# Patient Record
Sex: FEMALE | Race: WHITE | NOT HISPANIC OR LATINO
[De-identification: names, ages, dates, MRNs, and addresses within clinical notes are randomized per-mention and may not be internally consistent; named-entity substitution may affect disease eponyms.]

---

## 2022-12-29 ENCOUNTER — TRANSCRIPTION ENCOUNTER (OUTPATIENT)
Age: 72
End: 2022-12-29

## 2022-12-30 ENCOUNTER — RESULT REVIEW (OUTPATIENT)
Age: 72
End: 2022-12-30

## 2022-12-30 ENCOUNTER — INPATIENT (INPATIENT)
Facility: HOSPITAL | Age: 72
LOS: 12 days | Discharge: NURSING, REHAB OR LONG-TERM CARE | DRG: 23 | End: 2023-01-12
Attending: NEUROLOGICAL SURGERY | Admitting: NEUROLOGICAL SURGERY
Payer: MEDICARE

## 2022-12-30 ENCOUNTER — TRANSCRIPTION ENCOUNTER (OUTPATIENT)
Age: 72
End: 2022-12-30

## 2022-12-30 VITALS
OXYGEN SATURATION: 100 % | DIASTOLIC BLOOD PRESSURE: 73 MMHG | HEART RATE: 58 BPM | SYSTOLIC BLOOD PRESSURE: 168 MMHG | RESPIRATION RATE: 12 BRPM

## 2022-12-30 LAB
ANION GAP SERPL CALC-SCNC: 10 MMOL/L — SIGNIFICANT CHANGE UP (ref 5–17)
ANION GAP SERPL CALC-SCNC: 8 MMOL/L — SIGNIFICANT CHANGE UP (ref 5–17)
APTT BLD: 24.2 SEC — LOW (ref 27.5–35.5)
BASE EXCESS BLDA CALC-SCNC: -0.8 MMOL/L — SIGNIFICANT CHANGE UP (ref -2–3)
BLD GP AB SCN SERPL QL: NEGATIVE — SIGNIFICANT CHANGE UP
BLD GP AB SCN SERPL QL: NEGATIVE — SIGNIFICANT CHANGE UP
BUN SERPL-MCNC: 13 MG/DL — SIGNIFICANT CHANGE UP (ref 7–23)
BUN SERPL-MCNC: 13 MG/DL — SIGNIFICANT CHANGE UP (ref 7–23)
CALCIUM SERPL-MCNC: 7.4 MG/DL — LOW (ref 8.4–10.5)
CALCIUM SERPL-MCNC: 8.4 MG/DL — SIGNIFICANT CHANGE UP (ref 8.4–10.5)
CHLORIDE SERPL-SCNC: 102 MMOL/L — SIGNIFICANT CHANGE UP (ref 96–108)
CHLORIDE SERPL-SCNC: 104 MMOL/L — SIGNIFICANT CHANGE UP (ref 96–108)
CO2 BLDA-SCNC: 25 MMOL/L — HIGH (ref 19–24)
CO2 SERPL-SCNC: 24 MMOL/L — SIGNIFICANT CHANGE UP (ref 22–31)
CO2 SERPL-SCNC: 28 MMOL/L — SIGNIFICANT CHANGE UP (ref 22–31)
CREAT SERPL-MCNC: 0.62 MG/DL — SIGNIFICANT CHANGE UP (ref 0.5–1.3)
CREAT SERPL-MCNC: 0.65 MG/DL — SIGNIFICANT CHANGE UP (ref 0.5–1.3)
EGFR: 94 ML/MIN/1.73M2 — SIGNIFICANT CHANGE UP
EGFR: 95 ML/MIN/1.73M2 — SIGNIFICANT CHANGE UP
GAS PNL BLDA: SIGNIFICANT CHANGE UP
GLUCOSE SERPL-MCNC: 127 MG/DL — HIGH (ref 70–99)
GLUCOSE SERPL-MCNC: 155 MG/DL — HIGH (ref 70–99)
HCO3 BLDA-SCNC: 24 MMOL/L — SIGNIFICANT CHANGE UP (ref 21–28)
HCT VFR BLD CALC: 24.1 % — LOW (ref 34.5–45)
HCT VFR BLD CALC: 24.2 % — LOW (ref 34.5–45)
HCT VFR BLD CALC: 27.6 % — LOW (ref 34.5–45)
HGB BLD-MCNC: 8 G/DL — LOW (ref 11.5–15.5)
HGB BLD-MCNC: 8 G/DL — LOW (ref 11.5–15.5)
HGB BLD-MCNC: 9 G/DL — LOW (ref 11.5–15.5)
INR BLD: 1.25 — HIGH (ref 0.88–1.16)
MAGNESIUM SERPL-MCNC: 1.8 MG/DL — SIGNIFICANT CHANGE UP (ref 1.6–2.6)
MAGNESIUM SERPL-MCNC: 2 MG/DL — SIGNIFICANT CHANGE UP (ref 1.6–2.6)
MCHC RBC-ENTMCNC: 28.9 PG — SIGNIFICANT CHANGE UP (ref 27–34)
MCHC RBC-ENTMCNC: 29.3 PG — SIGNIFICANT CHANGE UP (ref 27–34)
MCHC RBC-ENTMCNC: 29.4 PG — SIGNIFICANT CHANGE UP (ref 27–34)
MCHC RBC-ENTMCNC: 32.6 GM/DL — SIGNIFICANT CHANGE UP (ref 32–36)
MCHC RBC-ENTMCNC: 33.1 GM/DL — SIGNIFICANT CHANGE UP (ref 32–36)
MCHC RBC-ENTMCNC: 33.2 GM/DL — SIGNIFICANT CHANGE UP (ref 32–36)
MCV RBC AUTO: 88.3 FL — SIGNIFICANT CHANGE UP (ref 80–100)
MCV RBC AUTO: 88.7 FL — SIGNIFICANT CHANGE UP (ref 80–100)
MCV RBC AUTO: 89 FL — SIGNIFICANT CHANGE UP (ref 80–100)
NRBC # BLD: 0 /100 WBCS — SIGNIFICANT CHANGE UP (ref 0–0)
PCO2 BLDA: 37 MMHG — SIGNIFICANT CHANGE UP (ref 32–45)
PH BLDA: 7.41 — SIGNIFICANT CHANGE UP (ref 7.35–7.45)
PHOSPHATE SERPL-MCNC: 3.5 MG/DL — SIGNIFICANT CHANGE UP (ref 2.5–4.5)
PHOSPHATE SERPL-MCNC: 3.8 MG/DL — SIGNIFICANT CHANGE UP (ref 2.5–4.5)
PLATELET # BLD AUTO: 198 K/UL — SIGNIFICANT CHANGE UP (ref 150–400)
PLATELET # BLD AUTO: 218 K/UL — SIGNIFICANT CHANGE UP (ref 150–400)
PLATELET # BLD AUTO: 236 K/UL — SIGNIFICANT CHANGE UP (ref 150–400)
PO2 BLDA: 183 MMHG — HIGH (ref 83–108)
POTASSIUM SERPL-MCNC: 3.9 MMOL/L — SIGNIFICANT CHANGE UP (ref 3.5–5.3)
POTASSIUM SERPL-MCNC: 3.9 MMOL/L — SIGNIFICANT CHANGE UP (ref 3.5–5.3)
POTASSIUM SERPL-SCNC: 3.9 MMOL/L — SIGNIFICANT CHANGE UP (ref 3.5–5.3)
POTASSIUM SERPL-SCNC: 3.9 MMOL/L — SIGNIFICANT CHANGE UP (ref 3.5–5.3)
PROTHROM AB SERPL-ACNC: 14.9 SEC — HIGH (ref 10.5–13.4)
RBC # BLD: 2.72 M/UL — LOW (ref 3.8–5.2)
RBC # BLD: 2.73 M/UL — LOW (ref 3.8–5.2)
RBC # BLD: 3.11 M/UL — LOW (ref 3.8–5.2)
RBC # FLD: 16.1 % — HIGH (ref 10.3–14.5)
RBC # FLD: 16.2 % — HIGH (ref 10.3–14.5)
RBC # FLD: 16.2 % — HIGH (ref 10.3–14.5)
RH IG SCN BLD-IMP: POSITIVE — SIGNIFICANT CHANGE UP
RH IG SCN BLD-IMP: POSITIVE — SIGNIFICANT CHANGE UP
SAO2 % BLDA: 98.4 % — HIGH (ref 94–98)
SARS-COV-2 RNA SPEC QL NAA+PROBE: SIGNIFICANT CHANGE UP
SODIUM SERPL-SCNC: 138 MMOL/L — SIGNIFICANT CHANGE UP (ref 135–145)
SODIUM SERPL-SCNC: 138 MMOL/L — SIGNIFICANT CHANGE UP (ref 135–145)
TROPONIN T SERPL-MCNC: 0.01 NG/ML — SIGNIFICANT CHANGE UP (ref 0–0.01)
WBC # BLD: 13.3 K/UL — HIGH (ref 3.8–10.5)
WBC # BLD: 13.63 K/UL — HIGH (ref 3.8–10.5)
WBC # BLD: 13.76 K/UL — HIGH (ref 3.8–10.5)
WBC # FLD AUTO: 13.3 K/UL — HIGH (ref 3.8–10.5)
WBC # FLD AUTO: 13.63 K/UL — HIGH (ref 3.8–10.5)
WBC # FLD AUTO: 13.76 K/UL — HIGH (ref 3.8–10.5)

## 2022-12-30 PROCEDURE — 61514 CRNEC TREPH EXC BRN ABS STTL: CPT

## 2022-12-30 PROCEDURE — 99291 CRITICAL CARE FIRST HOUR: CPT | Mod: 24

## 2022-12-30 PROCEDURE — 88309 TISSUE EXAM BY PATHOLOGIST: CPT | Mod: 26

## 2022-12-30 PROCEDURE — 88311 DECALCIFY TISSUE: CPT | Mod: 26

## 2022-12-30 DEVICE — SURGICEL 4 X 8": Type: IMPLANTABLE DEVICE | Status: FUNCTIONAL

## 2022-12-30 DEVICE — SURGIFLO HEMOSTATIC MATRIX KIT: Type: IMPLANTABLE DEVICE | Status: FUNCTIONAL

## 2022-12-30 DEVICE — COLLAGEN DURAMATRIX ONLAY 4X5IN: Type: IMPLANTABLE DEVICE | Status: FUNCTIONAL

## 2022-12-30 DEVICE — SURGIFOAM PAD 8CM X 12.5CM X 10MM (100): Type: IMPLANTABLE DEVICE | Status: FUNCTIONAL

## 2022-12-30 RX ORDER — ACETAMINOPHEN 500 MG
650 TABLET ORAL EVERY 6 HOURS
Refills: 0 | Status: DISCONTINUED | OUTPATIENT
Start: 2022-12-30 | End: 2022-12-31

## 2022-12-30 RX ORDER — DEXMEDETOMIDINE HYDROCHLORIDE IN 0.9% SODIUM CHLORIDE 4 UG/ML
0.2 INJECTION INTRAVENOUS
Qty: 400 | Refills: 0 | Status: DISCONTINUED | OUTPATIENT
Start: 2022-12-30 | End: 2022-12-31

## 2022-12-30 RX ORDER — VANCOMYCIN HCL 1 G
2000 VIAL (EA) INTRAVENOUS ONCE
Refills: 0 | Status: DISCONTINUED | OUTPATIENT
Start: 2022-12-31 | End: 2022-12-31

## 2022-12-30 RX ORDER — AMPICILLIN TRIHYDRATE 250 MG
CAPSULE ORAL
Refills: 0 | Status: DISCONTINUED | OUTPATIENT
Start: 2022-12-31 | End: 2022-12-31

## 2022-12-30 RX ORDER — LEVETIRACETAM 250 MG/1
1000 TABLET, FILM COATED ORAL EVERY 12 HOURS
Refills: 0 | Status: DISCONTINUED | OUTPATIENT
Start: 2022-12-30 | End: 2023-01-01

## 2022-12-30 RX ORDER — CEFEPIME 1 G/1
INJECTION, POWDER, FOR SOLUTION INTRAMUSCULAR; INTRAVENOUS
Refills: 0 | Status: DISCONTINUED | OUTPATIENT
Start: 2022-12-30 | End: 2022-12-30

## 2022-12-30 RX ORDER — FENTANYL CITRATE 50 UG/ML
50 INJECTION INTRAVENOUS
Refills: 0 | Status: DISCONTINUED | OUTPATIENT
Start: 2022-12-30 | End: 2022-12-31

## 2022-12-30 RX ORDER — AMPICILLIN TRIHYDRATE 250 MG
2 CAPSULE ORAL EVERY 4 HOURS
Refills: 0 | Status: DISCONTINUED | OUTPATIENT
Start: 2022-12-31 | End: 2022-12-31

## 2022-12-30 RX ORDER — PANTOPRAZOLE SODIUM 20 MG/1
40 TABLET, DELAYED RELEASE ORAL DAILY
Refills: 0 | Status: DISCONTINUED | OUTPATIENT
Start: 2022-12-30 | End: 2022-12-31

## 2022-12-30 RX ORDER — SENNA PLUS 8.6 MG/1
2 TABLET ORAL AT BEDTIME
Refills: 0 | Status: DISCONTINUED | OUTPATIENT
Start: 2022-12-30 | End: 2023-01-01

## 2022-12-30 RX ORDER — CHLORHEXIDINE GLUCONATE 213 G/1000ML
15 SOLUTION TOPICAL EVERY 12 HOURS
Refills: 0 | Status: DISCONTINUED | OUTPATIENT
Start: 2022-12-30 | End: 2022-12-31

## 2022-12-30 RX ORDER — AMPICILLIN TRIHYDRATE 250 MG
2 CAPSULE ORAL ONCE
Refills: 0 | Status: COMPLETED | OUTPATIENT
Start: 2022-12-30 | End: 2022-12-31

## 2022-12-30 RX ORDER — ONDANSETRON 8 MG/1
4 TABLET, FILM COATED ORAL EVERY 6 HOURS
Refills: 0 | Status: DISCONTINUED | OUTPATIENT
Start: 2022-12-30 | End: 2023-01-12

## 2022-12-30 RX ORDER — SODIUM CHLORIDE 9 MG/ML
1000 INJECTION INTRAMUSCULAR; INTRAVENOUS; SUBCUTANEOUS
Refills: 0 | Status: DISCONTINUED | OUTPATIENT
Start: 2022-12-30 | End: 2022-12-31

## 2022-12-30 NOTE — PROGRESS NOTE ADULT - SUBJECTIVE AND OBJECTIVE BOX
***********************************************  ADULT NSICU PROGRESS NOTE  JEMIMA DUFFY [id number] [facility] [id number]  ***********************************************    HPI: 73 yo RH F with no significant PMHx admitted to NSICU for management of subdural empyema/abscess in setting of pansinusitis.  Patient was transferred from [facility] in [place] where she originally presented with c/f AMS and dysarthria on 12/22.  Stroke alert was initially called, at which time CTH/CTP were unremarkable and the patient did not receive thrombolytic for unclear reason (though in retrospect we know this is CNS infection, no CVA).  Repeat stroke alert called 12/23, CTH showing SDH.  NSG recommending no surgical intervention.  Patient exhibited focal clinical seizure activity (twitching) for which she was empirically treated with LEV 1000/1000.  Ceribell showed no epileptiform activity.  UA(+) for which vanc/zosyn were started, but these abx were ultimately changed to vanc/CTX when a stability scan showed a L. frontoparietal low attenuation focus and an MRI showed a L. SD collection w/ difficusion restriction concerning for SD empyema and associated abscess.  Patient accepted by Dr. D'Amico to [facility] NSICU.    ROS: negative except per mentioned above in 24h interval events.      VITALS:    ICU Vital Signs Last 24 Hrs  T(C): 37.7 (30 Dec 2022 19:44), Max: 37.7 (30 Dec 2022 19:30)  T(F): 99.9 (30 Dec 2022 19:30), Max: 99.9 (30 Dec 2022 19:30)  HR: 60 (30 Dec 2022 19:44) (58 - 60)  BP: 168/73 (30 Dec 2022 19:44) (168/73 - 168/73)  BP(mean): 105 (30 Dec 2022 19:44) (105 - 105)  ABP: --  ABP(mean): --  RR: 12 (30 Dec 2022 19:44) (12 - 12)  SpO2: 99% (30 Dec 2022 19:44) (99% - 100%)    O2 Parameters below as of 30 Dec 2022 19:44      O2 Concentration (%): 50            I&O's Summary      EXAM:     Amorita Coma Scale: 1/1T/5 = 7    General: normocephalic, atraumatic, laying in bed, on vent & no sedation  Neuro     MS: Intubated and on mechanical ventilation, obtunded & not on sedation, eyes closed, but localizing to noxious stimuli    CN: PERRL, gaze conjugate, EOMI, face appears symmetric but activation not assessed    Mot: bulk normal, tone normal, antigravity bilateral UE (localizes), plain of bed bilateral LE (withdrawals)    Sens: intact to crude touch in bilateral upper and lower extremities  Chest: nonlabored respirations, coarse/mechanical breath sounds, heart regular rate/rhythm, present S1/S2, no murmurs or rubs  Abdomen: obese, soft and nontender without peritoneal signs, normoactive bowel sounds  Extremities: no clubbing, well-perfused, no edema    LABORATORY DATA:                            9.0    13.30 )-----------( 236      ( 30 Dec 2022 19:15 )             27.6     12-30    138  |  102  |  13  ----------------------------<  127<H>  3.9   |  28  |  0.62    Ca    8.4      30 Dec 2022 19:15  Phos  3.5     12-30  Mg     2.0     12-30      MEDICATIONS  (STANDING):  chlorhexidine 0.12% Liquid 15 milliLiter(s) Oral Mucosa every 12 hours    MEDICATIONS  (PRN):      ***********************************************  ASSESSMENT AND PLAN  ***********************************************    This is a case of a SD empyema/cerebral abscess in a 73 yo RH F with no significant PMHx (patient does not see physicians regularly).  Source of CNS infection likely pansinusitis.      NEURO  #Subdural empyema, cerebral abscess  #Clinical seizure activity at OSH  - Admit NSICU, Q1h neuro checks / Q1h vital signs  - OR for craniectomy/washout, Dr. D'Amico  - VEEG tomorrow if indicated and able from wound perspective  - LEV 1000/1000  - Upload OSH images  - ENT consult in AM  - Pain control, sedation w/ precedex    PULM  #Compromised airway due to neurological injury  #Mechanically ventilated  - SpO2 goal > 92%, supplemental O2 and pulm toileting as needed  - Baseline CXR/ABG    CARDIO  - BP goal: NORMOTENSION  - TTE, trend cardiac trop    GI  - Diet: NPO  - Stress ulcer prophylaxis: not indicated  - Bowel regimen, rectal tube in place from OSH    /RENAL  - Monitor UOP/volume status, BUN/SCr  - MIVF w/ D5 + 1/2NS    HEME  - Maintain Hb > 7.0, PLT > 100,000  - SCDs, holding chemoprophylaxis    ID  #CNS infection as above  - ID consultation, CT CAP w/ contrast, daily BCx until S/S identified, follow up surgical cultures, pan culture  - Vanco/CPM/ampicillin, but will defer abx selection to ID  - Monitor for infectious s/s, fever curve, leukocytosis    ENDO  - RASSI as needed    12-30-22 @ 22:11

## 2022-12-30 NOTE — H&P ADULT - HISTORY OF PRESENT ILLNESS
72F, txfered from [facility]. Presented to [facility] 7 days ago for AMS. CTH done 6 days ago showed spontaneous Left SD collection. MRI done today showed Left SD collection with diffusion restriction c/f empyema and infarct. Also showed Left sinus collection. Was also getting ceftriaxone for presumed UT at OSH.

## 2022-12-30 NOTE — H&P ADULT - NSHPLABSRESULTS_GEN_ALL_CORE
CTH done 6 days ago showed spontaneous Left SD collection. MRI done today showed Left SD collection with diffusion restriction c/f empyema and infarct. Also showed Left sinus collection. Was also getting ceftriaxone for presumed UT at OSH.

## 2022-12-31 LAB
ANION GAP SERPL CALC-SCNC: 8 MMOL/L — SIGNIFICANT CHANGE UP (ref 5–17)
APPEARANCE UR: CLEAR — SIGNIFICANT CHANGE UP
BACTERIA # UR AUTO: PRESENT /HPF
BILIRUB UR-MCNC: NEGATIVE — SIGNIFICANT CHANGE UP
BUN SERPL-MCNC: 13 MG/DL — SIGNIFICANT CHANGE UP (ref 7–23)
CALCIUM SERPL-MCNC: 7.9 MG/DL — LOW (ref 8.4–10.5)
CHLORIDE SERPL-SCNC: 99 MMOL/L — SIGNIFICANT CHANGE UP (ref 96–108)
CO2 SERPL-SCNC: 26 MMOL/L — SIGNIFICANT CHANGE UP (ref 22–31)
COLOR SPEC: YELLOW — SIGNIFICANT CHANGE UP
CREAT SERPL-MCNC: 0.63 MG/DL — SIGNIFICANT CHANGE UP (ref 0.5–1.3)
CRP SERPL-MCNC: 79.8 MG/L — HIGH (ref 0–4)
DIFF PNL FLD: ABNORMAL
EGFR: 95 ML/MIN/1.73M2 — SIGNIFICANT CHANGE UP
EPI CELLS # UR: SIGNIFICANT CHANGE UP /HPF (ref 0–5)
ERYTHROCYTE [SEDIMENTATION RATE] IN BLOOD: 122 MM/HR — HIGH
GLUCOSE BLDC GLUCOMTR-MCNC: 128 MG/DL — HIGH (ref 70–99)
GLUCOSE BLDC GLUCOMTR-MCNC: 137 MG/DL — HIGH (ref 70–99)
GLUCOSE BLDC GLUCOMTR-MCNC: 147 MG/DL — HIGH (ref 70–99)
GLUCOSE SERPL-MCNC: 147 MG/DL — HIGH (ref 70–99)
GLUCOSE UR QL: NEGATIVE — SIGNIFICANT CHANGE UP
GRAM STN FLD: SIGNIFICANT CHANGE UP
HCT VFR BLD CALC: 24.5 % — LOW (ref 34.5–45)
HCV AB S/CO SERPL IA: 0.04 S/CO — SIGNIFICANT CHANGE UP
HCV AB SERPL-IMP: SIGNIFICANT CHANGE UP
HGB BLD-MCNC: 7.9 G/DL — LOW (ref 11.5–15.5)
KETONES UR-MCNC: NEGATIVE — SIGNIFICANT CHANGE UP
LEUKOCYTE ESTERASE UR-ACNC: NEGATIVE — SIGNIFICANT CHANGE UP
MAGNESIUM SERPL-MCNC: 2.1 MG/DL — SIGNIFICANT CHANGE UP (ref 1.6–2.6)
MCHC RBC-ENTMCNC: 28.8 PG — SIGNIFICANT CHANGE UP (ref 27–34)
MCHC RBC-ENTMCNC: 32.2 GM/DL — SIGNIFICANT CHANGE UP (ref 32–36)
MCV RBC AUTO: 89.4 FL — SIGNIFICANT CHANGE UP (ref 80–100)
NITRITE UR-MCNC: NEGATIVE — SIGNIFICANT CHANGE UP
NRBC # BLD: 0 /100 WBCS — SIGNIFICANT CHANGE UP (ref 0–0)
PH UR: 5.5 — SIGNIFICANT CHANGE UP (ref 5–8)
PHOSPHATE SERPL-MCNC: 3.6 MG/DL — SIGNIFICANT CHANGE UP (ref 2.5–4.5)
PLATELET # BLD AUTO: 247 K/UL — SIGNIFICANT CHANGE UP (ref 150–400)
POTASSIUM SERPL-MCNC: 4 MMOL/L — SIGNIFICANT CHANGE UP (ref 3.5–5.3)
POTASSIUM SERPL-SCNC: 4 MMOL/L — SIGNIFICANT CHANGE UP (ref 3.5–5.3)
PROT UR-MCNC: NEGATIVE MG/DL — SIGNIFICANT CHANGE UP
RBC # BLD: 2.74 M/UL — LOW (ref 3.8–5.2)
RBC # FLD: 16.5 % — HIGH (ref 10.3–14.5)
RBC CASTS # UR COMP ASSIST: < 5 /HPF — SIGNIFICANT CHANGE UP
SODIUM SERPL-SCNC: 133 MMOL/L — LOW (ref 135–145)
SP GR SPEC: 1.01 — SIGNIFICANT CHANGE UP (ref 1–1.03)
SPECIMEN SOURCE: SIGNIFICANT CHANGE UP
UROBILINOGEN FLD QL: 0.2 E.U./DL — SIGNIFICANT CHANGE UP
VANCOMYCIN TROUGH SERPL-MCNC: 13.3 UG/ML — SIGNIFICANT CHANGE UP (ref 10–20)
WBC # BLD: 15.67 K/UL — HIGH (ref 3.8–10.5)
WBC # FLD AUTO: 15.67 K/UL — HIGH (ref 3.8–10.5)
WBC UR QL: < 5 /HPF — SIGNIFICANT CHANGE UP

## 2022-12-31 PROCEDURE — 70450 CT HEAD/BRAIN W/O DYE: CPT | Mod: 26

## 2022-12-31 PROCEDURE — 71045 X-RAY EXAM CHEST 1 VIEW: CPT | Mod: 26,77

## 2022-12-31 PROCEDURE — 36600 WITHDRAWAL OF ARTERIAL BLOOD: CPT | Mod: 59

## 2022-12-31 PROCEDURE — 71045 X-RAY EXAM CHEST 1 VIEW: CPT | Mod: 26

## 2022-12-31 PROCEDURE — 43752 NASAL/OROGASTRIC W/TUBE PLMT: CPT

## 2022-12-31 PROCEDURE — 36000 PLACE NEEDLE IN VEIN: CPT | Mod: 59

## 2022-12-31 PROCEDURE — 74177 CT ABD & PELVIS W/CONTRAST: CPT | Mod: 26

## 2022-12-31 PROCEDURE — 71260 CT THORAX DX C+: CPT | Mod: 26

## 2022-12-31 PROCEDURE — 99223 1ST HOSP IP/OBS HIGH 75: CPT

## 2022-12-31 PROCEDURE — 99291 CRITICAL CARE FIRST HOUR: CPT

## 2022-12-31 RX ORDER — ENOXAPARIN SODIUM 100 MG/ML
40 INJECTION SUBCUTANEOUS
Refills: 0 | Status: DISCONTINUED | OUTPATIENT
Start: 2022-12-31 | End: 2023-01-01

## 2022-12-31 RX ORDER — VANCOMYCIN HCL 1 G
2000 VIAL (EA) INTRAVENOUS EVERY 12 HOURS
Refills: 0 | Status: DISCONTINUED | OUTPATIENT
Start: 2022-12-31 | End: 2023-01-02

## 2022-12-31 RX ORDER — SODIUM CHLORIDE 0.65 %
1 AEROSOL, SPRAY (ML) NASAL
Refills: 0 | Status: DISCONTINUED | OUTPATIENT
Start: 2022-12-31 | End: 2023-01-12

## 2022-12-31 RX ORDER — FLUTICASONE PROPIONATE 50 MCG
1 SPRAY, SUSPENSION NASAL EVERY 12 HOURS
Refills: 0 | Status: DISCONTINUED | OUTPATIENT
Start: 2022-12-31 | End: 2023-01-12

## 2022-12-31 RX ORDER — INSULIN LISPRO 100/ML
VIAL (ML) SUBCUTANEOUS
Refills: 0 | Status: DISCONTINUED | OUTPATIENT
Start: 2022-12-31 | End: 2023-01-11

## 2022-12-31 RX ORDER — METRONIDAZOLE 500 MG
500 TABLET ORAL EVERY 8 HOURS
Refills: 0 | Status: DISCONTINUED | OUTPATIENT
Start: 2022-12-31 | End: 2022-12-31

## 2022-12-31 RX ORDER — DEXTROSE 50 % IN WATER 50 %
12.5 SYRINGE (ML) INTRAVENOUS ONCE
Refills: 0 | Status: DISCONTINUED | OUTPATIENT
Start: 2022-12-31 | End: 2022-12-31

## 2022-12-31 RX ORDER — CEFTRIAXONE 500 MG/1
INJECTION, POWDER, FOR SOLUTION INTRAMUSCULAR; INTRAVENOUS
Refills: 0 | Status: DISCONTINUED | OUTPATIENT
Start: 2022-12-31 | End: 2022-12-31

## 2022-12-31 RX ORDER — DEXTROSE 50 % IN WATER 50 %
25 SYRINGE (ML) INTRAVENOUS ONCE
Refills: 0 | Status: DISCONTINUED | OUTPATIENT
Start: 2022-12-31 | End: 2022-12-31

## 2022-12-31 RX ORDER — SODIUM CHLORIDE 9 MG/ML
1000 INJECTION INTRAMUSCULAR; INTRAVENOUS; SUBCUTANEOUS
Refills: 0 | Status: DISCONTINUED | OUTPATIENT
Start: 2022-12-31 | End: 2023-01-01

## 2022-12-31 RX ORDER — CHLORHEXIDINE GLUCONATE 213 G/1000ML
15 SOLUTION TOPICAL EVERY 12 HOURS
Refills: 0 | Status: DISCONTINUED | OUTPATIENT
Start: 2022-12-31 | End: 2022-12-31

## 2022-12-31 RX ORDER — CEFEPIME 1 G/1
2000 INJECTION, POWDER, FOR SOLUTION INTRAMUSCULAR; INTRAVENOUS EVERY 12 HOURS
Refills: 0 | Status: DISCONTINUED | OUTPATIENT
Start: 2022-12-31 | End: 2022-12-31

## 2022-12-31 RX ORDER — METRONIDAZOLE 500 MG
750 TABLET ORAL EVERY 8 HOURS
Refills: 0 | Status: DISCONTINUED | OUTPATIENT
Start: 2022-12-31 | End: 2023-01-03

## 2022-12-31 RX ORDER — CEFEPIME 1 G/1
2000 INJECTION, POWDER, FOR SOLUTION INTRAMUSCULAR; INTRAVENOUS ONCE
Refills: 0 | Status: COMPLETED | OUTPATIENT
Start: 2022-12-31 | End: 2022-12-31

## 2022-12-31 RX ORDER — CEFTRIAXONE 500 MG/1
2000 INJECTION, POWDER, FOR SOLUTION INTRAMUSCULAR; INTRAVENOUS EVERY 12 HOURS
Refills: 0 | Status: COMPLETED | OUTPATIENT
Start: 2022-12-31 | End: 2023-01-07

## 2022-12-31 RX ORDER — VANCOMYCIN HCL 1 G
2000 VIAL (EA) INTRAVENOUS EVERY 12 HOURS
Refills: 0 | Status: DISCONTINUED | OUTPATIENT
Start: 2022-12-31 | End: 2022-12-31

## 2022-12-31 RX ADMIN — FENTANYL CITRATE 50 MICROGRAM(S): 50 INJECTION INTRAVENOUS at 00:30

## 2022-12-31 RX ADMIN — Medication 100 MILLIGRAM(S): at 21:24

## 2022-12-31 RX ADMIN — ENOXAPARIN SODIUM 40 MILLIGRAM(S): 100 INJECTION SUBCUTANEOUS at 21:24

## 2022-12-31 RX ADMIN — CHLORHEXIDINE GLUCONATE 15 MILLILITER(S): 213 SOLUTION TOPICAL at 06:49

## 2022-12-31 RX ADMIN — Medication 500 MILLIGRAM(S): at 09:13

## 2022-12-31 RX ADMIN — LEVETIRACETAM 400 MILLIGRAM(S): 250 TABLET, FILM COATED ORAL at 18:00

## 2022-12-31 RX ADMIN — Medication 216 GRAM(S): at 06:02

## 2022-12-31 RX ADMIN — PANTOPRAZOLE SODIUM 40 MILLIGRAM(S): 20 TABLET, DELAYED RELEASE ORAL at 11:16

## 2022-12-31 RX ADMIN — Medication 1 SPRAY(S): at 18:32

## 2022-12-31 RX ADMIN — Medication 100 MILLIGRAM(S): at 13:51

## 2022-12-31 RX ADMIN — FENTANYL CITRATE 50 MICROGRAM(S): 50 INJECTION INTRAVENOUS at 01:17

## 2022-12-31 RX ADMIN — Medication 250 MILLIGRAM(S): at 02:09

## 2022-12-31 RX ADMIN — Medication 250 MILLIGRAM(S): at 17:42

## 2022-12-31 RX ADMIN — DEXMEDETOMIDINE HYDROCHLORIDE IN 0.9% SODIUM CHLORIDE 6.32 MICROGRAM(S)/KG/HR: 4 INJECTION INTRAVENOUS at 00:27

## 2022-12-31 RX ADMIN — Medication 1 SPRAY(S): at 23:16

## 2022-12-31 RX ADMIN — LEVETIRACETAM 400 MILLIGRAM(S): 250 TABLET, FILM COATED ORAL at 06:47

## 2022-12-31 RX ADMIN — SENNA PLUS 2 TABLET(S): 8.6 TABLET ORAL at 21:24

## 2022-12-31 RX ADMIN — CEFEPIME 100 MILLIGRAM(S): 1 INJECTION, POWDER, FOR SOLUTION INTRAMUSCULAR; INTRAVENOUS at 01:02

## 2022-12-31 RX ADMIN — Medication 216 GRAM(S): at 10:42

## 2022-12-31 RX ADMIN — Medication 216 GRAM(S): at 00:14

## 2022-12-31 RX ADMIN — CEFTRIAXONE 100 MILLIGRAM(S): 500 INJECTION, POWDER, FOR SOLUTION INTRAMUSCULAR; INTRAVENOUS at 17:42

## 2022-12-31 NOTE — CONSULT NOTE ADULT - SUBJECTIVE AND OBJECTIVE BOX
HPI:  72F, txfered from [facility]. Presented to [facility] 7 days ago for AMS. CTH done 6 days ago showed spontaneous Left SD collection. MRI done today showed Left SD collection with diffusion restriction c/f empyema and infarct. Also showed Left sinus collection. Was also getting ceftriaxone for presumed UT at OSH. (30 Dec 2022 19:41)      PAST MEDICAL & SURGICAL HISTORY:        REVIEW OF SYSTEMS:    UTO      ANTIBIOTICS:  MEDICATIONS  (STANDING):  cefTRIAXone   IVPB 2000 milliGRAM(s) IV Intermittent every 12 hours  dextrose 50% Injectable 25 Gram(s) IV Push once  dextrose 50% Injectable 12.5 Gram(s) IV Push once  enoxaparin Injectable 40 milliGRAM(s) SubCutaneous <User Schedule>  fluticasone propionate 50 MICROgram(s)/spray Nasal Spray 1 Spray(s) Both Nostrils every 12 hours  insulin lispro (ADMELOG) corrective regimen sliding scale   SubCutaneous Before meals and at bedtime  levETIRAcetam  IVPB 1000 milliGRAM(s) IV Intermittent every 12 hours  metroNIDAZOLE  IVPB 750 milliGRAM(s) IV Intermittent every 8 hours  senna 2 Tablet(s) Oral at bedtime  sodium chloride 0.65% Nasal 1 Spray(s) Both Nostrils four times a day  sodium chloride 0.9%. 1000 milliLiter(s) (30 mL/Hr) IV Continuous <Continuous>  vancomycin  IVPB 2000 milliGRAM(s) IV Intermittent every 12 hours    MEDICATIONS  (PRN):  ondansetron Injectable 4 milliGRAM(s) IV Push every 6 hours PRN Nausea and/or Vomiting      Allergies    Allergy Status Unknown    Intolerances        SOCIAL HISTORY:    FAMILY HISTORY:      Vital Signs Last 24 Hrs  T(C): 37.3 (31 Dec 2022 14:22), Max: 37.7 (30 Dec 2022 19:30)  T(F): 99.1 (31 Dec 2022 14:22), Max: 99.9 (30 Dec 2022 19:30)  HR: 75 (31 Dec 2022 15:00) (58 - 91)  BP: 136/57 (31 Dec 2022 15:00) (95/52 - 168/73)  BP(mean): 82 (31 Dec 2022 15:00) (71 - 105)  RR: 18 (31 Dec 2022 15:00) (12 - 20)  SpO2: 97% (31 Dec 2022 15:00) (97% - 100%)    Parameters below as of 31 Dec 2022 15:00  Patient On (Oxygen Delivery Method): room air        PHYSICAL EXAM:  Constitutional: NAD  Eyes: VISHAL, EOMI  Ear/Nose/Throat: no oral lesion, no sinus tenderness on percussion	  Neck: no JVD, no lymphadenopathy, supple  Respiratory: CTA larry  Cardiovascular: S1S2 RRR, no murmurs  Gastrointestinal: soft, (+) BS, no HSM  Extremities:no e/e/c  Vascular: DP Pulse: right normal; left normal            LABS:                        7.9    15.67 )-----------( 247      ( 31 Dec 2022 05:59 )             24.5     12-    133<L>  |  99  |  13  ----------------------------<  147<H>  4.0   |  26  |  0.63    Ca    7.9<L>      31 Dec 2022 05:59  Phos  3.6     12  Mg     2.1     12-      PT/INR - ( 30 Dec 2022 23:02 )   PT: 14.9 sec;   INR: 1.25          PTT - ( 30 Dec 2022 23:02 )  PTT:24.2 sec  Urinalysis Basic - ( 31 Dec 2022 07:25 )    Color: Yellow / Appearance: Clear / S.010 / pH: x  Gluc: x / Ketone: NEGATIVE  / Bili: Negative / Urobili: 0.2 E.U./dL   Blood: x / Protein: NEGATIVE mg/dL / Nitrite: NEGATIVE   Leuk Esterase: NEGATIVE / RBC: < 5 /HPF / WBC < 5 /HPF   Sq Epi: x / Non Sq Epi: 0-5 /HPF / Bacteria: Present /HPF        MICROBIOLOGY: Culture - Surgical Swab (22 @ 21:00)    Gram Stain:   Numerous White blood cells  No organisms seen    Specimen Source: .Surgical Swab 2- subdural infection or spec    Culture Results:   No growth to date        RADIOLOGY & ADDITIONAL STUDIES: < from: CT Head No Cont (22 @ 05:58) >  IMPRESSION:  1. Recent postsurgical changes as described above, with sulcal effacement   and patchy cerebral edema in the high left parietal and frontal lobes.   Comparison with any available prior studies would be helpful.  2. Pansinus mucosal disease with air-fluid levels, nonspecificfindings   in the setting of intubation.    < end of copied text >

## 2022-12-31 NOTE — CONSULT NOTE ADULT - ASSESSMENT
72F no PMH transferred from [place] with left subdural emypema s/p left hemicraniectomy and evacuation of empyema 12/30. ENT consulted for pansinusitis on imaging, MRI upload pending. On vanc, ampicillin, cefempime, flagyl. Afebrile, hemodynamically stable.    - No acute ENT surgical intervention at this time  - f/u MRI imaging upload  - continue IV abx per ID  - care per Neurosurgery/NSICU  - Notify ENT if any acute worsening  - d/w attending 72F no PMH transferred from [place] with left subdural emypema s/p left hemicraniectomy and evacuation of empyema 12/30. ENT consulted for pansinusitis on imaging, MRI upload pending. On vanc, ampicillin, cefempime, flagyl. OR cx 12/30 with GPC Afebrile, hemodynamically stable.    - No acute ENT surgical intervention at this time  - f/u MRI imaging upload  - continue IV abx per ID  - f/u Cx  - care per Neurosurgery/NSICU  - Notify ENT if any acute worsening  - d/w attending 72F no PMH transferred from [place] with left subdural emypema s/p left hemicraniectomy and evacuation of empyema 12/30. ENT consulted for pansinusitis on imaging, MRI upload pending. On vanc, ampicillin, cefempime, flagyl. OR cx 12/30 with GPC Afebrile, hemodynamically stable.    - No acute ENT surgical intervention at this time  - CT sinus stealth protocol  - f/u MRI imaging upload  - continue IV abx per ID  - f/u Cx  - care per Neurosurgery/NSICU  - Notify ENT if any acute worsening  - d/w attending 72F no PMH transferred from [place] with left subdural emypema s/p left hemicraniectomy and evacuation of empyema 12/30. ENT consulted for pansinusitis on imaging, MRI upload pending. On vanc, ampicillin, cefempime, flagyl. OR cx 12/30 with GPC Afebrile, hemodynamically stable.    - No acute ENT surgical intervention at this time  - CT sinus stealth protocol if stable enough  - f/u MRI imaging upload  - continue IV abx per ID  - f/u Cx  - care per Neurosurgery/NSICU  - Notify ENT if any acute worsening  - d/w attending 72F no PMH transferred from [place] with left subdural emypema s/p left hemicraniectomy and evacuation of empyema 12/30. ENT consulted for pansinusitis on imaging, MRI upload pending. On vanc, ampicillin, cefempime, flagyl. OR cx 12/30 with GPC Afebrile, hemodynamically stable.    - No acute ENT surgical intervention at this time  - CT sinus stealth protocol when stable  - Nasal saline QID  - Start flonase  - f/u MRI imaging upload  - continue IV abx per ID  - f/u Cx  - care per Neurosurgery/NSICU  - Notify ENT if any acute worsening  - d/w attending

## 2022-12-31 NOTE — PROGRESS NOTE ADULT - SUBJECTIVE AND OBJECTIVE BOX
HPI:  72F, txfered from [facility]. Presented to [facility] 7 days ago for AMS. CTH done 6 days ago showed spontaneous Left SD collection. MRI done today showed Left SD collection with diffusion restriction c/f empyema and infarct. Also showed Left sinus collection. Was also getting ceftriaxone for presumed UT at OSH. (30 Dec 2022 19:41)      Hospital Course:   12/31: Admitted to NSICU, s/p Left hemicraniectomy and washout of L subdural empyema. Pancultured. Vanc/cefepime/ampicillin/flagyl for empiric coverage.    Vital Signs Last 24 Hrs  T(C): 37.2 (31 Dec 2022 01:46), Max: 37.7 (30 Dec 2022 19:30)  T(F): 99 (31 Dec 2022 01:46), Max: 99.9 (30 Dec 2022 19:30)  HR: 67 (31 Dec 2022 02:00) (58 - 91)  BP: 105/51 (31 Dec 2022 02:00) (102/55 - 168/73)  BP(mean): 72 (31 Dec 2022 02:00) (72 - 105)  RR: 12 (31 Dec 2022 02:00) (12 - 14)  SpO2: 100% (31 Dec 2022 02:00) (99% - 100%)    Parameters below as of 31 Dec 2022 02:00  Patient On (Oxygen Delivery Method): ventilator    O2 Concentration (%): 40    I&O's Detail    30 Dec 2022 07:01  -  31 Dec 2022 02:51  --------------------------------------------------------  IN:    Dexmedetomidine: 50.2 mL    IV PiggyBack: 200 mL    sodium chloride 0.9%: 300 mL  Total IN: 550.2 mL    OUT:    Indwelling Catheter - Urethral (mL): 525 mL  Total OUT: 525 mL    Total NET: 25.2 mL        I&O's Summary    30 Dec 2022 07:01  -  31 Dec 2022 02:51  --------------------------------------------------------  IN: 550.2 mL / OUT: 525 mL / NET: 25.2 mL        PHYSICAL EXAM:  General: patient seen laying supine in bed intubated on FVS  Neuro: OE to noxious, LUE anti-gravity FC (squeezes hand), b/l LE FC (wiggles toes), RUE extensor posturing to noxious, +cough/gag/corneals   HEENT: L pupil 3 mm briskly reactive, R surgical pupil, +salem sump  Pulmonary: chest rise symmetric  Abdomen: soft, nontender, nondistended  Ext: perfusing well  Skin: warm, dry  Wound: L hemicraniectomy site c/d/i +dressing      TUBES/LINES:  [] CVC  [x] A-line  [] Lumbar Drain  [x] Ventriculostomy  [x] CHENCHO- x2  [] Cannon  [] NGT   [] Other    DIET:  [x] NPO  [] Mechanical  [] Tube feeds    LABS:                        8.0    13.76 )-----------( 218      ( 30 Dec 2022 23:02 )             24.2     12-30    138  |  104  |  13  ----------------------------<  155<H>  3.9   |  24  |  0.65    Ca    7.4<L>      30 Dec 2022 23:02  Phos  3.8     12-30  Mg     1.8     12-30      PT/INR - ( 30 Dec 2022 23:02 )   PT: 14.9 sec;   INR: 1.25          PTT - ( 30 Dec 2022 23:02 )  PTT:24.2 sec    CARDIAC MARKERS ( 30 Dec 2022 19:15 )  x     / 0.01 ng/mL / x     / x     / x          CAPILLARY BLOOD GLUCOSE          Drug Levels: [] N/A    CSF Analysis: [] N/A      Allergies    Allergy Status Unknown    Intolerances        Home Medications:      MEDICATIONS:  Antibiotics:  ampicillin  IVPB      ampicillin  IVPB 2 Gram(s) IV Intermittent every 4 hours  cefepime   IVPB 2000 milliGRAM(s) IV Intermittent every 12 hours  metroNIDAZOLE    Tablet 500 milliGRAM(s) Oral every 8 hours  vancomycin  IVPB 2000 milliGRAM(s) IV Intermittent every 12 hours    Neuro:  acetaminophen     Tablet .. 650 milliGRAM(s) Oral every 6 hours PRN  dexMEDEtomidine Infusion 0.2 MICROgram(s)/kG/Hr IV Continuous <Continuous>  fentaNYL    Injectable 50 MICROGram(s) IV Push every 2 hours PRN  levETIRAcetam  IVPB 1000 milliGRAM(s) IV Intermittent every 12 hours  ondansetron Injectable 4 milliGRAM(s) IV Push every 6 hours PRN    Anticoagulation:    OTHER:  chlorhexidine 0.12% Liquid 15 milliLiter(s) Oral Mucosa every 12 hours  pantoprazole  Injectable 40 milliGRAM(s) IV Push daily  senna 2 Tablet(s) Oral at bedtime    IVF:  sodium chloride 0.9%. 1000 milliLiter(s) IV Continuous <Continuous>    CULTURES:    RADIOLOGY & ADDITIONAL TESTS:  < from: Xray Chest 1 View AP/PA (12.31.22 @ 00:35) >  IMPRESSION:  Endotracheal tube in good position. Enteric tube with tip terminating in   the region of the gastric cardia, consider adjustment.        ******PRELIMINARY REPORT******      < end of copied text >      ASSESSMENT:  73yo F w/ no known PMHx transferred from [place] c/o slurred speech and right sided weakness. CTH initially negative, repeat on 12/23 showed L sided SDH, pts mental status worsened and CTH on 12/28 significant for L frontoparietal collection, MRI completed showed concern for possible empyema. S/p L hemicraniectomy and washout of L subdural empyema.     INTRACRANIALHEMORRIAGE    Handoff    Subdural empyema    Subdural empyema    Craniectomy or craniotomy, emergent    [id number]        PLAN:  NEURO   - neuro/vitals Q1/Q1  - Post op labs/CTH  - Post op VEEG if unable to arouse and able to from a wound perspective  - Cont Keppra 1000 BID  - Sedation w/ precedex, prn fentanyl pushes  - ENT consult  - 2 CHENCHO drains, monitor output     PULM   - AC/VC, baseline ABG/CXR, goal > 92%    CARDIO   - TTE  - < 140    GI   - NPO       - NS @75 while NPO    HEME   - SCDs, holding chemoppx    ID   - ID consult, CT CAP w/ IV con, daily BCx for now  - Pancultured 12/31, f/u cultures  - follow up OR cultures  - Start vanc/CPM/ampicillin/flagyl for empiric tx     ENDO  - repeat TFTs, RASS    D/w Dr. D'Amico       Assessment:  Present when checked    []  GCS  E   V  M     Heart Failure: []Acute, [] acute on chronic , []chronic  Heart Failure:  [] Diastolic (HFpEF), [] Systolic (HFrEF), []Combined (HFpEF and HFrEF), [] RHF, [] Pulm HTN, [] Other    [] DIONTE, [] ATN, [] AIN, [] other  [] CKD1, [] CKD2, [] CKD 3, [] CKD 4, [] CKD 5, []ESRD    Encephalopathy: [] Metabolic, [] Hepatic, [] toxic, [] Neurological, [] Other    Abnormal Nurtitional Status: [] malnurtition (see nutrition note), [ ]underweight: BMI < 19, [] morbid obesity: BMI >40, [] Cachexia    [] Sepsis  [] hypovolemic shock,[] cardiogenic shock, [] hemorrhagic shock, [] neuogenic shock  [] Acute Respiratory Failure  []Cerebral edema, [] Brain compression/ herniation,   [] Functional quadriplegia  [] Acute blood loss anemia

## 2022-12-31 NOTE — PROGRESS NOTE ADULT - ASSESSMENT
72y/F with  subdural empyema s/p L hemicraniectomy, evacuation, brain compression, cerebral edema  anemia  hyponatremia    PLAN:   NEURO: neurochecks q1h, PRN pain meds with acetaminophen, sedation with dexmedetomidine PRN fentanyl  VEEG, continue levetiracetam 1G BID  ENT consult  2 CHENCHO drains - keep for now, monitor output  REHAB:  physical therapy evaluation and management    EARLY MOB:  HOB up, bedrest    PULM:  wean from vent   CARDIO:  SBP goal 100-160mm Hg  ENDO:  Blood sugar goals 140-180 mg/dL, continue insulin sliding scale  GI:  PPI for GI prophylaxis while intubated  DIET: NPO  RENAL:   IVF, hyponatremia work-up, start 3%. recheck BMP this afternoon  HEM/ONC: check post-op Hb, anemia work-up  VTE Prophylaxis: SCDs, no DVT chemoprophylaxis for now as patient is high risk for bleed (fresh post-op); baseline LE Doppler for DVT suspected on admission (transfer)  ID: afebrile, leukocytosis, continue quad ABx, ID consult, f/u microbiologic work-up  Social: will update family    Active issues:  What's keeping patient in the ICU? intubated, fresh post-op  What is this patient's dispo plan?    ATTENDING ATTESTATION:  I was physically present for the key portions of the evaluation and management (E/M) service provided.  I agree with the above history, physical and plan, which I have reviewed and edited where appropriate.    Patient at high risk for neurological deterioration or death due to:  ICU delirium, aspiration PNA, DVT / PE.  Critical care time:  I have personally provided 45 minutes of critical care time, excluding time spent on separate procedures.      Plan discussed with RN, house staff. 72y/F with  subdural empyema s/p L hemicraniectomy, evacuation, brain compression, cerebral edema  anemia  hyponatremia    PLAN:   NEURO: neurochecks q1h, PRN pain meds with acetaminophen, sedation with dexmedetomidine RASS to 0 to -1, PRN fentanyl  defer VEEG for now, continue levetiracetam 1G BID  ENT consult, ?drainage  2 CHENCHO drains - keep for now, monitor output  REHAB:  physical therapy evaluation and management    EARLY MOB:  HOB up, bedrest    PULM:  wean from vent, CPAP 8/5   CARDIO:  SBP goal 100-160mm Hg  ENDO:  Blood sugar goals 140-180 mg/dL, continue insulin sliding scale  GI:  PPI for GI prophylaxis while intubated  DIET: NPO for extubation  RENAL:   IVF, hyponatremia work-up, start 3%. recheck BMP this afternoon  HEM/ONC: check post-op Hb, anemia work-up  VTE Prophylaxis: SCDs, no DVT chemoprophylaxis for now as patient is high risk for bleed (fresh post-op); baseline LE Doppler for DVT suspected on admission (transfer)  ID: afebrile, leukocytosis, continue quad ABx, ID consult, f/u microbiologic work-up; vanc trough prior to 4th dose, ESR CRP  Social: will update family (daughter)    Active issues:  What's keeping patient in the ICU? intubated, fresh post-op  What is this patient's dispo plan?    ATTENDING ATTESTATION:  I was physically present for the key portions of the evaluation and management (E/M) service provided.  I agree with the above history, physical and plan, which I have reviewed and edited where appropriate.    Patient at high risk for neurological deterioration or death due to:  ICU delirium, aspiration PNA, DVT / PE.  Critical care time:  I have personally provided 45 minutes of critical care time, excluding time spent on separate procedures.      Plan discussed with RN, house staff.

## 2022-12-31 NOTE — PROGRESS NOTE ADULT - SUBJECTIVE AND OBJECTIVE BOX
=================================  NEUROCRITICAL CARE ATTENDING NOTE  =================================    JEMIMA DUFFY   MRN-[id number]  Summary:  72y/F from [facility]. Presented 7 days ago with AMS. CTH done 6 days ago showed spontaneous Left SD collection. MRI done today showed Left SD collection with diffusion restriction c/f empyema and infarct. Also showed Left sinus collection. Was also getting ceftriaxone for presumed UT at OSH. (30 Dec 2022 19:41)    COURSE IN THE HOSPITAL:   Admitted to [facility], s/p OR       Past Medical History:   Allergies:  Allergy Status Unknown  Home meds:     PHYSICAL EXAMINATION  T(C): 37.7 ( @ 05:47), Max: 37.7 ( @ 19:30) HR: 70 ( @ 07:00) (58 - 91) BP: 95/52 ( @ 04:00) (95/52 - 168/73) RR: 14 ( @ 07:00) (12 - 17) SpO2: 100% ( @ 07:00) (99% - 100%)   NEUROLOGIC EXAMINATION:  Patient is    GENERAL:    EENT:  anicteric  CARDIOVASCULAR: (+) S1 S2, normal rate and regular rhythm  PULMONARY: clear to auscultation bilaterally  ABDOMEN: soft, nontender with normoactive bowel sounds  EXTREMITIES: no edema  SKIN: no rash    LABS:                        7.9    15.67 )-----------( 247      ( 31 Dec 2022 05:59 )             24.5     133<L>  |  99  |  13  ----------------------------<  147<H>  4.0   |  26  |  0.63    Ca    7.9<L>      31 Dec 2022 05:59  Phos  3.6       Mg     2.1      @ 07:01  -   @ 07:00  IN: 1131.4 mL / OUT: 1160 mL / NET: -28.6 mL    Bacteriology:  CSF studies:  EEG:  Neuroimagin/31 CT head: post-surgical changes, patchy cerebral edema high L parietal and frontal lobes, pansinus mucosal disease (near complete opacification of bilateral ethmoid and sphenoid sinuses), mod mucosal thickening R maxillary sinuses  Other imaging:    IV FLUIDS:  DRIPS:  DIET:  Lines:  Drains:      Bulb (mL): 60 mL  Wounds:    CODE STATUS:  Full Code                       GOALS OF CARE:  aggressive                      DISPOSITION:  ICU =================================  NEUROCRITICAL CARE ATTENDING NOTE  =================================    JEMIMA DUFFY   MRN-[id number]  Summary:  72y/F from [facility]. Presented 7 days ago with AMS. CTH done 6 days ago showed spontaneous Left SD collection. MRI done today showed Left SD collection with diffusion restriction c/f empyema and infarct. Also showed Left sinus collection. Was also getting ceftriaxone for presumed UT at OSH. (30 Dec 2022 19:41)    COURSE IN THE HOSPITAL:   Admitted to [facility], s/p OR   remained intubated overnight    Past Medical History:   Allergies:  Allergy Status Unknown  Home meds:     PHYSICAL EXAMINATION  T(C): 37.7 ( @ 05:47), Max: 37.7 ( @ 19:30) HR: 70 ( @ 07:00) (58 - 91) BP: 95/52 ( @ 04:00) (95/52 - 168/73) RR: 14 ( @ 07:00) (12 - 17) SpO2: 100% ( @ 07:00) (99% - 100%)   NEUROLOGIC EXAMINATION:  Patient is  awake, eyes open to voice, following commands, L 4mm brisk R 4mm sluggish, (+) cough/gag, L UE/LE 5/5, R UE withdraws to pain, R LE 4/5  GENERAL:  AC 50% 12 +5  EENT:  anicteric  CARDIOVASCULAR: (+) S1 S2, normal rate and regular rhythm  PULMONARY: clear to auscultation bilaterally  ABDOMEN: soft, nontender with normoactive bowel sounds  EXTREMITIES: no edema  SKIN: no rash    LABS:             (13.7)   7.9  (9.0)  15.67 )-----------( 247      ( 31 Dec 2022 05:59 )             24.5   (138)  133<L>  |  99  |  13  ----------------------------<  147<H>  4.0   |  26  |  0.63    Ca    7.9<L>      31 Dec 2022 05:59  Phos  3.6       Mg     2.1         CRP 79.8     @ 07:01  -   @ 07:00  IN: 1131.4 mL / OUT: 1160 mL / NET: -28.6 mL    Bacteriology:  UA NEG   surgical swab few GPCIPairs x1   surgical swab NEG x2    CSF studies:  EEG:  Neuroimagin/31 CT head: post-surgical changes, patchy cerebral edema high L parietal and frontal lobes, pansinus mucosal disease (near complete opacification of bilateral ethmoid and sphenoid sinuses), mod mucosal thickening R maxillary sinuses  Other imaging:    MEDICATIONS:     ·	ampicillin  IVPB 2 IV Intermittent every 4 hours  ·	cefepime   IVPB 2000 IV Intermittent every 12 hours  ·	metroNIDAZOLE    Tablet 500 Oral every 8 hours  ·	vancomycin  IVPB 2000 IV Intermittent every 12 hours  ·	levETIRAcetam  IVPB 1000 IV Intermittent every 12 hours  ·	pantoprazole  Injectable 40 IV Push daily  ·	senna 2 Oral at bedtime  ·	acetaminophen     Tablet .. 650 Oral every 6 hours PRN  ·	fentaNYL    Injectable 50 IV Push every 2 hours PRN  ·	ondansetron Injectable 4 IV Push every 6 hours PRN    IV FLUIDS: NS@75cc/hr  DRIPS: off precedex  DIET: NPO  Lines: R radial roz, midline  Drains:  Cannon     Bulb (mL): 60 mL  Bulb:  Wounds:    CODE STATUS:  Full Code                       GOALS OF CARE:  aggressive                      DISPOSITION:  ICU

## 2022-12-31 NOTE — CONSULT NOTE ADULT - ASSESSMENT
71 yo female p/w confusion, word-finding difficulty to OSH, found to have pansinusitis and L subdural empyema s/p L hemicraniotomy 12/30; per patient's daughter at bedside, unknown if patient had sinus infection preceding presentation and denies patient having recent dental work.   - f/u OR cx--GS with GPC in pairs  - continue vancomycin 2g IV q12h; check trough before 4th dose  - d/c cefepime; start ceftriaxone 2g IV q12h  - continue metronidazole--increase to 750mg IV q8h  - d/c ampicillin

## 2022-12-31 NOTE — CONSULT NOTE ADULT - SUBJECTIVE AND OBJECTIVE BOX
ENT Consult Note    HPI: 73 yo RH F with no significant PMHx admitted to NSICU for management of subdural empyema/abscess in setting of pansinusitis.  Patient was transferred from [facility] in [place] where she originally presented with c/f AMS and dysarthria on 12/22.  Stroke alert was initially called, at which time CTH/CTP were unremarkable and the patient did not receive thrombolytic for unclear reason (though in retrospect we know this is CNS infection, no CVA).  Repeat stroke alert called 12/23, CTH showing SDH.  NSG recommending no surgical intervention.  Patient exhibited focal clinical seizure activity (twitching) for which she was empirically treated with LEV 1000/1000.  Ceribell showed no epileptiform activity.  UA(+) for which vanc/zosyn were started, but these abx were ultimately changed to vanc/CTX when a stability scan showed a L. frontoparietal low attenuation focus and an MRI showed a L. SD collection w/ difficusion restriction concerning for SD empyema and associated abscess.  Patient accepted by Dr. D'Amico to [facility] NSICU    ENT Consult:  ENT consulted for sinusitis on imaging in setting of left subdural empyema s/p left hemicraniectomy and evacuation of empyema. Pt intubated at time of exam. CTH showing limited view of sinuses, pansinusitis involving sphenoids, ethmoid, frontal, and left maxillary>right maxillary. Air in frontal sinuses. Currently on vanc, cefepime, ampicillin, flagyl. ID following.    PAST MEDICAL & SURGICAL HISTORY:    MEDICATIONS  (STANDING):  ampicillin  IVPB      ampicillin  IVPB 2 Gram(s) IV Intermittent every 4 hours  cefepime   IVPB 2000 milliGRAM(s) IV Intermittent every 12 hours  dextrose 50% Injectable 25 Gram(s) IV Push once  dextrose 50% Injectable 12.5 Gram(s) IV Push once  enoxaparin Injectable 40 milliGRAM(s) SubCutaneous <User Schedule>  insulin lispro (ADMELOG) corrective regimen sliding scale   SubCutaneous Before meals and at bedtime  levETIRAcetam  IVPB 1000 milliGRAM(s) IV Intermittent every 12 hours  metroNIDAZOLE  IVPB 500 milliGRAM(s) IV Intermittent every 8 hours  pantoprazole  Injectable 40 milliGRAM(s) IV Push daily  senna 2 Tablet(s) Oral at bedtime  sodium chloride 0.9%. 1000 milliLiter(s) (75 mL/Hr) IV Continuous <Continuous>  vancomycin  IVPB 2000 milliGRAM(s) IV Intermittent every 12 hours    MEDICATIONS  (PRN):  ondansetron Injectable 4 milliGRAM(s) IV Push every 6 hours PRN Nausea and/or Vomiting    ICU Vital Signs Last 24 Hrs  T(C): 36.6 (31 Dec 2022 09:28), Max: 37.7 (30 Dec 2022 19:30)  T(F): 97.8 (31 Dec 2022 09:28), Max: 99.9 (30 Dec 2022 19:30)  HR: 79 (31 Dec 2022 13:00) (58 - 91)  BP: 123/56 (31 Dec 2022 13:00) (95/52 - 168/73)  BP(mean): 81 (31 Dec 2022 13:00) (71 - 105)  ABP: 149/50 (31 Dec 2022 13:00) (103/45 - 187/59)  ABP(mean): 78 (31 Dec 2022 13:00) (63 - 133)  RR: 20 (31 Dec 2022 13:00) (12 - 20)  SpO2: 100% (31 Dec 2022 13:00) (99% - 100%)    O2 Parameters below as of 31 Dec 2022 13:00  Patient On (Oxygen Delivery Method): mask, simple face    O2 Concentration (%): 40     ENT Consult Note    HPI: 73 yo RH F with no significant PMHx admitted to NSICU for management of subdural empyema/abscess in setting of pansinusitis.  Patient was transferred from [facility] in [place] where she originally presented with c/f AMS and dysarthria on 12/22.  Stroke alert was initially called, at which time CTH/CTP were unremarkable and the patient did not receive thrombolytic for unclear reason (though in retrospect we know this is CNS infection, no CVA).  Repeat stroke alert called 12/23, CTH showing SDH.  NSG recommending no surgical intervention.  Patient exhibited focal clinical seizure activity (twitching) for which she was empirically treated with LEV 1000/1000.  Ceribell showed no epileptiform activity.  UA(+) for which vanc/zosyn were started, but these abx were ultimately changed to vanc/CTX when a stability scan showed a L. frontoparietal low attenuation focus and an MRI showed a L. SD collection w/ difficusion restriction concerning for SD empyema and associated abscess.  Patient accepted by Dr. D'Amico to [facility] NSICU    ENT Consult:  ENT consulted for sinusitis on imaging in setting of left subdural empyema s/p left hemicraniectomy and evacuation of empyema. Pt intubated at time of exam. CTH showing limited view of sinuses, pansinusitis involving sphenoids, ethmoid, frontal, and left maxillary>right maxillary. Air in frontal sinuses. Currently on vanc, cefepime, ampicillin, flagyl. ID following.    PAST MEDICAL & SURGICAL HISTORY:    MEDICATIONS  (STANDING):  ampicillin  IVPB      ampicillin  IVPB 2 Gram(s) IV Intermittent every 4 hours  cefepime   IVPB 2000 milliGRAM(s) IV Intermittent every 12 hours  dextrose 50% Injectable 25 Gram(s) IV Push once  dextrose 50% Injectable 12.5 Gram(s) IV Push once  enoxaparin Injectable 40 milliGRAM(s) SubCutaneous <User Schedule>  insulin lispro (ADMELOG) corrective regimen sliding scale   SubCutaneous Before meals and at bedtime  levETIRAcetam  IVPB 1000 milliGRAM(s) IV Intermittent every 12 hours  metroNIDAZOLE  IVPB 500 milliGRAM(s) IV Intermittent every 8 hours  pantoprazole  Injectable 40 milliGRAM(s) IV Push daily  senna 2 Tablet(s) Oral at bedtime  sodium chloride 0.9%. 1000 milliLiter(s) (75 mL/Hr) IV Continuous <Continuous>  vancomycin  IVPB 2000 milliGRAM(s) IV Intermittent every 12 hours    MEDICATIONS  (PRN):  ondansetron Injectable 4 milliGRAM(s) IV Push every 6 hours PRN Nausea and/or Vomiting    ICU Vital Signs Last 24 Hrs  T(C): 36.6 (31 Dec 2022 09:28), Max: 37.7 (30 Dec 2022 19:30)  T(F): 97.8 (31 Dec 2022 09:28), Max: 99.9 (30 Dec 2022 19:30)  HR: 79 (31 Dec 2022 13:00) (58 - 91)  BP: 123/56 (31 Dec 2022 13:00) (95/52 - 168/73)  BP(mean): 81 (31 Dec 2022 13:00) (71 - 105)  ABP: 149/50 (31 Dec 2022 13:00) (103/45 - 187/59)  ABP(mean): 78 (31 Dec 2022 13:00) (63 - 133)  RR: 20 (31 Dec 2022 13:00) (12 - 20)  SpO2: 100% (31 Dec 2022 13:00) (99% - 100%)    O2 Parameters below as of 31 Dec 2022 13:00  Patient On (Oxygen Delivery Method): mask, simple face    O2 Concentration (%): 40    PHYSICAL EXAM:    CONSTITUTIONAL: Intubated and sedated.    HEAD: s/p left hemicraniectomy.  EARS: The right/left pinna was normal.   NOSE: Normal external nose. NGT in place, no judith drainage  ORAL CAVITY/OROPHARYNX: orally intubated  NECK: No cervical lymphadenopathy  RESPIRATORY: ETT, on vent.  CARDIAC: Warm extremities, no cyanosis.                          7.9    15.67 )-----------( 247      ( 31 Dec 2022 05:59 )             24.5     12-31    133<L>  |  99  |  13  ----------------------------<  147<H>  4.0   |  26  |  0.63    Ca    7.9<L>      31 Dec 2022 05:59  Phos  3.6     12-31  Mg     2.1     12-31       ENT Consult Note    HPI: 71 yo RH F with no significant PMHx admitted to NSICU for management of subdural empyema/abscess in setting of pansinusitis.  Patient was transferred from [facility] in [place] where she originally presented with c/f AMS and dysarthria on 12/22.  Stroke alert was initially called, at which time CTH/CTP were unremarkable and the patient did not receive thrombolytic for unclear reason (though in retrospect we know this is CNS infection, no CVA).  Repeat stroke alert called 12/23, CTH showing SDH.  NSG recommending no surgical intervention.  Patient exhibited focal clinical seizure activity (twitching) for which she was empirically treated with LEV 1000/1000.  Ceribell showed no epileptiform activity.  UA(+) for which vanc/zosyn were started, but these abx were ultimately changed to vanc/CTX when a stability scan showed a L. frontoparietal low attenuation focus and an MRI showed a L. SD collection w/ difficusion restriction concerning for SD empyema and associated abscess.  Patient accepted by Dr. D'Amico to [facility] NSICU    ENT Consult:  ENT consulted for sinusitis on imaging in setting of left subdural empyema s/p left hemicraniectomy and evacuation of empyema. Pt intubated at time of exam. CTH showing limited view of sinuses, pansinusitis involving sphenoids, ethmoid, frontal, and left maxillary>right maxillary. Air in frontal sinuses. Currently on vanc, cefepime, ampicillin, flagyl. ID following.    PAST MEDICAL & SURGICAL HISTORY:    MEDICATIONS  (STANDING):  ampicillin  IVPB      ampicillin  IVPB 2 Gram(s) IV Intermittent every 4 hours  cefepime   IVPB 2000 milliGRAM(s) IV Intermittent every 12 hours  dextrose 50% Injectable 25 Gram(s) IV Push once  dextrose 50% Injectable 12.5 Gram(s) IV Push once  enoxaparin Injectable 40 milliGRAM(s) SubCutaneous <User Schedule>  insulin lispro (ADMELOG) corrective regimen sliding scale   SubCutaneous Before meals and at bedtime  levETIRAcetam  IVPB 1000 milliGRAM(s) IV Intermittent every 12 hours  metroNIDAZOLE  IVPB 500 milliGRAM(s) IV Intermittent every 8 hours  pantoprazole  Injectable 40 milliGRAM(s) IV Push daily  senna 2 Tablet(s) Oral at bedtime  sodium chloride 0.9%. 1000 milliLiter(s) (75 mL/Hr) IV Continuous <Continuous>  vancomycin  IVPB 2000 milliGRAM(s) IV Intermittent every 12 hours    MEDICATIONS  (PRN):  ondansetron Injectable 4 milliGRAM(s) IV Push every 6 hours PRN Nausea and/or Vomiting    ICU Vital Signs Last 24 Hrs  T(C): 36.6 (31 Dec 2022 09:28), Max: 37.7 (30 Dec 2022 19:30)  T(F): 97.8 (31 Dec 2022 09:28), Max: 99.9 (30 Dec 2022 19:30)  HR: 79 (31 Dec 2022 13:00) (58 - 91)  BP: 123/56 (31 Dec 2022 13:00) (95/52 - 168/73)  BP(mean): 81 (31 Dec 2022 13:00) (71 - 105)  ABP: 149/50 (31 Dec 2022 13:00) (103/45 - 187/59)  ABP(mean): 78 (31 Dec 2022 13:00) (63 - 133)  RR: 20 (31 Dec 2022 13:00) (12 - 20)  SpO2: 100% (31 Dec 2022 13:00) (99% - 100%)    O2 Parameters below as of 31 Dec 2022 13:00  Patient On (Oxygen Delivery Method): mask, simple face    O2 Concentration (%): 40    PHYSICAL EXAM:    CONSTITUTIONAL: Intubated and sedated.    HEAD: s/p left hemicraniectomy.  EARS: The right/left pinna was normal.   NOSE: Normal external nose. NGT in place, no judith drainage  ORAL CAVITY/OROPHARYNX: orally intubated  NECK: No cervical lymphadenopathy  RESPIRATORY: ETT, on vent.  CARDIAC: Warm extremities, no cyanosis.                          7.9    15.67 )-----------( 247      ( 31 Dec 2022 05:59 )             24.5     12-31    133<L>  |  99  |  13  ----------------------------<  147<H>  4.0   |  26  |  0.63    Ca    7.9<L>      31 Dec 2022 05:59  Phos  3.6     12-31  Mg     2.1     12-31    CTH  1. Recent postsurgical changes as described above, with sulcal effacement and patchy cerebral edema in the high left parietal and frontal lobes. Comparison with any available prior studies would be helpful.  2. Pansinus mucosal disease with air-fluid levels, nonspecific findings in the setting of intubation.

## 2022-12-31 NOTE — CHART NOTE - NSCHARTNOTEFT_GEN_A_CORE
12/31: Admitted to NSICU, s/p Left hemicraniectomy and washout of L subdural empyema. Pancultured. Vanc/ceftriaxone/flagyl for empiric coverage. OR cultures demonstrated gram negative cocci in pairs. ENT and ID consulted, recommend cont vanc 2g, flagyl 750mg q8hrs, ceftriaxone 2g q12hrs. Extubated to room air, RUE/RLE strength improving. Pend CT sinus stealth protocol pend per ENT.

## 2023-01-01 LAB
A1C WITH ESTIMATED AVERAGE GLUCOSE RESULT: 6.1 % — HIGH (ref 4–5.6)
ANION GAP SERPL CALC-SCNC: 8 MMOL/L — SIGNIFICANT CHANGE UP (ref 5–17)
APTT BLD: 28.1 SEC — SIGNIFICANT CHANGE UP (ref 27.5–35.5)
BASOPHILS # BLD AUTO: 0.04 K/UL — SIGNIFICANT CHANGE UP (ref 0–0.2)
BASOPHILS NFR BLD AUTO: 0.3 % — SIGNIFICANT CHANGE UP (ref 0–2)
BUN SERPL-MCNC: 12 MG/DL — SIGNIFICANT CHANGE UP (ref 7–23)
CALCIUM SERPL-MCNC: 7.4 MG/DL — LOW (ref 8.4–10.5)
CHLORIDE SERPL-SCNC: 104 MMOL/L — SIGNIFICANT CHANGE UP (ref 96–108)
CO2 SERPL-SCNC: 27 MMOL/L — SIGNIFICANT CHANGE UP (ref 22–31)
CREAT SERPL-MCNC: 0.6 MG/DL — SIGNIFICANT CHANGE UP (ref 0.5–1.3)
CRP SERPL-MCNC: 133.2 MG/L — HIGH (ref 0–4)
EGFR: 96 ML/MIN/1.73M2 — SIGNIFICANT CHANGE UP
EOSINOPHIL # BLD AUTO: 0.19 K/UL — SIGNIFICANT CHANGE UP (ref 0–0.5)
EOSINOPHIL NFR BLD AUTO: 1.3 % — SIGNIFICANT CHANGE UP (ref 0–6)
ERYTHROCYTE [SEDIMENTATION RATE] IN BLOOD: 112 MM/HR — HIGH
ESTIMATED AVERAGE GLUCOSE: 128 MG/DL — HIGH (ref 68–114)
FERRITIN SERPL-MCNC: 593 NG/ML — HIGH (ref 15–150)
FOLATE SERPL-MCNC: 3.3 NG/ML — LOW
GLUCOSE BLDC GLUCOMTR-MCNC: 126 MG/DL — HIGH (ref 70–99)
GLUCOSE BLDC GLUCOMTR-MCNC: 149 MG/DL — HIGH (ref 70–99)
GLUCOSE BLDC GLUCOMTR-MCNC: 162 MG/DL — HIGH (ref 70–99)
GLUCOSE BLDC GLUCOMTR-MCNC: 165 MG/DL — HIGH (ref 70–99)
GLUCOSE SERPL-MCNC: 176 MG/DL — HIGH (ref 70–99)
HCT VFR BLD CALC: 20.9 % — CRITICAL LOW (ref 34.5–45)
HCT VFR BLD CALC: 22.6 % — LOW (ref 34.5–45)
HGB BLD-MCNC: 6.8 G/DL — CRITICAL LOW (ref 11.5–15.5)
HGB BLD-MCNC: 7.4 G/DL — LOW (ref 11.5–15.5)
IMM GRANULOCYTES NFR BLD AUTO: 1 % — HIGH (ref 0–0.9)
INR BLD: 1.27 — HIGH (ref 0.88–1.16)
IRON SATN MFR SERPL: 26 % — SIGNIFICANT CHANGE UP (ref 14–50)
IRON SATN MFR SERPL: 32 UG/DL — SIGNIFICANT CHANGE UP (ref 30–160)
LYMPHOCYTES # BLD AUTO: 1.79 K/UL — SIGNIFICANT CHANGE UP (ref 1–3.3)
LYMPHOCYTES # BLD AUTO: 12.6 % — LOW (ref 13–44)
MAGNESIUM SERPL-MCNC: 2 MG/DL — SIGNIFICANT CHANGE UP (ref 1.6–2.6)
MCHC RBC-ENTMCNC: 28.8 PG — SIGNIFICANT CHANGE UP (ref 27–34)
MCHC RBC-ENTMCNC: 29 PG — SIGNIFICANT CHANGE UP (ref 27–34)
MCHC RBC-ENTMCNC: 32.5 GM/DL — SIGNIFICANT CHANGE UP (ref 32–36)
MCHC RBC-ENTMCNC: 32.7 GM/DL — SIGNIFICANT CHANGE UP (ref 32–36)
MCV RBC AUTO: 88.6 FL — SIGNIFICANT CHANGE UP (ref 80–100)
MCV RBC AUTO: 88.6 FL — SIGNIFICANT CHANGE UP (ref 80–100)
MONOCYTES # BLD AUTO: 1 K/UL — HIGH (ref 0–0.9)
MONOCYTES NFR BLD AUTO: 7 % — SIGNIFICANT CHANGE UP (ref 2–14)
NEUTROPHILS # BLD AUTO: 11.08 K/UL — HIGH (ref 1.8–7.4)
NEUTROPHILS NFR BLD AUTO: 77.8 % — HIGH (ref 43–77)
NRBC # BLD: 0 /100 WBCS — SIGNIFICANT CHANGE UP (ref 0–0)
NRBC # BLD: 0 /100 WBCS — SIGNIFICANT CHANGE UP (ref 0–0)
PHOSPHATE SERPL-MCNC: 2.5 MG/DL — SIGNIFICANT CHANGE UP (ref 2.5–4.5)
PLATELET # BLD AUTO: 240 K/UL — SIGNIFICANT CHANGE UP (ref 150–400)
PLATELET # BLD AUTO: 243 K/UL — SIGNIFICANT CHANGE UP (ref 150–400)
POTASSIUM SERPL-MCNC: 3.5 MMOL/L — SIGNIFICANT CHANGE UP (ref 3.5–5.3)
POTASSIUM SERPL-SCNC: 3.5 MMOL/L — SIGNIFICANT CHANGE UP (ref 3.5–5.3)
PROTHROM AB SERPL-ACNC: 15.1 SEC — HIGH (ref 10.5–13.4)
RBC # BLD: 2.36 M/UL — LOW (ref 3.8–5.2)
RBC # BLD: 2.55 M/UL — LOW (ref 3.8–5.2)
RBC # FLD: 16.1 % — HIGH (ref 10.3–14.5)
RBC # FLD: 16.4 % — HIGH (ref 10.3–14.5)
SODIUM SERPL-SCNC: 139 MMOL/L — SIGNIFICANT CHANGE UP (ref 135–145)
TIBC SERPL-MCNC: 125 UG/DL — LOW (ref 220–430)
TRANSFERRIN SERPL-MCNC: 108 MG/DL — LOW (ref 200–360)
UIBC SERPL-MCNC: 93 UG/DL — LOW (ref 110–370)
VANCOMYCIN TROUGH SERPL-MCNC: 19.7 UG/ML — SIGNIFICANT CHANGE UP (ref 10–20)
VIT B12 SERPL-MCNC: 591 PG/ML — SIGNIFICANT CHANGE UP (ref 232–1245)
WBC # BLD: 13.43 K/UL — HIGH (ref 3.8–10.5)
WBC # BLD: 14.24 K/UL — HIGH (ref 3.8–10.5)
WBC # FLD AUTO: 13.43 K/UL — HIGH (ref 3.8–10.5)
WBC # FLD AUTO: 14.24 K/UL — HIGH (ref 3.8–10.5)

## 2023-01-01 PROCEDURE — 70486 CT MAXILLOFACIAL W/O DYE: CPT | Mod: 26

## 2023-01-01 PROCEDURE — 93970 EXTREMITY STUDY: CPT | Mod: 26

## 2023-01-01 PROCEDURE — 99292 CRITICAL CARE ADDL 30 MIN: CPT

## 2023-01-01 PROCEDURE — 99232 SBSQ HOSP IP/OBS MODERATE 35: CPT

## 2023-01-01 PROCEDURE — 99291 CRITICAL CARE FIRST HOUR: CPT

## 2023-01-01 RX ORDER — SODIUM,POTASSIUM PHOSPHATES 278-250MG
1 POWDER IN PACKET (EA) ORAL ONCE
Refills: 0 | Status: COMPLETED | OUTPATIENT
Start: 2023-01-01 | End: 2023-01-01

## 2023-01-01 RX ORDER — HYDROMORPHONE HYDROCHLORIDE 2 MG/ML
0.25 INJECTION INTRAMUSCULAR; INTRAVENOUS; SUBCUTANEOUS ONCE
Refills: 0 | Status: DISCONTINUED | OUTPATIENT
Start: 2023-01-01 | End: 2023-01-01

## 2023-01-01 RX ORDER — MAGNESIUM SULFATE 500 MG/ML
2 VIAL (ML) INJECTION ONCE
Refills: 0 | Status: COMPLETED | OUTPATIENT
Start: 2023-01-01 | End: 2023-01-01

## 2023-01-01 RX ORDER — ENOXAPARIN SODIUM 100 MG/ML
40 INJECTION SUBCUTANEOUS EVERY 12 HOURS
Refills: 0 | Status: DISCONTINUED | OUTPATIENT
Start: 2023-01-01 | End: 2023-01-04

## 2023-01-01 RX ORDER — POTASSIUM CHLORIDE 20 MEQ
40 PACKET (EA) ORAL ONCE
Refills: 0 | Status: COMPLETED | OUTPATIENT
Start: 2023-01-01 | End: 2023-01-01

## 2023-01-01 RX ORDER — HYDRALAZINE HCL 50 MG
10 TABLET ORAL
Refills: 0 | Status: DISCONTINUED | OUTPATIENT
Start: 2023-01-01 | End: 2023-01-02

## 2023-01-01 RX ORDER — LEVETIRACETAM 250 MG/1
1000 TABLET, FILM COATED ORAL
Refills: 0 | Status: DISCONTINUED | OUTPATIENT
Start: 2023-01-01 | End: 2023-01-08

## 2023-01-01 RX ORDER — SENNA PLUS 8.6 MG/1
2 TABLET ORAL AT BEDTIME
Refills: 0 | Status: DISCONTINUED | OUTPATIENT
Start: 2023-01-01 | End: 2023-01-10

## 2023-01-01 RX ADMIN — ENOXAPARIN SODIUM 40 MILLIGRAM(S): 100 INJECTION SUBCUTANEOUS at 18:05

## 2023-01-01 RX ADMIN — Medication 100 MILLIGRAM(S): at 13:29

## 2023-01-01 RX ADMIN — HYDROMORPHONE HYDROCHLORIDE 0.25 MILLIGRAM(S): 2 INJECTION INTRAMUSCULAR; INTRAVENOUS; SUBCUTANEOUS at 13:31

## 2023-01-01 RX ADMIN — Medication 1 SPRAY(S): at 08:22

## 2023-01-01 RX ADMIN — Medication 1 SPRAY(S): at 07:30

## 2023-01-01 RX ADMIN — Medication 250 MILLIGRAM(S): at 19:00

## 2023-01-01 RX ADMIN — Medication 1 PACKET(S): at 07:27

## 2023-01-01 RX ADMIN — Medication 1 SPRAY(S): at 18:21

## 2023-01-01 RX ADMIN — Medication 10 MILLIGRAM(S): at 09:55

## 2023-01-01 RX ADMIN — Medication 1 SPRAY(S): at 13:00

## 2023-01-01 RX ADMIN — Medication 2: at 18:00

## 2023-01-01 RX ADMIN — HYDROMORPHONE HYDROCHLORIDE 0.25 MILLIGRAM(S): 2 INJECTION INTRAMUSCULAR; INTRAVENOUS; SUBCUTANEOUS at 13:01

## 2023-01-01 RX ADMIN — Medication 100 MILLIGRAM(S): at 06:52

## 2023-01-01 RX ADMIN — LEVETIRACETAM 1000 MILLIGRAM(S): 250 TABLET, FILM COATED ORAL at 18:02

## 2023-01-01 RX ADMIN — Medication 25 GRAM(S): at 12:00

## 2023-01-01 RX ADMIN — Medication 40 MILLIEQUIVALENT(S): at 07:26

## 2023-01-01 RX ADMIN — CEFTRIAXONE 100 MILLIGRAM(S): 500 INJECTION, POWDER, FOR SOLUTION INTRAMUSCULAR; INTRAVENOUS at 18:05

## 2023-01-01 RX ADMIN — Medication 1 SPRAY(S): at 23:56

## 2023-01-01 RX ADMIN — Medication 250 MILLIGRAM(S): at 09:00

## 2023-01-01 RX ADMIN — CEFTRIAXONE 100 MILLIGRAM(S): 500 INJECTION, POWDER, FOR SOLUTION INTRAMUSCULAR; INTRAVENOUS at 09:00

## 2023-01-01 RX ADMIN — Medication 2: at 06:55

## 2023-01-01 RX ADMIN — SENNA PLUS 2 TABLET(S): 8.6 TABLET ORAL at 21:49

## 2023-01-01 RX ADMIN — Medication 100 MILLIGRAM(S): at 21:49

## 2023-01-01 RX ADMIN — LEVETIRACETAM 400 MILLIGRAM(S): 250 TABLET, FILM COATED ORAL at 06:31

## 2023-01-01 NOTE — DIETITIAN INITIAL EVALUATION ADULT - OTHER CALCULATIONS
Based on Standards of Care pt within % IBW thus actual body weight used for all calculations. Needs adjusted for advanced age and critical care, non-vented needs (w/BMI >40 taken into account). Fluid recs per team.

## 2023-01-01 NOTE — OCCUPATIONAL THERAPY INITIAL EVALUATION ADULT - RANGE OF MOTION EXAMINATION, UPPER EXTREMITY
**RUE AROM shoulder flexion/extension ~75%, elbow flexion/extension ~75%, unable to assess wrist AROM 2/2 IV placement, no AROM noted in R thumb and digits/Left UE Active ROM was WNL (within normal limits)

## 2023-01-01 NOTE — SWALLOW BEDSIDE ASSESSMENT ADULT - PHARYNGEAL PHASE
Multiple swallows with thin liquids (3-4) despite small bolus size. Weak cough x1 noted after thin liquid trial. No overt signs of airway protection deficits noted with ice chip trials. Cannot rule out aspiration at bedside./Cough post oral intake/Multiple swallows Multiple swallows with thin liquids (3-4) despite small bolus size. Weak cough x1 noted after thin liquid trial. No overt signs of airway protection deficits noted with ice chip trials. Cannot rule out aspiration at bedside. Unable to assess vocal quality./Cough post oral intake/Multiple swallows

## 2023-01-01 NOTE — OCCUPATIONAL THERAPY INITIAL EVALUATION ADULT - GROSSLY INTACT, SENSORY
unable to accurately assess as pt unable to verbalize needs, and not following directions to nod yes/no or use hand signals

## 2023-01-01 NOTE — OCCUPATIONAL THERAPY INITIAL EVALUATION ADULT - NS ASR FOLLOW COMMAND OT EVAL
Able to follow ~75% single step commands and ~25% 2-step commands. Pt non-verbal throughout. Responds better to visual cues compared to verbal cues.

## 2023-01-01 NOTE — OCCUPATIONAL THERAPY INITIAL EVALUATION ADULT - VISUAL ASSESSMENT: TRACKING
able to track with (R) eye with increased verbal cues as pt with difficulty maintaining attention, L eye swollen shut

## 2023-01-01 NOTE — OCCUPATIONAL THERAPY INITIAL EVALUATION ADULT - MODIFIED CLINICAL TEST OF SENSORY INTEGRATION IN BALANCE TEST
Pt tolerated sitting upright at EOB for ~10 minutes with min-max A to maintain midline position as pt with posterior and R side lean

## 2023-01-01 NOTE — DIETITIAN INITIAL EVALUATION ADULT - COLLABORATION WITH OTHER PROVIDERS
RD has collaborated with team in regards to diet/EN/PN recs, ONS/nourishment recs, pt's overall nutritional status. Paged team.

## 2023-01-01 NOTE — SWALLOW BEDSIDE ASSESSMENT ADULT - ADDITIONAL RECOMMENDATIONS
Allow 2-3 ice chips/hour for swallow practice and hydration. This svc will f/u within 48 hours for reassessment.

## 2023-01-01 NOTE — CHART NOTE - NSCHARTNOTEFT_GEN_A_CORE
POD2 DEEPALI o/n, neuro stable, tolerating RA post extubation, pending CT sinus w/ stealth protocol per ENT. Blood consent obtained and pt transfused 1u prbc for HGB 7.9-> 6.8 -> 7.4. CHENCHO drain removed.  LE dopplers + B/L peroneal IM calf DVT, SQL increased to BID prophylactic dosing. Failed swallow eval, remains on tube feeds at goal.

## 2023-01-01 NOTE — PROCEDURE NOTE - GENERAL PROCEDURE DETAILS
Skin and drain site was prepped with chlorhexidine. Drain was taken off suction and anchoring suture was removed. Drain was pulled without resistance and tip was visualized and intact. 1 staple was placed at drain exit site.

## 2023-01-01 NOTE — PROGRESS NOTE ADULT - ASSESSMENT
72y/F with  subdural empyema s/p L hemicraniectomy, evacuation, brain compression, cerebral edema  anemia  hyponatremia  prediabetes  bilateral DVT (present on admission)    PLAN:   NEURO: neurochecks q2h, VSq1h, PRN pain meds with acetaminophen  seizure treatment: continue levetiracetam 1G PO BID  ENT consult, CT sinuses, fluticasone nasal spray  1 CHENCHO drain, monitor output  REHAB:  physical therapy evaluation and management    EARLY MOB:  HOB up, bedrest    PULM:  room air  CARDIO:  SBP goal 100-160mm Hg, PRN antihypertensives  ENDO:  Blood sugar goals 140-180 mg/dL, continue insulin sliding scale  GI:  bowel regimen   DIET: Jevity at goal   RENAL:   IVF, Na goal 135-145   HEM/ONC: Hb low - given 1 unit, get post-tranfusion CBC  VTE Prophylaxis: SCDs, SQL BID, bilateral DVT  ID: afebrile, leukocytosis improving , continue triple ABx, - Vanc ceftriaxone MNZ; ID consulted,  f/u microbiologic work-up; f/u vanc trough. ESR CRP - every 5 days  Social: daughter updated yesterday     Active issues:  What's keeping patient in the ICU? close neurochecks, aspiration risk  What is this patient's dispo plan? early stepdown tomorrow    ATTENDING ATTESTATION:  I was physically present for the key portions of the evaluation and management (E/M) service provided.  I agree with the above history, physical and plan, which I have reviewed and edited where appropriate.    Patient at high risk for neurological deterioration or death due to:  ICU delirium, aspiration PNA, DVT / PE.  Critical care time:  I have personally provided 45 minutes of critical care time, excluding time spent on separate procedures.      Plan discussed with RN, house staff. 72y/F with  subdural empyema s/p L hemicraniectomy, evacuation, brain compression, cerebral edema  anemia  hyponatremia  prediabetes  bilateral DVT (present on admission)    PLAN:   NEURO: neurochecks q2h, VSq1h, PRN pain meds with acetaminophen  seizure treatment: continue levetiracetam 1G PO BID  ENT consult, CT sinuses, fluticasone nasal spray  1 CHENCHO drain, monitor output  REHAB:  physical therapy evaluation and management    EARLY MOB:  HOB up, bedrest    PULM:  room air  CARDIO:  SBP goal 100-160mm Hg, PRN antihypertensives  ENDO:  Blood sugar goals 140-180 mg/dL, continue insulin sliding scale  GI:  bowel regimen   DIET: Jevity at goal   RENAL:   IVF, Na goal 135-145   HEM/ONC: Hb low - given 1 unit, get post-tranfusion CBC  VTE Prophylaxis: SCDs, SQL BID, bilateral DVT  ID: afebrile, leukocytosis improving , continue triple ABx, - Vanc ceftriaxone MNZ; ID consulted,  f/u microbiologic work-up; f/u vanc trough. ESR CRP - every 5 days  Social: daughter updated yesterday     Active issues:  What's keeping patient in the ICU? close neurochecks, aspiration risk  What is this patient's dispo plan? early stepdown tomorrow    ATTENDING ATTESTATION:  I was physically present for the key portions of the evaluation and management (E/M) service provided.  I agree with the above history, physical and plan, which I have reviewed and edited where appropriate.    Patient at high risk for neurological deterioration or death due to:  ICU delirium, aspiration PNA, DVT / PE.  Critical care time:  I have personally provided 30 minutes of critical care time, excluding time spent on separate procedures.      Plan discussed with RN, house staff.

## 2023-01-01 NOTE — PROGRESS NOTE ADULT - SUBJECTIVE AND OBJECTIVE BOX
HPI:  72F, txfered from [facility]. Presented to [facility] 7 days ago for AMS. CTH done 6 days ago showed spontaneous Left SD collection. MRI done today showed Left SD collection with diffusion restriction c/f empyema and infarct. Also showed Left sinus collection. Was also getting ceftriaxone for presumed UT at OSH. (30 Dec 2022 19:41)    Hospital Course:   : Admitted to NSICU, s/p Left hemicraniectomy and washout of L subdural empyema. Pancultured. Vanc/ceftriaxone/flagyl for empiric coverage. OR cultures demonstrated gram negative cocci in pairs. ENT and ID consulted, recommend cont vanc 2g, flagyl 750mg q8hrs, ceftriaxone 2g q12hrs. Extubated to room air, RUE/RLE strength improving. Pend CT sinus stealth protocol pend per ENT.   : DEEPALI o/n, neuro stable, tolerating RA post extubation, pending CT sinus w/ stealth protocol per ENT.         Vital Signs Last 24 Hrs  T(C): 37.3 (2023 01:09), Max: 37.8 (31 Dec 2022 18:02)  T(F): 99.2 (2023 01:09), Max: 100.1 (31 Dec 2022 18:02)  HR: 73 (2023 01:00) (60 - 82)  BP: 138/59 (2023 01:00) (95/52 - 160/69)  BP(mean): 85 (2023 01:00) (71 - 99)  RR: 15 (2023 01:00) (12 - 20)  SpO2: 98% (2023 01:00) (96% - 100%)    Parameters below as of 2023 01:00  Patient On (Oxygen Delivery Method): room air        I&O's Detail    30 Dec 2022 07:01  -  31 Dec 2022 07:00  --------------------------------------------------------  IN:    Dexmedetomidine: 56.4 mL    IV PiggyBack: 550 mL    sodium chloride 0.9%: 525 mL  Total IN: 1131.4 mL    OUT:    Bulb (mL): 60 mL    Indwelling Catheter - Urethral (mL): 1100 mL    Rectal Tube (mL): 0 mL  Total OUT: 1160 mL    Total NET: -28.6 mL      31 Dec 2022 07:01  -  2023 02:16  --------------------------------------------------------  IN:    Enteral Tube Flush: 60 mL    IV PiggyBack: 850 mL    Jevity 1.2: 200 mL    sodium chloride 0.9%: 330 mL    sodium chloride 0.9%: 600 mL  Total IN: 2040 mL    OUT:    Bulb (mL): 105 mL    Bulb (mL): 0 mL    Dexmedetomidine: 0 mL    Indwelling Catheter - Urethral (mL): 350 mL    Rectal Tube (mL): 0 mL    Voided (mL): 550 mL  Total OUT: 1005 mL    Total NET: 1035 mL        I&O's Summary    30 Dec 2022 07:01  -  31 Dec 2022 07:00  --------------------------------------------------------  IN: 1131.4 mL / OUT: 1160 mL / NET: -28.6 mL    31 Dec 2022 07:01  -  2023 02:16  --------------------------------------------------------  IN: 2040 mL / OUT: 1005 mL / NET: 1035 mL        PHYSICAL EXAM:  General: patient seen laying supine in bed in NAD  Neuro: OE to voice, smiles to command, R facial, LUE FC (squeezes hand) 4/5, LLE 4/5, RUE 3/5 (attempts to squeeze hand), RLE 3/5   HEENT: L pupil 4 mm reactive, R surgical pupil, EOMI  Pulmonary: chest rise symmetric  Abdomen: soft, nontender, nondistended  Ext: perfusing well  Skin: warm, dry  Wounds: L crani incision site c/d/i with dressing in place       TUBES/LINES:  [] CVC  [] A-line  [] Lumbar Drain  [] Ventriculostomy  [x] CHENCHO - 2x  [] Cannon  [x] NGT   [] Other    DIET:  [] NPO  [] Mechanical  [x] Tube feeds - NGT     LABS:                        7.9    15.67 )-----------( 247      ( 31 Dec 2022 05:59 )             24.5     12-    133<L>  |  99  |  13  ----------------------------<  147<H>  4.0   |  26  |  0.63    Ca    7.9<L>      31 Dec 2022 05:59  Phos  3.6       Mg     2.1     -      PT/INR - ( 30 Dec 2022 23:02 )   PT: 14.9 sec;   INR: 1.25          PTT - ( 30 Dec 2022 23:02 )  PTT:24.2 sec  Urinalysis Basic - ( 31 Dec 2022 07:25 )    Color: Yellow / Appearance: Clear / S.010 / pH: x  Gluc: x / Ketone: NEGATIVE  / Bili: Negative / Urobili: 0.2 E.U./dL   Blood: x / Protein: NEGATIVE mg/dL / Nitrite: NEGATIVE   Leuk Esterase: NEGATIVE / RBC: < 5 /HPF / WBC < 5 /HPF   Sq Epi: x / Non Sq Epi: 0-5 /HPF / Bacteria: Present /HPF      CARDIAC MARKERS ( 30 Dec 2022 19:15 )  x     / 0.01 ng/mL / x     / x     / x          CAPILLARY BLOOD GLUCOSE      POCT Blood Glucose.: 147 mg/dL (31 Dec 2022 21:49)  POCT Blood Glucose.: 137 mg/dL (31 Dec 2022 15:17)  POCT Blood Glucose.: 128 mg/dL (31 Dec 2022 11:24)      Drug Levels: [] N/A  Vancomycin Level, Trough: 13.3 ug/mL ( @ 16:19)    CSF Analysis: [] N/A      Allergies    Allergy Status Unknown    Intolerances        Home Medications:      MEDICATIONS:  Antibiotics:  cefTRIAXone   IVPB 2000 milliGRAM(s) IV Intermittent every 12 hours  metroNIDAZOLE  IVPB 750 milliGRAM(s) IV Intermittent every 8 hours  vancomycin  IVPB 2000 milliGRAM(s) IV Intermittent every 12 hours    Neuro:  levETIRAcetam  IVPB 1000 milliGRAM(s) IV Intermittent every 12 hours  ondansetron Injectable 4 milliGRAM(s) IV Push every 6 hours PRN    Anticoagulation:  enoxaparin Injectable 40 milliGRAM(s) SubCutaneous <User Schedule>    OTHER:  fluticasone propionate 50 MICROgram(s)/spray Nasal Spray 1 Spray(s) Both Nostrils every 12 hours  insulin lispro (ADMELOG) corrective regimen sliding scale   SubCutaneous Before meals and at bedtime  senna 2 Tablet(s) Oral at bedtime  sodium chloride 0.65% Nasal 1 Spray(s) Both Nostrils four times a day    IVF:  sodium chloride 0.9%. 1000 milliLiter(s) IV Continuous <Continuous>    CULTURES:  Culture Results:   No growth at 12 hours ( @ 02:42)  Culture Results:   No growth at 12 hours ( @ 02:42)    RADIOLOGY & ADDITIONAL TESTS:  < from: CT Head No Cont (22 @ 05:58) >  IMPRESSION:  1. Recent postsurgical changes as described above, with sulcal effacement   and patchy cerebral edema in the high left parietal and frontal lobes.   Comparison with any available prior studies would be helpful.  2. Pansinus mucosal disease with air-fluid levels, nonspecificfindings   in the setting of intubation.    --- End of Report ---    < end of copied text >  < from: Xray Chest 1 View- PORTABLE-Urgent (Xray Chest 1 View- PORTABLE-Urgent .) (22 @ 14:29) >  IMPRESSION: The upper lung zones are out of field of view. No focal   consolidation in the visualized lungs. There are no pleural effusions.   Interval placement of nasogastric tube with tip overlying the stomach.        ******PRELIMINARY REPORT******      < end of copied text >      ASSESSMENT:  71yo F w/ no known PMHx transferred from [place] c/o slurred speech and right sided weakness. CTH initially negative, repeat on  showed L sided SDH, pts mental status worsened and CTH on  significant for L frontoparietal collection, MRI completed showed concern for possible empyema. S/p L hemicraniectomy and washout of L subdural empyema 22.     INTRACRANIALHEMORRIAGE    Handoff    Subdural empyema    Subdural empyema    Craniectomy or craniotomy, emergent    [id number]        PLAN:  NEURO   - neuro/vitals Q1/Q1  - Post op CTH complete   - CT sinuses pending per ENT rec   - Cont Keppra 1000 BID  - Upload OSH images  - 2 CHENCHO drains, monitor output     PULM   - Extubated   - Satting well on room air     CARDIO   - TTE  -  < 160    GI   - NPO  - bowel regimen  - protonix   - rectal tube d/c'ed     RENAL  - NS @30  - voiding  - i's and o's     ENDO  - ISS  - monitor finger sticks   - repeat TFTs, RASS    HEME   - SCDs, SQL (q24 dosing)   - downtrending hgb  - iron deficiency anemia work up pending   - FOB pending     ID   - ID consult,   - Pancultured , f/u cultures  - follow up OR cultures  - vanc/ceftriaxone/metronidazole for presumed CNS infection (Pus in OR)     Dispo: Pending, pending PT/OT when appropriate     Discussed with Dr. D'Amico       Assessment:  Present when checked    []  GCS  E   V  M     Heart Failure: []Acute, [] acute on chronic , []chronic  Heart Failure:  [] Diastolic (HFpEF), [] Systolic (HFrEF), []Combined (HFpEF and HFrEF), [] RHF, [] Pulm HTN, [] Other    [] DIONTE, [] ATN, [] AIN, [] other  [] CKD1, [] CKD2, [] CKD 3, [] CKD 4, [] CKD 5, []ESRD    Encephalopathy: [] Metabolic, [] Hepatic, [] toxic, [] Neurological, [] Other    Abnormal Nurtitional Status: [] malnurtition (see nutrition note), [ ]underweight: BMI < 19, [] morbid obesity: BMI >40, [] Cachexia    [] Sepsis  [] hypovolemic shock,[] cardiogenic shock, [] hemorrhagic shock, [] neuogenic shock  [] Acute Respiratory Failure  []Cerebral edema, [] Brain compression/ herniation,   [] Functional quadriplegia  [] Acute blood loss anemia

## 2023-01-01 NOTE — OCCUPATIONAL THERAPY INITIAL EVALUATION ADULT - PLANNED THERAPY INTERVENTIONS, OT EVAL
Spoke with patient, aware of results. Patient states her cough is much better.  Will call with update if sx persist. ADL retraining/IADL retraining/balance training/bed mobility training/cognitive, visual perceptual/fine motor coordination training/motor coordination training/neuromuscular re-education/parent/caregiver training.../ROM/strengthening/transfer training

## 2023-01-01 NOTE — SWALLOW BEDSIDE ASSESSMENT ADULT - SWALLOW EVAL: ORAL MUSCULATURE
Limited visualization of oral cavity 2/2 lingual swelling./unable to assess due to poor participation/comprehension

## 2023-01-01 NOTE — SWALLOW BEDSIDE ASSESSMENT ADULT - ORAL PREPARATORY PHASE
Reduced bolus control and containment with first ice chip and thin liquid trials resulting in anterior loss. Oral prep improved with subsequent trials of ice chips.

## 2023-01-01 NOTE — PROGRESS NOTE ADULT - SUBJECTIVE AND OBJECTIVE BOX
=================================  NEUROCRITICAL CARE ATTENDING NOTE  =================================    JEMIMA DUFFY   MRN-[id number]  Summary:  72y/F from [facility]. Presented 7 days ago with AMS. CTH done 6 days ago showed spontaneous Left SD collection. MRI done today showed Left SD collection with diffusion restriction c/f empyema and infarct. Also showed Left sinus collection. Was also getting ceftriaxone for presumed UT at OSH. (30 Dec 2022 19:41)    COURSE IN THE HOSPITAL:   Admitted to [facility], s/p OR   remained intubated overnight    :      Past Medical History:   Allergies:  Allergy Status Unknown  Home meds:     PHYSICAL EXAMINATION  T(C): 37.4 ( @ 05:22), Max: 37.8 ( @ 18:02) HR: 55 ( @ 05:00) (55 - 82) BP: 130/58 ( @ 05:00) (116/56 - 160/69) RR: 18 ( @ 05:00) (15 - 20) SpO2: 97% ( @ 05:00) (96% - 100%)  NEUROLOGIC EXAMINATION:  Patient is  awake, eyes open to voice, following commands, L 4mm brisk R 4mm sluggish, (+) cough/gag, L UE/LE 5/5, R UE withdraws to pain, R LE 4/5  GENERAL:  AC 50% 12 +5  EENT:  anicteric  CARDIOVASCULAR: (+) S1 S2, bradycardic rate and regular rhythm  PULMONARY: clear to auscultation bilaterally  ABDOMEN: soft, nontender with normoactive bowel sounds  EXTREMITIES: no edema  SKIN: no rash    LABS:   CAPILLARY BLOOD GLUCOSE 162 147 137 128              (15.67)  6.8 (7.9)   14.24 )-----------( 240      ( 2023 04:53 )             20.9   (133)  139  |  104  |  12  ----------------------------<  176<H>  3.5   |  27  |  0.60    Ca    7.4<L>      2023 04:53  Phos  2.5       Mg     2.0      @ 07:01  -   @ 07:00  IN: 2310 mL / OUT: 1205 mL / NET: 1105 mL    CRP 79.8     Bacteriology:  UA NEG   surgical swab few GPCIPairs x1   surgical swab NEG x2    CSF studies:  EEG:  Neuroimagin/31 CT head: post-surgical changes, patchy cerebral edema high L parietal and frontal lobes, pansinus mucosal disease (near complete opacification of bilateral ethmoid and sphenoid sinuses), mod mucosal thickening R maxillary sinuses  Other imaging:    MEDICATIONS:     ·	enoxaparin Injectable 40 SubCutaneous <User Schedule>  ·	cefTRIAXone   IVPB 2000 IV Intermittent every 12 hours  ·	metroNIDAZOLE  IVPB 750 IV Intermittent every 8 hours  ·	vancomycin  IVPB 2000 IV Intermittent every 12 hours  ·	levETIRAcetam  IVPB 1000 IV Intermittent every 12 hours  ·	senna 2 Oral at bedtime  ·	insulin lispro (ADMELOG) corrective regimen sliding scale  SubCutaneous Before meals and at bedtime  ·	fluticasone propionate 50 MICROgram(s)/spray Nasal Spray 1 Both Nostrils every 12 hours  ·	sodium chloride 0.65% Nasal 1 Both Nostrils four times a day  ·	ondansetron Injectable 4 IV Push every 6 hours PRN    IV FLUIDS: NS@75cc/hr  DRIPS: off precedex  DIET: NPO  Lines: R radial roz, midline  Drains:  Cannon     Bulb (mL): 60 mL  Bulb:  Wounds:    CODE STATUS:  Full Code                       GOALS OF CARE:  aggressive                      DISPOSITION:  ICU =================================  NEUROCRITICAL CARE ATTENDING NOTE  =================================    JEMIMA DUFFY   [id number]  Summary:  72y/F from [facility]. Presented 7 days ago with AMS. CTH done 6 days ago showed spontaneous Left SD collection. MRI done today showed Left SD collection with diffusion restriction c/f empyema and infarct. Also showed Left sinus collection. Was also getting ceftriaxone for presumed UT at OSH. (30 Dec 2022 19:41)    COURSE IN THE HOSPITAL:   Admitted to [facility], s/p OR   remained intubated overnight; extubated     :   1 unit pRBC overnight    Past Medical History:   Allergies:  Allergy Status Unknown  Home meds:     PHYSICAL EXAMINATION  T(C): 37.4 ( @ 05:22), Max: 37.8 ( @ 18:02) HR: 55 ( @ 05:00) (55 - 82) BP: 130/58 ( @ 05:00) (116/56 - 160/69) RR: 18 ( @ 05:00) (15 - 20) SpO2: 97% ( @ 05:00) (96% - 100%)  NEUROLOGIC EXAMINATION:  Patient is  awake, eyes open to voice, following commands, L 4mm brisk R 4mm sluggish, L UE/LE 5/5, R UE 3/5, R LE 4/5  GENERAL:  not intubated, not in respiratory distress  EENT:  anicteric  CARDIOVASCULAR: (+) S1 S2, bradycardic rate and regular rhythm  PULMONARY: clear to auscultation bilaterally  ABDOMEN: soft, nontender with normoactive bowel sounds  EXTREMITIES: no edema  SKIN: no rash    LABS:   CAPILLARY BLOOD GLUCOSE 162 147 137 128  - 2 units coverage    (15.67)  6.8 (7.9)   14.24 )-----------( 240      ( 2023 04:53 )             20.9   (133)  139  |  104  |  12  ----------------------------<  176<H>  3.5   |  27  |  0.60    Ca    7.4<L>      2023 04:53  Phos  2.5       Mg     2.0      @ 07:01  -   @ 07:00  IN: 2310 mL / OUT: 1205 mL / NET: 1105 mL    CRP 79.8     Bacteriology:  UA NEG   Blood CS NG1D x2   surgical swab few GPCIPairs x1, nGTD   surgical swab NGTD    CSF studies:  EEG:  Neuroimagin/31 CT head: post-surgical changes, patchy cerebral edema high L parietal and frontal lobes, pansinus mucosal disease (near complete opacification of bilateral ethmoid and sphenoid sinuses), mod mucosal thickening R maxillary sinuses  Other imaging:    MEDICATIONS:     ·	enoxaparin Injectable 40 SubCutaneous <User Schedule>  ·	cefTRIAXone   IVPB 2000 IV Intermittent every 12 hours  ·	metroNIDAZOLE  IVPB 750 IV Intermittent every 8 hours  ·	vancomycin  IVPB 2000 IV Intermittent every 12 hours  ·	levETIRAcetam  IVPB 1000 IV Intermittent every 12 hours  ·	senna 2 Oral at bedtime  ·	insulin lispro (ADMELOG) corrective regimen sliding scale  SubCutaneous Before meals and at bedtime  ·	fluticasone propionate 50 MICROgram(s)/spray Nasal Spray 1 Both Nostrils every 12 hours  ·	sodium chloride 0.65% Nasal 1 Both Nostrils four times a day  ·	ondansetron Injectable 4 IV Push every 6 hours PRN    IV FLUIDS: IVL  DRIPS:   DIET: Jevity @50cc/hr  Lines: R radial roz, midline  Drains:  Cannon     Bulb (mL): 0 mL    Bulb (mL): 135 mL  Wounds:    CODE STATUS:  Full Code                       GOALS OF CARE:  aggressive                      DISPOSITION:  ICU

## 2023-01-01 NOTE — DIETITIAN INITIAL EVALUATION ADULT - PERTINENT MEDS FT
MEDICATIONS  (STANDING):  cefTRIAXone   IVPB 2000 milliGRAM(s) IV Intermittent every 12 hours  enoxaparin Injectable 40 milliGRAM(s) SubCutaneous every 12 hours  fluticasone propionate 50 MICROgram(s)/spray Nasal Spray 1 Spray(s) Both Nostrils every 12 hours  insulin lispro (ADMELOG) corrective regimen sliding scale   SubCutaneous Before meals and at bedtime  levETIRAcetam 1000 milliGRAM(s) Oral two times a day  metroNIDAZOLE  IVPB 750 milliGRAM(s) IV Intermittent every 8 hours  senna 2 Tablet(s) Oral at bedtime  sodium chloride 0.65% Nasal 1 Spray(s) Both Nostrils four times a day  vancomycin  IVPB 2000 milliGRAM(s) IV Intermittent every 12 hours    MEDICATIONS  (PRN):  hydrALAZINE Injectable 10 milliGRAM(s) IV Push every 2 hours PRN SBP >160  ondansetron Injectable 4 milliGRAM(s) IV Push every 6 hours PRN Nausea and/or Vomiting

## 2023-01-01 NOTE — PHYSICAL THERAPY INITIAL EVALUATION ADULT - PERTINENT HX OF CURRENT PROBLEM, REHAB EVAL
72F, txfered from [facility]. Presented to [facility] 7 days ago for AMS. CTH done 6 days ago showed spontaneous Left SD collection. MRI done today showed Left SD collection with diffusion restriction c/f empyema and infarct. Also showed Left sinus collection. Was also getting ceftriaxone for presumed UT at OSH.

## 2023-01-01 NOTE — OCCUPATIONAL THERAPY INITIAL EVALUATION ADULT - THERAPY FREQUENCY, OT EVAL
Scribe Attestation (For Scribes USE Only)... 3-5x/week Scribe Attestation (For Scribes USE Only).../Attending Attestation (For Attendings USE Only)...

## 2023-01-01 NOTE — DIETITIAN INITIAL EVALUATION ADULT - OTHER INFO
71yo F w/ no known PMHx. on 12/22 presented to [facility] c/o slurred speech and R sided weakness, stroke code called, imaging initially negative for stroke or hemorrhage, pt's neuro exam worsened and subsequent CTH the following day showed L sided SDH, admitted to ICU for further monitoring. Pt neuro declined on 12/25 with AMS requiring intubation, noted to have episodes of twitching concerning for seizures, started on keppra and EEG placed. No epileptiform activity noted. CTH on 12/28 significant for L frontoparietal collection, MRI completed showed concern for possible empyema. Daughter reported pt likely had a sinus infection recently, unsure of abx. At [place] pt started on ceftriaxone for UTI that was switched to vancomycin this am. Pt transferred to [facility] for further management and possible L hemicraniectomy and washout of L subdural empyema.     Pt seen on 8EA at bedside. Pt extubated on 12/31/22. Pt in bed resting, satting at 96% SpO2. MAP 79, trend >65. GI: s/s fecal incontinence noted. NS/NT abdomen; audible and normoactive bowel sounds per chart. No noted N/V/D by pt's RN/medical team. Most recent BM on 1/1/23. Alexandr: 14. Skin integrity: L head sx incision noted, lower lip wound noted. Generalized edema 1+ (trace) noted. Nonverbal indicators of pain absent. Allergies unknown per chart. Pertinent labs: elevated POCT BG (126), elevated A1c (6.1). Pt is currently NPO status with a nasogastric tube feeding: Jevity 1.2 started at 20mL/h to goal rate of 50mL/h continuously. Pertinent nutrition-related medications/supplements: pt is receiving insulin, senna, zofran, IV Abx and IV fluids for hydration/electrolyte repletion. No wt hx noted in chart; pt's .3kg (278.5#). Pt's IBW is 120#, pt is 232% of IBW. RD observed pt with no overt signs of muscle or fat wasting. Based on ASPEN guidelines, pt does not meet criteria for malnutrition at this time. Education deferred at this time r/t pt's clinical and cognitive status. RD will remain available per protocol. Additional nutrition recommendations listed below to follow.

## 2023-01-01 NOTE — OCCUPATIONAL THERAPY INITIAL EVALUATION ADULT - DIAGNOSIS, OT EVAL
Pt is s/p L hemicraniectomy and washout of subdural empyema (12/30) presents with impaired balance, R sided weakness (RUE weaker than RLE), impaired cognition in regards to command following and attention, and decreased activity tolerance impacting ability to perform ADLs and transfers at prior level of function.

## 2023-01-01 NOTE — OCCUPATIONAL THERAPY INITIAL EVALUATION ADULT - ORIENTATION, REHAB EVAL
unable to accurately assess as pt non-verbal and inconsistently following commands, pt responding to name

## 2023-01-01 NOTE — OCCUPATIONAL THERAPY INITIAL EVALUATION ADULT - GENERAL OBSERVATIONS, REHAB EVAL
Pt received semi-supine in bed, +tele, +crani CHENCHO x2, +crani staples C/D/I, +SCDs, +a-line, +purewick, in NAD and agreeable to OT. MRS 5. Pt received semi-supine in bed, +tele, +crani CHENCHO x2, +crani staples C/D/I, +SCDs, +a-line, +purewick, in NAD and agreeable to OT.

## 2023-01-01 NOTE — DIETITIAN INITIAL EVALUATION ADULT - NSFNSGIIOFT_GEN_A_CORE
12-31-22 @ 07:01 - 01-01-23 @ 07:00  --------------------------------------------------------  OUT:    Rectal Tube (mL): 0 mL  Total OUT: 0 mL    Total NET: 390 mL      01-01-23 @ 07:01 - 01-01-23 @ 20:25  --------------------------------------------------------  OUT:  Total OUT: 0 mL    Total NET: 650 mL

## 2023-01-01 NOTE — DIETITIAN INITIAL EVALUATION ADULT - ADD RECOMMEND
1. If medically feasible, recommend EN via NGT: Jevity 1.2 @ 65mL/hr x 24 hours. This provides: 1560mL TV,  1872kcal, 86.6g protein, 1259mL free water. Meetin non-protein kcal/kg, 1.56g/kg protein based on IBW 54.4kg. Defer fluids to team. Maintain aspiration precautions. Monitor s/s of intolerance; adjust EN as needed. Start at 20ml/hr, increase 10ml 6-8hrs or as tolerated until goal rate reached    2. Encourage pt to meet nutritional needs as able   3. Trend weights (weekly), monitor skin integrity, monitor labs (electrolytes, CMP), monitor GI fxn   4. Encourage adherence to diet education (reinforce as able)   5. Continue insulin regimen to promote euglycemia.    6. Pain and bowel regimen per team   7. Will continue to assess/honor preferences as able   8. Align nutrition interventions with GOC at all times

## 2023-01-01 NOTE — PROGRESS NOTE ADULT - ASSESSMENT
72y/F with  subdural empyema s/p L hemicraniectomy, evacuation, brain compression, cerebral edema  anemia  hyponatremia    PLAN:   NEURO: neurochecks q1h, PRN pain meds with acetaminophen, sedation with dexmedetomidine RASS to 0 to -1, PRN fentanyl  defer VEEG for now, continue levetiracetam 1G BID  ENT consult, ?drainage  2 CHENCHO drains - keep for now, monitor output  REHAB:  physical therapy evaluation and management    EARLY MOB:  HOB up, bedrest    PULM:  wean from vent, CPAP 8/5   CARDIO:  SBP goal 100-160mm Hg  ENDO:  Blood sugar goals 140-180 mg/dL, continue insulin sliding scale  GI:  PPI for GI prophylaxis while intubated  DIET: NPO for extubation  RENAL:   IVF, hyponatremia work-up, start 3%. recheck BMP this afternoon  HEM/ONC: check post-op Hb, anemia work-up  VTE Prophylaxis: SCDs, no DVT chemoprophylaxis for now as patient is high risk for bleed (fresh post-op); baseline LE Doppler for DVT suspected on admission (transfer)  ID: afebrile, leukocytosis, continue quad ABx, ID consult, f/u microbiologic work-up; vanc trough prior to 4th dose, ESR CRP  Social: will update family (daughter)    Active issues:  What's keeping patient in the ICU? intubated, fresh post-op  What is this patient's dispo plan?    ATTENDING ATTESTATION:  I was physically present for the key portions of the evaluation and management (E/M) service provided.  I agree with the above history, physical and plan, which I have reviewed and edited where appropriate.    Patient at high risk for neurological deterioration or death due to:  ICU delirium, aspiration PNA, DVT / PE.  Critical care time:  I have personally provided 45 minutes of critical care time, excluding time spent on separate procedures.      Plan discussed with RN, house staff. 72y/F with  subdural empyema s/p L hemicraniectomy, evacuation, brain compression, cerebral edema  anemia  hyponatremia  prediabetes    PLAN:   NEURO: neurochecks q2h, VSq1h, PRN pain meds with acetaminophen  seizure treatment: continue levetiracetam 1G PO BID  ENT consult, CT sinuses, fluticasone nasal spray  2 CHENCHO drains - keep 1 for now, monitor output, d/c CHENCHO 1  REHAB:  physical therapy evaluation and management    EARLY MOB:  HOB up, bedrest    PULM:  room air  CARDIO:  SBP goal 100-160mm Hg, PRN antihypertensives  ENDO:  Blood sugar goals 140-180 mg/dL, continue insulin sliding scale  GI:  bowel regimen   DIET: Jevity at goal   RENAL:   IVF, Na goal 135-145   HEM/ONC: Hb low - given 1 unit, get post-tranfusion CBC  VTE Prophylaxis: SCDs, SQL OD, may increase to BID if CBC stable tomorrow; baseline LE Doppler for DVT suspected on admission (transfer)  ID: afebrile, leukocytosis improving , continue triple ABx, - Vanc ceftriaxone MNZ; ID consulted,  f/u microbiologic work-up; vanc trough prior to 4th dose, ESR CRP - every 5 days  Social: daughter updated yesterday     Active issues:  What's keeping patient in the ICU? close neurochecks, aspiration risk  What is this patient's dispo plan? early stepdown tomorrow    ATTENDING ATTESTATION:  I was physically present for the key portions of the evaluation and management (E/M) service provided.  I agree with the above history, physical and plan, which I have reviewed and edited where appropriate.    Patient at high risk for neurological deterioration or death due to:  ICU delirium, aspiration PNA, DVT / PE.  Critical care time:  I have personally provided 45 minutes of critical care time, excluding time spent on separate procedures.      Plan discussed with RN, house staff.

## 2023-01-01 NOTE — DIETITIAN INITIAL EVALUATION ADULT - PERTINENT LABORATORY DATA
01-01    139  |  104  |  12  ----------------------------<  176<H>  3.5   |  27  |  0.60    Ca    7.4<L>      01 Jan 2023 04:53  Phos  2.5     01-01  Mg     2.0     01-01    POCT Blood Glucose.: 165 mg/dL (01-01-23 @ 17:05)  A1C with Estimated Average Glucose Result: 6.1 % (01-01-23 @ 04:53)

## 2023-01-01 NOTE — SWALLOW BEDSIDE ASSESSMENT ADULT - ASR SWALLOW LABIAL MOBILITY
Unable to complete lingual or labial ROM tasks 2/2 swelling. Of note, oral motor assessment limited by mental status.

## 2023-01-01 NOTE — OCCUPATIONAL THERAPY INITIAL EVALUATION ADULT - MODALITIES TREATMENT COMMENTS
Cranial nerves grossly assessed as pt at times inconsistently following commands benefits from visual cues > verbal cues. +R facial droop, +R tongue deviation, able to open/close (R) eye, L eye swollen shut. Difficulty maintaining attention to visually track during H-test but pt making eye contact throughout session. Pt able to point to nose, (L) ear, and (R) knee using LUE with verbal command.

## 2023-01-01 NOTE — SWALLOW BEDSIDE ASSESSMENT ADULT - SLP PERTINENT HISTORY OF CURRENT PROBLEM
73 yo RH F with no significant PMHx admitted to NSICU for management of subdural empyema/abscess in setting of pansinusitis.  Originally presented to [facility] with c/f AMS and dysarthria on 12/22.  Stroke alert was initially called, at which time CTH/CTP were unremarkable. Repeat stroke alert called 12/23, CTH showing SDH. Patient exhibited focal clinical seizure activity (twitching) for which she was empirically treated with LEV 1000/1000. UA(+) for which vanc/zosyn were started, but these abx were ultimately changed to vanc/CTX when a stability scan showed a L. frontoparietal low attenuation focus and an MRI showed a L. SD collection w/ difficusion restriction concerning for SD empyema and associated abscess. Admitted to NSICU, s/p Left hemicraniectomy and washout of L subdural empyema. SD empyema/cerebral abscess in a 73 yo RH F with no significant PMHx (patient does not see physicians regularly).  Source of CNS infection likely pansinusitis. s/p Left hemicraniectomy and washout of L subdural empyema.

## 2023-01-01 NOTE — SWALLOW BEDSIDE ASSESSMENT ADULT - NS SPL SWALLOW CLINIC TRIAL FT
O2 and HR stable across PO trials. PO trials deferred at this time given concern for aspiration.   O2 and HR stable across PO trials.

## 2023-01-01 NOTE — PHYSICAL THERAPY INITIAL EVALUATION ADULT - MANUAL MUSCLE TESTING RESULTS, REHAB EVAL
Left UE/LE 5/5. Right UE: shoulder flex, elbow flex, elbow extend 3-/5. Right  0/5 Right wrist flex/ext 0/5 R LE: knee ext 4/5  hip flex 3-/5

## 2023-01-01 NOTE — PHYSICAL THERAPY INITIAL EVALUATION ADULT - BED MOBILITY LIMITATIONS, REHAB EVAL
decreased ability to use arms for pushing/pulling/decreased ability to use legs for bridging/pushing
none

## 2023-01-01 NOTE — PROGRESS NOTE ADULT - SUBJECTIVE AND OBJECTIVE BOX
=================================  NEUROCRITICAL CARE ATTENDING NOTE  =================================    JEMIMA DUFFY   MRN-[id number]  Summary:  72y/F from [facility]. Presented 7 days ago with AMS. CTH done 6 days ago showed spontaneous Left SD collection. MRI done today showed Left SD collection with diffusion restriction c/f empyema and infarct. Also showed Left sinus collection. Was also getting ceftriaxone for presumed UT at OSH. (30 Dec 2022 19:41)    COURSE IN THE HOSPITAL:   Admitted to [facility], s/p OR   remained intubated overnight; extubated     :   1 unit pRBC overnight, PT, dangle legs    Past Medical History:   Allergies:  Allergy Status Unknown  Home meds:     PHYSICAL EXAMINATION  T(C): 37.1 ( @ 21:36), Max: 37.4 ( @ 05:22) HR: 60 ( @ 22:00) (55 - 98) BP: 136/61 ( @ 22:00) (117/51 - 177/77) RR: 18 ( @ 22:00) (15 - 18) SpO2: 99% ( @ 22:00) (94% - 100%)   NEUROLOGIC EXAMINATION:  Patient is  awake, eyes open to voice, following commands, L 4mm brisk R 4mm sluggish, L UE/LE 5/5, R UE 3/5, R LE 4/5  GENERAL:  not intubated, not in respiratory distress  EENT:  anicteric  CARDIOVASCULAR: (+) S1 S2, bradycardic rate and regular rhythm  PULMONARY: clear to auscultation bilaterally  ABDOMEN: soft, nontender with normoactive bowel sounds  EXTREMITIES: no edema  SKIN: no rash    LABS:   CAPILLARY BLOOD GLUCOSE 149 165 126 162              (14.24)  7.4  (6.8)  13.43 )-----------( 243      ( 2023 10:52 )             22.6     139  |  104  |  12  ----------------------------<  176<H>  3.5   |  27  |  0.60    Ca    7.4<L>      2023 04:53  Phos  2.5       Mg     2.0      @ 07:01  -   @ 07:00  IN: 2620 mL / OUT: 1235 mL / NET: 1385 mL    CRP 79.8     Bacteriology:  UA NEG   Blood CS NG1D x2   surgical swab few GPCIPairs x1, nGTD   surgical swab NGTD    CSF studies:  EEG:  Neuroimagin/31 CT head: post-surgical changes, patchy cerebral edema high L parietal and frontal lobes, pansinus mucosal disease (near complete opacification of bilateral ethmoid and sphenoid sinuses), mod mucosal thickening R maxillary sinuses  Other imaging:    MEDICATIONS:     ·	enoxaparin Injectable 40 SubCutaneous every 12 hours  ·	cefTRIAXone   IVPB 2000 IV Intermittent every 12 hours  ·	metroNIDAZOLE  IVPB 750 IV Intermittent every 8 hours  ·	vancomycin  IVPB 2000 IV Intermittent every 12 hours  ·	levETIRAcetam 1000 Oral two times a day  ·	senna 2 Oral at bedtime  ·	insulin lispro (ADMELOG) corrective regimen sliding scale  SubCutaneous Before meals and at bedtime  ·	fluticasone propionate 50 MICROgram(s)/spray Nasal Spray 1 Both Nostrils every 12 hours  ·	sodium chloride 0.65% Nasal 1 Both Nostrils four times a day  ·	hydrALAZINE Injectable 10 IV Push every 2 hours PRN  ·	ondansetron Injectable 4 IV Push every 6 hours PRN    IV FLUIDS: IVL  DRIPS:   DIET: Jevity @50cc/hr  Lines: R radial roz, midline  Drains:  Cannon     Bulb (mL): 0 mL    Bulb (mL): 135 mL  Wounds:    CODE STATUS:  Full Code                       GOALS OF CARE:  aggressive                      DISPOSITION:  ICU

## 2023-01-01 NOTE — OCCUPATIONAL THERAPY INITIAL EVALUATION ADULT - ADDITIONAL COMMENTS
Daughter (Elly) arrived at bedside during session and able to provide history. Pt lives alone in a walk-up apartment, 1 FOS to enter, with tub in bathroom. Prior to admit, pt independent with all ADLs/IADLs and mobility, did not require any DME or ADs. Pt is R hand dominant and wears prescription glasses as needed.

## 2023-01-01 NOTE — PHYSICAL THERAPY INITIAL EVALUATION ADULT - MODALITIES TREATMENT COMMENTS
CN's: pt with difficulty following commands, able to demo tongue movement with right deviation, opened and closed eyes, R facial droop. Vision: pt can track but not on demand, able to mirror how many fingers being held  up,

## 2023-01-01 NOTE — OCCUPATIONAL THERAPY INITIAL EVALUATION ADULT - PERTINENT HX OF CURRENT PROBLEM, REHAB EVAL
72y/F from [facility]. Presented 7 days ago with AMS. CTH done 6 days ago showed spontaneous Left SD collection. MRI done today showed Left SD collection with diffusion restriction c/f empyema and infarct. Also showed Left sinus collection. Was also getting ceftriaxone for presumed UT at OSH.

## 2023-01-02 LAB
ANION GAP SERPL CALC-SCNC: 8 MMOL/L — SIGNIFICANT CHANGE UP (ref 5–17)
BUN SERPL-MCNC: 12 MG/DL — SIGNIFICANT CHANGE UP (ref 7–23)
CALCIUM SERPL-MCNC: 7.2 MG/DL — LOW (ref 8.4–10.5)
CHLORIDE SERPL-SCNC: 106 MMOL/L — SIGNIFICANT CHANGE UP (ref 96–108)
CO2 SERPL-SCNC: 24 MMOL/L — SIGNIFICANT CHANGE UP (ref 22–31)
CREAT SERPL-MCNC: 0.55 MG/DL — SIGNIFICANT CHANGE UP (ref 0.5–1.3)
EGFR: 98 ML/MIN/1.73M2 — SIGNIFICANT CHANGE UP
GLUCOSE BLDC GLUCOMTR-MCNC: 149 MG/DL — HIGH (ref 70–99)
GLUCOSE BLDC GLUCOMTR-MCNC: 153 MG/DL — HIGH (ref 70–99)
GLUCOSE BLDC GLUCOMTR-MCNC: 171 MG/DL — HIGH (ref 70–99)
GLUCOSE BLDC GLUCOMTR-MCNC: 186 MG/DL — HIGH (ref 70–99)
GLUCOSE SERPL-MCNC: 185 MG/DL — HIGH (ref 70–99)
HCT VFR BLD CALC: 23.8 % — LOW (ref 34.5–45)
HGB BLD-MCNC: 7.9 G/DL — LOW (ref 11.5–15.5)
MAGNESIUM SERPL-MCNC: 2.4 MG/DL — SIGNIFICANT CHANGE UP (ref 1.6–2.6)
MCHC RBC-ENTMCNC: 29.3 PG — SIGNIFICANT CHANGE UP (ref 27–34)
MCHC RBC-ENTMCNC: 33.2 GM/DL — SIGNIFICANT CHANGE UP (ref 32–36)
MCV RBC AUTO: 88.1 FL — SIGNIFICANT CHANGE UP (ref 80–100)
NRBC # BLD: 0 /100 WBCS — SIGNIFICANT CHANGE UP (ref 0–0)
OB PNL STL: NEGATIVE — SIGNIFICANT CHANGE UP
PHOSPHATE SERPL-MCNC: 2.3 MG/DL — LOW (ref 2.5–4.5)
PLATELET # BLD AUTO: 289 K/UL — SIGNIFICANT CHANGE UP (ref 150–400)
POTASSIUM SERPL-MCNC: 3.5 MMOL/L — SIGNIFICANT CHANGE UP (ref 3.5–5.3)
POTASSIUM SERPL-SCNC: 3.5 MMOL/L — SIGNIFICANT CHANGE UP (ref 3.5–5.3)
RBC # BLD: 2.7 M/UL — LOW (ref 3.8–5.2)
RBC # FLD: 16.7 % — HIGH (ref 10.3–14.5)
SODIUM SERPL-SCNC: 138 MMOL/L — SIGNIFICANT CHANGE UP (ref 135–145)
VANCOMYCIN TROUGH SERPL-MCNC: 23 UG/ML — HIGH (ref 10–20)
WBC # BLD: 12.85 K/UL — HIGH (ref 3.8–10.5)
WBC # FLD AUTO: 12.85 K/UL — HIGH (ref 3.8–10.5)

## 2023-01-02 PROCEDURE — 99291 CRITICAL CARE FIRST HOUR: CPT | Mod: 24

## 2023-01-02 PROCEDURE — 99292 CRITICAL CARE ADDL 30 MIN: CPT | Mod: 24

## 2023-01-02 PROCEDURE — 70450 CT HEAD/BRAIN W/O DYE: CPT | Mod: 26

## 2023-01-02 PROCEDURE — 99233 SBSQ HOSP IP/OBS HIGH 50: CPT

## 2023-01-02 RX ORDER — LATANOPROST 0.05 MG/ML
1 SOLUTION/ DROPS OPHTHALMIC; TOPICAL AT BEDTIME
Refills: 0 | Status: DISCONTINUED | OUTPATIENT
Start: 2023-01-02 | End: 2023-01-02

## 2023-01-02 RX ORDER — LATANOPROST 0.05 MG/ML
2 SOLUTION/ DROPS OPHTHALMIC; TOPICAL AT BEDTIME
Refills: 0 | Status: DISCONTINUED | OUTPATIENT
Start: 2023-01-02 | End: 2023-01-02

## 2023-01-02 RX ORDER — POTASSIUM PHOSPHATE, MONOBASIC POTASSIUM PHOSPHATE, DIBASIC 236; 224 MG/ML; MG/ML
30 INJECTION, SOLUTION INTRAVENOUS ONCE
Refills: 0 | Status: COMPLETED | OUTPATIENT
Start: 2023-01-02 | End: 2023-01-02

## 2023-01-02 RX ORDER — AMLODIPINE BESYLATE 2.5 MG/1
5 TABLET ORAL DAILY
Refills: 0 | Status: DISCONTINUED | OUTPATIENT
Start: 2023-01-03 | End: 2023-01-03

## 2023-01-02 RX ORDER — VANCOMYCIN HCL 1 G
1750 VIAL (EA) INTRAVENOUS EVERY 12 HOURS
Refills: 0 | Status: DISCONTINUED | OUTPATIENT
Start: 2023-01-02 | End: 2023-01-03

## 2023-01-02 RX ORDER — LATANOPROST 0.05 MG/ML
1 SOLUTION/ DROPS OPHTHALMIC; TOPICAL AT BEDTIME
Refills: 0 | Status: DISCONTINUED | OUTPATIENT
Start: 2023-01-02 | End: 2023-01-12

## 2023-01-02 RX ORDER — POTASSIUM CHLORIDE 20 MEQ
20 PACKET (EA) ORAL ONCE
Refills: 0 | Status: COMPLETED | OUTPATIENT
Start: 2023-01-02 | End: 2023-01-02

## 2023-01-02 RX ORDER — CHLORHEXIDINE GLUCONATE 213 G/1000ML
1 SOLUTION TOPICAL
Refills: 0 | Status: DISCONTINUED | OUTPATIENT
Start: 2023-01-02 | End: 2023-01-12

## 2023-01-02 RX ORDER — HYDROMORPHONE HYDROCHLORIDE 2 MG/ML
0.25 INJECTION INTRAMUSCULAR; INTRAVENOUS; SUBCUTANEOUS ONCE
Refills: 0 | Status: DISCONTINUED | OUTPATIENT
Start: 2023-01-02 | End: 2023-01-02

## 2023-01-02 RX ORDER — ACETAMINOPHEN 500 MG
650 TABLET ORAL EVERY 6 HOURS
Refills: 0 | Status: DISCONTINUED | OUTPATIENT
Start: 2023-01-02 | End: 2023-01-08

## 2023-01-02 RX ADMIN — HYDROMORPHONE HYDROCHLORIDE 0.25 MILLIGRAM(S): 2 INJECTION INTRAMUSCULAR; INTRAVENOUS; SUBCUTANEOUS at 16:33

## 2023-01-02 RX ADMIN — CHLORHEXIDINE GLUCONATE 1 APPLICATION(S): 213 SOLUTION TOPICAL at 08:02

## 2023-01-02 RX ADMIN — Medication 250 MILLIGRAM(S): at 11:01

## 2023-01-02 RX ADMIN — LATANOPROST 1 DROP(S): 0.05 SOLUTION/ DROPS OPHTHALMIC; TOPICAL at 22:15

## 2023-01-02 RX ADMIN — Medication 250 MILLIGRAM(S): at 22:14

## 2023-01-02 RX ADMIN — CEFTRIAXONE 100 MILLIGRAM(S): 500 INJECTION, POWDER, FOR SOLUTION INTRAMUSCULAR; INTRAVENOUS at 18:17

## 2023-01-02 RX ADMIN — Medication 20 MILLIEQUIVALENT(S): at 06:40

## 2023-01-02 RX ADMIN — Medication 1 SPRAY(S): at 06:39

## 2023-01-02 RX ADMIN — Medication 1 SPRAY(S): at 18:17

## 2023-01-02 RX ADMIN — Medication 650 MILLIGRAM(S): at 11:32

## 2023-01-02 RX ADMIN — Medication 100 MILLIGRAM(S): at 06:31

## 2023-01-02 RX ADMIN — Medication 100 MILLIGRAM(S): at 22:14

## 2023-01-02 RX ADMIN — LEVETIRACETAM 1000 MILLIGRAM(S): 250 TABLET, FILM COATED ORAL at 18:16

## 2023-01-02 RX ADMIN — CEFTRIAXONE 100 MILLIGRAM(S): 500 INJECTION, POWDER, FOR SOLUTION INTRAMUSCULAR; INTRAVENOUS at 06:34

## 2023-01-02 RX ADMIN — Medication 1 SPRAY(S): at 11:32

## 2023-01-02 RX ADMIN — Medication 100 MILLIGRAM(S): at 14:40

## 2023-01-02 RX ADMIN — HYDROMORPHONE HYDROCHLORIDE 0.25 MILLIGRAM(S): 2 INJECTION INTRAMUSCULAR; INTRAVENOUS; SUBCUTANEOUS at 16:15

## 2023-01-02 RX ADMIN — POTASSIUM PHOSPHATE, MONOBASIC POTASSIUM PHOSPHATE, DIBASIC 83.33 MILLIMOLE(S): 236; 224 INJECTION, SOLUTION INTRAVENOUS at 11:00

## 2023-01-02 RX ADMIN — Medication 2: at 07:15

## 2023-01-02 RX ADMIN — ENOXAPARIN SODIUM 40 MILLIGRAM(S): 100 INJECTION SUBCUTANEOUS at 18:17

## 2023-01-02 RX ADMIN — Medication 1 SPRAY(S): at 23:36

## 2023-01-02 RX ADMIN — Medication 2: at 11:32

## 2023-01-02 RX ADMIN — Medication 650 MILLIGRAM(S): at 12:00

## 2023-01-02 RX ADMIN — LEVETIRACETAM 1000 MILLIGRAM(S): 250 TABLET, FILM COATED ORAL at 06:31

## 2023-01-02 RX ADMIN — ENOXAPARIN SODIUM 40 MILLIGRAM(S): 100 INJECTION SUBCUTANEOUS at 06:31

## 2023-01-02 NOTE — PROGRESS NOTE ADULT - SUBJECTIVE AND OBJECTIVE BOX
***********************************************  ADULT NSICU PROGRESS NOTE  JEMIMA DUFFY [id number] [facility] 08EA [id number]  ***********************************************    24H INTERVAL EVENTS:    ROS: negative except per mentioned above in 24h interval events.      HOSPITAL COURSE CARRIED FORWARD:    VITALS:    ICU Vital Signs Last 24 Hrs  T(C): 37.2 (02 Jan 2023 06:11), Max: 37.3 (01 Jan 2023 14:19)  T(F): 99 (02 Jan 2023 06:11), Max: 99.2 (01 Jan 2023 14:19)  HR: 77 (02 Jan 2023 07:00) (57 - 98)  BP: 143/64 (02 Jan 2023 07:00) (124/56 - 177/77)  BP(mean): 92 (02 Jan 2023 07:00) (80 - 110)  ABP: 157/52 (02 Jan 2023 07:00) (107/77 - 185/64)  ABP(mean): 86 (02 Jan 2023 07:00) (66 - 102)  RR: 16 (02 Jan 2023 07:00) (15 - 18)  SpO2: 100% (02 Jan 2023 07:00) (94% - 100%)    O2 Parameters below as of 02 Jan 2023 07:00  Patient On (Oxygen Delivery Method): room air                I&O's Summary    01 Jan 2023 07:01  -  02 Jan 2023 07:00  --------------------------------------------------------  IN: 3020 mL / OUT: 1450 mL / NET: 1570 mL    02 Jan 2023 07:01  -  02 Jan 2023 07:45  --------------------------------------------------------  IN: 65 mL / OUT: 0 mL / NET: 65 mL        EXAM:     Alvaro Coma Scale:     General: normocephalic, atraumatic, laying in bed, in no distress  Neuro     MS: A/Ox3, cooperative, normal attention, no neglect, comprehension intact, speech with preserved fluency, naming, and repetition    CN: PERRL, VF FTC, EOMI and no ptosis bilaterally, sensation intact to crude touch V1-V3, face symmetric, hearing grossly intact    Mot: bulk normal, tone normal, power 5/5 in bilateral upper and lower proximal extremities    Sens: intact to crude touch in bilateral upper and lower extremities    Reflexes: deferred    Coord: no dysmetria or ataxia on finger to nose/heel to shin, respectively, no focal bradykinetic movements    Gait: deferred  Chest: nonlabored respirations, no adventitious lung sounds bilaterally, heart regular rate/rhythm, present S1/S2, no murmurs or rubs  Abdomen: nondistended, soft and nontender without peritoneal signs, normoactive bowel sounds  Extremities: no clubbing, well-perfused, no edema    LABORATORY DATA:                            7.9    12.85 )-----------( 289      ( 02 Jan 2023 04:38 )             23.8     01-02    138  |  106  |  12  ----------------------------<  185<H>  3.5   |  24  |  0.55    Ca    7.2<L>      02 Jan 2023 04:38  Phos  2.3     01-02  Mg     2.4     01-02        PT/INR - ( 01 Jan 2023 04:53 )   PT: 15.1 sec;   INR: 1.27          PTT - ( 01 Jan 2023 04:53 )  PTT:28.1 sec    IMAGING DATA:    CARDIOLOGY DATA:    MICROBIOLOGY DATA:      Urinalysis with Rflx Culture (collected 31 Dec 2022 07:25)    Culture - Blood (collected 31 Dec 2022 02:42)  Source: .Blood Blood  Preliminary Report (02 Jan 2023 04:00):    No growth at 2 days.    Culture - Blood (collected 31 Dec 2022 02:42)  Source: .Blood Blood  Preliminary Report (02 Jan 2023 04:00):    No growth at 2 days.    Culture - Tissue with Gram Stain (collected 30 Dec 2022 21:00)  Source: .Tissue 4-brain infection or spec  Gram Stain (31 Dec 2022 09:26):    Moderate WBC's    Rare Gram positive cocci in pairs  Preliminary Report (31 Dec 2022 14:27):    No growth to date    Culture in progress    Culture - Surgical Swab (collected 30 Dec 2022 21:00)  Source: .Surgical Swab 3-epidural infection or spec  Gram Stain (31 Dec 2022 06:46):    Numerous White blood cells    Few Gram positive cocci in pairs  Preliminary Report (31 Dec 2022 14:32):    No growth to date    Culture - Surgical Swab (collected 30 Dec 2022 21:00)  Source: .Surgical Swab 2- subdural infection or spec  Gram Stain (31 Dec 2022 07:03):    Numerous White blood cells    No organisms seen  Preliminary Report (31 Dec 2022 14:35):    No growth to date    Culture - Surgical Swab (collected 30 Dec 2022 21:00)  Source: .Surgical Swab 1 Subdural pus or spec  Gram Stain (31 Dec 2022 06:54):    Moderate White blood cells    No organisms seen  Preliminary Report (31 Dec 2022 11:17):    No growth to date        MEDICATIONS  (STANDING):  cefTRIAXone   IVPB 2000 milliGRAM(s) IV Intermittent every 12 hours  chlorhexidine 2% Cloths 1 Application(s) Topical <User Schedule>  enoxaparin Injectable 40 milliGRAM(s) SubCutaneous every 12 hours  fluticasone propionate 50 MICROgram(s)/spray Nasal Spray 1 Spray(s) Both Nostrils every 12 hours  insulin lispro (ADMELOG) corrective regimen sliding scale   SubCutaneous Before meals and at bedtime  levETIRAcetam 1000 milliGRAM(s) Oral two times a day  metroNIDAZOLE  IVPB 750 milliGRAM(s) IV Intermittent every 8 hours  potassium phosphate IVPB 30 milliMole(s) IV Intermittent once  senna 2 Tablet(s) Oral at bedtime  sodium chloride 0.65% Nasal 1 Spray(s) Both Nostrils four times a day  vancomycin  IVPB 1750 milliGRAM(s) IV Intermittent every 12 hours    MEDICATIONS  (PRN):  hydrALAZINE Injectable 10 milliGRAM(s) IV Push every 2 hours PRN SBP >160  ondansetron Injectable 4 milliGRAM(s) IV Push every 6 hours PRN Nausea and/or Vomiting      ***********************************************  ASSESSMENT AND PLAN  ***********************************************    This is a case of ***    NEURO  - Admit NSICU, Q1h neuro checks / Q1h vital signs    PULM  - SpO2 goal > 92%, supplemental O2 and pulm toileting as needed    CARDIO  - BP goal:     GI  - Diet:   - Stress ulcer prophylaxis:     /RENAL  - Monitor UOP/volume status, BUN/SCr    HEME  - Maintain Hb > 7.0, PLT > ***    ID  - Monitor for infectious s/s, fever curve, leukocytosis    ENDO    01-02-23 @ 07:45       ***********************************************  ADULT NSICU PROGRESS NOTE  JEMIMA DUFFY [id number] [facility] 08EA [id number]  ***********************************************    24H INTERVAL EVENTS: no acute overnight events.  ENT cultured endonasal cavity.  No further events thus far today.    ROS: negative except per mentioned above in 24h interval events.      VITALS:    ICU Vital Signs Last 24 Hrs  T(C): 37.2 (02 Jan 2023 06:11), Max: 37.3 (01 Jan 2023 14:19)  T(F): 99 (02 Jan 2023 06:11), Max: 99.2 (01 Jan 2023 14:19)  HR: 77 (02 Jan 2023 07:00) (57 - 98)  BP: 143/64 (02 Jan 2023 07:00) (124/56 - 177/77)  BP(mean): 92 (02 Jan 2023 07:00) (80 - 110)  ABP: 157/52 (02 Jan 2023 07:00) (107/77 - 185/64)  ABP(mean): 86 (02 Jan 2023 07:00) (66 - 102)  RR: 16 (02 Jan 2023 07:00) (15 - 18)  SpO2: 100% (02 Jan 2023 07:00) (94% - 100%)    O2 Parameters below as of 02 Jan 2023 07:00  Patient On (Oxygen Delivery Method): room air    I&O's Summary    01 Jan 2023 07:01  -  02 Jan 2023 07:00  --------------------------------------------------------  IN: 3020 mL / OUT: 1450 mL / NET: 1570 mL    02 Jan 2023 07:01  -  02 Jan 2023 07:45  --------------------------------------------------------  IN: 65 mL / OUT: 0 mL / NET: 65 mL        EXAM:     Alvaro Coma Scale:     General: laying in bed, in no distress, NGT placed, L. crani scar  Neuro     MS: Eyes open, attends examiner, follows simple commands but noncommunicative     CN: R. pupil nonreactive (consistent with pre-OR examination), L. pupil reactive to light 3 -> 2, BTT bilaterally, EOMI and no ptosis bilaterally, face symmetric but activation not assessed, hearing grossly intact    Mot: bulk normal, tone normal, power (R/L) UPPER: 3/5, LOWER 2/2 (plain of bed bilaterally)  Chest: nonlabored respirations, bilateral rhonchi at lung bases, heart regular rate/rhythm, present S1/S2, no murmurs or rubs  Abdomen: obese, nondistended, soft and nontender without peritoneal signs, normoactive bowel sounds  Extremities: no clubbing, well-perfused, no edema    LABORATORY DATA:                            7.9    12.85 )-----------( 289      ( 02 Jan 2023 04:38 )             23.8     01-02    138  |  106  |  12  ----------------------------<  185<H>  3.5   |  24  |  0.55    Ca    7.2<L>      02 Jan 2023 04:38  Phos  2.3     01-02  Mg     2.4     01-02        PT/INR - ( 01 Jan 2023 04:53 )   PT: 15.1 sec;   INR: 1.27          PTT - ( 01 Jan 2023 04:53 )  PTT:28.1 sec    Urinalysis with Rflx Culture (collected 31 Dec 2022 07:25)    Culture - Blood (collected 31 Dec 2022 02:42)  Source: .Blood Blood  Preliminary Report (02 Jan 2023 04:00):    No growth at 2 days.    Culture - Blood (collected 31 Dec 2022 02:42)  Source: .Blood Blood  Preliminary Report (02 Jan 2023 04:00):    No growth at 2 days.    Culture - Tissue with Gram Stain (collected 30 Dec 2022 21:00)  Source: .Tissue 4-brain infection or spec  Gram Stain (31 Dec 2022 09:26):    Moderate WBC's    Rare Gram positive cocci in pairs  Preliminary Report (31 Dec 2022 14:27):    No growth to date    Culture in progress    Culture - Surgical Swab (collected 30 Dec 2022 21:00)  Source: .Surgical Swab 3-epidural infection or spec  Gram Stain (31 Dec 2022 06:46):    Numerous White blood cells    Few Gram positive cocci in pairs  Preliminary Report (31 Dec 2022 14:32):    No growth to date    Culture - Surgical Swab (collected 30 Dec 2022 21:00)  Source: .Surgical Swab 2- subdural infection or spec  Gram Stain (31 Dec 2022 07:03):    Numerous White blood cells    No organisms seen  Preliminary Report (31 Dec 2022 14:35):    No growth to date    Culture - Surgical Swab (collected 30 Dec 2022 21:00)  Source: .Surgical Swab 1 Subdural pus or spec  Gram Stain (31 Dec 2022 06:54):    Moderate White blood cells    No organisms seen  Preliminary Report (31 Dec 2022 11:17):    No growth to date    MEDICATIONS  (STANDING):  cefTRIAXone   IVPB 2000 milliGRAM(s) IV Intermittent every 12 hours  chlorhexidine 2% Cloths 1 Application(s) Topical <User Schedule>  enoxaparin Injectable 40 milliGRAM(s) SubCutaneous every 12 hours  fluticasone propionate 50 MICROgram(s)/spray Nasal Spray 1 Spray(s) Both Nostrils every 12 hours  insulin lispro (ADMELOG) corrective regimen sliding scale   SubCutaneous Before meals and at bedtime  levETIRAcetam 1000 milliGRAM(s) Oral two times a day  metroNIDAZOLE  IVPB 750 milliGRAM(s) IV Intermittent every 8 hours  potassium phosphate IVPB 30 milliMole(s) IV Intermittent once  senna 2 Tablet(s) Oral at bedtime  sodium chloride 0.65% Nasal 1 Spray(s) Both Nostrils four times a day  vancomycin  IVPB 1750 milliGRAM(s) IV Intermittent every 12 hours    MEDICATIONS  (PRN):  hydrALAZINE Injectable 10 milliGRAM(s) IV Push every 2 hours PRN SBP >160  ondansetron Injectable 4 milliGRAM(s) IV Push every 6 hours PRN Nausea and/or Vomiting      ***********************************************  ASSESSMENT AND PLAN  ***********************************************    This is a case of a SD empyema/cerebral abscess in a 73 yo RH F with no significant PMHx (patient does not see physicians regularly).  Source of CNS infection likely pansinusitis.  S/p OR for washout w/ Dr. D'Amico on 12/30/22.     NEURO  #Epidural/subdural empyema, suspect source pansinusitis, POD #3 for crani w/ washout, Dr. D'Amico  - Admit NSICU, Q2h neuro checks / Q2h vital signs  - Surveillance VEEG, ppx LEV 1000/1000  - ENT consultation  - Pain control    PULM  - SpO2 goal > 92%, supplemental O2 and pulm toileting as needed    CARDIO  - BP goal: NORMOTENSION    GI  - Diet: feeds via DHT  - Stress ulcer prophylaxis: not indicated  - Bowel regimen    /RENAL  - Goal euvolemia/eunatremia  - Monitor UOP/volume status, BUN/SCr    HEME  #BL LE DVT  - AC for BLE DVT to be determined by NSG  - Maintain Hb > 7.0, PLT > 100,000 in light of recent surgery    ID  #Epidural/subdural empyema, source likely pansinusitis  - Surgical cultures showing GPCs  - Vanco/CTX/flagyl, ID consultation  - Monitor for infectious s/s, fever curve, leukocytosis    ENDO  - No issues    01-02-23 @ 07:45

## 2023-01-02 NOTE — PROCEDURE NOTE - ADDITIONAL PROCEDURE DETAILS
Pts L dorsum of foot cleaned and 1 set of blood cultures acquired, then pts R dorsum of foot was accessed via dorsalis pedis artery and second set of cultures was obtained after tourniquets applied and sites prepped with chlorhexidine.
Site cleansed with chlorhexidine, anchoring stitch removed with suture scissors, drain placed off suction, drain pulled out without difficulty, 3 staples placed at exit site. No residual bleeding noted.

## 2023-01-02 NOTE — PROGRESS NOTE ADULT - ASSESSMENT
72F no significant known pmhx, admit for L sudural empyema s/p L hemicrani and evacuation pending culture & sensitivities     -c/w ID recommended abx, f/u cultures and sensitivities  -c/w vEEG monitoring consider d/c if negative for 24hrs, c/w seizure ppx w/ keppra 1000mg BID   -HISS FSG q6hrs   -c/w dvt ppx   -NC q2hrs

## 2023-01-02 NOTE — PATIENT PROFILE ADULT - FALL HARM RISK - HARM RISK INTERVENTIONS

## 2023-01-02 NOTE — CHART NOTE - NSCHARTNOTEFT_GEN_A_CORE
POD3 L hemicrani. no acute events, neuro stable. repeat CTH stable. h/h stable. remains on vanc/ceftriaxone/flagyl per ID, pending final OR cx. placed on EEG to r/o subclinical seizures. man drain removed.

## 2023-01-02 NOTE — PROGRESS NOTE ADULT - ASSESSMENT
73 yo female p/w confusion, word-finding difficulty to OSH, found to have pansinusitis and L subdural empyema s/p L hemicraniotomy 12/30; per patient's daughter at bedside, unknown if patient had sinus infection preceding presentation and denies patient having recent dental work.   - f/u OR cx--GS with GPC in pairs  - f/u culture from sinus done by ENT 1/2  - continue vancomycin 1.75g IV q12h; check trough before 4th dose  - continue ceftriaxone 2g IV q12h  - continue metronidazole 750mg IV q8h

## 2023-01-02 NOTE — PROGRESS NOTE ADULT - SUBJECTIVE AND OBJECTIVE BOX
INTERVAL HPI/OVERNIGHT EVENTS: DEEPALI.    ROS: UTO      ANTIBIOTICS/RELEVANT:    MEDICATIONS  (STANDING):  cefTRIAXone   IVPB 2000 milliGRAM(s) IV Intermittent every 12 hours  chlorhexidine 2% Cloths 1 Application(s) Topical <User Schedule>  enoxaparin Injectable 40 milliGRAM(s) SubCutaneous every 12 hours  fluticasone propionate 50 MICROgram(s)/spray Nasal Spray 1 Spray(s) Both Nostrils every 12 hours  insulin lispro (ADMELOG) corrective regimen sliding scale   SubCutaneous Before meals and at bedtime  latanoprost 0.005% Ophthalmic Solution 1 Drop(s) Both EYES at bedtime  levETIRAcetam 1000 milliGRAM(s) Oral two times a day  metroNIDAZOLE  IVPB 750 milliGRAM(s) IV Intermittent every 8 hours  senna 2 Tablet(s) Oral at bedtime  sodium chloride 0.65% Nasal 1 Spray(s) Both Nostrils four times a day  vancomycin  IVPB 1750 milliGRAM(s) IV Intermittent every 12 hours    MEDICATIONS  (PRN):  acetaminophen    Suspension .. 650 milliGRAM(s) Oral every 6 hours PRN Temp greater or equal to 38C (100.4F), Mild Pain (1 - 3)  hydrALAZINE Injectable 10 milliGRAM(s) IV Push every 2 hours PRN SBP >160  ondansetron Injectable 4 milliGRAM(s) IV Push every 6 hours PRN Nausea and/or Vomiting        Vital Signs Last 24 Hrs  T(C): 37.1 (02 Jan 2023 14:24), Max: 37.2 (01 Jan 2023 17:22)  T(F): 98.7 (02 Jan 2023 14:24), Max: 99 (02 Jan 2023 06:11)  HR: 67 (02 Jan 2023 15:00) (59 - 95)  BP: 148/64 (02 Jan 2023 15:00) (124/56 - 168/70)  BP(mean): 92 (02 Jan 2023 15:00) (80 - 101)  RR: 18 (02 Jan 2023 15:00) (15 - 19)  SpO2: 95% (02 Jan 2023 15:00) (93% - 100%)    Parameters below as of 02 Jan 2023 15:00  Patient On (Oxygen Delivery Method): room air        PHYSICAL EXAM:  Constitutional: NAD  Eyes: VISHAL, EOMI  Ear/Nose/Throat: no oral lesion, no sinus tenderness on percussion	  Neck: no JVD, no lymphadenopathy, supple  Respiratory: CTA larry  Cardiovascular: S1S2 RRR, no murmurs  Gastrointestinal:soft, (+) BS, no HSM  Extremities:no e/e/c  Vascular: DP Pulse:	right normal; left normal      LABS:                        7.9    12.85 )-----------( 289      ( 02 Jan 2023 04:38 )             23.8     01-02    138  |  106  |  12  ----------------------------<  185<H>  3.5   |  24  |  0.55    Ca    7.2<L>      02 Jan 2023 04:38  Phos  2.3     01-02  Mg     2.4     01-02      PT/INR - ( 01 Jan 2023 04:53 )   PT: 15.1 sec;   INR: 1.27          PTT - ( 01 Jan 2023 04:53 )  PTT:28.1 sec      MICROBIOLOGY: reviewed    RADIOLOGY & ADDITIONAL STUDIES: reviewed

## 2023-01-02 NOTE — PROGRESS NOTE ADULT - SUBJECTIVE AND OBJECTIVE BOX
HPI:  72F, txfered from [facility]. Presented to [facility] 7 days ago for AMS. CTH done 6 days ago showed spontaneous Left SD collection. MRI done today showed Left SD collection with diffusion restriction c/f empyema and infarct. Also showed Left sinus collection. Was also getting ceftriaxone for presumed UT at OSH. (30 Dec 2022 19:41)        Hospital Course:   : Admitted to NSICU, s/p Left hemicraniectomy and washout of L subdural empyema. Pancultured. Vanc/ceftriaxone/flagyl for empiric coverage. OR cultures demonstrated gram negative cocci in pairs. ENT and ID consulted, recommend cont vanc 2g, flagyl 750mg q8hrs, ceftriaxone 2g q12hrs. Extubated to room air, RUE/RLE strength improving. Pend CT sinus stealth protocol pend per ENT.   : POD2 DEEPALI o/n, neuro stable, tolerating RA post extubation, pending CT sinus w/ stealth protocol per ENT. Blood consent obtained and pt transfused 1u prbc for HGB 7.9-> 6.8 -> 7.4. CHENCHO drain removed.  LE dopplers + B/L peroneal IM calf DVT, SQL increased to BID prophylactic dosing. Failed swallow eval, remains on tube feeds.   : POD3, DEEPALI o/n. Neuro stable. TF increased goal rate to 65, tolerating feeds. S&S to re-eval in 48 hours. Pending final recs from ID.         Vital Signs Last 24 Hrs  T(C): 37.1 (2023 21:36), Max: 37.4 (2023 05:22)  T(F): 98.8 (2023 21:36), Max: 99.4 (2023 05:22)  HR: 75 (2023 00:00) (55 - 98)  BP: 146/62 (2023 00:00) (117/51 - 177/77)  BP(mean): 89 (2023 00:00) (74 - 111)  RR: 18 (2023 00:00) (15 - 18)  SpO2: 100% (2023 00:00) (94% - 100%)    Parameters below as of 2023 00:00  Patient On (Oxygen Delivery Method): room air        I&O's Detail    31 Dec 2022 07:  -  2023 07:00  --------------------------------------------------------  IN:    Enteral Tube Flush: 180 mL    IV PiggyBack: 1000 mL    Jevity 1.2: 390 mL    sodium chloride 0.9%: 600 mL    sodium chloride 0.9%: 450 mL  Total IN: 2620 mL    OUT:    Bulb (mL): 135 mL    Bulb (mL): 0 mL    Dexmedetomidine: 0 mL    Indwelling Catheter - Urethral (mL): 350 mL    Rectal Tube (mL): 0 mL    Voided (mL): 750 mL  Total OUT: 1235 mL    Total NET: 1385 mL      2023 07:  -  2023 00:38  --------------------------------------------------------  IN:    Enteral Tube Flush: 80 mL    IV PiggyBack: 1000 mL    Jevity 1.2: 860 mL    PRBCs (Packed Red Blood Cells): 300 mL  Total IN: 2240 mL    OUT:    Bulb (mL): 0 mL    Voided (mL): 600 mL  Total OUT: 600 mL    Total NET: 1640 mL        I&O's Summary    31 Dec 2022 07:  -  2023 07:00  --------------------------------------------------------  IN: 2620 mL / OUT: 1235 mL / NET: 1385 mL    :  -  2023 00:38  --------------------------------------------------------  IN: 2240 mL / OUT: 600 mL / NET: 1640 mL        PHYSICAL EXAM:  General: patient seen laying supine in bed in NAD  Neuro: OE spontaneously, awake& alert, not oriented, tracking, R facial, FC throughout, RUE 3/4, RLE 4/5, LUE/LLE 5/5.   HEENT: L pupil 4 mm reactive, R pupil 3 mm sluggish (surgical pupil) + NGT   Pulmonary: chest rise symmetric  Abdomen: soft, nontender, nondistended  Ext: perfusing well  Skin: warm, dry  Wound: Crani incision c/d/i +staples/sutures     TUBES/LINES:  [] CVC  [] A-line  [] Lumbar Drain  [] Ventriculostomy  [x] CHENCHO - 1x  [] Cannon  [] NGT   [] Other    DIET:  [] NPO  [] Mechanical  [x] Tube feeds    LABS:                        7.4    13.43 )-----------( 243      ( 2023 10:52 )             22.6         139  |  104  |  12  ----------------------------<  176<H>  3.5   |  27  |  0.60    Ca    7.4<L>      2023 04:53  Phos  2.5       Mg     2.0           PT/INR - ( 2023 04:53 )   PT: 15.1 sec;   INR: 1.27          PTT - ( 2023 04:53 )  PTT:28.1 sec  Urinalysis Basic - ( 31 Dec 2022 07:25 )    Color: Yellow / Appearance: Clear / S.010 / pH: x  Gluc: x / Ketone: NEGATIVE  / Bili: Negative / Urobili: 0.2 E.U./dL   Blood: x / Protein: NEGATIVE mg/dL / Nitrite: NEGATIVE   Leuk Esterase: NEGATIVE / RBC: < 5 /HPF / WBC < 5 /HPF   Sq Epi: x / Non Sq Epi: 0-5 /HPF / Bacteria: Present /HPF          CAPILLARY BLOOD GLUCOSE      POCT Blood Glucose.: 149 mg/dL (2023 21:53)  POCT Blood Glucose.: 165 mg/dL (2023 17:05)  POCT Blood Glucose.: 126 mg/dL (2023 12:46)  POCT Blood Glucose.: 162 mg/dL (2023 06:04)      Drug Levels: [] N/A  Vancomycin Level, Trough: 19.7 ug/mL ( @ 17:15)  Vancomycin Level, Trough: 13.3 ug/mL ( @ 16:19)    CSF Analysis: [] N/A      Allergies    Allergy Status Unknown    Intolerances        Home Medications:      MEDICATIONS:  Antibiotics:  cefTRIAXone   IVPB 2000 milliGRAM(s) IV Intermittent every 12 hours  metroNIDAZOLE  IVPB 750 milliGRAM(s) IV Intermittent every 8 hours  vancomycin  IVPB 2000 milliGRAM(s) IV Intermittent every 12 hours    Neuro:  levETIRAcetam 1000 milliGRAM(s) Oral two times a day  ondansetron Injectable 4 milliGRAM(s) IV Push every 6 hours PRN    Anticoagulation:  enoxaparin Injectable 40 milliGRAM(s) SubCutaneous every 12 hours    OTHER:  fluticasone propionate 50 MICROgram(s)/spray Nasal Spray 1 Spray(s) Both Nostrils every 12 hours  hydrALAZINE Injectable 10 milliGRAM(s) IV Push every 2 hours PRN  insulin lispro (ADMELOG) corrective regimen sliding scale   SubCutaneous Before meals and at bedtime  senna 2 Tablet(s) Oral at bedtime  sodium chloride 0.65% Nasal 1 Spray(s) Both Nostrils four times a day    IVF:    CULTURES:  Culture Results:   No growth at 1 day. ( @ 02:42)  Culture Results:   No growth at 1 day. ( @ 02:42)    RADIOLOGY & ADDITIONAL TESTS:  < from: CT Sinuses No Cont (23 @ 11:45) >    IMPRESSION:    Examination is markedly degraded by motion in spite of repeat images.    Extensive opacification of the paranasal sinuses which may in part be due   to recent intubation although infectious sinus disease cannot be excluded.    --- End of Report ---    < end of copied text >  < from: US Duplex Venous Lower Ext Complete, Bilateral (23 @ 14:00) >    IMPRESSION:  Deep venous thrombosis involving the bilateral calves, as above.  Acute deep venous thrombosis: below the knee.    Findings were discussed with OMER Gallegos 2023 3:20 PM by Dr. Hernandez   with read back confirmation.        ******PRELIMINARY REPORT******      < end of copied text >      ASSESSMENT:  73yo F w/ no known PMHx transferred from [place] c/o slurred speech and right sided weakness. CTH initially negative, repeat on  showed L sided SDH, pts mental status worsened and CTH on  significant for L frontoparietal collection, MRI completed showed concern for possible empyema. S/p L hemicraniectomy and washout of L subdural empyema 22.       INTRACRANIALHEMORRIAGE    Handoff    Subdural empyema    Subdural empyema    Craniectomy or craniotomy, emergent    [email or web address]        PLAN:  NEURO   - neuro/vitals Q2/Q1  - Post op CTH complete   - CT sinuses pending per ENT rec   - Cont Keppra 1000 BID  - Upload OSH images  - 1 CHENCHO drains, monitor output     PULM   - Extubated   - Satting well on room air     CARDIO   - TTE  -  < 160    GI   - TF via NGT  - bowel regimen  - protonix   - BM      RENAL  - voiding  - i's and o's     ENDO  - ISS  - a1c 6.1   - monitor finger sticks   - repeat TFTs, RASS    HEME   - SCDs, SQL BID   - downtrending hgb  - FOB pending   - s/p 1u prbc   - LE doppler  b/l calf and left peroneal DVTs     ID   - ID consult,   - Pancultured , f/u cultures  - follow up OR cultures  - vanc/ceftriaxone/metronidazole for presumed CNS infection (Pus in OR)     Dispo: Pending, [place]     Discussed with Dr. D'Amico       Assessment:  Present when checked    []  GCS  E   V  M     Heart Failure: []Acute, [] acute on chronic , []chronic  Heart Failure:  [] Diastolic (HFpEF), [] Systolic (HFrEF), []Combined (HFpEF and HFrEF), [] RHF, [] Pulm HTN, [] Other    [] DIONTE, [] ATN, [] AIN, [] other  [] CKD1, [] CKD2, [] CKD 3, [] CKD 4, [] CKD 5, []ESRD    Encephalopathy: [] Metabolic, [] Hepatic, [] toxic, [] Neurological, [] Other    Abnormal Nurtitional Status: [] malnurtition (see nutrition note), [ ]underweight: BMI < 19, [] morbid obesity: BMI >40, [] Cachexia    [] Sepsis  [] hypovolemic shock,[] cardiogenic shock, [] hemorrhagic shock, [] neuogenic shock  [] Acute Respiratory Failure  []Cerebral edema, [] Brain compression/ herniation,   [] Functional quadriplegia  [] Acute blood loss anemia

## 2023-01-02 NOTE — PROGRESS NOTE ADULT - SUBJECTIVE AND OBJECTIVE BOX
INTERVAL HISTORY: HPI:  72F, txfered from [facility]. Presented to [facility] 7 days ago for AMS. CTH done 6 days ago showed spontaneous Left SD collection. MRI done today showed Left SD collection with diffusion restriction c/f empyema and infarct. Also showed Left sinus collection. Was also getting ceftriaxone for presumed UT at OSH. (30 Dec 2022 19:41)      MEDICATIONS  (STANDING):  amLODIPine   Tablet 5 milliGRAM(s) Oral daily  cefTRIAXone   IVPB 2000 milliGRAM(s) IV Intermittent every 12 hours  chlorhexidine 0.12% Liquid 15 milliLiter(s) Swish and Spit two times a day  chlorhexidine 2% Cloths 1 Application(s) Topical <User Schedule>  enoxaparin Injectable 40 milliGRAM(s) SubCutaneous every 12 hours  fluticasone propionate 50 MICROgram(s)/spray Nasal Spray 1 Spray(s) Both Nostrils every 12 hours  insulin lispro (ADMELOG) corrective regimen sliding scale   SubCutaneous Before meals and at bedtime  latanoprost 0.005% Ophthalmic Solution 1 Drop(s) Right EYE at bedtime  levETIRAcetam 1000 milliGRAM(s) Oral two times a day  metroNIDAZOLE  IVPB 750 milliGRAM(s) IV Intermittent every 8 hours  senna 2 Tablet(s) Oral at bedtime  sodium chloride 0.65% Nasal 1 Spray(s) Both Nostrils four times a day  vancomycin  IVPB 1750 milliGRAM(s) IV Intermittent every 12 hours    MEDICATIONS  (PRN):  acetaminophen    Suspension .. 650 milliGRAM(s) Oral every 6 hours PRN Temp greater or equal to 38C (100.4F), Mild Pain (1 - 3)  ondansetron Injectable 4 milliGRAM(s) IV Push every 6 hours PRN Nausea and/or Vomiting      Drug Dosing Weight  Height (cm): 162.6 (30 Dec 2022 22:30)  Weight (kg): 126.3 (30 Dec 2022 22:57)  BMI (kg/m2): 47.8 (30 Dec 2022 22:57)  BSA (m2): 2.25 (30 Dec 2022 22:57)    PAST MEDICAL & SURGICAL HISTORY:      REVIEW OF SYSTEMS: [ ] Unable to Assess due to neurologic exam   [x] All ROS addressed below are non-contributory, except:  Neuro: [ ] Headache [ ] Back pain [ ] Numbness [ ] Weakness [ ] Ataxia [ ] Dizziness [ ] Aphasia [ ] Dysarthria [ ] Visual disturbance  Resp: [ ] Shortness of breath/dyspnea, [ ] Orthopnea [ ] Cough  CV: [ ] Chest pain [ ] Palpitation [ ] Lightheadedness [ ] Syncope  Renal: [ ] Thirst [ ] Edema  GI: [ ] Nausea [ ] Emesis [ ] Abdominal pain [ ] Constipation [ ] Diarrhea  Hem: [ ] Hematemesis [ ] bright red blood per rectum  ID: [ ] Fever [ ] Chills [ ] Dysuria  ENT: [ ] Rhinorrhea    PHYSICAL EXAM:    General: No Acute Distress     Neurological: eyes open to voice, follows commands, mild right facial w/ tongue deviation, RUE HG 2/5, all else 3/5 w/ drift, RLE 4/5, LUE 5/5 LLE 5/5, attempts to mouth words, aox2 w/ choice,     Pulmonary: Clear to Auscultation, No Rales, No Rhonchi, No Wheezes     Cardiovascular: S1, S2, Regular Rate and Rhythm     Gastrointestinal: Soft, Nontender, Nondistended     Extremities: No calf tenderness     Incision: CDI    ICU Vital Signs Last 24 Hrs  T(C): 36.7 (03 Jan 2023 01:04), Max: 37.2 (02 Jan 2023 06:11)  T(F): 98 (03 Jan 2023 01:04), Max: 99 (02 Jan 2023 06:11)  HR: 73 (03 Jan 2023 04:00) (59 - 95)  BP: 131/63 (03 Jan 2023 04:00) (131/63 - 168/70)  BP(mean): 90 (03 Jan 2023 04:00) (88 - 101)  ABP: 138/45 (02 Jan 2023 16:00) (131/40 - 173/64)  ABP(mean): 79 (02 Jan 2023 16:00) (71 - 105)  RR: 16 (03 Jan 2023 04:00) (16 - 19)  SpO2: 97% (03 Jan 2023 04:00) (93% - 100%)    O2 Parameters below as of 03 Jan 2023 04:00  Patient On (Oxygen Delivery Method): room air      I&O's Detail    01 Jan 2023 07:01  -  02 Jan 2023 07:00  --------------------------------------------------------  IN:    Enteral Tube Flush: 220 mL    IV PiggyBack: 1200 mL    Jevity 1.2: 1300 mL    PRBCs (Packed Red Blood Cells): 300 mL  Total IN: 3020 mL    OUT:    Bulb (mL): 0 mL    Voided (mL): 1450 mL  Total OUT: 1450 mL    Total NET: 1570 mL      02 Jan 2023 07:01  -  03 Jan 2023 04:19  --------------------------------------------------------  IN:    IV PiggyBack: 999.8 mL    IV PiggyBack: 600 mL    Jevity 1.2: 1365 mL  Total IN: 2964.8 mL    OUT:    Voided (mL): 1610 mL  Total OUT: 1610 mL    Total NET: 1354.8 mL      LABS:  CBC Full  -  ( 02 Jan 2023 04:38 )  WBC Count : 12.85 K/uL  RBC Count : 2.70 M/uL  Hemoglobin : 7.9 g/dL  Hematocrit : 23.8 %  Platelet Count - Automated : 289 K/uL  Mean Cell Volume : 88.1 fl  Mean Cell Hemoglobin : 29.3 pg  Mean Cell Hemoglobin Concentration : 33.2 gm/dL  Auto Neutrophil # : x  Auto Lymphocyte # : x  Auto Monocyte # : x  Auto Eosinophil # : x  Auto Basophil # : x  Auto Neutrophil % : x  Auto Lymphocyte % : x  Auto Monocyte % : x  Auto Eosinophil % : x  Auto Basophil % : x    01-02    138  |  106  |  12  ----------------------------<  185<H>  3.5   |  24  |  0.55    Ca    7.2<L>      02 Jan 2023 04:38  Phos  2.3     01-02  Mg     2.4     01-02      PT/INR - ( 01 Jan 2023 04:53 )   PT: 15.1 sec;   INR: 1.27          PTT - ( 01 Jan 2023 04:53 )  PTT:28.1 sec      RADIOLOGY & ADDITIONAL STUDIES: reviewed

## 2023-01-03 ENCOUNTER — TRANSCRIPTION ENCOUNTER (OUTPATIENT)
Age: 73
End: 2023-01-03

## 2023-01-03 DIAGNOSIS — R73.03 PREDIABETES: ICD-10-CM

## 2023-01-03 DIAGNOSIS — R13.10 DYSPHAGIA, UNSPECIFIED: ICD-10-CM

## 2023-01-03 DIAGNOSIS — I62.00 NONTRAUMATIC SUBDURAL HEMORRHAGE, UNSPECIFIED: ICD-10-CM

## 2023-01-03 DIAGNOSIS — G06.2 EXTRADURAL AND SUBDURAL ABSCESS, UNSPECIFIED: ICD-10-CM

## 2023-01-03 DIAGNOSIS — Z29.9 ENCOUNTER FOR PROPHYLACTIC MEASURES, UNSPECIFIED: ICD-10-CM

## 2023-01-03 DIAGNOSIS — I82.4Z9 ACUTE EMBOLISM AND THROMBOSIS OF UNSPECIFIED DEEP VEINS OF UNSPECIFIED DISTAL LOWER EXTREMITY: ICD-10-CM

## 2023-01-03 DIAGNOSIS — I10 ESSENTIAL (PRIMARY) HYPERTENSION: ICD-10-CM

## 2023-01-03 DIAGNOSIS — D64.9 ANEMIA, UNSPECIFIED: ICD-10-CM

## 2023-01-03 DIAGNOSIS — J32.4 CHRONIC PANSINUSITIS: ICD-10-CM

## 2023-01-03 DIAGNOSIS — E66.01 MORBID (SEVERE) OBESITY DUE TO EXCESS CALORIES: ICD-10-CM

## 2023-01-03 LAB
ANION GAP SERPL CALC-SCNC: 5 MMOL/L — SIGNIFICANT CHANGE UP (ref 5–17)
BLD GP AB SCN SERPL QL: NEGATIVE — SIGNIFICANT CHANGE UP
BUN SERPL-MCNC: 10 MG/DL — SIGNIFICANT CHANGE UP (ref 7–23)
CALCIUM SERPL-MCNC: 7.9 MG/DL — LOW (ref 8.4–10.5)
CHLORIDE SERPL-SCNC: 106 MMOL/L — SIGNIFICANT CHANGE UP (ref 96–108)
CO2 SERPL-SCNC: 26 MMOL/L — SIGNIFICANT CHANGE UP (ref 22–31)
CREAT SERPL-MCNC: 0.56 MG/DL — SIGNIFICANT CHANGE UP (ref 0.5–1.3)
EGFR: 98 ML/MIN/1.73M2 — SIGNIFICANT CHANGE UP
GLUCOSE BLDC GLUCOMTR-MCNC: 128 MG/DL — HIGH (ref 70–99)
GLUCOSE BLDC GLUCOMTR-MCNC: 150 MG/DL — HIGH (ref 70–99)
GLUCOSE BLDC GLUCOMTR-MCNC: 157 MG/DL — HIGH (ref 70–99)
GLUCOSE BLDC GLUCOMTR-MCNC: 172 MG/DL — HIGH (ref 70–99)
GLUCOSE SERPL-MCNC: 179 MG/DL — HIGH (ref 70–99)
HCT VFR BLD CALC: 25.6 % — LOW (ref 34.5–45)
HGB BLD-MCNC: 8.2 G/DL — LOW (ref 11.5–15.5)
LMWH PPP CHRO-ACNC: 0.22 IU/ML — LOW (ref 0.5–1.1)
MAGNESIUM SERPL-MCNC: 2.1 MG/DL — SIGNIFICANT CHANGE UP (ref 1.6–2.6)
MCHC RBC-ENTMCNC: 29.1 PG — SIGNIFICANT CHANGE UP (ref 27–34)
MCHC RBC-ENTMCNC: 32 GM/DL — SIGNIFICANT CHANGE UP (ref 32–36)
MCV RBC AUTO: 90.8 FL — SIGNIFICANT CHANGE UP (ref 80–100)
NRBC # BLD: 0 /100 WBCS — SIGNIFICANT CHANGE UP (ref 0–0)
PHOSPHATE SERPL-MCNC: 2.7 MG/DL — SIGNIFICANT CHANGE UP (ref 2.5–4.5)
PLATELET # BLD AUTO: 351 K/UL — SIGNIFICANT CHANGE UP (ref 150–400)
POTASSIUM SERPL-MCNC: 3.9 MMOL/L — SIGNIFICANT CHANGE UP (ref 3.5–5.3)
POTASSIUM SERPL-SCNC: 3.9 MMOL/L — SIGNIFICANT CHANGE UP (ref 3.5–5.3)
RBC # BLD: 2.82 M/UL — LOW (ref 3.8–5.2)
RBC # FLD: 16.8 % — HIGH (ref 10.3–14.5)
RH IG SCN BLD-IMP: POSITIVE — SIGNIFICANT CHANGE UP
SODIUM SERPL-SCNC: 137 MMOL/L — SIGNIFICANT CHANGE UP (ref 135–145)
VANCOMYCIN TROUGH SERPL-MCNC: 22 UG/ML — HIGH (ref 10–20)
WBC # BLD: 10.85 K/UL — HIGH (ref 3.8–10.5)
WBC # FLD AUTO: 10.85 K/UL — HIGH (ref 3.8–10.5)

## 2023-01-03 PROCEDURE — 99024 POSTOP FOLLOW-UP VISIT: CPT

## 2023-01-03 PROCEDURE — 99222 1ST HOSP IP/OBS MODERATE 55: CPT

## 2023-01-03 PROCEDURE — 99291 CRITICAL CARE FIRST HOUR: CPT | Mod: 24

## 2023-01-03 RX ORDER — VANCOMYCIN HCL 1 G
1500 VIAL (EA) INTRAVENOUS EVERY 12 HOURS
Refills: 0 | Status: DISCONTINUED | OUTPATIENT
Start: 2023-01-03 | End: 2023-01-04

## 2023-01-03 RX ORDER — POTASSIUM CHLORIDE 20 MEQ
20 PACKET (EA) ORAL ONCE
Refills: 0 | Status: COMPLETED | OUTPATIENT
Start: 2023-01-03 | End: 2023-01-03

## 2023-01-03 RX ORDER — CHLORHEXIDINE GLUCONATE 213 G/1000ML
15 SOLUTION TOPICAL
Refills: 0 | Status: DISCONTINUED | OUTPATIENT
Start: 2023-01-03 | End: 2023-01-12

## 2023-01-03 RX ORDER — AMLODIPINE BESYLATE 2.5 MG/1
5 TABLET ORAL DAILY
Refills: 0 | Status: DISCONTINUED | OUTPATIENT
Start: 2023-01-04 | End: 2023-01-08

## 2023-01-03 RX ORDER — METRONIDAZOLE 500 MG
500 TABLET ORAL EVERY 6 HOURS
Refills: 0 | Status: DISCONTINUED | OUTPATIENT
Start: 2023-01-03 | End: 2023-01-10

## 2023-01-03 RX ORDER — SODIUM,POTASSIUM PHOSPHATES 278-250MG
1 POWDER IN PACKET (EA) ORAL ONCE
Refills: 0 | Status: COMPLETED | OUTPATIENT
Start: 2023-01-03 | End: 2023-01-03

## 2023-01-03 RX ADMIN — CHLORHEXIDINE GLUCONATE 15 MILLILITER(S): 213 SOLUTION TOPICAL at 05:26

## 2023-01-03 RX ADMIN — Medication 2: at 17:00

## 2023-01-03 RX ADMIN — ENOXAPARIN SODIUM 40 MILLIGRAM(S): 100 INJECTION SUBCUTANEOUS at 17:42

## 2023-01-03 RX ADMIN — Medication 20 MILLIEQUIVALENT(S): at 06:41

## 2023-01-03 RX ADMIN — ENOXAPARIN SODIUM 40 MILLIGRAM(S): 100 INJECTION SUBCUTANEOUS at 05:26

## 2023-01-03 RX ADMIN — Medication 1 SPRAY(S): at 05:25

## 2023-01-03 RX ADMIN — LEVETIRACETAM 1000 MILLIGRAM(S): 250 TABLET, FILM COATED ORAL at 17:42

## 2023-01-03 RX ADMIN — CHLORHEXIDINE GLUCONATE 15 MILLILITER(S): 213 SOLUTION TOPICAL at 17:43

## 2023-01-03 RX ADMIN — Medication 1 SPRAY(S): at 05:26

## 2023-01-03 RX ADMIN — Medication 1 SPRAY(S): at 17:42

## 2023-01-03 RX ADMIN — CHLORHEXIDINE GLUCONATE 1 APPLICATION(S): 213 SOLUTION TOPICAL at 05:28

## 2023-01-03 RX ADMIN — AMLODIPINE BESYLATE 5 MILLIGRAM(S): 2.5 TABLET ORAL at 06:04

## 2023-01-03 RX ADMIN — Medication 1 SPRAY(S): at 12:36

## 2023-01-03 RX ADMIN — CEFTRIAXONE 100 MILLIGRAM(S): 500 INJECTION, POWDER, FOR SOLUTION INTRAMUSCULAR; INTRAVENOUS at 05:26

## 2023-01-03 RX ADMIN — Medication 100 MILLIGRAM(S): at 05:27

## 2023-01-03 RX ADMIN — Medication 2: at 06:05

## 2023-01-03 RX ADMIN — Medication 250 MILLIGRAM(S): at 09:15

## 2023-01-03 RX ADMIN — Medication 1 PACKET(S): at 06:41

## 2023-01-03 RX ADMIN — LEVETIRACETAM 1000 MILLIGRAM(S): 250 TABLET, FILM COATED ORAL at 05:27

## 2023-01-03 RX ADMIN — CEFTRIAXONE 100 MILLIGRAM(S): 500 INJECTION, POWDER, FOR SOLUTION INTRAMUSCULAR; INTRAVENOUS at 17:41

## 2023-01-03 RX ADMIN — Medication 100 MILLIGRAM(S): at 14:35

## 2023-01-03 NOTE — DISCHARGE NOTE PROVIDER - HOSPITAL COURSE
HPI:  73yo F w/ no known PMHx. on 12/22 presented to [facility] c/o slurred speech and R sided weakness, stroke code called, imaging initially negative for stroke or hemorrhage, pt's neuro exam worsened and subsequent CTH the following day showed L sided SDH, admitted to ICU for further monitoring. Pt neuro declined on 12/25 with AMS requiring intubation, noted to have episodes of twitching concerning for seizures, started on keppra and EEG placed. No epileptiform activity noted. CTH on 12/28 significant for L frontoparietal collection, MRI completed showed concern for possible empyema. Daughter reported pt likely had a sinus infection recently, unsure of abx. At [place] pt started on ceftriaxone for UTI that was switched to vancomycin this am. Pt transferred to [facility] for furhte rmanagement and possible L hemicraniectomy and washout of L subdural empyema.     Hospital Course:  12/31: Admitted to NSICU, s/p Left hemicraniectomy and washout of L subdural empyema. Pancultured. Vanc/ceftriaxone/flagyl for empiric coverage. OR cultures demonstrated gram negative cocci in pairs. ENT and ID consulted, recommend cont vanc 2g, flagyl 750mg q8hrs, ceftriaxone 2g q12hrs. Extubated to room air, RUE/RLE strength improving. Pend CT sinus stealth protocol pend per ENT.   1/1: POD2 DEEPALI o/n, neuro stable, tolerating RA post extubation, pending CT sinus w/ stealth protocol per ENT. Blood consent obtained and pt transfused 1u prbc for HGB 7.9-> 6.8 -> 7.4. CHENCHO drain removed.  LE dopplers + B/L peroneal IM calf DVT, SQL increased to BID prophylactic dosing. Failed swallow eval, remains on tube feeds.   1/2: POD3, DEEPALI o/n. Neuro stable. TF increased goal rate to 65, tolerating feeds. S&S to re-eval in 48 hours. Pending final recs from ID. FOBT neg. Nate drain removed.   1/3: POD4. DEEPALI overnight, pt able to say "no" to questions, o/w neuro exam stable. EEG on, f/u read in am. F/u final OR cx, then will have PICC placed. Vanc trough pend 1/3 @9pm. Pend repeat dopplers 1/6.     Patient evaluated by PT/OT who recommended: Acute Rehab   Patient is going home? rehab? hospice? Facility Name:     Hospital course c/b:     Exam on day of discharge:    Checklist:   - Obtained follow up appointment from NP  - Reviewed final recommendations of inpatient consults  - review discharge planning on provider handoff  - post op imaging completed  - Neurologically stable for discharge  - Vitals stable for discharge   - Afebrile for discharge  - WBC is stable  - Sodium level is normal  - Pain is adequately controlled  - Pt has PICC/walker/brace/collar   - LACE score (10 or > needs PCP apt)      HPI:  73yo F w/ no known PMHx. on 12/22 presented to [facility] c/o slurred speech and R sided weakness, stroke code called, imaging initially negative for stroke or hemorrhage, pt's neuro exam worsened and subsequent CTH the following day showed L sided SDH, admitted to ICU for further monitoring. Pt neuro declined on 12/25 with AMS requiring intubation, noted to have episodes of twitching concerning for seizures, started on keppra and EEG placed. No epileptiform activity noted. CTH on 12/28 significant for L frontoparietal collection, MRI completed showed concern for possible empyema. Daughter reported pt likely had a sinus infection recently, unsure of abx. At [place] pt started on ceftriaxone for UTI that was switched to vancomycin this am. Pt transferred to [facility] for furhte rmanagement and possible L hemicraniectomy and washout of L subdural empyema.     Hospital Course:  12/31: Admitted to NSICU, s/p Left hemicraniectomy and washout of L subdural empyema. Pancultured. Vanc/ceftriaxone/flagyl for empiric coverage. OR cultures demonstrated gram negative cocci in pairs. ENT and ID consulted, recommend cont vanc 2g, flagyl 750mg q8hrs, ceftriaxone 2g q12hrs. Extubated to room air, RUE/RLE strength improving. Pend CT sinus stealth protocol pend per ENT.   1/1: POD2 DEEPALI o/n, neuro stable, tolerating RA post extubation, pending CT sinus w/ stealth protocol per ENT. Blood consent obtained and pt transfused 1u prbc for HGB 7.9-> 6.8 -> 7.4. CHENCHO drain removed.  LE dopplers + B/L peroneal IM calf DVT, SQL increased to BID prophylactic dosing. Failed swallow eval, remains on tube feeds.   1/2: POD3, DEEPALI o/n. Neuro stable. TF increased goal rate to 65, tolerating feeds. S&S to re-eval in 48 hours. Pending final recs from ID. FOBT neg. Nate drain removed.   1/3: POD4. DEEPALI overnight, pt able to say "no" to questions, o/w neuro exam stable. EEG on, dc'd in afternoon, no seizure. F/u final OR cx, then will have PICC placed. Vanc trough pend 1/3 @9pm. Pend repeat dopplers 1/6. SDU status  1/4: POD 5. DEEPALI overnight. Neuro stable. FEES completed and patient cleared for pureed diet. Pending OR with ENT tomorrow. Vancomycin dc'd per ID.  1/5: POD 6. DEEPALI overnight. Neurostable. Repeat CT sinus complete overnight. POD0 b/l sinus surgery with judith purulence to L maxillary sinus and posterior bony erosiun to L sphenoid sinus. NGT replaced by ENT. CXR w/ NGT in distal esophagus, advanced and re-ordered CXR which showed correct placement of NGT.   1/6: POD 7/POD1. DEEPALI ovn, neurologically stable. SQL ppx resumed tonight. Tolerating pureed diet, NGT removed.   1/7: POD8/POD2. DEEPALI o/n neuro stable. Al cx are groin staph. epidermidis, Vancomycin started.  1/8: POD 9/3. DEEPALI o/n. Neuro stable. Pending AR.   1/9: POD 10/4. DEEPALI o/n. Neuro stable. Pending AR.     Patient evaluated by PT/OT who recommended: Acute Rehab   Patient is going home? rehab? hospice? Facility Name:     Hospital course c/b:     Exam on day of discharge:    Checklist:   - Obtained follow up appointment from NP  - Reviewed final recommendations of inpatient consults  - review discharge planning on provider handoff  - post op imaging completed  - Neurologically stable for discharge  - Vitals stable for discharge   - Afebrile for discharge  - WBC is stable  - Sodium level is normal  - Pain is adequately controlled  - Pt has PICC/walker/brace/collar   - LACE score (10 or > needs PCP apt)      HPI:  71yo F w/ no known PMHx. on 12/22 presented to [facility] c/o slurred speech and R sided weakness, stroke code called, imaging initially negative for stroke or hemorrhage, pt's neuro exam worsened and subsequent CTH the following day showed L sided SDH, admitted to ICU for further monitoring. Pt neuro declined on 12/25 with AMS requiring intubation, noted to have episodes of twitching concerning for seizures, started on keppra and EEG placed. No epileptiform activity noted. CTH on 12/28 significant for L frontoparietal collection, MRI completed showed concern for possible empyema. Daughter reported pt likely had a sinus infection recently, unsure of abx. At [place] pt started on ceftriaxone for UTI that was switched to vancomycin this am. Pt transferred to [facility] for furhte rmanagement and possible L hemicraniectomy and washout of L subdural empyema.     Hospital Course:  12/31: Admitted to NSICU, s/p Left hemicraniectomy and washout of L subdural empyema. Pancultured. Vanc/ceftriaxone/flagyl for empiric coverage. OR cultures demonstrated gram negative cocci in pairs. ENT and ID consulted, recommend cont vanc 2g, flagyl 750mg q8hrs, ceftriaxone 2g q12hrs. Extubated to room air, RUE/RLE strength improving. Pend CT sinus stealth protocol pend per ENT.   1/1: POD2 DEEPALI o/n, neuro stable, tolerating RA post extubation, pending CT sinus w/ stealth protocol per ENT. Blood consent obtained and pt transfused 1u prbc for HGB 7.9-> 6.8 -> 7.4. CHENCHO drain removed.  LE dopplers + B/L peroneal IM calf DVT, SQL increased to BID prophylactic dosing. Failed swallow eval, remains on tube feeds.   1/2: POD3, DEEPALI o/n. Neuro stable. TF increased goal rate to 65, tolerating feeds. S&S to re-eval in 48 hours. Pending final recs from ID. FOBT neg. Nate drain removed.   1/3: POD4. DEEPALI overnight, pt able to say "no" to questions, o/w neuro exam stable. EEG on, dc'd in afternoon, no seizure. F/u final OR cx, then will have PICC placed. Vanc trough pend 1/3 @9pm. Pend repeat dopplers 1/6. SDU status  1/4: POD 5. DEEPALI overnight. Neuro stable. FEES completed and patient cleared for pureed diet. Pending OR with ENT tomorrow. Vancomycin dc'd per ID.  1/5: POD 6. DEEPALI overnight. Neurostable. Repeat CT sinus complete overnight. POD0 b/l sinus surgery with judith purulence to L maxillary sinus and posterior bony erosiun to L sphenoid sinus. NGT replaced by ENT. CXR w/ NGT in distal esophagus, advanced and re-ordered CXR which showed correct placement of NGT.   1/6: POD 7/POD1. DEEPALI ovn, neurologically stable. SQL ppx resumed tonight. Tolerating pureed diet, NGT removed.   1/7: POD8/POD2. DEEPALI o/n neuro stable. Al cx are groin staph. epidermidis, Vancomycin started.  1/8: POD 9/3. DEEPALI o/n. Neuro stable. Pending AR.   1/9: POD 10/4. DEEPALI o/n. Neuro stable. Pending AR.   1/10: POD 11/5. DEEPALI o/n. Pending AR.       Patient evaluated by PT/OT who recommended: Acute Rehab   Patient is going to [facility] course uncomplicated     Exam on day of discharge:    Checklist:   - Obtained follow up appointment from NP  - Reviewed final recommendations of inpatient consults  - review discharge planning on provider handoff  - post op imaging completed  - Neurologically stable for discharge  - Vitals stable for discharge   - Afebrile for discharge  - WBC is stable  - Sodium level is normal  - Pain is adequately controlled  - Pt has PICC/walker/brace/collar   - LACE score (10 or > needs PCP apt)      HPI:  71yo F w/ no known PMHx. on 12/22 presented to [facility] c/o slurred speech and R sided weakness, stroke code called, imaging initially negative for stroke or hemorrhage, pt's neuro exam worsened and subsequent CTH the following day showed L sided SDH, admitted to ICU for further monitoring. Pt neuro declined on 12/25 with AMS requiring intubation, noted to have episodes of twitching concerning for seizures, started on keppra and EEG placed. No epileptiform activity noted. CTH on 12/28 significant for L frontoparietal collection, MRI completed showed concern for possible empyema. Daughter reported pt likely had a sinus infection recently, unsure of abx. At [place] pt started on ceftriaxone for UTI that was switched to vancomycin this am. Pt transferred to [facility] for furhte rmanagement and possible L hemicraniectomy and washout of L subdural empyema.     Hospital Course:  12/31: Admitted to NSICU, s/p Left hemicraniectomy and washout of L subdural empyema. Pancultured. Vanc/ceftriaxone/flagyl for empiric coverage. OR cultures demonstrated gram negative cocci in pairs. ENT and ID consulted, recommend cont vanc 2g, flagyl 750mg q8hrs, ceftriaxone 2g q12hrs. Extubated to room air, RUE/RLE strength improving. Pend CT sinus stealth protocol pend per ENT.   1/1: POD2 DEEPALI o/n, neuro stable, tolerating RA post extubation, pending CT sinus w/ stealth protocol per ENT. Blood consent obtained and pt transfused 1u prbc for HGB 7.9-> 6.8 -> 7.4. CHENCHO drain removed.  LE dopplers + B/L peroneal IM calf DVT, SQL increased to BID prophylactic dosing. Failed swallow eval, remains on tube feeds.   1/2: POD3, DEEPALI o/n. Neuro stable. TF increased goal rate to 65, tolerating feeds. S&S to re-eval in 48 hours. Pending final recs from ID. FOBT neg. Nate drain removed.   1/3: POD4. DEEPALI overnight, pt able to say "no" to questions, o/w neuro exam stable. EEG on, dc'd in afternoon, no seizure. F/u final OR cx, then will have PICC placed. Vanc trough pend 1/3 @9pm. Pend repeat dopplers 1/6. SDU status  1/4: POD 5. DEEPALI overnight. Neuro stable. FEES completed and patient cleared for pureed diet. Pending OR with ENT tomorrow. Vancomycin dc'd per ID.  1/5: POD 6. DEEPALI overnight. Neurostable. Repeat CT sinus complete overnight. POD0 b/l sinus surgery with judith purulence to L maxillary sinus and posterior bony erosiun to L sphenoid sinus. NGT replaced by ENT. CXR w/ NGT in distal esophagus, advanced and re-ordered CXR which showed correct placement of NGT.   1/6: POD 7/POD1. DEEPALI ovn, neurologically stable. SQL ppx resumed tonight. Tolerating pureed diet, NGT removed.   1/7: POD8/POD2. DEEPALI o/n neuro stable. Al cx are groin staph. epidermidis, Vancomycin started.  1/8: POD 9/3. DEEPALI o/n. Neuro stable. Pending AR.   1/9: POD 10/4. DEEPALI o/n. Neuro stable. Pending AR.   1/10: POD 11/5. DEEPALI o/n. Pending AR.       Patient evaluated by PT/OT who recommended: Acute Rehab   Patient is going to [facility] course c/b DVT (stable repeat doppler)     Exam on day of discharge:  GEN: laying in bed, appears well, NAD  NEURO: AOx3. FC, OE spont, hypophonic, R facial. CNII-XII intact. L pupil 4/brisk, R pupil 3/sluggish, EOMI. No pronator drift. LUE/LLE 5/5. RUE 3/5 prox, HG 2/5. RLE 4/5   CV: RRR +S1/S2  PULM: CTAB  GI: Abd soft, NT/ND  EXT: ext warm, dry, nontender  WOUND: L hemicrani site c/d/i, flap sunken, staples in place     Patient is neuro stable, vitals stable, afebrile, medically ready for discharge          HPI:  73yo F w/ no known PMHx. on 12/22 presented to [facility] c/o slurred speech and R sided weakness, stroke code called, imaging initially negative for stroke or hemorrhage, pt's neuro exam worsened and subsequent CTH the following day showed L sided SDH, admitted to ICU for further monitoring. Pt neuro declined on 12/25 with AMS requiring intubation, noted to have episodes of twitching concerning for seizures, started on keppra and EEG placed. No epileptiform activity noted. CTH on 12/28 significant for L frontoparietal collection, MRI completed showed concern for possible empyema. Daughter reported pt likely had a sinus infection recently, unsure of abx. At [place] pt started on ceftriaxone for UTI that was switched to vancomycin this am. Pt transferred to [facility] for furhte rmanagement and possible L hemicraniectomy and washout of L subdural empyema.     Hospital Course:  12/31: Admitted to NSICU, s/p Left hemicraniectomy and washout of L subdural empyema. Pancultured. Vanc/ceftriaxone/flagyl for empiric coverage. OR cultures demonstrated gram negative cocci in pairs. ENT and ID consulted, recommend cont vanc 2g, flagyl 750mg q8hrs, ceftriaxone 2g q12hrs. Extubated to room air, RUE/RLE strength improving. Pend CT sinus stealth protocol pend per ENT.   1/1: POD2 DEEPALI o/n, neuro stable, tolerating RA post extubation, pending CT sinus w/ stealth protocol per ENT. Blood consent obtained and pt transfused 1u prbc for HGB 7.9-> 6.8 -> 7.4. CHENCHO drain removed.  LE dopplers + B/L peroneal IM calf DVT, SQL increased to BID prophylactic dosing. Failed swallow eval, remains on tube feeds.   1/2: POD3, DEEPALI o/n. Neuro stable. TF increased goal rate to 65, tolerating feeds. S&S to re-eval in 48 hours. Pending final recs from ID. FOBT neg. Nate drain removed.   1/3: POD4. DEEPALI overnight, pt able to say "no" to questions, o/w neuro exam stable. EEG on, dc'd in afternoon, no seizure. F/u final OR cx, then will have PICC placed. Vanc trough pend 1/3 @9pm. Pend repeat dopplers 1/6. SDU status  1/4: POD 5. DEEPALI overnight. Neuro stable. FEES completed and patient cleared for pureed diet. Pending OR with ENT tomorrow. Vancomycin dc'd per ID.  1/5: POD 6. DEEPALI overnight. Neurostable. Repeat CT sinus complete overnight. POD0 b/l sinus surgery with judith purulence to L maxillary sinus and posterior bony erosiun to L sphenoid sinus. NGT replaced by ENT. CXR w/ NGT in distal esophagus, advanced and re-ordered CXR which showed correct placement of NGT.   1/6: POD 7/POD1. DEEPALI ovn, neurologically stable. SQL ppx resumed tonight. Tolerating pureed diet, NGT removed.   1/7: POD8/POD2. DEEPALI o/n neuro stable. Al cx are groin staph. epidermidis, Vancomycin started.  1/8: POD 9/3. DEEPALI o/n. Neuro stable. Pending AR.   1/9: POD 10/4. DEEPALI o/n. Neuro stable. Pending AR.   1/10: POD 11/5. DEEPALI o/n. Pending AR.       Patient evaluated by PT/OT who recommended: Acute Rehab   Patient is going to [facility] course c/b DVT (repeat doppler stable, with no propagation)     Exam on day of discharge:  GEN: laying in bed, appears well, NAD  NEURO: AOx3. FC, OE spont, hypophonic, R facial. CNII-XII intact. L pupil 4/brisk, R pupil 3/sluggish, EOMI. No pronator drift. LUE/LLE 5/5. RUE 3/5 prox, HG 2/5. RLE 4/5   CV: RRR +S1/S2  PULM: CTAB  GI: Abd soft, NT/ND  EXT: ext warm, dry, nontender  WOUND: L hemicrani site c/d/i, flap sunken, staples in place     Patient is neuro stable, vitals stable, afebrile, medically ready for discharge          HPI:  73yo F w/ no known PMHx. on 12/22 presented to [facility] c/o slurred speech and R sided weakness, stroke code called, imaging initially negative for stroke or hemorrhage, pt's neuro exam worsened and subsequent CTH the following day showed L sided SDH, admitted to ICU for further monitoring. Pt neuro declined on 12/25 with AMS requiring intubation, noted to have episodes of twitching concerning for seizures, started on keppra and EEG placed. No epileptiform activity noted. CTH on 12/28 significant for L frontoparietal collection, MRI completed showed concern for possible empyema. Daughter reported pt likely had a sinus infection recently, unsure of abx. At [place] pt started on ceftriaxone for UTI that was switched to vancomycin this am. Pt transferred to [facility] for furhte rmanagement and possible L hemicraniectomy and washout of L subdural empyema.     Hospital Course:  12/31: Admitted to NSICU, s/p Left hemicraniectomy and washout of L subdural empyema. Pancultured. Vanc/ceftriaxone/flagyl for empiric coverage. OR cultures demonstrated gram negative cocci in pairs. ENT and ID consulted, recommend cont vanc 2g, flagyl 750mg q8hrs, ceftriaxone 2g q12hrs. Extubated to room air, RUE/RLE strength improving. Pend CT sinus stealth protocol pend per ENT.   1/1: POD2 DEEPALI o/n, neuro stable, tolerating RA post extubation, pending CT sinus w/ stealth protocol per ENT. Blood consent obtained and pt transfused 1u prbc for HGB 7.9-> 6.8 -> 7.4. CHENCHO drain removed.  LE dopplers + B/L peroneal IM calf DVT, SQL increased to BID prophylactic dosing. Failed swallow eval, remains on tube feeds.   1/2: POD3, DEEPALI o/n. Neuro stable. TF increased goal rate to 65, tolerating feeds. S&S to re-eval in 48 hours. Pending final recs from ID. FOBT neg. Nate drain removed.   1/3: POD4. DEEPALI overnight, pt able to say "no" to questions, o/w neuro exam stable. EEG on, dc'd in afternoon, no seizure. F/u final OR cx, then will have PICC placed. Vanc trough pend 1/3 @9pm. Pend repeat dopplers 1/6. SDU status  1/4: POD 5. DEEPALI overnight. Neuro stable. FEES completed and patient cleared for pureed diet. Pending OR with ENT tomorrow. Vancomycin dc'd per ID.  1/5: POD 6. DEEPALI overnight. Neurostable. Repeat CT sinus complete overnight. POD0 b/l sinus surgery with judith purulence to L maxillary sinus and posterior bony erosiun to L sphenoid sinus. NGT replaced by ENT. CXR w/ NGT in distal esophagus, advanced and re-ordered CXR which showed correct placement of NGT.   1/6: POD 7/POD1. DEEPALI ovn, neurologically stable. SQL ppx resumed tonight. Tolerating pureed diet, NGT removed.   1/7: POD8/POD2. DEEPALI o/n neuro stable. Al cx are groin staph. epidermidis, Vancomycin started.  1/8: POD 9/3. DEEPALI o/n. Neuro stable. Pending AR.   1/9: POD 10/4. DEEPALI o/n. Neuro stable. Pending AR.   1/10: POD 11/5. DEEPALI o/n. Pending AR.       Patient evaluated by PT/OT who recommended: Acute Rehab   Patient is going to [facility] course c/b DVT/PE (on  BID)     Exam on day of discharge:  GEN: laying in bed, appears well, NAD  NEURO: AOx3. FC, OE spont, hypophonic, R facial. CNII-XII intact. L pupil 4/brisk, R pupil 3/sluggish, EOMI. No pronator drift. LUE/LLE 5/5. RUE 3/5 prox, HG 2/5. RLE 4/5   CV: RRR +S1/S2  PULM: CTAB  GI: Abd soft, NT/ND  EXT: ext warm, dry, nontender  WOUND: L hemicrani site c/d/i, flap sunken, staples in place     Patient is neuro stable, vitals stable, afebrile, medically ready for discharge          HPI:  71yo F w/ no known PMHx. on 12/22 presented to [facility] c/o slurred speech and R sided weakness, stroke code called, imaging initially negative for stroke or hemorrhage, pt's neuro exam worsened and subsequent CTH the following day showed L sided SDH, admitted to ICU for further monitoring. Pt neuro declined on 12/25 with AMS requiring intubation, noted to have episodes of twitching concerning for seizures, started on keppra and EEG placed. No epileptiform activity noted. CTH on 12/28 significant for L frontoparietal collection, MRI completed showed concern for possible empyema. Daughter reported pt likely had a sinus infection recently, unsure of abx. At [place] pt started on ceftriaxone for UTI that was switched to vancomycin this am. Pt transferred to [facility] for furhte rmanagement and possible L hemicraniectomy and washout of L subdural empyema.     Hospital Course:  12/31: Admitted to NSICU, s/p Left hemicraniectomy and washout of L subdural empyema. Pancultured. Vanc/ceftriaxone/flagyl for empiric coverage. OR cultures demonstrated gram negative cocci in pairs. ENT and ID consulted, recommend cont vanc 2g, flagyl 750mg q8hrs, ceftriaxone 2g q12hrs. Extubated to room air, RUE/RLE strength improving. Pend CT sinus stealth protocol pend per ENT.   1/1: POD2 DEEPALI o/n, neuro stable, tolerating RA post extubation, pending CT sinus w/ stealth protocol per ENT. Blood consent obtained and pt transfused 1u prbc for HGB 7.9-> 6.8 -> 7.4. CHENCHO drain removed.  LE dopplers + B/L peroneal IM calf DVT, SQL increased to BID prophylactic dosing. Failed swallow eval, remains on tube feeds.   1/2: POD3, DEEPALI o/n. Neuro stable. TF increased goal rate to 65, tolerating feeds. S&S to re-eval in 48 hours. Pending final recs from ID. FOBT neg. Nate drain removed.   1/3: POD4. DEEPALI overnight, pt able to say "no" to questions, o/w neuro exam stable. EEG on, dc'd in afternoon, no seizure. F/u final OR cx, then will have PICC placed. Vanc trough pend 1/3 @9pm. Pend repeat dopplers 1/6. SDU status  1/4: POD 5. DEEPALI overnight. Neuro stable. FEES completed and patient cleared for pureed diet. Pending OR with ENT tomorrow. Vancomycin dc'd per ID.  1/5: POD 6. DEEPALI overnight. Neurostable. Repeat CT sinus complete overnight. POD0 b/l sinus surgery with judith purulence to L maxillary sinus and posterior bony erosiun to L sphenoid sinus. NGT replaced by ENT. CXR w/ NGT in distal esophagus, advanced and re-ordered CXR which showed correct placement of NGT.   1/6: POD 7/POD1. DEEPALI ovn, neurologically stable. SQL ppx resumed tonight. Tolerating pureed diet, NGT removed.   1/7: POD8/POD2. DEEPALI o/n neuro stable. Al cx are groin staph. epidermidis, Vancomycin started.  1/8: POD 9/3. DEEPALI o/n. Neuro stable. Pending AR.   1/9: POD 10/4. DEEPALI o/n. Neuro stable. Pending AR.   1/10: POD 11/5. DEEPALI o/n. Pending AR.   1/11: POD12/6, DEEPALI overnight, neuro stable, echo stable with normal RV/LV EF. cont vanco 1500 BID per ID.   1/12: POD13/7, DEEPALI overnight, neuro stable penidng rehab at 10AM today.       Patient evaluated by PT/OT who recommended: Acute Rehab   Patient is going to [facility] [place] course c/b DVT/PE (on  BID)     Exam on day of discharge:  GEN: laying in bed, appears well, NAD  NEURO: AOx3. FC, OE spont, hypophonic, R facial. CNII-XII intact. L pupil 4/brisk, R pupil 3/sluggish, EOMI. No pronator drift. LUE/LLE 5/5. RUE 3/5 prox, HG 2/5. RLE 4/5   CV: RRR +S1/S2  PULM: CTAB  GI: Abd soft, NT/ND  EXT: ext warm, dry, nontender  WOUND: L hemicrani site c/d/i, flap sunken, staples in place     Patient is neuro stable, vitals stable, afebrile, medically ready for discharge

## 2023-01-03 NOTE — SPEECH LANGUAGE PATHOLOGY EVALUATION - SLP BEHAVIORAL OBSERVATIONS
initially flat but improved with expressions (smiling) upon reassurance that SLP was aware receptive language skills were partially intact

## 2023-01-03 NOTE — DISCHARGE NOTE PROVIDER - NSDCCPCAREPLAN_GEN_ALL_CORE_FT
PRINCIPAL DISCHARGE DIAGNOSIS  Diagnosis: Subdural empyema  Assessment and Plan of Treatment:       SECONDARY DISCHARGE DIAGNOSES  Diagnosis: Morbid obesity  Assessment and Plan of Treatment:     Diagnosis: Pansinusitis  Assessment and Plan of Treatment:     Diagnosis: Calf DVT (deep venous thrombosis)  Assessment and Plan of Treatment:     Diagnosis: Prediabetes  Assessment and Plan of Treatment:     Diagnosis: Hypertension  Assessment and Plan of Treatment:      PRINCIPAL DISCHARGE DIAGNOSIS  Diagnosis: Subdural empyema  Assessment and Plan of Treatment:       SECONDARY DISCHARGE DIAGNOSES  Diagnosis: Morbid obesity  Assessment and Plan of Treatment:     Diagnosis: Pansinusitis  Assessment and Plan of Treatment:     Diagnosis: Calf DVT (deep venous thrombosis)  Assessment and Plan of Treatment:     Diagnosis: Prediabetes  Assessment and Plan of Treatment:     Diagnosis: Pulmonary embolism  Assessment and Plan of Treatment:     Diagnosis: Hypertension  Assessment and Plan of Treatment:

## 2023-01-03 NOTE — DISCHARGE NOTE PROVIDER - NSDCFUADDAPPT_GEN_ALL_CORE_FT
Please follow up with Dr. D'Amico outpatient    Please follow up with your primary care doctor     ENT  ID  Please follow up with Dr. D'Amico outpatient on 1/24/23 at 2:45pm, call to confirm appointment at [phone]    Please follow up with Dr. Abarca from ENT outpatient     Please follow up with Dr. Ayers from Infectious Disease outpatient      Please follow up with your primary care doctor  Please follow up with Dr. D'Amico outpatient on 1/24/23 at 2:45pm, call to confirm appointment at [phone]    Please follow up with Dr. Abarca from ENT outpatient     Please follow up with Dr. Ayers from Infectious Disease outpatient      Please follow up with your primary care doctor, especially for management of your b/l peroneal and intramuscular calf DVT's Please follow up with Dr. D'Amico outpatient on 1/24/23 at 2:45pm, call to confirm appointment at [phone]    Please follow up with Dr. Abarca from ENT outpatient     Please follow up with Dr. Ayers from Infectious Disease outpatient      Please follow up with your primary care doctor, especially for management of your new DVT/PE's

## 2023-01-03 NOTE — SPEECH LANGUAGE PATHOLOGY EVALUATION - SLP COMPREHENSION
Suspect comprehension of single familiar words to be intact AEB pt's ability to point to accurate target word to WH- orientation questions from a F:2 written choices/intact/single words

## 2023-01-03 NOTE — CONSULT NOTE ADULT - SUBJECTIVE AND OBJECTIVE BOX
Patient is a 72y old  Female who presents with a chief complaint of Subdural collection (03 Jan 2023 12:05)      HPI:  72F, txfered from [facility]. Presented to [facility] 7 days ago for AMS. CTH done 6 days ago showed spontaneous Left SD collection. MRI done today showed Left SD collection with diffusion restriction c/f empyema and infarct. Also showed Left sinus collection. Was also getting ceftriaxone for presumed UT at OSH. (30 Dec 2022 19:41)      REVIEW OF SYSTEMS: see HPI    PAST MEDICAL & SURGICAL HISTORY:      FAMILY HISTORY:    SOCIAL HISTORY:  Tobacco use:  EtOH use:  Recreational drug use:    MEDICATIONS:  MEDICATIONS  (STANDING):  cefTRIAXone   IVPB 2000 milliGRAM(s) IV Intermittent every 12 hours  chlorhexidine 0.12% Liquid 15 milliLiter(s) Swish and Spit two times a day  chlorhexidine 2% Cloths 1 Application(s) Topical <User Schedule>  enoxaparin Injectable 40 milliGRAM(s) SubCutaneous every 12 hours  fluticasone propionate 50 MICROgram(s)/spray Nasal Spray 1 Spray(s) Both Nostrils every 12 hours  insulin lispro (ADMELOG) corrective regimen sliding scale   SubCutaneous every 6 hours  latanoprost 0.005% Ophthalmic Solution 1 Drop(s) Right EYE at bedtime  levETIRAcetam 1000 milliGRAM(s) Oral two times a day  metroNIDAZOLE  IVPB 750 milliGRAM(s) IV Intermittent every 8 hours  senna 2 Tablet(s) Oral at bedtime  sodium chloride 0.65% Nasal 1 Spray(s) Both Nostrils four times a day  vancomycin  IVPB 1750 milliGRAM(s) IV Intermittent every 12 hours    MEDICATIONS  (PRN):  acetaminophen    Suspension .. 650 milliGRAM(s) Oral every 6 hours PRN Temp greater or equal to 38C (100.4F), Mild Pain (1 - 3)  ondansetron Injectable 4 milliGRAM(s) IV Push every 6 hours PRN Nausea and/or Vomiting      ALLERGIES:  Allergies    Allergy Status Unknown    Intolerances        VITAL SIGNS:  Vital Signs Last 24 Hrs  T(C): 35.9 (03 Jan 2023 13:00), Max: 37.3 (03 Jan 2023 09:21)  T(F): 96.7 (03 Jan 2023 13:00), Max: 99.1 (03 Jan 2023 09:21)  HR: 84 (03 Jan 2023 10:25) (67 - 93)  BP: 165/73 (03 Jan 2023 10:25) (130/59 - 169/68)  BP(mean): 105 (03 Jan 2023 10:25) (85 - 105)  RR: 18 (03 Jan 2023 10:25) (15 - 18)  SpO2: 100% (03 Jan 2023 10:25) (95% - 100%)    Parameters below as of 03 Jan 2023 10:25  Patient On (Oxygen Delivery Method): room air        01-02-23 @ 07:01  -  01-03-23 @ 07:00  --------------------------------------------------------  IN:    Enteral Tube Flush: 80 mL    IV PiggyBack: 999.8 mL    IV PiggyBack: 800 mL    Jevity 1.2: 1560 mL  Total IN: 3439.8 mL    OUT:    Voided (mL): 2610 mL  Total OUT: 2610 mL    Total NET: 829.8 mL      01-03-23 @ 07:01  -  01-03-23 @ 14:52  --------------------------------------------------------  IN:    IV PiggyBack: 500 mL    Jevity 1.2: 130 mL  Total IN: 630 mL    OUT:    Voided (mL): 600 mL  Total OUT: 600 mL    Total NET: 30 mL          PHYSICAL EXAM:  Constitutional: resting comfortably in bed; NAD  Head:   Eyes: PERRL, EOMI, anicteric sclera  ENT: no nasal discharge; uvula midline, no oropharyngeal erythema or exudates; MMM  Neck: supple; no JVD or thyromegaly  Respiratory: CTA B/L; no W/R/R, no retractions  Cardiac: +S1/S2; RRR; no M/R/G  Gastrointestinal: abdomen soft, NT/ND; no rebound or guarding; +BSx4  Genitourinary: normal external genitalia  Back: spine midline, no bony tenderness or step-offs; no CVAT B/L  Extremities: WWP, no clubbing or cyanosis; no peripheral edema  Musculoskeletal: NROM x4; no joint swelling, tenderness or erythema  Vascular: 2+ radial, femoral, DP/PT pulses B/L  Dermatologic: skin warm, dry and intact; no rashes, wounds, or scars  Lymphatic: no submandibular or cervical LAD  Neurologic: AAOx3; CNII-XII grossly intact; no focal deficits  Psychiatric: affect and characteristics of appearance, verbalizations, behaviors are appropriate    LABS:                        8.2    10.85 )-----------( 351      ( 03 Jan 2023 04:39 )             25.6     01-03    137  |  106  |  10  ----------------------------<  179<H>  3.9   |  26  |  0.56    Ca    7.9<L>      03 Jan 2023 04:39  Phos  2.7     01-03  Mg     2.1     01-03              CAPILLARY BLOOD GLUCOSE      POCT Blood Glucose.: 150 mg/dL (03 Jan 2023 11:42)  POCT Blood Glucose.: 157 mg/dL (03 Jan 2023 05:51)  POCT Blood Glucose.: 149 mg/dL (02 Jan 2023 22:27)  POCT Blood Glucose.: 153 mg/dL (02 Jan 2023 16:40)          RADIOLOGY & ADDITIONAL TESTS: Reviewed. Patient is a 72y old  Female who presents with a chief complaint of Subdural collection (03 Jan 2023 12:05)      HPI:  72F, txfered from [facility]. Presented to [facility] 7 days ago for AMS. CTH done 6 days ago showed spontaneous Left SD collection. MRI done showed Left SD collection with diffusion restriction c/f empyema and infarct. Also showed Left sinus collection. Was also getting ceftriaxone for presumed UTI at OSH. (30 Dec 2022 19:41)      Patient seen and examined at bedside.  Provides some verbal responses to yes/no questions only and intermittently follows commands.  Appears comfortable.  Hx obtained from chart and neurosurgery primary team.      REVIEW OF SYSTEMS: Unable to reliably obtain due to mental status    PAST MEDICAL & SURGICAL HISTORY:  No known medical history    FAMILY HISTORY:  No known family hx    SOCIAL HISTORY:  No known hx of tobacco, EtOH or recreational drug use    MEDICATIONS:  MEDICATIONS  (STANDING):  cefTRIAXone   IVPB 2000 milliGRAM(s) IV Intermittent every 12 hours  chlorhexidine 0.12% Liquid 15 milliLiter(s) Swish and Spit two times a day  chlorhexidine 2% Cloths 1 Application(s) Topical <User Schedule>  enoxaparin Injectable 40 milliGRAM(s) SubCutaneous every 12 hours  fluticasone propionate 50 MICROgram(s)/spray Nasal Spray 1 Spray(s) Both Nostrils every 12 hours  insulin lispro (ADMELOG) corrective regimen sliding scale   SubCutaneous every 6 hours  latanoprost 0.005% Ophthalmic Solution 1 Drop(s) Right EYE at bedtime  levETIRAcetam 1000 milliGRAM(s) Oral two times a day  metroNIDAZOLE  IVPB 750 milliGRAM(s) IV Intermittent every 8 hours  senna 2 Tablet(s) Oral at bedtime  sodium chloride 0.65% Nasal 1 Spray(s) Both Nostrils four times a day  vancomycin  IVPB 1750 milliGRAM(s) IV Intermittent every 12 hours    MEDICATIONS  (PRN):  acetaminophen    Suspension .. 650 milliGRAM(s) Oral every 6 hours PRN Temp greater or equal to 38C (100.4F), Mild Pain (1 - 3)  ondansetron Injectable 4 milliGRAM(s) IV Push every 6 hours PRN Nausea and/or Vomiting      ALLERGIES:  Allergies    Allergy Status Unknown    Intolerances        VITAL SIGNS:  Vital Signs Last 24 Hrs  T(C): 35.9 (03 Jan 2023 13:00), Max: 37.3 (03 Jan 2023 09:21)  T(F): 96.7 (03 Jan 2023 13:00), Max: 99.1 (03 Jan 2023 09:21)  HR: 84 (03 Jan 2023 10:25) (67 - 93)  BP: 165/73 (03 Jan 2023 10:25) (130/59 - 169/68)  BP(mean): 105 (03 Jan 2023 10:25) (85 - 105)  RR: 18 (03 Jan 2023 10:25) (15 - 18)  SpO2: 100% (03 Jan 2023 10:25) (95% - 100%)    Parameters below as of 03 Jan 2023 10:25  Patient On (Oxygen Delivery Method): room air        01-02-23 @ 07:01  -  01-03-23 @ 07:00  --------------------------------------------------------  IN:    Enteral Tube Flush: 80 mL    IV PiggyBack: 999.8 mL    IV PiggyBack: 800 mL    Jevity 1.2: 1560 mL  Total IN: 3439.8 mL    OUT:    Voided (mL): 2610 mL  Total OUT: 2610 mL    Total NET: 829.8 mL      01-03-23 @ 07:01  -  01-03-23 @ 14:52  --------------------------------------------------------  IN:    IV PiggyBack: 500 mL    Jevity 1.2: 130 mL  Total IN: 630 mL    OUT:    Voided (mL): 600 mL  Total OUT: 600 mL    Total NET: 30 mL          PHYSICAL EXAM:  Constitutional: resting comfortably in bed; NAD, obese  Head: L crani incision c/d/i  Eyes: Pupils with L > R, reactive, EOMI, anicteric sclera  ENT: + NGT, MMM  Neck: supple  Respiratory: CTA B/L; no W/R/R, no retractions  Cardiac: +S1/S2; RRR; no M/R/G  Gastrointestinal: abdomen soft, NT/ND; no rebound or guarding; +BSx4  Extremities: WWP, no clubbing or cyanosis; no peripheral edema, + RUE PICC site c/d/i  Vascular: 2+ radial, femoral, DP/PT pulses B/L  Dermatologic: skin warm, dry and intact; no rashes, wounds, or scars  Neurologic: Awake, only says no to some questions, able to take deep breaths on command and followed commands with LUE, moves b/l LEs spontaneously, R facial droop    LABS:                        8.2    10.85 )-----------( 351      ( 03 Jan 2023 04:39 )             25.6     01-03    137  |  106  |  10  ----------------------------<  179<H>  3.9   |  26  |  0.56    Ca    7.9<L>      03 Jan 2023 04:39  Phos  2.7     01-03  Mg     2.1     01-03              CAPILLARY BLOOD GLUCOSE      POCT Blood Glucose.: 150 mg/dL (03 Jan 2023 11:42)  POCT Blood Glucose.: 157 mg/dL (03 Jan 2023 05:51)  POCT Blood Glucose.: 149 mg/dL (02 Jan 2023 22:27)  POCT Blood Glucose.: 153 mg/dL (02 Jan 2023 16:40)          RADIOLOGY & ADDITIONAL TESTS:   < from: CT Head No Cont (01.02.23 @ 13:00) >  IMPRESSION: Interval removal of anterior left-sided subgaleal drainage   catheter with interval increase in subgaleal fluid. Interval progression   of interhemispheric hypodense subduralfluid collection/hygroma extending   into the left tentorium.    < end of copied text >

## 2023-01-03 NOTE — DISCHARGE NOTE PROVIDER - NSDCFUSCHEDAPPT_GEN_ALL_CORE_FT
Daxa Carr  [facility]  NEUROSURG [id number]  Scheduled Appointment: 01/24/2023

## 2023-01-03 NOTE — DISCHARGE NOTE PROVIDER - NSDCMRMEDTOKEN_GEN_ALL_CORE_FT
travoprost 0.004% ophthalmic solution: 1 drop(s) to both eyes once a day (in the evening)   acetaminophen 160 mg/5 mL oral suspension: 650 milligram(s) orally every 6 hours, As Needed - 3)  amLODIPine 10 mg oral tablet: 1 tab(s) orally once a day  cefTRIAXone: 2000 milligram(s) intravenous 2 times a day (END DATE: 2/16/23)  enoxaparin: 40 milligram(s) subcutaneous 2 times a day  fluticasone 50 mcg/inh nasal spray: 1 spray(s) nasal every 12 hours  levETIRAcetam 1000 mg oral tablet: 1 tab(s) orally 2 times a day  metroNIDAZOLE: 750 milligram(s) intravenous every 8 hours  nystatin 100,000 units/g topical cream: 1 application topically 2 times a day  senna leaf extract oral tablet: 2 tab(s) orally once a day (at bedtime)  sodium chloride 0.65% nasal spray: 1 spray(s) nasal 4 times a day  travoprost 0.004% ophthalmic solution: 1 drop(s) to both eyes once a day (in the evening)  vancomycin 1.5 g intravenous injection: 1.5 gram(s) intravenous every 12 hours (END DATE: 2/16/23)   acetaminophen 160 mg/5 mL oral suspension: 650 milligram(s) orally every 6 hours, As Needed - 3)  amLODIPine 10 mg oral tablet: 1 tab(s) orally once a day  cefTRIAXone: 2000 milligram(s) intravenous 2 times a day (END DATE: 2/16/23)  enoxaparin: 120 milligram(s) subcutaneous 2 times a day  fluticasone 50 mcg/inh nasal spray: 1 spray(s) nasal every 12 hours  levETIRAcetam 1000 mg oral tablet: 1 tab(s) orally 2 times a day  metroNIDAZOLE: 750 milligram(s) intravenous every 8 hours  nystatin 100,000 units/g topical cream: 1 application topically 2 times a day  senna leaf extract oral tablet: 2 tab(s) orally once a day (at bedtime)  sodium chloride 0.65% nasal spray: 1 spray(s) nasal 4 times a day  travoprost 0.004% ophthalmic solution: 1 drop(s) to both eyes once a day (in the evening)  vancomycin 1.5 g intravenous injection: 1.5 gram(s) intravenous every 12 hours (END DATE: 2/16/23)   acetaminophen 160 mg/5 mL oral suspension: 650 milligram(s) orally every 6 hours, As Needed - 3)  amLODIPine 10 mg oral tablet: 1 tab(s) orally once a day  cefTRIAXone: 2000 milligram(s) intravenous 2 times a day (END DATE: 2/16/23)  enoxaparin: 120 milligram(s) subcutaneous 2 times a day  fluticasone 50 mcg/inh nasal spray: 1 spray(s) nasal every 12 hours  levETIRAcetam 1000 mg oral tablet: 1 tab(s) orally 2 times a day  metroNIDAZOLE: 750 milligram(s) intravenous every 8 hours (END DATE: 2/16/23)  nystatin 100,000 units/g topical cream: 1 application topically 2 times a day  senna leaf extract oral tablet: 2 tab(s) orally once a day (at bedtime)  sodium chloride 0.65% nasal spray: 1 spray(s) nasal 4 times a day  travoprost 0.004% ophthalmic solution: 1 drop(s) to both eyes once a day (in the evening)  vancomycin 1.5 g intravenous injection: 1.5 gram(s) intravenous every 12 hours (END DATE: 2/16/23)   acetaminophen 160 mg/5 mL oral suspension: 650 milligram(s) orally every 6 hours, As Needed - 3)  amLODIPine 10 mg oral tablet: 1 tab(s) orally once a day  cefTRIAXone: 2000 milligram(s) intravenous 2 times a day (END DATE: 2/16/23)  enoxaparin: 120 milligram(s) subcutaneous 2 times a day  fluticasone 50 mcg/inh nasal spray: 1 spray(s) nasal every 12 hours  levETIRAcetam 1000 mg oral tablet: 1 tab(s) orally 2 times a day  metroNIDAZOLE: 750 milligram(s) intravenous every 8 hours (END DATE: 2/16/23)  nystatin 100,000 units/g topical cream: 1 application topically 2 times a day  senna leaf extract oral tablet: 2 tab(s) orally once a day (at bedtime)  sodium chloride 0.65% nasal spray: 1 spray(s) nasal 4 times a day  travoprost 0.004% ophthalmic solution: 1 drop(s) to both eyes once a day (in the evening)  vancomycin 1.25 g/150 mL-NaCl 0.9% intravenous solution: 1250 milligram(s) intravenous 2 times a day (END DATE: 2/16/23)

## 2023-01-03 NOTE — SWALLOW BEDSIDE ASSESSMENT ADULT - NS SPL SWALLOW CLINIC TRIAL FT
+baseline pooling of thick oral secretions with foamy dry secretions coating lips. Oropharyngeal suctioning provided with red rubber catheter with retrieval of copious secretions.   Selected preferred food/drink items via pointing.  Mild anterior bolus loss of liquids d/t reduced labial seal. Slow prolonged bolus processing of purees. Laryngeal movement palpated with multiple swallows per single small bolus suggestive of pharyngeal clearance deficits. Although no clinical overt s/s of aspiration were appreciated, increased wet breath sounds were noted. Secretions/liquid/puree residue retrieved with suctioning upon completion of trials. Of note, unable to assess vocal quality given severity of expressive aphasia.

## 2023-01-03 NOTE — SPEECH LANGUAGE PATHOLOGY EVALUATION - SLP PERTINENT HISTORY OF CURRENT PROBLEM
SD empyema/cerebral abscess in a 71 yo RH F with no significant PMHx (patient does not see physicians regularly).  Source of CNS infection likely pansinusitis. s/p Left hemicraniectomy and washout of L subdural empyema

## 2023-01-03 NOTE — SWALLOW BEDSIDE ASSESSMENT ADULT - SLP PERTINENT HISTORY OF CURRENT PROBLEM
SD empyema/cerebral abscess in a 73 yo RH F with no significant PMHx (patient does not see physicians regularly).  Source of CNS infection likely pansinusitis. s/p Left hemicraniectomy and washout of L subdural empyema.

## 2023-01-03 NOTE — PROGRESS NOTE ADULT - SUBJECTIVE AND OBJECTIVE BOX
***********************************************  ADULT NSICU PROGRESS NOTE  JEMIMA DUFFY [id number] [facility] 08EA [id number]  ***********************************************    24H INTERVAL EVENTS: no acute overnight events.  ENT cultured endonasal cavity.  No further events thus far today.    ROS: negative except per mentioned above in 24h interval events.      VITALS:    ICU Vital Signs Last 24 Hrs  T(C): 36.5 (03 Jan 2023 05:30), Max: 37.1 (02 Jan 2023 14:24)  T(F): 97.7 (03 Jan 2023 05:30), Max: 98.7 (02 Jan 2023 14:24)  HR: 93 (03 Jan 2023 08:00) (59 - 95)  BP: 146/63 (03 Jan 2023 08:00) (131/63 - 169/68)  BP(mean): 91 (03 Jan 2023 08:00) (88 - 101)  ABP: 138/45 (02 Jan 2023 16:00) (131/40 - 173/64)  ABP(mean): 79 (02 Jan 2023 16:00) (71 - 105)  RR: 15 (03 Jan 2023 08:00) (15 - 19)  SpO2: 96% (03 Jan 2023 08:00) (93% - 99%)    O2 Parameters below as of 03 Jan 2023 08:00  Patient On (Oxygen Delivery Method): room air            EXAM:     Alvaro Coma Scale:     General: laying in bed, in no distress, NGT placed, L. crani scar  Neuro     MS: Eyes open, attends examiner, follows simple commands but noncommunicative     CN: R. pupil nonreactive (consistent with pre-OR examination), L. pupil reactive to light 3 -> 2, BTT bilaterally, EOMI and no ptosis bilaterally, face symmetric but activation not assessed, hearing grossly intact    Mot: bulk normal, tone normal, power (R/L) UPPER: 3/5, LOWER 2/2 (plain of bed bilaterally)  Chest: nonlabored respirations, bilateral rhonchi at lung bases, heart regular rate/rhythm, present S1/S2, no murmurs or rubs  Abdomen: obese, nondistended, soft and nontender without peritoneal signs, normoactive bowel sounds  Extremities: no clubbing, well-perfused, no edema    LABORATORY DATA:                            8.2    10.85 )-----------( 351      ( 03 Jan 2023 04:39 )             25.6     01-03    137  |  106  |  10  ----------------------------<  179<H>  3.9   |  26  |  0.56    Ca    7.9<L>      03 Jan 2023 04:39  Phos  2.7     01-03  Mg     2.1     01-03        MEDICATIONS  (STANDING):  cefTRIAXone   IVPB 2000 milliGRAM(s) IV Intermittent every 12 hours  chlorhexidine 0.12% Liquid 15 milliLiter(s) Swish and Spit two times a day  chlorhexidine 2% Cloths 1 Application(s) Topical <User Schedule>  enoxaparin Injectable 40 milliGRAM(s) SubCutaneous every 12 hours  fluticasone propionate 50 MICROgram(s)/spray Nasal Spray 1 Spray(s) Both Nostrils every 12 hours  insulin lispro (ADMELOG) corrective regimen sliding scale   SubCutaneous every 6 hours  latanoprost 0.005% Ophthalmic Solution 1 Drop(s) Right EYE at bedtime  levETIRAcetam 1000 milliGRAM(s) Oral two times a day  metroNIDAZOLE  IVPB 750 milliGRAM(s) IV Intermittent every 8 hours  senna 2 Tablet(s) Oral at bedtime  sodium chloride 0.65% Nasal 1 Spray(s) Both Nostrils four times a day  vancomycin  IVPB 1750 milliGRAM(s) IV Intermittent every 12 hours    MEDICATIONS  (PRN):  acetaminophen    Suspension .. 650 milliGRAM(s) Oral every 6 hours PRN Temp greater or equal to 38C (100.4F), Mild Pain (1 - 3)  ondansetron Injectable 4 milliGRAM(s) IV Push every 6 hours PRN Nausea and/or Vomiting    ***********************************************  ASSESSMENT AND PLAN  ***********************************************    This is a case of a SD empyema/cerebral abscess in a 71 yo RH F with no significant PMHx (patient does not see physicians regularly).  Source of CNS infection likely pansinusitis.  S/p OR for washout w/ Dr. D'Amico on 12/30/22.     NEURO  #Epidural/subdural empyema, suspect source pansinusitis, POD #3 for crani w/ washout, Dr. D'Amico  - Admit NSICU, Q2h neuro checks / Q2h vital signs  - Surveillance VEEG, ppx LEV 1000/1000  - ENT consultation  - Pain control    PULM  - SpO2 goal > 92%, supplemental O2 and pulm toileting as needed    CARDIO  - BP goal: NORMOTENSION    GI  - Diet: feeds via DHT  - Stress ulcer prophylaxis: not indicated  - Bowel regimen    /RENAL  - Goal euvolemia/eunatremia  - Monitor UOP/volume status, BUN/SCr    HEME  #BL LE DVT  - AC for BLE DVT to be determined by NSG  - Maintain Hb > 7.0, PLT > 100,000 in light of recent surgery    ID  #Epidural/subdural empyema, source likely pansinusitis  - Surgical cultures showing GPCs  - Vanco/CTX/flagyl, ID consultation  - Monitor for infectious s/s, fever curve, leukocytosis    ENDO  - No issues    01-02-23 @ 07:45       ***********************************************  ADULT NSICU PROGRESS NOTE  JEMIMA DUFFY [id number] [facility] 08EA 809 01  ***********************************************    24H INTERVAL EVENTS: no acute overnight events.  ENT cultured endonasal cavity.  No further events thus far today.    ROS: negative except per mentioned above in 24h interval events.      VITALS:    ICU Vital Signs Last 24 Hrs  T(C): 36.5 (03 Jan 2023 05:30), Max: 37.1 (02 Jan 2023 14:24)  T(F): 97.7 (03 Jan 2023 05:30), Max: 98.7 (02 Jan 2023 14:24)  HR: 93 (03 Jan 2023 08:00) (59 - 95)  BP: 146/63 (03 Jan 2023 08:00) (131/63 - 169/68)  BP(mean): 91 (03 Jan 2023 08:00) (88 - 101)  ABP: 138/45 (02 Jan 2023 16:00) (131/40 - 173/64)  ABP(mean): 79 (02 Jan 2023 16:00) (71 - 105)  RR: 15 (03 Jan 2023 08:00) (15 - 19)  SpO2: 96% (03 Jan 2023 08:00) (93% - 99%)    O2 Parameters below as of 03 Jan 2023 08:00  Patient On (Oxygen Delivery Method): room air            EXAM:     Alvaro Coma Scale:     General: laying in bed, in no distress, NGT placed, L. crani scar  Neuro     MS: Eyes open, attends examiner, follows simple commands but noncommunicative     CN: R. pupil nonreactive (consistent with pre-OR examination), L. pupil reactive to light 3 -> 2, BTT bilaterally, EOMI and no ptosis bilaterally, face symmetric but activation not assessed, hearing grossly intact    Mot: bulk normal, tone normal, power (R/L) UPPER: 3/5, LOWER 2/2 (plain of bed bilaterally)  Chest: nonlabored respirations, bilateral rhonchi at lung bases, heart regular rate/rhythm, present S1/S2, no murmurs or rubs  Abdomen: obese, nondistended, soft and nontender without peritoneal signs, normoactive bowel sounds  Extremities: no clubbing, well-perfused, no edema    LABORATORY DATA:                            8.2    10.85 )-----------( 351      ( 03 Jan 2023 04:39 )             25.6     01-03    137  |  106  |  10  ----------------------------<  179<H>  3.9   |  26  |  0.56    Ca    7.9<L>      03 Jan 2023 04:39  Phos  2.7     01-03  Mg     2.1     01-03        MEDICATIONS  (STANDING):  cefTRIAXone   IVPB 2000 milliGRAM(s) IV Intermittent every 12 hours  chlorhexidine 0.12% Liquid 15 milliLiter(s) Swish and Spit two times a day  chlorhexidine 2% Cloths 1 Application(s) Topical <User Schedule>  enoxaparin Injectable 40 milliGRAM(s) SubCutaneous every 12 hours  fluticasone propionate 50 MICROgram(s)/spray Nasal Spray 1 Spray(s) Both Nostrils every 12 hours  insulin lispro (ADMELOG) corrective regimen sliding scale   SubCutaneous every 6 hours  latanoprost 0.005% Ophthalmic Solution 1 Drop(s) Right EYE at bedtime  levETIRAcetam 1000 milliGRAM(s) Oral two times a day  metroNIDAZOLE  IVPB 750 milliGRAM(s) IV Intermittent every 8 hours  senna 2 Tablet(s) Oral at bedtime  sodium chloride 0.65% Nasal 1 Spray(s) Both Nostrils four times a day  vancomycin  IVPB 1750 milliGRAM(s) IV Intermittent every 12 hours    MEDICATIONS  (PRN):  acetaminophen    Suspension .. 650 milliGRAM(s) Oral every 6 hours PRN Temp greater or equal to 38C (100.4F), Mild Pain (1 - 3)  ondansetron Injectable 4 milliGRAM(s) IV Push every 6 hours PRN Nausea and/or Vomiting    ***********************************************  ASSESSMENT AND PLAN  ***********************************************    This is a case of a SD empyema/cerebral abscess in a 71 yo RH F with no significant PMHx (patient does not see physicians regularly).  Source of CNS infection likely pansinusitis.  S/p OR for washout w/ Dr. D'Amico on 12/30/22.     NEURO  #Epidural/subdural empyema, suspect source pansinusitis, POD #3 for crani w/ washout, Dr. D'Amico  - Admit NSICU, Q2h neuro checks / Q2h vital signs  - Surveillance VEEG, ppx LEV 1000/1000  - ENT consultation  - Pain control    PULM  - SpO2 goal > 92%, supplemental O2 and pulm toileting as needed    CARDIO  - BP goal: NORMOTENSION    GI  - Diet: feeds via DHT  - Stress ulcer prophylaxis: not indicated  - Bowel regimen    /RENAL  - Goal euvolemia/eunatremia  - Monitor UOP/volume status, BUN/SCr    HEME  #BL LE DVT  - AC for BLE DVT to be determined by NSG  - Maintain Hb > 7.0, PLT > 100,000 in light of recent surgery    ID  #Epidural/subdural empyema, source likely pansinusitis  - Surgical cultures showing GPCs  - Vanco/CTX/flagyl, ID consultation  - Monitor for infectious s/s, fever curve, leukocytosis    ENDO  - No issues      ICU stepdown Checklist:    [X] hemodynamically stable – VS WNL and stable x 24hours, UO adequate  [X] if  previously on HDA - off pressors x 24h with stable neuro exam   [X] no new symptoms x 24h (i.e. new fever, new-onset nausea/vomiting)  [X] stable labs: (i.e. WBC not rising, sodium not dropping)  [X] patient not at high risk for aspiration, if high risk then:                  [ ] should have definitive plans for trach/PEG (alternative option is to discharge from ICU to facilty)                  [ ] stepdown to bed close to nurse’s station  [X] low suctioning requirements (i.e. q4h or less)  [X] sign-off from primary RN*  [X] drains do not require ICU level of care  [X] if patient previously agitated or with behavioral issues – controlled  [X] pain controlled

## 2023-01-03 NOTE — DISCHARGE NOTE PROVIDER - CARE PROVIDER_API CALL
DAmico, Randy S (MD)  Neurosurgery  [address]  [place], [place] [address]  Phone: [phone]  Fax: [phone]  Follow Up Time:    DAmico, Randy S (MD)  Neurosurgery  [address], [address] [place] [address]  Phone: [phone]  Fax: [phone]  Follow Up Time:     Isaac Ayers)  Internal Medicine  [address] [place] [address]  Phone: [phone]  Fax: [phone]  Follow Up Time:     Samuel Abarca)  Otolaryngology  [address], [address] [place] [address]  Phone: [phone]  Fax: [phone]  Follow Up Time:

## 2023-01-03 NOTE — DISCHARGE NOTE PROVIDER - CARE PROVIDERS DIRECT ADDRESSES
,DirectAddress_Unknown ,DirectAddress_Unknown,[email or web address]

## 2023-01-03 NOTE — CONSULT NOTE ADULT - PROBLEM SELECTOR RECOMMENDATION 4
No known hx but patient without outpatient follow up  - agree with starting amlodipine 5mg, can increase as needed

## 2023-01-03 NOTE — DISCHARGE NOTE PROVIDER - NSDCCPTREATMENT_GEN_ALL_CORE_FT
PRINCIPAL PROCEDURE  Procedure: Craniectomy or craniotomy, emergent  Findings and Treatment: evacuation of subdural empyema

## 2023-01-03 NOTE — CONSULT NOTE ADULT - ASSESSMENT
73yo F w/ no known PMHx transferred from [place] c/o slurred speech and right sided weakness. CTH initially negative, repeat on 12/23 showed L sided SDH, pts mental status worsened and CTH on 12/28 significant for L frontoparietal collection, MRI completed showed concern for possible empyema. S/p L hemicraniectomy and washout of L subdural empyema 12/30/22

## 2023-01-03 NOTE — SPEECH LANGUAGE PATHOLOGY EVALUATION - 1-STEP
66% accuracy; errors impacted by stuck-in-set perseverations (e.g. pointed to body parts from prior task); accuracy increased with gestural imitation/no

## 2023-01-03 NOTE — CONSULT NOTE ADULT - PROBLEM SELECTOR RECOMMENDATION 9
BMI 47.8, affects all aspects of care  - outpatient follow up s/p L hemicraniectomy and washout of L subdural empyema 12/30/22  - repeat post op CT scans stable  - seizure ppx per primary team  - ENT following for pansinusitis, ID following for empyema see below  - on EEG, f/u read  - plan per primary team

## 2023-01-03 NOTE — CONSULT NOTE ADULT - PROBLEM SELECTOR RECOMMENDATION 2
f/u OR Cultures. Surgical culture with peptostreptococcus  - ID following appreciate recs  - c/w Vanc/CTX/Flagyl   - likely source is pansinusitis

## 2023-01-03 NOTE — CONSULT NOTE ADULT - TIME BILLING
Management of subdural empyema
Initial consult.  Chart, imaging, lab and medication review  - SHD  - subdural empyema  - HTN  - PreDM  - Obesity  - Dysphagia

## 2023-01-03 NOTE — SPEECH LANGUAGE PATHOLOGY EVALUATION - SLP GESTURES
responded to questions regarding preferences with head shake x2 (e.g. Do you want the TV on?)./gesture imitation/spontaneously gestures wants/needs/gestures objects

## 2023-01-03 NOTE — PROGRESS NOTE ADULT - SUBJECTIVE AND OBJECTIVE BOX
HPI:  71yo F w/ no known PMHx. on 12/22 presented to [facility] c/o slurred speech and R sided weakness, stroke code called, imaging initially negative for stroke or hemorrhage, pt's neuro exam worsened and subsequent CTH the following day showed L sided SDH, admitted to ICU for further monitoring. Pt neuro declined on 12/25 with AMS requiring intubation, noted to have episodes of twitching concerning for seizures, started on keppra and EEG placed. No epileptiform activity noted. CTH on 12/28 significant for L frontoparietal collection, MRI completed showed concern for possible empyema. Daughter reported pt likely had a sinus infection recently, unsure of abx. At [facility] pt started on ceftriaxone for UTI that was switched to vancomycin this am. Pt transferred to [facility] for furhte rmanagement and possible L hemicraniectomy and washout of L subdural empyema.       Hospital Course:   12/31: Admitted to NSICU, s/p Left hemicraniectomy and washout of L subdural empyema. Pancultured. Vanc/ceftriaxone/flagyl for empiric coverage. OR cultures demonstrated gram negative cocci in pairs. ENT and ID consulted, recommend cont vanc 2g, flagyl 750mg q8hrs, ceftriaxone 2g q12hrs. Extubated to room air, RUE/RLE strength improving. Pend CT sinus stealth protocol pend per ENT.   1/1: POD2 DEEPALI o/n, neuro stable, tolerating RA post extubation, pending CT sinus w/ stealth protocol per ENT. Blood consent obtained and pt transfused 1u prbc for HGB 7.9-> 6.8 -> 7.4. CHENCHO drain removed.  LE dopplers + B/L peroneal IM calf DVT, SQL increased to BID prophylactic dosing. Failed swallow eval, remains on tube feeds.   1/2: POD3, DEEPALI o/n. Neuro stable. TF increased goal rate to 65, tolerating feeds. S&S to re-eval in 48 hours. Pending final recs from ID. FOBT neg. Nate drain removed.   1/3: POD4. DEEPALI overnight, pt able to say "no" to questions, o/w neuro exam stable. EEG on, f/u read in am. F/u final OR cx, then will have PICC placed. Vanc trough pend 1/3 @9pm. Pend repeat dopplers 1/6.       Vital Signs Last 24 Hrs  T(C): 36.7 (03 Jan 2023 01:04), Max: 37.2 (02 Jan 2023 06:11)  T(F): 98 (03 Jan 2023 01:04), Max: 99 (02 Jan 2023 06:11)  HR: 70 (03 Jan 2023 02:00) (59 - 95)  BP: 142/63 (03 Jan 2023 02:00) (138/59 - 168/70)  BP(mean): 90 (03 Jan 2023 02:00) (88 - 101)  RR: 16 (03 Jan 2023 02:00) (15 - 19)  SpO2: 97% (03 Jan 2023 02:00) (93% - 100%)    Parameters below as of 03 Jan 2023 02:00  Patient On (Oxygen Delivery Method): room air        I&O's Detail    01 Jan 2023 07:01  -  02 Jan 2023 07:00  --------------------------------------------------------  IN:    Enteral Tube Flush: 220 mL    IV PiggyBack: 1200 mL    Jevity 1.2: 1300 mL    PRBCs (Packed Red Blood Cells): 300 mL  Total IN: 3020 mL    OUT:    Bulb (mL): 0 mL    Voided (mL): 1450 mL  Total OUT: 1450 mL    Total NET: 1570 mL      02 Jan 2023 07:01  -  03 Jan 2023 03:02  --------------------------------------------------------  IN:    IV PiggyBack: 999.8 mL    IV PiggyBack: 600 mL    Jevity 1.2: 1040 mL  Total IN: 2639.8 mL    OUT:    Voided (mL): 1600 mL  Total OUT: 1600 mL    Total NET: 1039.8 mL        I&O's Summary    01 Jan 2023 07:01 - 02 Jan 2023 07:00  --------------------------------------------------------  IN: 3020 mL / OUT: 1450 mL / NET: 1570 mL    02 Jan 2023 07:01  -  03 Jan 2023 03:02  --------------------------------------------------------  IN: 2639.8 mL / OUT: 1600 mL / NET: 1039.8 mL        PHYSICAL EXAM:  General: NAD, pt is comfortably laying in bed, attempts to say yes and no to questions, A&O x self?, on RA   HEENT: R pupil 3mm sluggish, L pupil 3-4mm briskly reaction, tracks, +R facial asymmetry, +NGT   Cardiovascular: RRR, normal S1 and S2   Respiratory: lungs CTAB, no wheezing, rhonchi, or crackles   GI: normoactive BS to auscultation, abd soft, NTND   Neuro: + mixed aphasia expressive > receptive, unable to assess pronator drift   EVON/LL strength 5/5, RU delts 4/5, bi/tri 3/5, HG 2/5, RL 4/5   Extremities: distal pulses 2+ x4   Wound/incision: L hemicrani incision site C/D/I with staples     TUBES/LINES:  [] CVC  [] A-line  [] Lumbar Drain  [] Ventriculostomy  [] Other    DIET:  [] NPO  [] Mechanical  [X] Tube feeds    LABS:  PT/INR - ( 01 Jan 2023 04:53 )   PT: 15.1 sec;   INR: 1.27          PTT - ( 01 Jan 2023 04:53 )  PTT:28.1 sec        CAPILLARY BLOOD GLUCOSE      POCT Blood Glucose.: 149 mg/dL (02 Jan 2023 22:27)  POCT Blood Glucose.: 153 mg/dL (02 Jan 2023 16:40)  POCT Blood Glucose.: 171 mg/dL (02 Jan 2023 11:08)  POCT Blood Glucose.: 186 mg/dL (02 Jan 2023 06:58)      Drug Levels: [] N/A  Vancomycin Level, Trough: 23.0 ug/mL (01-02 @ 05:15)  Vancomycin Level, Trough: 19.7 ug/mL (01-01 @ 17:15)  Vancomycin Level, Trough: 13.3 ug/mL (12-31 @ 16:19)    CSF Analysis: [] N/A      Allergies    Allergy Status Unknown    Intolerances      MEDICATIONS:  Antibiotics:  cefTRIAXone   IVPB 2000 milliGRAM(s) IV Intermittent every 12 hours  metroNIDAZOLE  IVPB 750 milliGRAM(s) IV Intermittent every 8 hours  vancomycin  IVPB 1750 milliGRAM(s) IV Intermittent every 12 hours    Neuro:  acetaminophen    Suspension .. 650 milliGRAM(s) Oral every 6 hours PRN  levETIRAcetam 1000 milliGRAM(s) Oral two times a day  ondansetron Injectable 4 milliGRAM(s) IV Push every 6 hours PRN    Anticoagulation:  enoxaparin Injectable 40 milliGRAM(s) SubCutaneous every 12 hours    OTHER:  amLODIPine   Tablet 5 milliGRAM(s) Oral daily  chlorhexidine 2% Cloths 1 Application(s) Topical <User Schedule>  fluticasone propionate 50 MICROgram(s)/spray Nasal Spray 1 Spray(s) Both Nostrils every 12 hours  insulin lispro (ADMELOG) corrective regimen sliding scale   SubCutaneous Before meals and at bedtime  latanoprost 0.005% Ophthalmic Solution 1 Drop(s) Right EYE at bedtime  senna 2 Tablet(s) Oral at bedtime  sodium chloride 0.65% Nasal 1 Spray(s) Both Nostrils four times a day    IVF:    CULTURES:  Culture Results:   No growth at 2 days. (12-31 @ 02:42)  Culture Results:   No growth at 2 days. (12-31 @ 02:42)    RADIOLOGY & ADDITIONAL TESTS:      ASSESSMENT:  71yo F w/ no known PMHx transferred from [facility] c/o slurred speech and right sided weakness. CTH initially negative, repeat on 12/23 showed L sided SDH, pts mental status worsened and CTH on 12/28 significant for L frontoparietal collection, MRI completed showed concern for possible empyema. S/p L hemicraniectomy and washout of L subdural empyema 12/30/22.     INTRACRANIALHEMORRIAGE    Handoff    Subdural empyema    Subdural empyema    Craniectomy or craniotomy, emergent    [email or web address]        PLAN:  NEURO   - neuro/vitals Q2  - Post op CTH complete , repeat 1/2 stable  - CT sinuses 1/1 per ENT recs: Extensive opacification of the paranasal sinuses  - Cont Keppra 1000 BID  - Upload OSH images  - EEG 1/2, f/u read  - pend SLP eval     PULM   - Extubated 12/31  - Satting well on room air     CARDIO   - TTE  - -160    GI   - TF via NGT  - pend S&S re-eval   - bowel regimen  - protonix   - BM 1/2    RENAL  - voiding  - i's and o's     ENDO  - ISS  - a1c 6.1   - monitor finger sticks   - repeat TFTs, RASS    HEME   - SQL 40 BID   - FOB neg 1/2   - s/p 1u prbc 1/1  - LE doppler 1/1 b/l calf and left peroneal DVTs; repeat 1/6   - pend anti xa 1/3 10am    ID   - ID recs appreciated: vanc/ceftriaxone/metronidazole for subdural/epidural empyema  - vanc trough 1/3 @9pm   - Pancultured 12/31, f/u cultures  - follow up OR cultures    Dispo: Pending, [place]     Discussed with Dr. D'Amico, Dr. Aguilar, and Dr. Castorena

## 2023-01-03 NOTE — DISCHARGE NOTE PROVIDER - PROVIDER TOKENS
PROVIDER:[TOKEN:[[id number]]] PROVIDER:[TOKEN:[[id number]]],PROVIDER:[TOKEN:[[id number]]],PROVIDER:[TOKEN:[[id number]]]

## 2023-01-03 NOTE — SPEECH LANGUAGE PATHOLOGY EVALUATION - SLP REPETITION
unable to imitate phonemes in imitation despite max. multi-modal cues; however, did inconsistently imitate oral postures for productions without voicing/impaired

## 2023-01-03 NOTE — SPEECH LANGUAGE PATHOLOGY EVALUATION - SLP DIAGNOSIS
Pt presents with severe non-fluent aphasia. She is grossly non-verbal with the exception of few opportunities where she was able to produce reflexive voicing in imitation. Pt did not benefit from max. multi-modal cues to elicit purposeful voicing. Comprehension for very simple syntax, especially of personal information or of immediate environment appears relatively intact. However, accuracy did decline with more abstract and complex information. Pt benefited from field choices, visual aids (yes/no board), and modeling to improve comprehension. Reading comprehension of at least familiar single words appears to be a strength. Pt would benefit from intensive SLP tx with focus on functional expressive and receptive communication with use of multimodal cues/support at the inpatient rehab setting.

## 2023-01-03 NOTE — DISCHARGE NOTE PROVIDER - NSDCFUADDINST_GEN_ALL_CORE_FT
Neurosurgery follow up appointment date/time:  - are staples/sutures in place?  - what day should staples/sutures be removed (POD 10-14)?  - please call the office to confirm appointment: [phone]    Wound Care:  - you may shower and wash your hair  - pat dry incision after showering   - leave incision uncovered, open to air     Devices:  - does patient need collar or brace?  - does collar/brace need to be worn at all times or just when OOB?    Drains/Lines:  - PICC in place? ID follow up? (Paper Rx for: antibiotics, heparin flush, weekly lab draws)  - CHENCHO in place? Management?  - clay in place? Management/Urology follow up?  - Tracheostomy?  - PEG tube?      Activity:  - fatigue is common after surgery, rest if you feel tired   - no bending, lifting, twisting or heavy lifting   - walking is recommended, ambulate as tolerated  - you may shower when you get home, keep your incision dry  - no bathing   - no driving within 24 hours of anesthesia or while taking prescription pain medications   - keep hydrated, drink plenty of water   - skull base precautions: no nose blowing, sneeze with mouth open, no drinking out of a straw, no straining      Inpatient consults:  - final recommendations  - you will need follow up with....    Please also follow up with your primary care doctor.     Pain Expectations:  - pain after surgery is expected  - please take pain meds as prescribed     Medications:  - changes to home meds (ex. AED's)?  - new meds?  - pain meds?  - when can antiplatelets or anticoagulants be restarted?  - were adverse affects of meds discussed with patients?   - pain medications can cause constipation, you should eat a high fiber diet and may take a stool softener while on pain meds   - Avoid taking Advil (ibuprofen), Motrin (naproxen), or Aspirin for pain as they can cause bleeding     Call the office or come to ED if:  - wound has drainage or bleeding, increased redness or pain at incision site, neurological change, fever (>101), chills, night sweats, syncope, nausea/vomiting      Playback:  - See [facility] for a copy of your discharge paperwork     WITHIN 24 HOURS OF DISCHARGE, PLEASE CONTACT NEURO PA  WITH ANY QUESTIONS OR CONCERNS: [phone]   OTHERWISE, PLEASE CALL THE OFFICE WITH ANY QUESTIONS OR CONCERNS: [phone] Neurosurgery follow up appointment date/time:  - are staples/sutures in place?  - what day should staples/sutures be removed (POD 10-14)?  - please call the office to confirm appointment: [phone]    Wound Care:  - you may shower and wash your hair  - pat dry incision after showering   - leave incision uncovered, open to air     Devices/Drains/Lines:  - PICC in place? ID follow up? (Paper Rx for: antibiotics, heparin flush, weekly lab draws)    Activity:  - fatigue is common after surgery, rest if you feel tired   - no bending, lifting, twisting or heavy lifting   - walking is recommended, ambulate as tolerated  - you may shower when you get home, keep your incision dry  - no bathing   - no driving within 24 hours of anesthesia or while taking prescription pain medications   - keep hydrated, drink plenty of water   - skull base precautions: no nose blowing, sneeze with mouth open, no drinking out of a straw, no straining      Inpatient consults:  - ENT:  - Infectious Disease:     Please also follow up with your primary care doctor.     Pain Expectations:  - pain after surgery is expected  - please take pain meds as prescribed     Medications:  - changes to home meds (ex. AED's)?  - new meds?  - pain meds?  - when can antiplatelets or anticoagulants be restarted?  - were adverse affects of meds discussed with patients?   - pain medications can cause constipation, you should eat a high fiber diet and may take a stool softener while on pain meds   - Avoid taking Advil (ibuprofen), Motrin (naproxen), or Aspirin for pain as they can cause bleeding     Call the office or come to ED if:  - wound has drainage or bleeding, increased redness or pain at incision site, neurological change, fever (>101), chills, night sweats, syncope, nausea/vomiting      Playback:  - See [employer] [facility] for a copy of your discharge paperwork     WITHIN 24 HOURS OF DISCHARGE, PLEASE CONTACT NEURO PA  WITH ANY QUESTIONS OR CONCERNS: [phone]   OTHERWISE, PLEASE CALL THE OFFICE WITH ANY QUESTIONS OR CONCERNS: [phone] Neurosurgery follow up appointment date/time: 1/24/23 at 2:45pm  - follow up in the office for a wound check   - sutures can be removed at rehab on POD14 (1/13/23)  - please call the office to confirm appointment: [phone]    Wound Care:  - you may shower and wash your hair  - pat dry incision after showering   - leave incision uncovered, open to air   - sutures can be removed at rehab on 1/13/23    Devices/Drains/Lines:  - Helmet when out of bed, NO BONE on left side of head   - PICC in place: keep area dry and clean. Can be removed after completing course of antibiotics (through 2/16/23), confirm with Dr. Ayers from ID before removing      Activity:  - fatigue is common after surgery, rest if you feel tired   - no bending, lifting, twisting or heavy lifting   - walking is recommended, ambulate as tolerated WITH HELMET   - you may shower when you get home, keep your incision dry  - no bathing   - no driving within 24 hours of anesthesia or while taking prescription pain medications   - keep hydrated, drink plenty of water     Inpatient consults:  - ENT: Nasal rinse twice daily with saline water and kendra syringe. Follow up with Dr. Abarca outpatient    - Infectious Disease: Continue Vancomycin 1500mg IV twice daily, Ceftriaxone 2g IV twice daily, Metronidazole 750mg every 8 hours. END DATE: 2/16/23. Send weekly CBC, CMP, ESR, CRP, Vanco trough to [phone].    Please also follow up with your primary care doctor.     Pain Expectations:  - pain after surgery is expected  - please take pain meds as prescribed     Medications:  - current meds:  Lovenox 40mg BID  Amlodipine 10mg daily  Keppra 1000mg twice per day  Flonase twice per day, Ocean nasal spray four times per day  Travoprost opthalmic solution daily   - antibiotics: Metronidazole, Vancomycin, Ceftriaxone to 2/16/23  - pain meds: Tylenol as needed  - pain medications can cause constipation, you should eat a high fiber diet and may take a stool softener while on pain meds   - Avoid taking Advil (ibuprofen), Motrin (naproxen), or Aspirin for pain as they can cause bleeding     Call the office or come to ED if:  - wound has drainage or bleeding, increased redness or pain at incision site, neurological change, fever (>101), chills, night sweats, syncope, nausea/vomiting      Playback:  - See [employer] health for a copy of your discharge paperwork     WITHIN 24 HOURS OF DISCHARGE, PLEASE CONTACT NEURO PA  WITH ANY QUESTIONS OR CONCERNS: [phone]   OTHERWISE, PLEASE CALL THE OFFICE WITH ANY QUESTIONS OR CONCERNS: [phone] Neurosurgery follow up appointment date/time: 1/24/23 at 2:45pm  - follow up in the office for a wound check   - sutures can be removed at rehab on POD14 (1/13/23)  - please call the office to confirm appointment: [phone]    Wound Care:  - you may shower and wash your hair  - pat dry incision after showering   - leave incision uncovered, open to air   - sutures can be removed at rehab on 1/13/23    Devices/Drains/Lines:  - Helmet when out of bed, NO BONE on left side of head   - PICC in place: keep area dry and clean. Can be removed after completing course of antibiotics (through 2/16/23), confirm with Dr. Ayers from ID before removing      Activity:  - fatigue is common after surgery, rest if you feel tired   - no bending, lifting, twisting or heavy lifting   - walking is recommended, ambulate as tolerated WITH HELMET   - you may shower when you get home, keep your incision dry  - no bathing   - no driving within 24 hours of anesthesia or while taking prescription pain medications   - keep hydrated, drink plenty of water     Inpatient consults:  - ENT: Nasal rinse twice daily with saline water and kendra syringe. Follow up with Dr. Abarca outpatient    - Infectious Disease: Continue Vancomycin 1500mg IV twice daily, Ceftriaxone 2g IV twice daily, Metronidazole 750mg every 8 hours. END DATE: 2/16/23. Send weekly CBC, CMP, ESR, CRP, Vanco trough to [phone].    Please also follow up with your primary care doctor, especially for management of your b/l peroneal and intramuscular calf DVT's    Pain Expectations:  - pain after surgery is expected  - please take pain meds as prescribed     Medications:  - current meds:  Lovenox 40mg BID  Amlodipine 10mg daily  Keppra 1000mg twice per day  Flonase twice per day, Ocean nasal spray four times per day  Travoprost opthalmic solution daily   - antibiotics: Metronidazole, Vancomycin, Ceftriaxone to 2/16/23. Please send weekly CBC, CMP, ESR, CRP, Vanco trough to [phone].  - pain meds: Tylenol as needed  - pain medications can cause constipation, you should eat a high fiber diet and may take a stool softener while on pain meds   - Avoid taking Advil (ibuprofen), Motrin (naproxen), or Aspirin for pain as they can cause bleeding     Call the office or come to ED if:  - wound has drainage or bleeding, increased redness or pain at incision site, neurological change, fever (>101), chills, night sweats, syncope, nausea/vomiting      Playback:  - See [facility] for a copy of your discharge paperwork     WITHIN 24 HOURS OF DISCHARGE, PLEASE CONTACT NEURO PA  WITH ANY QUESTIONS OR CONCERNS: [phone]   OTHERWISE, PLEASE CALL THE OFFICE WITH ANY QUESTIONS OR CONCERNS: [phone] Neurosurgery follow up appointment date/time: 1/24/23 at 2:45pm  - follow up in the office for a wound check   - sutures can be removed at rehab on POD14 (1/13/23)  - please call the office to confirm appointment: [phone]    Wound Care:  - you may shower and wash your hair  - pat dry incision after showering   - leave incision uncovered, open to air   - sutures can be removed at rehab on 1/13/23    Devices/Drains/Lines:  - Helmet when out of bed, NO BONE on left side of head   - PICC in place: keep area dry and clean. Can be removed after completing course of antibiotics (through 2/16/23), confirm with Dr. Ayers from ID before removing      Activity:  - fatigue is common after surgery, rest if you feel tired   - no bending, lifting, twisting or heavy lifting   - walking is recommended, ambulate as tolerated WITH HELMET   - you may shower when you get home, keep your incision dry  - no bathing   - no driving within 24 hours of anesthesia or while taking prescription pain medications   - keep hydrated, drink plenty of water     Inpatient consults:  - ENT: Nasal rinse twice daily with saline water and kendra syringe. Follow up with Dr. Abarca outpatient    - Infectious Disease: Continue Vancomycin 1500mg IV twice daily, Ceftriaxone 2g IV twice daily, Metronidazole 750mg every 8 hours. END DATE: 2/16/23. Send weekly CBC, CMP, ESR, CRP, Vanco trough to [phone].    Please also follow up with your primary care doctor, especially for management of your b/l peroneal and intramuscular calf DVT's    Pain Expectations:  - pain after surgery is expected  - please take pain meds as prescribed     Medications:  - current meds:  Lovenox 120mg BID for DVT/PE  Amlodipine 10mg daily  Keppra 1000mg twice per day  Flonase twice per day, Ocean nasal spray four times per day  Travoprost opthalmic solution daily   - antibiotics: Metronidazole, Vancomycin, Ceftriaxone to 2/16/23. Please send weekly CBC, CMP, ESR, CRP, Vanco trough to [phone].  - pain meds: Tylenol as needed  - pain medications can cause constipation, you should eat a high fiber diet and may take a stool softener while on pain meds   - Avoid taking Advil (ibuprofen), Motrin (naproxen), or Aspirin for pain as they can cause bleeding     Call the office or come to ED if:  - wound has drainage or bleeding, increased redness or pain at incision site, neurological change, fever (>101), chills, night sweats, syncope, nausea/vomiting      Playback:  - See [employer] [facility] for a copy of your discharge paperwork     WITHIN 24 HOURS OF DISCHARGE, PLEASE CONTACT NEURO PA  WITH ANY QUESTIONS OR CONCERNS: [phone]   OTHERWISE, PLEASE CALL THE OFFICE WITH ANY QUESTIONS OR CONCERNS: [phone] Neurosurgery follow up appointment date/time: 1/24/23 at 2:45pm  - follow up in the office for a wound check   - sutures can be removed at rehab on POD14 (1/13/23)  - please call the office to confirm appointment: [phone]    Wound Care:  - you may shower and wash your hair  - pat dry incision after showering   - leave incision uncovered, open to air   - sutures can be removed at rehab on 1/13/23    Devices/Drains/Lines:  - Helmet when out of bed, NO BONE on left side of head   - PICC in place: keep area dry and clean. Can be removed after completing course of antibiotics (through 2/16/23), confirm with Dr. Ayers from ID before removing      Activity:  - fatigue is common after surgery, rest if you feel tired   - no bending, lifting, twisting or heavy lifting   - walking is recommended, ambulate as tolerated WITH HELMET   - you may shower when you get home, keep your incision dry  - no bathing   - no driving within 24 hours of anesthesia or while taking prescription pain medications   - keep hydrated, drink plenty of water     Inpatient consults:  - ENT: Nasal rinse twice daily with saline water and kendra syringe. Follow up with Dr. Abarca outpatient    - Infectious Disease: Continue Vancomycin 1250mg IV twice daily, Ceftriaxone 2g IV twice daily, Metronidazole 750mg every 8 hours. END DATE: 2/16/23. Send weekly CBC, CMP, ESR, CRP, Vanco trough to [phone].    *AT REHAB: please check vancomycin trough level before the 4th dose of Vancomycin 1250mg IV BID (first dose = 1/12/23 at 6pm, 4th dose = 1/14/23 at 6am)     Please also follow up with your primary care doctor, especially for management of your b/l peroneal and intramuscular calf DVT's    Pain Expectations:  - pain after surgery is expected  - please take pain meds as prescribed     Medications:  - current meds:  Lovenox 120mg BID for DVT/PE  Amlodipine 10mg daily  Keppra 1000mg twice per day  Flonase twice per day, Ocean nasal spray four times per day  Travoprost opthalmic solution daily   - antibiotics: Metronidazole, Vancomycin, Ceftriaxone to 2/16/23. Please send weekly CBC, CMP, ESR, CRP, Vanco trough to [phone].  *Vancomycin dose changed on 1/12/23 to 1250mg every 12 hours. First dose will be on 1/12 at 6pm. Please check a vancomycin trough level before the 4th dose (1/14/23 in the morning, before the 4th dose)   - pain meds: Tylenol as needed  - pain medications can cause constipation, you should eat a high fiber diet and may take a stool softener while on pain meds   - Avoid taking Advil (ibuprofen), Motrin (naproxen), or Aspirin for pain as they can cause bleeding     Call the office or come to ED if:  - wound has drainage or bleeding, increased redness or pain at incision site, neurological change, fever (>101), chills, night sweats, syncope, nausea/vomiting      Playback:  - See [employer] health for a copy of your discharge paperwork     WITHIN 24 HOURS OF DISCHARGE, PLEASE CONTACT NEURO PA  WITH ANY QUESTIONS OR CONCERNS: [phone]   OTHERWISE, PLEASE CALL THE OFFICE WITH ANY QUESTIONS OR CONCERNS: [phone]

## 2023-01-03 NOTE — SPEECH LANGUAGE PATHOLOGY EVALUATION - COMMENTS
Produced reflexive vocalizations (e.g. laughing, yawning, coughing, etc.) in imitation x2 unable to assess given severity of expressive language deficits Goals:  Pt will spontaneously gesture wants/needs using a low-tech communication board (F:9) during 80% of opportunities over 3 consecutive sessions.   Pt will imitate phonemes in isolation during 25% of opportunities with max. multi-modal cues over 3 consecutive sessions.   Additional goals to be added pending progress

## 2023-01-04 ENCOUNTER — TRANSCRIPTION ENCOUNTER (OUTPATIENT)
Age: 73
End: 2023-01-04

## 2023-01-04 DIAGNOSIS — Z01.818 ENCOUNTER FOR OTHER PREPROCEDURAL EXAMINATION: ICD-10-CM

## 2023-01-04 LAB
ANION GAP SERPL CALC-SCNC: 9 MMOL/L — SIGNIFICANT CHANGE UP (ref 5–17)
BUN SERPL-MCNC: 11 MG/DL — SIGNIFICANT CHANGE UP (ref 7–23)
CALCIUM SERPL-MCNC: 8.3 MG/DL — LOW (ref 8.4–10.5)
CHLORIDE SERPL-SCNC: 105 MMOL/L — SIGNIFICANT CHANGE UP (ref 96–108)
CO2 SERPL-SCNC: 26 MMOL/L — SIGNIFICANT CHANGE UP (ref 22–31)
CREAT SERPL-MCNC: 0.58 MG/DL — SIGNIFICANT CHANGE UP (ref 0.5–1.3)
CULTURE RESULTS: SIGNIFICANT CHANGE UP
EGFR: 97 ML/MIN/1.73M2 — SIGNIFICANT CHANGE UP
GLUCOSE BLDC GLUCOMTR-MCNC: 126 MG/DL — HIGH (ref 70–99)
GLUCOSE BLDC GLUCOMTR-MCNC: 157 MG/DL — HIGH (ref 70–99)
GLUCOSE BLDC GLUCOMTR-MCNC: 160 MG/DL — HIGH (ref 70–99)
GLUCOSE BLDC GLUCOMTR-MCNC: 208 MG/DL — HIGH (ref 70–99)
GLUCOSE SERPL-MCNC: 159 MG/DL — HIGH (ref 70–99)
HCT VFR BLD CALC: 25.3 % — LOW (ref 34.5–45)
HGB BLD-MCNC: 8.3 G/DL — LOW (ref 11.5–15.5)
MAGNESIUM SERPL-MCNC: 2 MG/DL — SIGNIFICANT CHANGE UP (ref 1.6–2.6)
MCHC RBC-ENTMCNC: 29.6 PG — SIGNIFICANT CHANGE UP (ref 27–34)
MCHC RBC-ENTMCNC: 32.8 GM/DL — SIGNIFICANT CHANGE UP (ref 32–36)
MCV RBC AUTO: 90.4 FL — SIGNIFICANT CHANGE UP (ref 80–100)
NRBC # BLD: 0 /100 WBCS — SIGNIFICANT CHANGE UP (ref 0–0)
PHOSPHATE SERPL-MCNC: 4.1 MG/DL — SIGNIFICANT CHANGE UP (ref 2.5–4.5)
PLATELET # BLD AUTO: 384 K/UL — SIGNIFICANT CHANGE UP (ref 150–400)
POTASSIUM SERPL-MCNC: 3.8 MMOL/L — SIGNIFICANT CHANGE UP (ref 3.5–5.3)
POTASSIUM SERPL-SCNC: 3.8 MMOL/L — SIGNIFICANT CHANGE UP (ref 3.5–5.3)
RBC # BLD: 2.8 M/UL — LOW (ref 3.8–5.2)
RBC # FLD: 17.1 % — HIGH (ref 10.3–14.5)
SARS-COV-2 RNA SPEC QL NAA+PROBE: SIGNIFICANT CHANGE UP
SODIUM SERPL-SCNC: 140 MMOL/L — SIGNIFICANT CHANGE UP (ref 135–145)
SPECIMEN SOURCE: SIGNIFICANT CHANGE UP
VANCOMYCIN TROUGH SERPL-MCNC: 11.6 UG/ML — SIGNIFICANT CHANGE UP (ref 10–20)
WBC # BLD: 9.97 K/UL — SIGNIFICANT CHANGE UP (ref 3.8–10.5)
WBC # FLD AUTO: 9.97 K/UL — SIGNIFICANT CHANGE UP (ref 3.8–10.5)

## 2023-01-04 PROCEDURE — 99233 SBSQ HOSP IP/OBS HIGH 50: CPT

## 2023-01-04 RX ORDER — NYSTATIN CREAM 100000 [USP'U]/G
1 CREAM TOPICAL
Refills: 0 | Status: DISCONTINUED | OUTPATIENT
Start: 2023-01-04 | End: 2023-01-12

## 2023-01-04 RX ORDER — POTASSIUM CHLORIDE 20 MEQ
20 PACKET (EA) ORAL ONCE
Refills: 0 | Status: COMPLETED | OUTPATIENT
Start: 2023-01-04 | End: 2023-01-04

## 2023-01-04 RX ORDER — SODIUM CHLORIDE 9 MG/ML
1000 INJECTION INTRAMUSCULAR; INTRAVENOUS; SUBCUTANEOUS
Refills: 0 | Status: DISCONTINUED | OUTPATIENT
Start: 2023-01-05 | End: 2023-01-08

## 2023-01-04 RX ADMIN — NYSTATIN CREAM 1 APPLICATION(S): 100000 CREAM TOPICAL at 17:39

## 2023-01-04 RX ADMIN — Medication 1 SPRAY(S): at 05:31

## 2023-01-04 RX ADMIN — Medication 100 MILLIGRAM(S): at 05:30

## 2023-01-04 RX ADMIN — AMLODIPINE BESYLATE 5 MILLIGRAM(S): 2.5 TABLET ORAL at 05:30

## 2023-01-04 RX ADMIN — Medication 100 MILLIGRAM(S): at 00:23

## 2023-01-04 RX ADMIN — SENNA PLUS 2 TABLET(S): 8.6 TABLET ORAL at 00:24

## 2023-01-04 RX ADMIN — CEFTRIAXONE 100 MILLIGRAM(S): 500 INJECTION, POWDER, FOR SOLUTION INTRAMUSCULAR; INTRAVENOUS at 06:37

## 2023-01-04 RX ADMIN — CEFTRIAXONE 100 MILLIGRAM(S): 500 INJECTION, POWDER, FOR SOLUTION INTRAMUSCULAR; INTRAVENOUS at 17:39

## 2023-01-04 RX ADMIN — Medication 300 MILLIGRAM(S): at 09:45

## 2023-01-04 RX ADMIN — LEVETIRACETAM 1000 MILLIGRAM(S): 250 TABLET, FILM COATED ORAL at 17:42

## 2023-01-04 RX ADMIN — Medication 1 SPRAY(S): at 17:41

## 2023-01-04 RX ADMIN — CHLORHEXIDINE GLUCONATE 1 APPLICATION(S): 213 SOLUTION TOPICAL at 05:37

## 2023-01-04 RX ADMIN — Medication 100 MILLIGRAM(S): at 11:45

## 2023-01-04 RX ADMIN — Medication 20 MILLIEQUIVALENT(S): at 09:56

## 2023-01-04 RX ADMIN — Medication 2: at 00:47

## 2023-01-04 RX ADMIN — LATANOPROST 1 DROP(S): 0.05 SOLUTION/ DROPS OPHTHALMIC; TOPICAL at 00:24

## 2023-01-04 RX ADMIN — Medication 1 SPRAY(S): at 09:55

## 2023-01-04 RX ADMIN — CHLORHEXIDINE GLUCONATE 15 MILLILITER(S): 213 SOLUTION TOPICAL at 05:30

## 2023-01-04 RX ADMIN — ENOXAPARIN SODIUM 40 MILLIGRAM(S): 100 INJECTION SUBCUTANEOUS at 05:30

## 2023-01-04 RX ADMIN — LEVETIRACETAM 1000 MILLIGRAM(S): 250 TABLET, FILM COATED ORAL at 05:30

## 2023-01-04 RX ADMIN — Medication 100 MILLIGRAM(S): at 18:00

## 2023-01-04 RX ADMIN — CHLORHEXIDINE GLUCONATE 15 MILLILITER(S): 213 SOLUTION TOPICAL at 17:41

## 2023-01-04 RX ADMIN — Medication 1 SPRAY(S): at 17:58

## 2023-01-04 RX ADMIN — Medication 1 SPRAY(S): at 03:34

## 2023-01-04 RX ADMIN — Medication 2: at 07:04

## 2023-01-04 RX ADMIN — Medication 4: at 12:04

## 2023-01-04 RX ADMIN — LATANOPROST 1 DROP(S): 0.05 SOLUTION/ DROPS OPHTHALMIC; TOPICAL at 22:20

## 2023-01-04 NOTE — DISCHARGE NOTE NURSING/CASE MANAGEMENT/SOCIAL WORK - NSDCFUADDAPPT_GEN_ALL_CORE_FT
Please follow up with Dr. D'Amico outpatient    Please follow up with your primary care doctor     ENT  ID

## 2023-01-04 NOTE — PROGRESS NOTE ADULT - SUBJECTIVE AND OBJECTIVE BOX
INTERVAL HPI/OVERNIGHT EVENTS: DEEPALI.    CONSTITUTIONAL:  Negative fever or chills, feels well, good appetite  EYES:  Negative  blurry vision or double vision  CARDIOVASCULAR:  Negative for chest pain or palpitations  RESPIRATORY:  Negative for cough, wheezing, or SOB   GASTROINTESTINAL:  Negative for nausea, vomiting, diarrhea, constipation, or abdominal pain  GENITOURINARY:  Negative frequency, urgency or dysuria  NEUROLOGIC:  No headache, confusion, dizziness, lightheadedness      ANTIBIOTICS/RELEVANT:    MEDICATIONS  (STANDING):  amLODIPine   Tablet 5 milliGRAM(s) Oral daily  cefTRIAXone   IVPB 2000 milliGRAM(s) IV Intermittent every 12 hours  chlorhexidine 0.12% Liquid 15 milliLiter(s) Swish and Spit two times a day  chlorhexidine 2% Cloths 1 Application(s) Topical <User Schedule>  enoxaparin Injectable 40 milliGRAM(s) SubCutaneous every 12 hours  fluticasone propionate 50 MICROgram(s)/spray Nasal Spray 1 Spray(s) Both Nostrils every 12 hours  insulin lispro (ADMELOG) corrective regimen sliding scale   SubCutaneous every 6 hours  latanoprost 0.005% Ophthalmic Solution 1 Drop(s) Right EYE at bedtime  levETIRAcetam 1000 milliGRAM(s) Oral two times a day  metroNIDAZOLE  IVPB 500 milliGRAM(s) IV Intermittent every 6 hours  nystatin Cream 1 Application(s) Topical two times a day  senna 2 Tablet(s) Oral at bedtime  sodium chloride 0.65% Nasal 1 Spray(s) Both Nostrils four times a day  vancomycin  IVPB 1500 milliGRAM(s) IV Intermittent every 12 hours    MEDICATIONS  (PRN):  acetaminophen    Suspension .. 650 milliGRAM(s) Oral every 6 hours PRN Temp greater or equal to 38C (100.4F), Mild Pain (1 - 3)  ondansetron Injectable 4 milliGRAM(s) IV Push every 6 hours PRN Nausea and/or Vomiting        Vital Signs Last 24 Hrs  T(C): 37.1 (04 Jan 2023 14:00), Max: 37.2 (03 Jan 2023 17:00)  T(F): 98.7 (04 Jan 2023 14:00), Max: 98.9 (03 Jan 2023 17:00)  HR: 98 (04 Jan 2023 11:45) (80 - 98)  BP: 153/67 (04 Jan 2023 11:45) (144/65 - 163/69)  BP(mean): 97 (04 Jan 2023 11:45) (93 - 99)  RR: 18 (04 Jan 2023 11:45) (18 - 18)  SpO2: 99% (04 Jan 2023 11:45) (95% - 99%)    Parameters below as of 04 Jan 2023 11:45  Patient On (Oxygen Delivery Method): room air        PHYSICAL EXAM:  Constitutional: NAD  Eyes: VISHAL, EOMI  Ear/Nose/Throat: no oral lesion, no sinus tenderness on percussion	  Neck: no JVD, no lymphadenopathy, supple  Respiratory: CTA larry  Cardiovascular: S1S2 RRR, no murmurs  Gastrointestinal:soft, (+) BS, no HSM  Extremities:no e/e/c  Vascular: DP Pulse:	right normal; left normal      LABS:                        8.3    9.97  )-----------( 384      ( 04 Jan 2023 06:53 )             25.3     01-04    140  |  105  |  11  ----------------------------<  159<H>  3.8   |  26  |  0.58    Ca    8.3<L>      04 Jan 2023 06:53  Phos  4.1     01-04  Mg     2.0     01-04            MICROBIOLOGY: Culture - Nose . (01.02.23 @ 09:17)    Specimen Source: .Nose Nose    Culture Results:   Normal Nose Yamile present  No Methicillin Resistant Staphylococcus aureus      Culture - Surgical Swab (12.30.22 @ 21:00)    Gram Stain:   Numerous White blood cells  Few Gram positive cocci in pairs    Specimen Source: .Surgical Swab 3-epidural infection or spec    Culture Results:   Rare Peptostreptococcus species  Culture in progress        RADIOLOGY & ADDITIONAL STUDIES: reviewed

## 2023-01-04 NOTE — PROGRESS NOTE ADULT - ASSESSMENT
71yo F w/ no known PMHx transferred from [facility] c/o slurred speech and right sided weakness. CTH initially negative, repeat on 12/23 showed L sided SDH, pts mental status worsened and CTH on 12/28 significant for L frontoparietal collection, MRI completed showed concern for possible empyema. S/p L hemicraniectomy and washout of L subdural empyema 12/30/22

## 2023-01-04 NOTE — DISCHARGE NOTE NURSING/CASE MANAGEMENT/SOCIAL WORK - HAVE YOU HAD COVID IN THE LAST 60 DAYS?
Facial Plastic and Reconstructive Surgery Consultation    Referring Provider:   Referred Self, MD  No address on file    HPI:   I had the pleasure of seeing Rsusell Rao today in clinic for consultation for sequelae from Bell's palsy and facial paralysis recovery.   Russell Rao is a 67 year old female who years ago experienced what is consistent on history with right Bell's palsy. She had slow recovery but was able to obtain a significant amount of facial movement.    She has since noted significant tightness on the right side of her face with unwanted movements. She specifically feels that her eye closes involuntarily and that the right side is uncomfortable. She tries to stretch her face but does not achieve much improvement.    She also notes twitching in her face and tightness in her neck. These are all problems that have been consistent concerns since her recovery from the total facial paralysis.       Review Of Systems  ROS: 10 point ROS neg other than the symptoms noted above in the HPI.    Patient Active Problem List   Diagnosis     Constipation     Migraine headache     Allergic state     S/P hysterectomy     Hyperlipidemia LDL goal <130     Advanced directives, counseling/discussion     Migraine     Obesity     Bell's palsy     Epigastric pain     Eyelid cancer     Major depression, single episode     Symptomatic cholelithiasis     Past Surgical History:   Procedure Laterality Date     HYSTERECTOMY, PAP NO LONGER INDICATED      TVH and prolift repair, ovaries remain     TUBAL LIGATION       Current Outpatient Prescriptions   Medication Sig Dispense Refill     Ibuprofen 200 MG capsule Take 200 mg by mouth every 4 hours as needed.       LORATADINE 10 MG OR TABS ONE DAILY 90 Tab 3     Multiple Vitamins-Minerals (MULTIVITAL PO)        omega-3 fatty acids (FISH OIL) 1200 MG capsule Take 1 capsule by mouth daily.       simvastatin (ZOCOR) 20 MG tablet Take 1 tablet by mouth At Bedtime. 90 tablet 3      Pollen extract and Pollen [pollen extract]  Social History     Social History     Marital status: Single     Spouse name: N/A     Number of children: N/A     Years of education: N/A     Occupational History     Not on file.     Social History Main Topics     Smoking status: Former Smoker     Types: Cigarettes     Quit date: 2006     Smokeless tobacco: Never Used     Alcohol use No     Drug use: No     Sexual activity: Not Currently     Partners: Male     Birth control/ protection: None     Other Topics Concern     Parent/Sibling W/ Cabg, Mi Or Angioplasty Before 65f 55m? No     Social History Narrative    Dairy/d 1-2 servings/d.     Caffeine 1 servings/d    Exercise 0 x week    Sunscreen used - Yes    Seatbelts used - Yes    Working smoke/CO detectors in the home - Yes    Guns stored in the home - No    Self Breast Exams - Yes    Self Testicular Exam - NA    Eye Exam up to date - No    Dental Exam up to date - No    Pap Smear up to date - No    Mammogram up to date - No    PSA up to date - NA    Dexa Scan up to date - No    Flex Sig / Colonoscopy up to date - No    Immunizations up to date - Yes    Abuse: Current or Past(Physical, Sexual or Emotional)- No    Do you feel safe in your environment - Yes    MAURY Collado CMA                 Family History   Problem Relation Age of Onset     Breast Cancer Maternal Grandmother      Asthma Mother      OSTEOPOROSIS Mother      Hypertension Father      Cancer - colorectal Father      Alcohol/Drug Father      Alzheimer Disease Father      dementia     Hypertension Brother      CEREBROVASCULAR DISEASE Brother           Alcohol/Drug Brother      Lipids Brother      Genitourinary Problems Sister      kidney stones     Gynecology Sister      hyst     CEREBROVASCULAR DISEASE Brother        PE:  Alert and Oriented, Answering Questions Appropriately  Atraumatic, Normocephalic  Face: Notable resting tone asymmetry with a narrow right palpebral aperture and deep right  nasolabial fold  Movement of the right corner of the mouth with closure of the eye  Involuntary closure of the eye with pursing of lips and speech  EOM's, PEERL  Skin: Awad 2  Facial Nerve Intact and facial movement symmetric  Chin: Normal   Neck: No lymphadenopathy, no thyromegaly, trachea midline  Chest: No wheezing, cyanosis, or stridor  Card: Normal upper extremity pulses and capillary refill, not diaphoretic  Neuro/Psych: CN's 2-12 intact except for right facial nerve findings, Moves all extremities, ambulation in intact, positive affect, no notable muscle weakness            IMPRESSION:    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   She has significant discomfort and symptoms and would benefit from treatment.   I have discussed chemodenervation with her today and discussed the risks and benefits of this treatment.  I believe that this combined with facial rehabilitation will lead to significant relief.     Photodocumentation was obtained.     I spent a total of 40 minutes face-to-face with Russell Rao during today's office visit.  Over 50% of this time was spent counseling the patient and/or coordinating care regarding her faical symptoms and their treatment.  See note for details.       No

## 2023-01-04 NOTE — PROGRESS NOTE ADULT - PROBLEM SELECTOR PLAN 5
Collateral obtained from daughter, patient had seen cardiologist for cataract surgery preop and was cleared but was told borderline HTN  - has remained hypertensive, improved on Amlodipine 5mg started 1/3

## 2023-01-04 NOTE — PROGRESS NOTE ADULT - PROBLEM SELECTOR PLAN 3
ENT following: c/w Flonase and nasal sodium  - abx as above  - plan per ENT for possible OR washout 1/5

## 2023-01-04 NOTE — CHART NOTE - NSCHARTNOTEFT_GEN_A_CORE
Admitting Diagnosis:   Patient is a 72y old  Female who presents with a chief complaint of Subdural collection (04 Jan 2023 01:15)    PAST MEDICAL & SURGICAL HISTORY:  No known PMH    Current Nutrition Order:   Diet, NPO after Midnight:      NPO Start Date: 04-Jan-2023,   NPO Start Time: 23:59  Except Medications (01-04-23 @ 11:03)    PO Intake: Good (%) [   ]  Fair (50-75%) [   ] Poor (<25%) [   ]    GI Issues:     Pain:    Skin Integrity:    Labs:   01-04    140  |  105  |  11  ----------------------------<  159<H>  3.8   |  26  |  0.58    Ca    8.3<L>      04 Jan 2023 06:53  Phos  4.1     01-04  Mg     2.0     01-04    CAPILLARY BLOOD GLUCOSE    POCT Blood Glucose.: 157 mg/dL (04 Jan 2023 06:41)  POCT Blood Glucose.: 160 mg/dL (04 Jan 2023 00:27)  POCT Blood Glucose.: 128 mg/dL (03 Jan 2023 21:36)  POCT Blood Glucose.: 172 mg/dL (03 Jan 2023 16:08)  POCT Blood Glucose.: 150 mg/dL (03 Jan 2023 11:42)    Medications:  MEDICATIONS  (STANDING):  amLODIPine   Tablet 5 milliGRAM(s) Oral daily  cefTRIAXone   IVPB 2000 milliGRAM(s) IV Intermittent every 12 hours  chlorhexidine 0.12% Liquid 15 milliLiter(s) Swish and Spit two times a day  chlorhexidine 2% Cloths 1 Application(s) Topical <User Schedule>  enoxaparin Injectable 40 milliGRAM(s) SubCutaneous every 12 hours  fluticasone propionate 50 MICROgram(s)/spray Nasal Spray 1 Spray(s) Both Nostrils every 12 hours  insulin lispro (ADMELOG) corrective regimen sliding scale   SubCutaneous every 6 hours  latanoprost 0.005% Ophthalmic Solution 1 Drop(s) Right EYE at bedtime  levETIRAcetam 1000 milliGRAM(s) Oral two times a day  metroNIDAZOLE  IVPB 500 milliGRAM(s) IV Intermittent every 6 hours  nystatin Cream 1 Application(s) Topical two times a day  senna 2 Tablet(s) Oral at bedtime  sodium chloride 0.65% Nasal 1 Spray(s) Both Nostrils four times a day  vancomycin  IVPB 1500 milliGRAM(s) IV Intermittent every 12 hours    MEDICATIONS  (PRN):  acetaminophen    Suspension .. 650 milliGRAM(s) Oral every 6 hours PRN Temp greater or equal to 38C (100.4F), Mild Pain (1 - 3)  ondansetron Injectable 4 milliGRAM(s) IV Push every 6 hours PRN Nausea and/or Vomiting    Admitting Anthropometrics:  Height for BMI (FEET)	5 Feet  Height for BMI (INCHES)	4 Inch(s)  Height for BMI (CENTIMETERS)	162.56 Centimeter(s)  Weight for BMI (lbs)	278.4 lb  Weight for BMI (kg)	126.3 kg  Body Mass Index	47.7  IBW 120lbs  %%    Weight Change:   No new weights obtained during this admission. Please cont to trend weight biweekly.     Nutrition Focused Physical Exam: Completed [   ]  Not Pertinent [  x ]    Estimated energy needs:   Based on Standards of Care pt within % IBW thus actual body weight used for all calculations. Needs adjusted for advanced age, morbid obesity, post-op wound healing.   Kcal (30-35 kcal/kg): 4697-8817 kcal  Protein (1.2-1.5 g/kg pro): 66-82g pro  **Adjust fluids per team    Subjective:   73yo F w/ no known PMHx. on 12/22 presented to [facility] c/o slurred speech and R sided weakness, stroke code called, imaging initially negative for stroke or hemorrhage, pt's neuro exam worsened and subsequent CTH the following day showed L sided SDH, admitted to ICU for further monitoring. Pt neuro declined on 12/25 with AMS requiring intubation, noted to have episodes of twitching concerning for seizures, started on keppra and EEG placed. No epileptiform activity noted. CTH on 12/28 significant for L frontoparietal collection, MRI completed showed concern for possible empyema. Daughter reported pt likely had a sinus infection recently, unsure of abx. At [place] pt started on ceftriaxone for UTI that was switched to vancomycin this am. Pt transferred to [facility] for further management and now s/p Left hemicraniectomy and washout of L subdural empyema 12/31. Transferred back to ICU intubated for further monitoring, and extubated later that day to RA 12/31. Pt failed SLP eval 1/1 and initiated on EN feeds 1/1.    Pt seen on 8EA at bedside. Pt extubated on 12/31/22. Pt in bed resting, satting at 96% SpO2. MAP 79, trend >65. GI: s/s fecal incontinence noted. NS/NT abdomen; audible and normoactive bowel sounds per chart. No noted N/V/D by pt's RN/medical team. Most recent BM on 1/1/23. Alexandr: 14. Skin integrity: L head sx incision noted, lower lip wound noted. Generalized edema 1+ (trace) noted. Nonverbal indicators of pain absent. Allergies unknown per chart. Pertinent labs: elevated POCT BG (126), elevated A1c (6.1). Pt is currently NPO status with a nasogastric tube feeding: Jevity 1.2 started at 20mL/h to goal rate of 50mL/h continuously. Pertinent nutrition-related medications/supplements: pt is receiving insulin, senna, zofran, IV Abx and IV fluids for hydration/electrolyte repletion. No wt hx noted in chart; pt's .3kg (278.5#). Pt's IBW is 120#, pt is 232% of IBW. RD observed pt with no overt signs of muscle or fat wasting. Based on ASPEN guidelines, pt does not meet criteria for malnutrition at this time. Education deferred at this time r/t pt's clinical and cognitive status. RD will remain available per protocol. Additional nutrition recommendations listed below to follow.    Previous Nutrition Diagnosis:  Inadequate Oral Intake r/t inability to meet nutritional needs orally AEB necessity for NPO with nasogastric enteral feeding status    Active [   ]  Resolved [   ]    If resolved, new PES:     Goal: Pt to consistently meet at least 75% of EEE via tolerated route that is consistent with GOC during hospital stay    Recommendations:  1.     Education:     Risk Level: High [   ] Moderate [   ] Low [   ] Admitting Diagnosis:   Patient is a 72y old  Female who presents with a chief complaint of Subdural collection (04 Jan 2023 01:15)    PAST MEDICAL & SURGICAL HISTORY:  No known PMH    Current Nutrition Order:   Diet, NPO after Midnight:      NPO Start Date: 04-Jan-2023,   NPO Start Time: 23:59  Except Medications (01-04-23 @ 11:03)    PO Intake: Good (%) [   ]  Fair (50-75%) [   ] Poor (<25%) [   ]    GI Issues:     Pain:    Skin Integrity:    Labs:   01-04    140  |  105  |  11  ----------------------------<  159<H>  3.8   |  26  |  0.58    Ca    8.3<L>      04 Jan 2023 06:53  Phos  4.1     01-04  Mg     2.0     01-04    CAPILLARY BLOOD GLUCOSE    POCT Blood Glucose.: 157 mg/dL (04 Jan 2023 06:41)  POCT Blood Glucose.: 160 mg/dL (04 Jan 2023 00:27)  POCT Blood Glucose.: 128 mg/dL (03 Jan 2023 21:36)  POCT Blood Glucose.: 172 mg/dL (03 Jan 2023 16:08)  POCT Blood Glucose.: 150 mg/dL (03 Jan 2023 11:42)    Medications:  MEDICATIONS  (STANDING):  amLODIPine   Tablet 5 milliGRAM(s) Oral daily  cefTRIAXone   IVPB 2000 milliGRAM(s) IV Intermittent every 12 hours  chlorhexidine 0.12% Liquid 15 milliLiter(s) Swish and Spit two times a day  chlorhexidine 2% Cloths 1 Application(s) Topical <User Schedule>  enoxaparin Injectable 40 milliGRAM(s) SubCutaneous every 12 hours  fluticasone propionate 50 MICROgram(s)/spray Nasal Spray 1 Spray(s) Both Nostrils every 12 hours  insulin lispro (ADMELOG) corrective regimen sliding scale   SubCutaneous every 6 hours  latanoprost 0.005% Ophthalmic Solution 1 Drop(s) Right EYE at bedtime  levETIRAcetam 1000 milliGRAM(s) Oral two times a day  metroNIDAZOLE  IVPB 500 milliGRAM(s) IV Intermittent every 6 hours  nystatin Cream 1 Application(s) Topical two times a day  senna 2 Tablet(s) Oral at bedtime  sodium chloride 0.65% Nasal 1 Spray(s) Both Nostrils four times a day  vancomycin  IVPB 1500 milliGRAM(s) IV Intermittent every 12 hours    MEDICATIONS  (PRN):  acetaminophen    Suspension .. 650 milliGRAM(s) Oral every 6 hours PRN Temp greater or equal to 38C (100.4F), Mild Pain (1 - 3)  ondansetron Injectable 4 milliGRAM(s) IV Push every 6 hours PRN Nausea and/or Vomiting    Admitting Anthropometrics:  Height for BMI (FEET)	5 Feet  Height for BMI (INCHES)	4 Inch(s)  Height for BMI (CENTIMETERS)	162.56 Centimeter(s)  Weight for BMI (lbs)	278.4 lb  Weight for BMI (kg)	126.3 kg  Body Mass Index	47.7  IBW 120lbs  %%    Weight Change:   No new weights obtained during this admission. Please cont to trend weight biweekly.     Nutrition Focused Physical Exam: Completed [   ]  Not Pertinent [  x ]    Estimated energy needs:   Based on Standards of Care pt within % IBW thus actual body weight used for all calculations. Needs adjusted for advanced age, morbid obesity, post-op wound healing.   Kcal (30-35 kcal/kg): 8871-3308 kcal  Protein (1.2-1.5 g/kg pro): 66-82g pro  **Adjust fluids per team    Subjective:   71yo F w/ no known PMHx. on 12/22 presented to [facility] c/o slurred speech and R sided weakness, stroke code called, imaging initially negative for stroke or hemorrhage, pt's neuro exam worsened and subsequent CTH the following day showed L sided SDH, admitted to ICU for further monitoring. Pt neuro declined on 12/25 with AMS requiring intubation, noted to have episodes of twitching concerning for seizures, started on keppra and EEG placed. No epileptiform activity noted. CTH on 12/28 significant for L frontoparietal collection, MRI completed showed concern for possible empyema. Daughter reported pt likely had a sinus infection recently, unsure of abx. At [place] pt started on ceftriaxone for UTI that was switched to vancomycin this am. Pt transferred to [facility] for further management and now s/p Left hemicraniectomy and washout of L subdural empyema 12/31. Transferred back to ICU intubated for further monitoring, and extubated later that day to RA 12/31. Pt failed SLP eval 1/1 and initiated on EN feeds 1/1. Pt now s/p FEES 1/4 with recommendations for pureed and thin liquids. Pt with minimal dentition so other textures were not tested- SLP will cont to follow and adjust recs prn.     Previous Nutrition Diagnosis:  Inadequate Oral Intake r/t inability to meet nutritional needs orally AEB necessity for NPO with nasogastric enteral feeding status    Active [   ]  Resolved [   ]    If resolved, new PES:     Goal: Pt to consistently meet at least 75% of EEE via tolerated route that is consistent with GOC during hospital stay    Recommendations:  1.     Education:     Risk Level: High [   ] Moderate [   ] Low [   ] Admitting Diagnosis:   Patient is a 72y old  Female who presents with a chief complaint of Subdural collection (04 Jan 2023 01:15)    PAST MEDICAL & SURGICAL HISTORY:  No known PMH    Current Nutrition Order:  Pureed diet  EN regimen; Jevity 1.2 @ 65mL/hr x 24 hours. This provides: 1560mL TV,  1872kcal, 86.6g protein, 1259mL free water.    PO Intake: Good (%) [   ]  Fair (50-75%) [   ] Poor (<25%) [   ]- N/A, diet just advanced. RD to follow.     GI Issues:   WDL, last BM 1/4  No n/v/c reported. +diarrhea  No abd distention or discomfort    Pain:  No pain noted    Skin Integrity:  Surgical incision, jeanna score 14  +1 generalized pitting edema  No pressure ulcers noted    Labs:   01-04    140  |  105  |  11  ----------------------------<  159<H>  3.8   |  26  |  0.58    Ca    8.3<L>      04 Jan 2023 06:53  Phos  4.1     01-04  Mg     2.0     01-04    CAPILLARY BLOOD GLUCOSE    POCT Blood Glucose.: 157 mg/dL (04 Jan 2023 06:41)  POCT Blood Glucose.: 160 mg/dL (04 Jan 2023 00:27)  POCT Blood Glucose.: 128 mg/dL (03 Jan 2023 21:36)  POCT Blood Glucose.: 172 mg/dL (03 Jan 2023 16:08)  POCT Blood Glucose.: 150 mg/dL (03 Jan 2023 11:42)    Medications:  MEDICATIONS  (STANDING):  amLODIPine   Tablet 5 milliGRAM(s) Oral daily  cefTRIAXone   IVPB 2000 milliGRAM(s) IV Intermittent every 12 hours  chlorhexidine 0.12% Liquid 15 milliLiter(s) Swish and Spit two times a day  chlorhexidine 2% Cloths 1 Application(s) Topical <User Schedule>  enoxaparin Injectable 40 milliGRAM(s) SubCutaneous every 12 hours  fluticasone propionate 50 MICROgram(s)/spray Nasal Spray 1 Spray(s) Both Nostrils every 12 hours  insulin lispro (ADMELOG) corrective regimen sliding scale   SubCutaneous every 6 hours  latanoprost 0.005% Ophthalmic Solution 1 Drop(s) Right EYE at bedtime  levETIRAcetam 1000 milliGRAM(s) Oral two times a day  metroNIDAZOLE  IVPB 500 milliGRAM(s) IV Intermittent every 6 hours  nystatin Cream 1 Application(s) Topical two times a day  senna 2 Tablet(s) Oral at bedtime  sodium chloride 0.65% Nasal 1 Spray(s) Both Nostrils four times a day  vancomycin  IVPB 1500 milliGRAM(s) IV Intermittent every 12 hours    MEDICATIONS  (PRN):  acetaminophen    Suspension .. 650 milliGRAM(s) Oral every 6 hours PRN Temp greater or equal to 38C (100.4F), Mild Pain (1 - 3)  ondansetron Injectable 4 milliGRAM(s) IV Push every 6 hours PRN Nausea and/or Vomiting    Admitting Anthropometrics:  Height for BMI (FEET)	5 Feet  Height for BMI (INCHES)	4 Inch(s)  Height for BMI (CENTIMETERS)	162.56 Centimeter(s)  Weight for BMI (lbs)	278.4 lb  Weight for BMI (kg)	126.3 kg  Body Mass Index	47.7  IBW 120lbs  %%    Weight Change:   No new weights obtained during this admission. Please cont to trend weight biweekly.     Nutrition Focused Physical Exam: Completed [   ]  Not Pertinent [  x ]    Estimated energy needs:   Based on Standards of Care pt within % IBW thus actual body weight used for all calculations. Needs adjusted for advanced age, morbid obesity, post-op wound healing.   Kcal (30-35 kcal/kg): 4877-9046 kcal  Protein (1.2-1.5 g/kg pro): 66-82g pro  **Adjust fluids per team    Subjective:   71yo F w/ no known PMHx. on 12/22 presented to [facility] c/o slurred speech and R sided weakness, stroke code called, imaging initially negative for stroke or hemorrhage, pt's neuro exam worsened and subsequent CTH the following day showed L sided SDH, admitted to ICU for further monitoring. Pt neuro declined on 12/25 with AMS requiring intubation, noted to have episodes of twitching concerning for seizures, started on keppra and EEG placed. No epileptiform activity noted. CTH on 12/28 significant for L frontoparietal collection, MRI completed showed concern for possible empyema. Daughter reported pt likely had a sinus infection recently, unsure of abx. At [place] pt started on ceftriaxone for UTI that was switched to vancomycin this am. Pt transferred to [facility] for further management and now s/p Left hemicraniectomy and washout of L subdural empyema 12/31. Transferred back to ICU intubated for further monitoring, and extubated later that day to RA 12/31. Pt failed SLP eval 1/1 and initiated on EN feeds 1/1. Pt now s/p FEES 1/4 with recommendations for pureed and thin liquids. Pt with minimal dentition so other textures were not tested- SLP will cont to follow and adjust recs prn. On assessment, pt resting in bed, noted to be confused. Currently remained NPO with EN feeds running at goal rate this am meeting 100% of est needs. No reported n/v/d/c. No abd distention or discomfort noted. If team wishes to advance PO diet, would recommend cont with supplemental EN feeds- please see recs below. Education remains deferred at this time. RD to follow.        Previous Nutrition Diagnosis:  Inadequate Oral Intake r/t inability to meet nutritional needs orally AEB necessity for NPO with nasogastric enteral feeding status    Active [   ]  Resolved [   ]    If resolved, new PES:     Goal: Pt to consistently meet at least 75% of EEE via tolerated route that is consistent with GOC during hospital stay    Recommendations:  1. Pureed diet  >> Cont to adjust diet consistency per SLPs recommendations for safest, least restrictive texture modification   2. Cont with current EN feeds, Jevity 1.2 @ 65mL/hr x 24 hours. This provides: 1560mL TV, 1872kcal, 86.6g protein, 1259mL free water.  >>FWF per team  >>Maintain aspiration precautions at all times  3. If team wishes to transition to cyclic night feeds (8pm to 8am) to encourage PO intake, would recommend Jevity 1.2 @ 65 ml/hr x12hrs to provide 780ml TV, 936 kcal, 43g pro, 629 ml free water.   >>Goal rate to meet ~50% of est needs  4. Pain and bowel regimen per team   5. Cont to monitor lytes and replete prn   6. RD diet edu prn  **Disc with teams    Education:   Deferred at this time    Risk Level: High [   ] Moderate [ x  ] Low [   ]

## 2023-01-04 NOTE — DISCHARGE NOTE NURSING/CASE MANAGEMENT/SOCIAL WORK - PATIENT PORTAL LINK FT
You can access the FollowMyHealth Patient Portal offered by [facility] by registering at the following website: [email or web address]. By joining [employer]’s FollowMyHealth portal, you will also be able to view your health information using other applications (apps) compatible with our system.

## 2023-01-04 NOTE — CHART NOTE - NSCHARTNOTEFT_GEN_A_CORE
FEES completed and patient cleared for pureed diet. Pending OR with ENT tomorrow. Vancomycin dc'd per ID.

## 2023-01-04 NOTE — PROGRESS NOTE ADULT - SUBJECTIVE AND OBJECTIVE BOX
Patient is a 72y old  Female who presents with a chief complaint of Subdural collection (04 Jan 2023 01:15)      SUBJECTIVE:  Patient seen and examined at bedside.  More interactive today, continues to have aphasia and only able to answer yes/no but smiles to some responses and more interactive    ROS:  Denies fevers, chills, neck pain, SOB, chest pain, abdominal pain. All other ROS negative except as above    Vital Signs Last 12 Hrs  T(F): 98.7 (01-04-23 @ 09:00), Max: 98.9 (01-04-23 @ 04:35)  HR: 98 (01-04-23 @ 11:45) (80 - 98)  BP: 153/67 (01-04-23 @ 11:45) (144/65 - 153/67)  BP(mean): 97 (01-04-23 @ 11:45) (93 - 97)  RR: 18 (01-04-23 @ 11:45) (18 - 18)  SpO2: 99% (01-04-23 @ 11:45) (97% - 99%)  I&O's Summary    03 Jan 2023 07:01  -  04 Jan 2023 07:00  --------------------------------------------------------  IN: 2170 mL / OUT: 3900 mL / NET: -1730 mL    04 Jan 2023 07:01  -  04 Jan 2023 14:06  --------------------------------------------------------  IN: 195 mL / OUT: 900 mL / NET: -705 mL        PHYSICAL EXAM:  Constitutional: resting comfortably in bed; NAD, obese  Head: L crani incision c/d/i  Eyes: Pupils with L > R, reactive, EOMI, anicteric sclera  ENT: + NGT, MMM  Neck: supple  Respiratory: CTA B/L; no W/R/R, no retractions  Cardiac: +S1/S2; RRR; no M/R/G  Gastrointestinal: abdomen soft, NT/ND; no rebound or guarding; +BSx4  Extremities: WWP, no clubbing or cyanosis; no peripheral edema, + RUE PICC site c/d/i  Vascular: 2+ radial, femoral, DP/PT pulses B/L  Dermatologic: skin warm, dry and intact; no rashes, wounds, or scars  Neurologic: Awake, only says no to some questions, LUE 5/5, RUE 4/5 and weaker hand , moves b/l LEs spontaneously, R facial droop        LABS:                        8.3    9.97  )-----------( 384      ( 04 Jan 2023 06:53 )             25.3     01-04    140  |  105  |  11  ----------------------------<  159<H>  3.8   |  26  |  0.58    Ca    8.3<L>      04 Jan 2023 06:53  Phos  4.1     01-04  Mg     2.0     01-04              RADIOLOGY & ADDITIONAL TESTS:  No new imaging    MEDICATIONS  (STANDING):  amLODIPine   Tablet 5 milliGRAM(s) Oral daily  cefTRIAXone   IVPB 2000 milliGRAM(s) IV Intermittent every 12 hours  chlorhexidine 0.12% Liquid 15 milliLiter(s) Swish and Spit two times a day  chlorhexidine 2% Cloths 1 Application(s) Topical <User Schedule>  enoxaparin Injectable 40 milliGRAM(s) SubCutaneous every 12 hours  fluticasone propionate 50 MICROgram(s)/spray Nasal Spray 1 Spray(s) Both Nostrils every 12 hours  insulin lispro (ADMELOG) corrective regimen sliding scale   SubCutaneous every 6 hours  latanoprost 0.005% Ophthalmic Solution 1 Drop(s) Right EYE at bedtime  levETIRAcetam 1000 milliGRAM(s) Oral two times a day  metroNIDAZOLE  IVPB 500 milliGRAM(s) IV Intermittent every 6 hours  nystatin Cream 1 Application(s) Topical two times a day  senna 2 Tablet(s) Oral at bedtime  sodium chloride 0.65% Nasal 1 Spray(s) Both Nostrils four times a day  vancomycin  IVPB 1500 milliGRAM(s) IV Intermittent every 12 hours    MEDICATIONS  (PRN):  acetaminophen    Suspension .. 650 milliGRAM(s) Oral every 6 hours PRN Temp greater or equal to 38C (100.4F), Mild Pain (1 - 3)  ondansetron Injectable 4 milliGRAM(s) IV Push every 6 hours PRN Nausea and/or Vomiting

## 2023-01-04 NOTE — SWALLOW FEES ASSESSMENT ADULT - DIAGNOSTIC IMPRESSIONS
Pt presents with a mild paula-pharyngeal dysphagia characterized by decreased bolus containment with L lateral spillage and decreased bolus control with premature bolus loss to the hypopharynx with thin and mildly thick liquids as well as delayed swallow initiation. Overall swallow was safe and efficient with adequate airway protection and no aspiration on this exam.

## 2023-01-04 NOTE — SWALLOW FEES ASSESSMENT ADULT - RECOMMENDED CONSISTENCY
Puree and thin liquid diet. Pt with very minimal dentition, therefore, other textures were not tested at this time.

## 2023-01-04 NOTE — SWALLOW FEES ASSESSMENT ADULT - COMMENTS
Pt awake, alert, with profound Broca's aphasia. Risks, benefits and alternatives of this procedure were explained to Pt. She agreed to proceed. Feeding assistance was provided by medical student Shaw.

## 2023-01-04 NOTE — DISCHARGE NOTE NURSING/CASE MANAGEMENT/SOCIAL WORK - NSDCPEFALRISK_GEN_ALL_CORE
For information on Fall & Injury Prevention, visit: [email or web address] OR  [email or web address] OR  https://www.cdc.gov/steadi/patient.html

## 2023-01-04 NOTE — PROGRESS NOTE ADULT - PROBLEM SELECTOR PLAN 4
Collateral obtained from Daughter that prior to admission patient was fully functional, did all ADLs, stairs, walk city block  - RCRI score 1, class II risk  - Ruby score 0.1-0.3% risk  - obtain EKG   - given prior functional status pending EKG patient is intermediate risk for intermediate risk procedure, no cardiac workup indicated.  Medically optimized for OR

## 2023-01-04 NOTE — PROGRESS NOTE ADULT - SUBJECTIVE AND OBJECTIVE BOX
HPI:  [id number], txfered from [facility]. Presented to [facility] 7 days ago for AMS. CTH done 6 days ago showed spontaneous Left SD collection. MRI done today showed Left SD collection with diffusion restriction c/f empyema and infarct. Also showed Left sinus collection. Was also getting ceftriaxone for presumed UT at OSH. (30 Dec 2022 19:41)    OVERNIGHT: POD 5. DEEPALI overnight. Neurologically stable. Pt resting comfortably in bed.     Hospital course:   12/31: Admitted to NSICU, s/p Left hemicraniectomy and washout of L subdural empyema. Pancultured. Vanc/ceftriaxone/flagyl for empiric coverage. OR cultures demonstrated gram negative cocci in pairs. ENT and ID consulted, recommend cont vanc 2g, flagyl 750mg q8hrs, ceftriaxone 2g q12hrs. Extubated to room air, RUE/RLE strength improving. Pend CT sinus stealth protocol pend per ENT.   1/1: POD2 DEEPALI o/n, neuro stable, tolerating RA post extubation, pending CT sinus w/ stealth protocol per ENT. Blood consent obtained and pt transfused 1u prbc for HGB 7.9-> 6.8 -> 7.4. CHENCHO drain removed.  LE dopplers + B/L peroneal IM calf DVT, SQL increased to BID prophylactic dosing. Failed swallow eval, remains on tube feeds.   1/2: POD3, DEEPALI o/n. Neuro stable. TF increased goal rate to 65, tolerating feeds. S&S to re-eval in 48 hours. Pending final recs from ID. FOBT neg. Nate drain removed.   1/3: POD4. DEEPALI overnight, pt able to say "no" to questions, o/w neuro exam stable. EEG on, f/u read in am. F/u final OR cx, then will have PICC placed. Vanc trough pend 1/3 @9pm. Pend repeat dopplers 1/6. SDU status  1/4: POD 5. DEEPALI overnight. Neuro stable. Next vanc trough 1/5 @4pm      Vital Signs Last 24 Hrs  T(C): 36.8 (03 Jan 2023 22:00), Max: 37.3 (03 Jan 2023 09:21)  T(F): 98.3 (03 Jan 2023 22:00), Max: 99.1 (03 Jan 2023 09:21)  HR: 86 (04 Jan 2023 00:33) (66 - 93)  BP: 163/69 (04 Jan 2023 00:33) (130/59 - 169/68)  BP(mean): 99 (04 Jan 2023 00:33) (85 - 105)  RR: 18 (04 Jan 2023 00:33) (15 - 18)  SpO2: 97% (04 Jan 2023 00:33) (95% - 100%)    Parameters below as of 04 Jan 2023 00:33  Patient On (Oxygen Delivery Method): room air        I&O's Summary    02 Jan 2023 07:01  -  03 Jan 2023 07:00  --------------------------------------------------------  IN: 3439.8 mL / OUT: 2610 mL / NET: 829.8 mL    03 Jan 2023 07:01  -  04 Jan 2023 01:17  --------------------------------------------------------  IN: 1715 mL / OUT: 2000 mL / NET: -285 mL        PHYSICAL EXAM:  General: NAD, pt is comfortably laying in bed, attempts to say yes and no to questions, A&O x self?, on RA   HEENT: R pupil 3mm sluggish, L pupil 3-4mm briskly reaction, tracks, +R facial asymmetry, +NGT   Cardiovascular: RRR, normal S1 and S2   Respiratory: lungs CTAB, no wheezing, rhonchi, or crackles   GI: normoactive BS to auscultation, abd soft, NTND   Neuro: + mixed aphasia expressive > receptive, unable to assess pronator drift   EVON/LL strength 5/5, RU delts 4/5, bi/tri 3/5, HG 2/5, RL 4/5   Extremities: distal pulses 2+ x4   Wound/incision: L hemicrani incision site C/D/I with staples       TUBES/LINES:  [] Cannon  [] A-line  [] Lumbar Drain  [] Wound Drains  [] NGT   [] EVD   [] CVC  [] Other      DIET:  [] NPO  [x] Mechanical  [] Tube feeds    LABS:                        8.2    10.85 )-----------( 351      ( 03 Jan 2023 04:39 )             25.6     01-03    137  |  106  |  10  ----------------------------<  179<H>  3.9   |  26  |  0.56    Ca    7.9<L>      03 Jan 2023 04:39  Phos  2.7     01-03  Mg     2.1     01-03              CAPILLARY BLOOD GLUCOSE      POCT Blood Glucose.: 160 mg/dL (04 Jan 2023 00:27)  POCT Blood Glucose.: 128 mg/dL (03 Jan 2023 21:36)  POCT Blood Glucose.: 172 mg/dL (03 Jan 2023 16:08)  POCT Blood Glucose.: 150 mg/dL (03 Jan 2023 11:42)  POCT Blood Glucose.: 157 mg/dL (03 Jan 2023 05:51)      Drug Levels: [] N/A  Vancomycin Level, Trough: 22.0 ug/mL (01-03 @ 21:00)  Vancomycin Level, Trough: 23.0 ug/mL (01-02 @ 05:15)  Vancomycin Level, Trough: 19.7 ug/mL (01-01 @ 17:15)    CSF Analysis: [] N/A      Allergies    Allergy Status Unknown    Intolerances        Home Medications:  travoprost 0.004% ophthalmic solution: 1 drop(s) to both eyes once a day (in the evening) (02 Jan 2023 14:23)      MEDICATIONS:  MEDICATIONS  (STANDING):  amLODIPine   Tablet 5 milliGRAM(s) Oral daily  cefTRIAXone   IVPB 2000 milliGRAM(s) IV Intermittent every 12 hours  chlorhexidine 0.12% Liquid 15 milliLiter(s) Swish and Spit two times a day  chlorhexidine 2% Cloths 1 Application(s) Topical <User Schedule>  enoxaparin Injectable 40 milliGRAM(s) SubCutaneous every 12 hours  fluticasone propionate 50 MICROgram(s)/spray Nasal Spray 1 Spray(s) Both Nostrils every 12 hours  insulin lispro (ADMELOG) corrective regimen sliding scale   SubCutaneous every 6 hours  latanoprost 0.005% Ophthalmic Solution 1 Drop(s) Right EYE at bedtime  levETIRAcetam 1000 milliGRAM(s) Oral two times a day  metroNIDAZOLE  IVPB 500 milliGRAM(s) IV Intermittent every 6 hours  senna 2 Tablet(s) Oral at bedtime  sodium chloride 0.65% Nasal 1 Spray(s) Both Nostrils four times a day  vancomycin  IVPB 1500 milliGRAM(s) IV Intermittent every 12 hours    MEDICATIONS  (PRN):  acetaminophen    Suspension .. 650 milliGRAM(s) Oral every 6 hours PRN Temp greater or equal to 38C (100.4F), Mild Pain (1 - 3)  ondansetron Injectable 4 milliGRAM(s) IV Push every 6 hours PRN Nausea and/or Vomiting      CULTURES:  Culture Results:   Normal Nose Yamile present to date (01-02 @ 09:17)  Culture Results:   No growth at 3 days. (12-31 @ 02:42)      RADIOLOGY & ADDITIONAL TESTS:      ASSESSMENT:  73 y/o F w/ no known PMHx transferred from [place] c/o slurred speech and right sided weakness. CTH initially negative, repeat on 12/23 showed L sided SDH, pts mental status worsened and CTH on 12/28 significant for L frontoparietal collection, MRI completed showed concern for possible empyema. S/p L hemicraniectomy and washout of L subdural empyema 12/30/22.     PLAN:  NEURO   - Neuro/vitals Q4  - Post-op CTH complete, repeat 1/2 stable  - CT sinuses 1/1 per ENT recs: Extensive opacification of the paranasal sinuses  - Cont Keppra 1000 BID  - Upload OSH images  - EEG 1/2, f/u read    PULM   - Extubated 12/31  - Satting well on room air     CARDIO   - TTE  - -160    GI   - TF via NGT  - S&S rec FEES and periodic sips and chips pending FEES  - Bowel regimen  - Protonix   - BM 1/3    RENAL  - Voiding  - i's and o's     ENDO  - ISS  - A1c 6.1   - Monitor finger sticks     HEME   - SQL 40 BID   - FOB neg 1/2   - s/p 1u prbc 1/1   - LE doppler 1/1 b/l calf and left peroneal DVTs; repeat 1/6   - Anti xa 1/3 0.22    ID   - ID recs appreciated: vanc/ceftriaxone/metronidazole for subdural/epidural empyema  - Vanc trough 1/5 @ 4 pm   - Pancultured 12/31, NGTD 1/4  - Follow up OR cultures, swab 3 growing rare peptostreptococcus 1/4    Dispo:   - SDU status, Pending AR    Discussed with Dr. D'Amico    Assessment: present when checked     [] GCS   E   V   M     Heart Failure: [] Acute, [] acute on chronic, [] chronic   Heart Failure: [] Diastolic (HFpEF), [] Systolic (HRrEF), [] Combined (HFpEF and HFrEF), [] RHF, [] Pulm HTN, [] Other     [] DIONTE, [] ATN, [] AIN, [] other   [] CKD1, [] CKD2, [] CKD3, [] CKD4, [] CKD5, [] ESRD     Encephalopathy: [] Metabolic, [] Hepatic, [] Toxic, [] Neurological, [] Other     Abnormal Nutritional Status: [] malnutrition (see nutrition note), []underweight: BMI <19, [] morbid obesity: BMI >40, [] Cachexia     [] Sepsis   [] Hypovolemic shock, [] Cardiogenic shock, [] Hemorrhagic shock, [] Neurogenic shock   [] Acute respiratory failure   [] Cerebral edema, [] Brain compression / herniation   [] Functional quadriplegia   [] Acute blood loss anemia

## 2023-01-04 NOTE — PROGRESS NOTE ADULT - ASSESSMENT
71 yo female p/w confusion, word-finding difficulty to OSH, found to have pansinusitis and L subdural empyema s/p L hemicraniotomy 12/30; per patient's daughter at bedside, unknown if patient had sinus infection preceding presentation and denies patient having recent dental work.   - f/u OR cx 12/30--GS with GPC in pairs--speciated as Peptostreptococcus  - f/u culture from nose 1/2--nasal kelsi present  - f/u findings from nasal endoscopy 1/4 and ENT plan ?need for sinus washout  - continue ceftriaxone 2g IV q12h plus metronidazole 750mg IV q8h  - d/c vancomycin    d/w Drs. D'Amico and Rima Ayers assumes care tomorrow

## 2023-01-04 NOTE — SWALLOW FEES ASSESSMENT ADULT - PHARYNGEAL PHASE COMMENTS
Swallow initiation was delayed with thin and mildly thick liquid bolus spilling to the level of the pyriform sinuses prior to swallow initiation. Pt with double swallow with all liquid trials, as well as mildly increased work of breathing. However, airway protection was preserved with no aspiration during this exam. Base of tongue and pharyngeal squeeze were adequate with no pharyngeal residue.

## 2023-01-04 NOTE — CHART NOTE - NSCHARTNOTEFT_GEN_A_CORE
Patient seen at bedside with ENT attending, Dr. Abarca. Requesting repeat CT sinus (to be completed before OR tomorrow) for operative decision and NPO at midnight.

## 2023-01-05 ENCOUNTER — RESULT REVIEW (OUTPATIENT)
Age: 73
End: 2023-01-05

## 2023-01-05 ENCOUNTER — TRANSCRIPTION ENCOUNTER (OUTPATIENT)
Age: 73
End: 2023-01-05

## 2023-01-05 DIAGNOSIS — D75.839 THROMBOCYTOSIS, UNSPECIFIED: ICD-10-CM

## 2023-01-05 LAB
ANION GAP SERPL CALC-SCNC: 6 MMOL/L — SIGNIFICANT CHANGE UP (ref 5–17)
APTT BLD: 31 SEC — SIGNIFICANT CHANGE UP (ref 27.5–35.5)
BASE EXCESS BLDA CALC-SCNC: 0.2 MMOL/L — SIGNIFICANT CHANGE UP (ref -2–3)
BLD GP AB SCN SERPL QL: NEGATIVE — SIGNIFICANT CHANGE UP
BUN SERPL-MCNC: 10 MG/DL — SIGNIFICANT CHANGE UP (ref 7–23)
CA-I BLDA-SCNC: 1.15 MMOL/L — SIGNIFICANT CHANGE UP (ref 1.15–1.33)
CALCIUM SERPL-MCNC: 8.4 MG/DL — SIGNIFICANT CHANGE UP (ref 8.4–10.5)
CHLORIDE SERPL-SCNC: 103 MMOL/L — SIGNIFICANT CHANGE UP (ref 96–108)
CO2 BLDA-SCNC: 27 MMOL/L — HIGH (ref 19–24)
CO2 SERPL-SCNC: 28 MMOL/L — SIGNIFICANT CHANGE UP (ref 22–31)
COHGB MFR BLDA: 1.7 % — SIGNIFICANT CHANGE UP
CREAT SERPL-MCNC: 0.57 MG/DL — SIGNIFICANT CHANGE UP (ref 0.5–1.3)
CULTURE RESULTS: NO GROWTH — SIGNIFICANT CHANGE UP
CULTURE RESULTS: SIGNIFICANT CHANGE UP
CULTURE RESULTS: SIGNIFICANT CHANGE UP
EGFR: 97 ML/MIN/1.73M2 — SIGNIFICANT CHANGE UP
GLUCOSE BLDA-MCNC: 159 MG/DL — HIGH (ref 70–99)
GLUCOSE BLDC GLUCOMTR-MCNC: 118 MG/DL — HIGH (ref 70–99)
GLUCOSE BLDC GLUCOMTR-MCNC: 123 MG/DL — HIGH (ref 70–99)
GLUCOSE BLDC GLUCOMTR-MCNC: 136 MG/DL — HIGH (ref 70–99)
GLUCOSE BLDC GLUCOMTR-MCNC: 184 MG/DL — HIGH (ref 70–99)
GLUCOSE SERPL-MCNC: 156 MG/DL — HIGH (ref 70–99)
GRAM STN FLD: SIGNIFICANT CHANGE UP
HCO3 BLDA-SCNC: 25 MMOL/L — SIGNIFICANT CHANGE UP (ref 21–28)
HCT VFR BLD CALC: 28 % — LOW (ref 34.5–45)
HGB BLD-MCNC: 9.1 G/DL — LOW (ref 11.5–15.5)
HGB BLDA-MCNC: 8.7 G/DL — LOW (ref 11.7–16.1)
INR BLD: 1.14 — SIGNIFICANT CHANGE UP (ref 0.88–1.16)
MAGNESIUM SERPL-MCNC: 2 MG/DL — SIGNIFICANT CHANGE UP (ref 1.6–2.6)
MCHC RBC-ENTMCNC: 29.4 PG — SIGNIFICANT CHANGE UP (ref 27–34)
MCHC RBC-ENTMCNC: 32.5 GM/DL — SIGNIFICANT CHANGE UP (ref 32–36)
MCV RBC AUTO: 90.6 FL — SIGNIFICANT CHANGE UP (ref 80–100)
METHGB MFR BLDA: 0.8 % — SIGNIFICANT CHANGE UP
NRBC # BLD: 0 /100 WBCS — SIGNIFICANT CHANGE UP (ref 0–0)
OXYHGB MFR BLDA: 96.2 % — HIGH (ref 90–95)
PCO2 BLDA: 43 MMHG — SIGNIFICANT CHANGE UP (ref 32–45)
PH BLDA: 7.38 — SIGNIFICANT CHANGE UP (ref 7.35–7.45)
PHOSPHATE SERPL-MCNC: 4.2 MG/DL — SIGNIFICANT CHANGE UP (ref 2.5–4.5)
PLATELET # BLD AUTO: 441 K/UL — HIGH (ref 150–400)
PO2 BLDA: 103 MMHG — SIGNIFICANT CHANGE UP (ref 83–108)
POTASSIUM BLDA-SCNC: 3.6 MMOL/L — SIGNIFICANT CHANGE UP (ref 3.5–5.1)
POTASSIUM SERPL-MCNC: 3.9 MMOL/L — SIGNIFICANT CHANGE UP (ref 3.5–5.3)
POTASSIUM SERPL-SCNC: 3.9 MMOL/L — SIGNIFICANT CHANGE UP (ref 3.5–5.3)
PROTHROM AB SERPL-ACNC: 13.6 SEC — HIGH (ref 10.5–13.4)
RBC # BLD: 3.09 M/UL — LOW (ref 3.8–5.2)
RBC # FLD: 17.2 % — HIGH (ref 10.3–14.5)
RH IG SCN BLD-IMP: POSITIVE — SIGNIFICANT CHANGE UP
SAO2 % BLDA: 98.7 % — HIGH (ref 94–98)
SODIUM BLDA-SCNC: 138 MMOL/L — SIGNIFICANT CHANGE UP (ref 136–145)
SODIUM SERPL-SCNC: 137 MMOL/L — SIGNIFICANT CHANGE UP (ref 135–145)
SPECIMEN SOURCE: SIGNIFICANT CHANGE UP
WBC # BLD: 10.51 K/UL — HIGH (ref 3.8–10.5)
WBC # FLD AUTO: 10.51 K/UL — HIGH (ref 3.8–10.5)

## 2023-01-05 PROCEDURE — 88304 TISSUE EXAM BY PATHOLOGIST: CPT | Mod: 26

## 2023-01-05 PROCEDURE — 88300 SURGICAL PATH GROSS: CPT | Mod: 26

## 2023-01-05 PROCEDURE — 71045 X-RAY EXAM CHEST 1 VIEW: CPT | Mod: 26

## 2023-01-05 PROCEDURE — 31267 ENDOSCOPY MAXILLARY SINUS: CPT | Mod: 50

## 2023-01-05 PROCEDURE — 88311 DECALCIFY TISSUE: CPT | Mod: 26

## 2023-01-05 PROCEDURE — 30520 REPAIR OF NASAL SEPTUM: CPT

## 2023-01-05 PROCEDURE — 99233 SBSQ HOSP IP/OBS HIGH 50: CPT

## 2023-01-05 PROCEDURE — 88312 SPECIAL STAINS GROUP 1: CPT | Mod: 26

## 2023-01-05 PROCEDURE — 61782 SCAN PROC CRANIAL EXTRA: CPT

## 2023-01-05 PROCEDURE — 99232 SBSQ HOSP IP/OBS MODERATE 35: CPT

## 2023-01-05 PROCEDURE — 70486 CT MAXILLOFACIAL W/O DYE: CPT | Mod: 26

## 2023-01-05 PROCEDURE — 88305 TISSUE EXAM BY PATHOLOGIST: CPT | Mod: 26

## 2023-01-05 PROCEDURE — 31259 NSL/SINS NDSC SPHN TISS RMVL: CPT | Mod: 50,22

## 2023-01-05 RX ADMIN — LEVETIRACETAM 1000 MILLIGRAM(S): 250 TABLET, FILM COATED ORAL at 05:18

## 2023-01-05 RX ADMIN — Medication 100 MILLIGRAM(S): at 23:37

## 2023-01-05 RX ADMIN — LEVETIRACETAM 1000 MILLIGRAM(S): 250 TABLET, FILM COATED ORAL at 23:35

## 2023-01-05 RX ADMIN — Medication 1 SPRAY(S): at 23:35

## 2023-01-05 RX ADMIN — Medication 1 SPRAY(S): at 00:10

## 2023-01-05 RX ADMIN — CHLORHEXIDINE GLUCONATE 15 MILLILITER(S): 213 SOLUTION TOPICAL at 05:18

## 2023-01-05 RX ADMIN — AMLODIPINE BESYLATE 5 MILLIGRAM(S): 2.5 TABLET ORAL at 05:17

## 2023-01-05 RX ADMIN — LATANOPROST 1 DROP(S): 0.05 SOLUTION/ DROPS OPHTHALMIC; TOPICAL at 23:36

## 2023-01-05 RX ADMIN — Medication 100 MILLIGRAM(S): at 11:07

## 2023-01-05 RX ADMIN — SODIUM CHLORIDE 100 MILLILITER(S): 9 INJECTION INTRAMUSCULAR; INTRAVENOUS; SUBCUTANEOUS at 04:02

## 2023-01-05 RX ADMIN — Medication 100 MILLIGRAM(S): at 00:10

## 2023-01-05 RX ADMIN — NYSTATIN CREAM 1 APPLICATION(S): 100000 CREAM TOPICAL at 05:18

## 2023-01-05 RX ADMIN — Medication 1 SPRAY(S): at 05:19

## 2023-01-05 RX ADMIN — Medication 1 SPRAY(S): at 05:33

## 2023-01-05 RX ADMIN — Medication 1 SPRAY(S): at 11:07

## 2023-01-05 RX ADMIN — CHLORHEXIDINE GLUCONATE 1 APPLICATION(S): 213 SOLUTION TOPICAL at 05:32

## 2023-01-05 RX ADMIN — CEFTRIAXONE 100 MILLIGRAM(S): 500 INJECTION, POWDER, FOR SOLUTION INTRAMUSCULAR; INTRAVENOUS at 23:34

## 2023-01-05 RX ADMIN — CEFTRIAXONE 100 MILLIGRAM(S): 500 INJECTION, POWDER, FOR SOLUTION INTRAMUSCULAR; INTRAVENOUS at 05:42

## 2023-01-05 RX ADMIN — Medication 100 MILLIGRAM(S): at 05:18

## 2023-01-05 NOTE — PROGRESS NOTE ADULT - SUBJECTIVE AND OBJECTIVE BOX
HPI:  [id number], txfered from [facility]. Presented to [facility] 7 days ago for AMS. CTH done 6 days ago showed spontaneous Left SD collection. MRI done today showed Left SD collection with diffusion restriction c/f empyema and infarct. Also showed Left sinus collection. Was also getting ceftriaxone for presumed UT at OSH. (30 Dec 2022 19:41)    OVERNIGHT: DEEPALI overnight, neurologically stable, pt resting in bed comfortably    Hospital course:   12/31: Admitted to NSICU, s/p Left hemicraniectomy and washout of L subdural empyema. Pancultured. Vanc/ceftriaxone/flagyl for empiric coverage. OR cultures demonstrated gram negative cocci in pairs. ENT and ID consulted, recommend cont vanc 2g, flagyl 750mg q8hrs, ceftriaxone 2g q12hrs. Extubated to room air, RUE/RLE strength improving. Pend CT sinus stealth protocol pend per ENT.   1/1: POD2 DEEPALI o/n, neuro stable, tolerating RA post extubation, pending CT sinus w/ stealth protocol per ENT. Blood consent obtained and pt transfused 1u prbc for HGB 7.9-> 6.8 -> 7.4. CHENCHO drain removed.  LE dopplers + B/L peroneal IM calf DVT, SQL increased to BID prophylactic dosing. Failed swallow eval, remains on tube feeds.   1/2: POD3, DEEPALI o/n. Neuro stable. TF increased goal rate to 65, tolerating feeds. S&S to re-eval in 48 hours. Pending final recs from ID. FOBT neg. Nate drain removed.   1/3: POD4. DEEPALI overnight, pt able to say "no" to questions, o/w neuro exam stable. EEG on, dc'd in afternoon, no seizure. F/u final OR cx, then will have PICC placed. Vanc trough pend 1/3 @9pm. Pend repeat dopplers 1/6. SDU status  1/4: POD 5. DEEPALI overnight. Neuro stable. FEES completed and patient cleared for pureed diet. Pending OR with ENT tomorrow. Vancomycin dc'd per ID.  1/5: POD 6. DEEPALI overnight. Neurostable. Repeat CT sinus complete. OR with ENT today.     Vital Signs Last 24 Hrs  T(C): 36.9 (04 Jan 2023 21:17), Max: 37.2 (04 Jan 2023 04:35)  T(F): 98.4 (04 Jan 2023 21:17), Max: 98.9 (04 Jan 2023 04:35)  HR: 78 (05 Jan 2023 00:25) (78 - 98)  BP: 143/63 (05 Jan 2023 00:25) (126/59 - 157/65)  BP(mean): 91 (05 Jan 2023 00:25) (85 - 97)  RR: 17 (05 Jan 2023 00:25) (17 - 18)  SpO2: 100% (05 Jan 2023 00:25) (97% - 100%)    Parameters below as of 05 Jan 2023 00:25  Patient On (Oxygen Delivery Method): room air        I&O's Summary    03 Jan 2023 07:01  -  04 Jan 2023 07:00  --------------------------------------------------------  IN: 2170 mL / OUT: 3900 mL / NET: -1730 mL    04 Jan 2023 07:01  -  05 Jan 2023 01:35  --------------------------------------------------------  IN: 1465 mL / OUT: 2000 mL / NET: -535 mL        PHYSICAL EXAM:  General: NAD, pt is comfortably laying in bed, attempts to say yes and no to questions, A&O x self?, on RA   HEENT: R pupil 3mm sluggish, L pupil 3-4mm briskly reaction, tracks, +R facial asymmetry, +NGT   Cardiovascular: RRR, normal S1 and S2   Respiratory: lungs CTAB, no wheezing, rhonchi, or crackles   GI: normoactive BS to auscultation, abd soft, NTND   Neuro: + mixed aphasia expressive > receptive, unable to assess pronator drift   EVON/LL strength 5/5, RU delts 4/5, bi/tri 3/5, HG 2/5, RL 4/5   Extremities: distal pulses 2+ x4   Wound/incision: L hemicrani incision site C/D/I with staples    TUBES/LINES:  [] Cannon  [] A-line  [] Lumbar Drain  [] Wound Drains  [] NGT   [] EVD   [] CVC  [] Other      DIET:  [] NPO  [x] Mechanical  [] Tube feeds    LABS:                        8.3    9.97  )-----------( 384      ( 04 Jan 2023 06:53 )             25.3     01-04    140  |  105  |  11  ----------------------------<  159<H>  3.8   |  26  |  0.58    Ca    8.3<L>      04 Jan 2023 06:53  Phos  4.1     01-04  Mg     2.0     01-04              CAPILLARY BLOOD GLUCOSE      POCT Blood Glucose.: 118 mg/dL (05 Jan 2023 00:03)  POCT Blood Glucose.: 126 mg/dL (04 Jan 2023 17:14)  POCT Blood Glucose.: 208 mg/dL (04 Jan 2023 11:31)  POCT Blood Glucose.: 157 mg/dL (04 Jan 2023 06:41)      Drug Levels: [] N/A  Vancomycin Level, Trough: 11.6 ug/mL (01-04 @ 06:53)  Vancomycin Level, Trough: 22.0 ug/mL (01-03 @ 21:00)  Vancomycin Level, Trough: 23.0 ug/mL (01-02 @ 05:15)    CSF Analysis: [] N/A      Allergies    Allergy Status Unknown    Intolerances        Home Medications:  travoprost 0.004% ophthalmic solution: 1 drop(s) to both eyes once a day (in the evening) (02 Jan 2023 14:23)      MEDICATIONS:  MEDICATIONS  (STANDING):  amLODIPine   Tablet 5 milliGRAM(s) Oral daily  cefTRIAXone   IVPB 2000 milliGRAM(s) IV Intermittent every 12 hours  chlorhexidine 0.12% Liquid 15 milliLiter(s) Swish and Spit two times a day  chlorhexidine 2% Cloths 1 Application(s) Topical <User Schedule>  fluticasone propionate 50 MICROgram(s)/spray Nasal Spray 1 Spray(s) Both Nostrils every 12 hours  insulin lispro (ADMELOG) corrective regimen sliding scale   SubCutaneous every 6 hours  latanoprost 0.005% Ophthalmic Solution 1 Drop(s) Right EYE at bedtime  levETIRAcetam 1000 milliGRAM(s) Oral two times a day  metroNIDAZOLE  IVPB 500 milliGRAM(s) IV Intermittent every 6 hours  nystatin Cream 1 Application(s) Topical two times a day  senna 2 Tablet(s) Oral at bedtime  sodium chloride 0.65% Nasal 1 Spray(s) Both Nostrils four times a day  sodium chloride 0.9%. 1000 milliLiter(s) (100 mL/Hr) IV Continuous <Continuous>    MEDICATIONS  (PRN):  acetaminophen    Suspension .. 650 milliGRAM(s) Oral every 6 hours PRN Temp greater or equal to 38C (100.4F), Mild Pain (1 - 3)  ondansetron Injectable 4 milliGRAM(s) IV Push every 6 hours PRN Nausea and/or Vomiting      CULTURES:  Culture Results:   Normal Nose Yamile present  No Methicillin Resistant Staphylococcus aureus (01-02 @ 09:17)  Culture Results:   No growth at 4 days. (12-31 @ 02:42)      RADIOLOGY & ADDITIONAL TESTS:      ASSESSMENT:  73 y/o F w/ no known PMHx transferred from [place] c/o slurred speech and right sided weakness. CTH initially negative, repeat on 12/23 showed L sided SDH, pts mental status worsened and CTH on 12/28 significant for L frontoparietal collection, MRI completed showed concern for possible empyema. S/p L hemicraniectomy and washout of L subdural empyema 12/30/22.     PLAN:  NEURO   - Neuro/vitals Q4  - Post-op CTH complete, repeat 1/2 stable  - CT sinuses 1/1 per ENT recs: Extensive opacification of the paranasal sinuses  - Repeat CT sinus complete 1/5  - Cont Keppra 1000 BID  - Upload OSH images  - EEG dc'd 1/3, neg for seizure  - Helmet ordered  - ENT consulted: CT sinus completed, rec flonase and nasal saline     PULM   - Extubated 12/31  - Satting well on room air     CARDIO   - TTE  - -160    GI   - pureed diet, NGT in place, NPO after midnight  - consider NGT removal if tolerating PO post-ENT procedure  - Bowel regimen  - Protonix   - BM 1/4    RENAL  - Voiding  - i's and o's     ENDO  - ISS  - A1c 6.1   - Monitor finger sticks     HEME   - SQL 40 BID held for OR 1/5  - FOB neg 1/2   - s/p 1u prbc 1/1   - LE doppler 1/1 b/l calf and left peroneal DVTs; repeat 1/8  - Anti xa 1/3 0.22    ID   - ID recs appreciated: ceftriaxone/metronidazole for subdural/epidural empyema  - Vanc dc'd 1/4  - Pancultured 12/31, NGTD 1/5  - Follow up OR cultures, swab 3 growing rare peptostreptococcus 1/5    Dispo:   - SDU status, Pending AR    Discussed with Dr. D'Amico    Assessment: present when checked     [] GCS   E   V   M     Heart Failure: [] Acute, [] acute on chronic, [] chronic   Heart Failure: [] Diastolic (HFpEF), [] Systolic (HRrEF), [] Combined (HFpEF and HFrEF), [] RHF, [] Pulm HTN, [] Other     [] DIONTE, [] ATN, [] AIN, [] other   [] CKD1, [] CKD2, [] CKD3, [] CKD4, [] CKD5, [] ESRD     Encephalopathy: [] Metabolic, [] Hepatic, [] Toxic, [] Neurological, [] Other     Abnormal Nutritional Status: [] malnutrition (see nutrition note), []underweight: BMI <19, [] morbid obesity: BMI >40, [] Cachexia     [] Sepsis   [] Hypovolemic shock, [] Cardiogenic shock, [] Hemorrhagic shock, [] Neurogenic shock   [] Acute respiratory failure   [] Cerebral edema, [] Brain compression / herniation   [] Functional quadriplegia   [] Acute blood loss anemia

## 2023-01-05 NOTE — PROGRESS NOTE ADULT - SUBJECTIVE AND OBJECTIVE BOX
Patient is a 72y old  Female who presents with a chief complaint of Subdural collection (05 Jan 2023 01:33)      SUBJECTIVE:  Patient seen and examined at bedside.  Patient mental status unchanged, tracks with eyes appears to comprehend conversation, able to answer simple questions but expressive aphasia    ROS:  Unable to obtain    Vital Signs Last 12 Hrs  T(F): 97.5 (01-05-23 @ 09:06), Max: 97.8 (01-05-23 @ 04:48)  HR: 78 (01-05-23 @ 11:06) (78 - 86)  BP: 135/63 (01-05-23 @ 11:06) (135/63 - 147/65)  BP(mean): 90 (01-05-23 @ 11:06) (90 - 94)  RR: 17 (01-05-23 @ 11:06) (17 - 18)  SpO2: 100% (01-05-23 @ 11:06) (99% - 100%)  I&O's Summary    04 Jan 2023 07:01  -  05 Jan 2023 07:00  --------------------------------------------------------  IN: 1465 mL / OUT: 2850 mL / NET: -1385 mL    05 Jan 2023 07:01  -  05 Jan 2023 13:05  --------------------------------------------------------  IN: 400 mL / OUT: 950 mL / NET: -550 mL        PHYSICAL EXAM:  Constitutional: resting comfortably in bed; NAD, obese  Head: L crani incision c/d/i  Eyes: Pupils with L minimally > R, reactive, EOMI, anicteric sclera  ENT: + NGT, MMM  Neck: supple  Respiratory: CTA B/L; no W/R/R, no retractions  Cardiac: +S1/S2; RRR; no M/R/G  Gastrointestinal: abdomen soft, NT/ND; no rebound or guarding; +BSx4  Extremities: WWP, no clubbing or cyanosis; no peripheral edema, + RUE PICC site c/d/i  Vascular: 2+ radial, femoral, DP/PT pulses B/L  Dermatologic: skin warm, dry and intact; no rashes, wounds, or scars  Neurologic: Awake, only says no to some questions, LUE 5/5, RUE 4/5 and weaker hand , moves b/l LEs spontaneously, R facial droop        LABS:                        9.1    10.51 )-----------( 441      ( 05 Jan 2023 07:32 )             28.0     01-05    137  |  103  |  10  ----------------------------<  156<H>  3.9   |  28  |  0.57    Ca    8.4      05 Jan 2023 07:32  Phos  4.2     01-05  Mg     2.0     01-05      PT/INR - ( 05 Jan 2023 07:32 )   PT: 13.6 sec;   INR: 1.14          PTT - ( 05 Jan 2023 07:32 )  PTT:31.0 sec        RADIOLOGY & ADDITIONAL TESTS:  < from: CT Sinuses No Cont (01.05.23 @ 01:39) >  IMPRESSION:    Pansinusitis as detailed above.    Postoperative findings status post left hemicraniectomy for subdural   collection evacuation/washout. Continued follow-up head CT suggested.    < end of copied text >      MEDICATIONS  (STANDING):  amLODIPine   Tablet 5 milliGRAM(s) Oral daily  cefTRIAXone   IVPB 2000 milliGRAM(s) IV Intermittent every 12 hours  chlorhexidine 0.12% Liquid 15 milliLiter(s) Swish and Spit two times a day  chlorhexidine 2% Cloths 1 Application(s) Topical <User Schedule>  fluticasone propionate 50 MICROgram(s)/spray Nasal Spray 1 Spray(s) Both Nostrils every 12 hours  insulin lispro (ADMELOG) corrective regimen sliding scale   SubCutaneous every 6 hours  latanoprost 0.005% Ophthalmic Solution 1 Drop(s) Right EYE at bedtime  levETIRAcetam 1000 milliGRAM(s) Oral two times a day  metroNIDAZOLE  IVPB 500 milliGRAM(s) IV Intermittent every 6 hours  nystatin Cream 1 Application(s) Topical two times a day  senna 2 Tablet(s) Oral at bedtime  sodium chloride 0.65% Nasal 1 Spray(s) Both Nostrils four times a day  sodium chloride 0.9%. 1000 milliLiter(s) (100 mL/Hr) IV Continuous <Continuous>    MEDICATIONS  (PRN):  acetaminophen    Suspension .. 650 milliGRAM(s) Oral every 6 hours PRN Temp greater or equal to 38C (100.4F), Mild Pain (1 - 3)  ondansetron Injectable 4 milliGRAM(s) IV Push every 6 hours PRN Nausea and/or Vomiting
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
## 2023-01-05 NOTE — PROGRESS NOTE ADULT - SUBJECTIVE AND OBJECTIVE BOX
INTERVAL HPI/OVERNIGHT EVENTS:    Patient was seen and examined at bedside.  She will go to OR today for washout of sinuses     CONSTITUTIONAL:  Negative fever or chills, feels well, good appetite  EYES:  Negative  blurry vision or double vision  CARDIOVASCULAR:  Negative for chest pain or palpitations  RESPIRATORY:  Negative for cough, wheezing, or SOB   GASTROINTESTINAL:  Negative for nausea, vomiting, diarrhea, constipation, or abdominal pain  GENITOURINARY:  Negative frequency, urgency or dysuria  NEUROLOGIC:  No headache, confusion, dizziness, lightheadedness      ANTIBIOTICS/RELEVANT:    MEDICATIONS  (STANDING):  amLODIPine   Tablet 5 milliGRAM(s) Oral daily  cefTRIAXone   IVPB 2000 milliGRAM(s) IV Intermittent every 12 hours  chlorhexidine 0.12% Liquid 15 milliLiter(s) Swish and Spit two times a day  chlorhexidine 2% Cloths 1 Application(s) Topical <User Schedule>  fluticasone propionate 50 MICROgram(s)/spray Nasal Spray 1 Spray(s) Both Nostrils every 12 hours  insulin lispro (ADMELOG) corrective regimen sliding scale   SubCutaneous every 6 hours  latanoprost 0.005% Ophthalmic Solution 1 Drop(s) Right EYE at bedtime  levETIRAcetam 1000 milliGRAM(s) Oral two times a day  metroNIDAZOLE  IVPB 500 milliGRAM(s) IV Intermittent every 6 hours  nystatin Cream 1 Application(s) Topical two times a day  senna 2 Tablet(s) Oral at bedtime  sodium chloride 0.65% Nasal 1 Spray(s) Both Nostrils four times a day  sodium chloride 0.9%. 1000 milliLiter(s) (100 mL/Hr) IV Continuous <Continuous>    MEDICATIONS  (PRN):  acetaminophen    Suspension .. 650 milliGRAM(s) Oral every 6 hours PRN Temp greater or equal to 38C (100.4F), Mild Pain (1 - 3)  ondansetron Injectable 4 milliGRAM(s) IV Push every 6 hours PRN Nausea and/or Vomiting        Vital Signs Last 24 Hrs  T(C): 36.4 (05 Jan 2023 09:06), Max: 37.1 (04 Jan 2023 14:00)  T(F): 97.5 (05 Jan 2023 09:06), Max: 98.7 (04 Jan 2023 14:00)  HR: 78 (05 Jan 2023 11:06) (78 - 86)  BP: 135/63 (05 Jan 2023 11:06) (126/59 - 157/65)  BP(mean): 90 (05 Jan 2023 11:06) (85 - 94)  RR: 17 (05 Jan 2023 11:06) (17 - 18)  SpO2: 100% (05 Jan 2023 11:06) (99% - 100%)    Parameters below as of 05 Jan 2023 11:06  Patient On (Oxygen Delivery Method): room air        PHYSICAL EXAM:  Constitutional:  non-toxic, no distress  Eyes:VISHAL, EOMI  Ear/Nose/Throat: no oral lesion, NG tube in place   Neck:  supple  Respiratory: CTA larry  Cardiovascular: S1S2 RRR, no murmurs  Gastrointestinal:soft, (+) BS, no HSM  Extremities:no e/e/c  Vascular: DP Pulse:	right normal; left normal      LABS:                        9.1    10.51 )-----------( 441      ( 05 Jan 2023 07:32 )             28.0     01-05    137  |  103  |  10  ----------------------------<  156<H>  3.9   |  28  |  0.57    Ca    8.4      05 Jan 2023 07:32  Phos  4.2     01-05  Mg     2.0     01-05      PT/INR - ( 05 Jan 2023 07:32 )   PT: 13.6 sec;   INR: 1.14          PTT - ( 05 Jan 2023 07:32 )  PTT:31.0 sec      MICROBIOLOGY:    Culture - Nose . (01.02.23 @ 09:17)    Specimen Source: .Nose Nose    Culture Results:   Normal Nose Yamile present  No Methicillin Resistant Staphylococcus aureus        RADIOLOGY & ADDITIONAL STUDIES:    < from: CT Sinuses No Cont (01.05.23 @ 01:39) >  Pansinusitis as detailed above.    Postoperative findings status post left hemicraniectomy for subdural   collection evacuation/washout. Continued follow-up head CT suggested.    < end of copied text >      < from: CT Head No Cont (01.02.23 @ 13:00) >    IMPRESSION: Interval removal of anterior left-sided subgaleal drainage   catheter with interval increase in subgaleal fluid. Interval progression   of interhemispheric hypodense subduralfluid collection/hygroma extending   into the left tentorium.    < end of copied text >

## 2023-01-05 NOTE — PROGRESS NOTE ADULT - PROBLEM SELECTOR PLAN 4
Collateral obtained from Daughter that prior to admission patient was fully functional, did all ADLs, stairs, walk city block  - RCRI score 1, class II risk  - Ruby score 0.1-0.3% risk  - EKG NSR   - given prior functional status patient is intermediate risk for intermediate risk procedure, no cardiac workup indicated.  Medically optimized for OR

## 2023-01-05 NOTE — PRE-ANESTHESIA EVALUATION ADULT - NSANTHOSAYNRD_GEN_A_CORE
Unable to assess, pt obtunded/No. EDWARD screening performed.  STOP BANG Legend: 0-2 = LOW Risk; 3-4 = INTERMEDIATE Risk; 5-8 = HIGH Risk
Unable to assess, pt obtunded/No. EDWARD screening performed.  STOP BANG Legend: 0-2 = LOW Risk; 3-4 = INTERMEDIATE Risk; 5-8 = HIGH Risk

## 2023-01-05 NOTE — PROGRESS NOTE ADULT - ASSESSMENT
71yo F w/ no known PMHx transferred from [facility] c/o slurred speech and right sided weakness. CTH initially negative, repeat on 12/23 showed L sided SDH, pts mental status worsened and CTH on 12/28 significant for L frontoparietal collection, MRI completed showed concern for possible empyema. S/p L hemicraniectomy and washout of L subdural empyema 12/30/22.  Planned for sinus washout 1/5

## 2023-01-05 NOTE — PROGRESS NOTE ADULT - ASSESSMENT
IMPRESSION:   73 yo female p/w confusion, word-finding difficulty to OSH, found to have pansinusitis and L subdural empyema s/p L hemicraniotomy 12/30; per patient's daughter at bedside, unknown if patient had sinus infection preceding presentation and denies patient having recent dental work.     Recommend:  1.  Continue Ceftriaxone 2 grams IV q12 + Metronidazole 750 mg IV q8hrs  2.  Please send cultures of any infected tissue/fluid from OR    ID team 2 will follow

## 2023-01-05 NOTE — BRIEF OPERATIVE NOTE - NSICDXBRIEFPROCEDURE_GEN_ALL_CORE_FT
PROCEDURES:  FESS, with nasal septoplasty and submucosal resection 05-Jan-2023 18:43:02  Celso Mueller  
PROCEDURES:  Craniectomy or craniotomy, emergent 30-Dec-2022 22:35:09  Rosangela Ervin

## 2023-01-05 NOTE — PRE-ANESTHESIA EVALUATION ADULT - MALLAMPATI CLASS
Class III - visualization of the soft palate and the base of the uvula
pt orally intubated with 8.0ETT

## 2023-01-05 NOTE — BRIEF OPERATIVE NOTE - OPERATION/FINDINGS
Septoplasty, R septal deviation  Maxillary antrostomies (L sinus w judith purulence)  B/l sphenoids and ethmoids with inflammatory changes  L sphenoid sinus with judith pus, bony erosion especially on posterior wall
Left hemicraniectomy for subdural empyema evacuation. Dru epidural and subdural pus encountered, and intraoperative samples sent for culture and permanent pathology

## 2023-01-05 NOTE — PROGRESS NOTE ADULT - PROBLEM SELECTOR PLAN 3
How Severe Is Your Skin Lesion?: mild Has Your Skin Lesion Been Treated?: not been treated Is This A New Presentation, Or A Follow-Up?: Skin Lesion ENT following: c/w Flonase and nasal sodium  - abx as above  - CT scan 1/4 with persistent pansinusitis  - plan per ENT for possible OR washout 1/5

## 2023-01-05 NOTE — PROGRESS NOTE ADULT - PROBLEM SELECTOR PLAN 5
Collateral obtained from daughter, patient had seen cardiologist for cataract surgery preop and was cleared but was told borderline HTN  - c/w Amlodipine 5mg started 1/3

## 2023-01-05 NOTE — PROGRESS NOTE ADULT - PROBLEM SELECTOR PLAN 2
f/u OR Cultures. Surgical culture with peptostreptococcus  - ID following appreciate recs  - c/w CTX/Flagyl, vanc Dc   - likely source is pansinusitis

## 2023-01-05 NOTE — PRE-ANESTHESIA EVALUATION ADULT - NSANTHGENDERRD_ENT_A_CORE
Alpa Montano RN, MSN, CDE  Inpatient Diabetes Educator  Office: [phone]  Pager: [phone]  [email or web address] [email or web address]      No

## 2023-01-06 LAB
ANION GAP SERPL CALC-SCNC: 8 MMOL/L — SIGNIFICANT CHANGE UP (ref 5–17)
BUN SERPL-MCNC: 15 MG/DL — SIGNIFICANT CHANGE UP (ref 7–23)
CALCIUM SERPL-MCNC: 8.4 MG/DL — SIGNIFICANT CHANGE UP (ref 8.4–10.5)
CHLORIDE SERPL-SCNC: 103 MMOL/L — SIGNIFICANT CHANGE UP (ref 96–108)
CO2 SERPL-SCNC: 28 MMOL/L — SIGNIFICANT CHANGE UP (ref 22–31)
CREAT SERPL-MCNC: 0.62 MG/DL — SIGNIFICANT CHANGE UP (ref 0.5–1.3)
EGFR: 95 ML/MIN/1.73M2 — SIGNIFICANT CHANGE UP
GLUCOSE BLDC GLUCOMTR-MCNC: 139 MG/DL — HIGH (ref 70–99)
GLUCOSE BLDC GLUCOMTR-MCNC: 151 MG/DL — HIGH (ref 70–99)
GLUCOSE BLDC GLUCOMTR-MCNC: 165 MG/DL — HIGH (ref 70–99)
GLUCOSE BLDC GLUCOMTR-MCNC: 184 MG/DL — HIGH (ref 70–99)
GLUCOSE SERPL-MCNC: 158 MG/DL — HIGH (ref 70–99)
HCT VFR BLD CALC: 27 % — LOW (ref 34.5–45)
HGB BLD-MCNC: 8.6 G/DL — LOW (ref 11.5–15.5)
MAGNESIUM SERPL-MCNC: 2.1 MG/DL — SIGNIFICANT CHANGE UP (ref 1.6–2.6)
MCHC RBC-ENTMCNC: 29.3 PG — SIGNIFICANT CHANGE UP (ref 27–34)
MCHC RBC-ENTMCNC: 31.9 GM/DL — LOW (ref 32–36)
MCV RBC AUTO: 91.8 FL — SIGNIFICANT CHANGE UP (ref 80–100)
NRBC # BLD: 0 /100 WBCS — SIGNIFICANT CHANGE UP (ref 0–0)
PHOSPHATE SERPL-MCNC: 3.9 MG/DL — SIGNIFICANT CHANGE UP (ref 2.5–4.5)
PLATELET # BLD AUTO: 427 K/UL — HIGH (ref 150–400)
POTASSIUM SERPL-MCNC: 4.3 MMOL/L — SIGNIFICANT CHANGE UP (ref 3.5–5.3)
POTASSIUM SERPL-SCNC: 4.3 MMOL/L — SIGNIFICANT CHANGE UP (ref 3.5–5.3)
RBC # BLD: 2.94 M/UL — LOW (ref 3.8–5.2)
RBC # FLD: 17.5 % — HIGH (ref 10.3–14.5)
SODIUM SERPL-SCNC: 139 MMOL/L — SIGNIFICANT CHANGE UP (ref 135–145)
SURGICAL PATHOLOGY STUDY: SIGNIFICANT CHANGE UP
WBC # BLD: 9.95 K/UL — SIGNIFICANT CHANGE UP (ref 3.8–10.5)
WBC # FLD AUTO: 9.95 K/UL — SIGNIFICANT CHANGE UP (ref 3.8–10.5)

## 2023-01-06 PROCEDURE — 99233 SBSQ HOSP IP/OBS HIGH 50: CPT

## 2023-01-06 PROCEDURE — 99232 SBSQ HOSP IP/OBS MODERATE 35: CPT

## 2023-01-06 RX ORDER — ENOXAPARIN SODIUM 100 MG/ML
40 INJECTION SUBCUTANEOUS EVERY 12 HOURS
Refills: 0 | Status: DISCONTINUED | OUTPATIENT
Start: 2023-01-06 | End: 2023-01-10

## 2023-01-06 RX ADMIN — Medication 2: at 00:20

## 2023-01-06 RX ADMIN — LEVETIRACETAM 1000 MILLIGRAM(S): 250 TABLET, FILM COATED ORAL at 18:25

## 2023-01-06 RX ADMIN — Medication 100 MILLIGRAM(S): at 12:22

## 2023-01-06 RX ADMIN — Medication 100 MILLIGRAM(S): at 18:24

## 2023-01-06 RX ADMIN — Medication 1 SPRAY(S): at 12:22

## 2023-01-06 RX ADMIN — NYSTATIN CREAM 1 APPLICATION(S): 100000 CREAM TOPICAL at 21:46

## 2023-01-06 RX ADMIN — SODIUM CHLORIDE 100 MILLILITER(S): 9 INJECTION INTRAMUSCULAR; INTRAVENOUS; SUBCUTANEOUS at 18:43

## 2023-01-06 RX ADMIN — CEFTRIAXONE 100 MILLIGRAM(S): 500 INJECTION, POWDER, FOR SOLUTION INTRAMUSCULAR; INTRAVENOUS at 22:24

## 2023-01-06 RX ADMIN — Medication 2: at 18:21

## 2023-01-06 RX ADMIN — CHLORHEXIDINE GLUCONATE 15 MILLILITER(S): 213 SOLUTION TOPICAL at 06:45

## 2023-01-06 RX ADMIN — Medication 1 SPRAY(S): at 06:49

## 2023-01-06 RX ADMIN — AMLODIPINE BESYLATE 5 MILLIGRAM(S): 2.5 TABLET ORAL at 06:45

## 2023-01-06 RX ADMIN — Medication 1 SPRAY(S): at 18:26

## 2023-01-06 RX ADMIN — ENOXAPARIN SODIUM 40 MILLIGRAM(S): 100 INJECTION SUBCUTANEOUS at 22:24

## 2023-01-06 RX ADMIN — LEVETIRACETAM 1000 MILLIGRAM(S): 250 TABLET, FILM COATED ORAL at 06:45

## 2023-01-06 RX ADMIN — CEFTRIAXONE 100 MILLIGRAM(S): 500 INJECTION, POWDER, FOR SOLUTION INTRAMUSCULAR; INTRAVENOUS at 10:54

## 2023-01-06 RX ADMIN — Medication 100 MILLIGRAM(S): at 06:44

## 2023-01-06 RX ADMIN — SENNA PLUS 2 TABLET(S): 8.6 TABLET ORAL at 22:24

## 2023-01-06 RX ADMIN — Medication 2: at 12:21

## 2023-01-06 RX ADMIN — CHLORHEXIDINE GLUCONATE 1 APPLICATION(S): 213 SOLUTION TOPICAL at 06:49

## 2023-01-06 RX ADMIN — Medication 1 SPRAY(S): at 18:24

## 2023-01-06 RX ADMIN — LATANOPROST 1 DROP(S): 0.05 SOLUTION/ DROPS OPHTHALMIC; TOPICAL at 22:25

## 2023-01-06 RX ADMIN — SODIUM CHLORIDE 100 MILLILITER(S): 9 INJECTION INTRAMUSCULAR; INTRAVENOUS; SUBCUTANEOUS at 06:44

## 2023-01-06 RX ADMIN — SODIUM CHLORIDE 100 MILLILITER(S): 9 INJECTION INTRAMUSCULAR; INTRAVENOUS; SUBCUTANEOUS at 22:26

## 2023-01-06 RX ADMIN — NYSTATIN CREAM 1 APPLICATION(S): 100000 CREAM TOPICAL at 06:48

## 2023-01-06 RX ADMIN — CHLORHEXIDINE GLUCONATE 15 MILLILITER(S): 213 SOLUTION TOPICAL at 18:25

## 2023-01-06 NOTE — CHART NOTE - NSCHARTNOTEFT_GEN_A_CORE
Chart note:  Patient demonstrated improvement in her speech and swallow. She is tolerating puree diet with good appetite.   NGT was removed.

## 2023-01-06 NOTE — PROGRESS NOTE ADULT - PROBLEM SELECTOR PLAN 5
NG for tube feeds to be removed now that patient passed dysphagia for pureed diet  - S&S FEES on 1/5 and approved for diet

## 2023-01-06 NOTE — PROGRESS NOTE ADULT - SUBJECTIVE AND OBJECTIVE BOX
HPI:  [id number], txfered from [facility]. Presented to [facility] 7 days ago for AMS. CTH done 6 days ago showed spontaneous Left SD collection. MRI done today showed Left SD collection with diffusion restriction c/f empyema and infarct. Also showed Left sinus collection. Was also getting ceftriaxone for presumed UT at OSH. (30 Dec 2022 19:41)    OVERNIGHT: DEEPALI overnight, neurologically stable, pt resting comfortably in bed after ENT procedure    Hospital course:   12/31: Admitted to NSICU, s/p Left hemicraniectomy and washout of L subdural empyema. Pancultured. Vanc/ceftriaxone/flagyl for empiric coverage. OR cultures demonstrated gram negative cocci in pairs. ENT and ID consulted, recommend cont vanc 2g, flagyl 750mg q8hrs, ceftriaxone 2g q12hrs. Extubated to room air, RUE/RLE strength improving. Pend CT sinus stealth protocol pend per ENT.   1/1: POD2 DEEPALI o/n, neuro stable, tolerating RA post extubation, pending CT sinus w/ stealth protocol per ENT. Blood consent obtained and pt transfused 1u prbc for HGB 7.9-> 6.8 -> 7.4. CHENCHO drain removed.  LE dopplers + B/L peroneal IM calf DVT, SQL increased to BID prophylactic dosing. Failed swallow eval, remains on tube feeds.   1/2: POD3, DEEPALI o/n. Neuro stable. TF increased goal rate to 65, tolerating feeds. S&S to re-eval in 48 hours. Pending final recs from ID. FOBT neg. Nate drain removed.   1/3: POD4. DEEPALI overnight, pt able to say "no" to questions, o/w neuro exam stable. EEG on, dc'd in afternoon, no seizure. F/u final OR cx, then will have PICC placed. Vanc trough pend 1/3 @9pm. Pend repeat dopplers 1/6. SDU status  1/4: POD 5. DEEPALI overnight. Neuro stable. FEES completed and patient cleared for pureed diet. Pending OR with ENT tomorrow. Vancomycin dc'd per ID.  1/5: POD 6. DEEPALI overnight. Neurostable. Repeat CT sinus complete overnight. POD0 b/l sinus surgery with judith purulence to L maxillary sinus and posterior bony erosiun to L sphenoid sinus. NGT replaced by ENT. CXR w/ NGT in distal esophagus, advanced and re-ordered CXR which showed correct placement of NGT  1/6: POD 7/POD1. DEEPALI ovn, neurologically stable      Vital Signs Last 24 Hrs  T(C): 36.9 (05 Jan 2023 22:00), Max: 36.9 (05 Jan 2023 22:00)  T(F): 98.4 (05 Jan 2023 22:00), Max: 98.4 (05 Jan 2023 22:00)  HR: 107 (05 Jan 2023 21:00) (78 - 110)  BP: 139/88 (05 Jan 2023 21:00) (118/72 - 147/65)  BP(mean): 107 (05 Jan 2023 21:00) (85 - 107)  RR: 18 (05 Jan 2023 21:00) (12 - 18)  SpO2: 99% (05 Jan 2023 21:00) (95% - 100%)    Parameters below as of 05 Jan 2023 21:00  Patient On (Oxygen Delivery Method): mask, nonrebreather  O2 Flow (L/min): 6      I&O's Summary    04 Jan 2023 07:01  -  05 Jan 2023 07:00  --------------------------------------------------------  IN: 1465 mL / OUT: 2850 mL / NET: -1385 mL    05 Jan 2023 07:01  -  06 Jan 2023 01:17  --------------------------------------------------------  IN: 1200 mL / OUT: 1950 mL / NET: -750 mL        PHYSICAL EXAM:  General: NAD, pt is comfortably laying in bed, attempts to say yes and no to questions, A&O x 3 (person, place, time), pt said her name, on RA   HEENT: R pupil 3mm sluggish, L pupil 3-4mm briskly reaction, tracks, +R facial asymmetry, +NGT   Cardiovascular: RRR, normal S1 and S2   Respiratory: lungs CTAB, no wheezing, rhonchi, or crackles   GI: normoactive BS to auscultation, abd soft, NTND   Neuro: + mixed aphasia expressive > receptive, unable to assess pronator drift   EVON/LL strength 5/5, RU delts 4/5, bi/tri 3/5, HG 2/5, RL 4/5   Extremities: distal pulses 2+ x4   Wound/incision: L hemicrani incision site C/D/I with staples    TUBES/LINES:  [] Cannon  [] A-line  [] Lumbar Drain  [] Wound Drains  [] NGT   [] EVD   [] CVC  [] Other      DIET:  [] NPO  [x] Mechanical  [] Tube feeds    LABS:                        9.1    10.51 )-----------( 441      ( 05 Jan 2023 07:32 )             28.0     01-05    137  |  103  |  10  ----------------------------<  156<H>  3.9   |  28  |  0.57    Ca    8.4      05 Jan 2023 07:32  Phos  4.2     01-05  Mg     2.0     01-05      PT/INR - ( 05 Jan 2023 07:32 )   PT: 13.6 sec;   INR: 1.14          PTT - ( 05 Jan 2023 07:32 )  PTT:31.0 sec        CAPILLARY BLOOD GLUCOSE      POCT Blood Glucose.: 184 mg/dL (05 Jan 2023 20:13)  POCT Blood Glucose.: 123 mg/dL (05 Jan 2023 11:35)  POCT Blood Glucose.: 136 mg/dL (05 Jan 2023 06:36)      Drug Levels: [] N/A  Vancomycin Level, Trough: 11.6 ug/mL (01-04 @ 06:53)  Vancomycin Level, Trough: 22.0 ug/mL (01-03 @ 21:00)  Vancomycin Level, Trough: 23.0 ug/mL (01-02 @ 05:15)    CSF Analysis: [] N/A      Allergies    Allergy Status Unknown    Intolerances        Home Medications:  travoprost 0.004% ophthalmic solution: 1 drop(s) to both eyes once a day (in the evening) (02 Jan 2023 14:23)      MEDICATIONS:  MEDICATIONS  (STANDING):  amLODIPine   Tablet 5 milliGRAM(s) Oral daily  cefTRIAXone   IVPB 2000 milliGRAM(s) IV Intermittent every 12 hours  chlorhexidine 0.12% Liquid 15 milliLiter(s) Swish and Spit two times a day  chlorhexidine 2% Cloths 1 Application(s) Topical <User Schedule>  fluticasone propionate 50 MICROgram(s)/spray Nasal Spray 1 Spray(s) Both Nostrils every 12 hours  insulin lispro (ADMELOG) corrective regimen sliding scale   SubCutaneous every 6 hours  latanoprost 0.005% Ophthalmic Solution 1 Drop(s) Right EYE at bedtime  levETIRAcetam 1000 milliGRAM(s) Oral two times a day  metroNIDAZOLE  IVPB 500 milliGRAM(s) IV Intermittent every 6 hours  nystatin Cream 1 Application(s) Topical two times a day  senna 2 Tablet(s) Oral at bedtime  sodium chloride 0.65% Nasal 1 Spray(s) Both Nostrils four times a day  sodium chloride 0.9%. 1000 milliLiter(s) (100 mL/Hr) IV Continuous <Continuous>    MEDICATIONS  (PRN):  acetaminophen    Suspension .. 650 milliGRAM(s) Oral every 6 hours PRN Temp greater or equal to 38C (100.4F), Mild Pain (1 - 3)  ondansetron Injectable 4 milliGRAM(s) IV Push every 6 hours PRN Nausea and/or Vomiting      CULTURES:  Culture Results:   Normal Nose Yamile present  No Methicillin Resistant Staphylococcus aureus (01-02 @ 09:17)  Culture Results:   No growth at 5 days. (12-31 @ 02:42)      RADIOLOGY & ADDITIONAL TESTS:      ASSESSMENT:  71 y/o F w/ no known PMHx transferred from [place] c/o slurred speech and right sided weakness. CTH initially negative, repeat on 12/23 showed L sided SDH, pts mental status worsened and CTH on 12/28 significant for L frontoparietal collection, MRI completed showed concern for possible empyema. S/p L hemicraniectomy and washout of L subdural empyema 12/30/22. S/p sinus surgery with judith purulence to L maxillary sinus and posterior bony erosiun to L sphenoid sinus 1/5.     PLAN:  NEURO   - Neuro/vitals Q4  - Post-op CTH complete, repeat 1/2 stable  - ENT consulted: CT sinus completed 1/1 and 1/5-extensive opacification of the paranasal sinuses ; rec flonase and nasal saline  - Cont Keppra 1000 BID  - Upload OSH images  - EEG dc'd 1/3, neg for seizure  - Helmet at bedside     PULM   - Extubated 12/31  - Satting well on room air     CARDIO   - TTE  - -160    GI   - pureed diet, NGT in place  - consider NGT removal if tolerating PO post-ENT procedure  - Bowel regimen  - Protonix   - BM 1/5    RENAL  - Voiding  - i's and o's     ENDO  - ISS  - A1c 6.1   - Monitor finger sticks     HEME   - SQL 40 BID held for OR 1/5  - FOB neg 1/2   - s/p 1u prbc 1/1   - LE doppler 1/1 b/l calf and left peroneal DVTs; repeat 1/8  - Anti xa 1/3 0.22    ID   - ID recs appreciated: ceftriaxone/metronidazole for subdural/epidural empyema  - Vanc dc'd 1/4  - Pancultured 12/31, NGTD 1/5  - F/u hemicrani washout OR cultures, swab 3 growing rare peptostreptococcus 1/6  - F/u ENT OR cultures growing rare GPC in pairs 1/6    Dispo:   - SDU status, Pending AR    Discussed with Dr. D'Amico    Assessment: present when checked     [] GCS   E   V   M     Heart Failure: [] Acute, [] acute on chronic, [] chronic   Heart Failure: [] Diastolic (HFpEF), [] Systolic (HRrEF), [] Combined (HFpEF and HFrEF), [] RHF, [] Pulm HTN, [] Other     [] DIONTE, [] ATN, [] AIN, [] other   [] CKD1, [] CKD2, [] CKD3, [] CKD4, [] CKD5, [] ESRD     Encephalopathy: [] Metabolic, [] Hepatic, [] Toxic, [] Neurological, [] Other     Abnormal Nutritional Status: [] malnutrition (see nutrition note), []underweight: BMI <19, [] morbid obesity: BMI >40, [] Cachexia     [] Sepsis   [] Hypovolemic shock, [] Cardiogenic shock, [] Hemorrhagic shock, [] Neurogenic shock   [] Acute respiratory failure   [] Cerebral edema, [] Brain compression / herniation   [] Functional quadriplegia   [] Acute blood loss anemia

## 2023-01-06 NOTE — SWALLOW BEDSIDE ASSESSMENT ADULT - SWALLOW EVAL: RECOMMENDED DIET
NPO with alternative means of nutrition
thin liquids/puree
NPO + NGT except for periodic sips and chips pending FEES results

## 2023-01-06 NOTE — SWALLOW BEDSIDE ASSESSMENT ADULT - NS SPL SWALLOW CLINIC TRIAL FT
Slight anterior loss of liquids (R>L) d/t reduced labial seal. Slow, reduced, and inefficient mastication of moistened solids with R-sided pocketing of un-chewed bolus; manually removed from oral cavity. Laryngeal movement palpated. Immediate coughing fit x1 with visible signs of resp. distress (face turning red, gasping for air) following larger self-administered cup sip. Oropharyngeal suctioning provided with immediate improvement in respiratory status. No additional clinical overt s/s of aspiration were appreciated with small single sips/purees.

## 2023-01-06 NOTE — SWALLOW BEDSIDE ASSESSMENT ADULT - CONSISTENCIES ADMINISTERED
thin liquid
thin liquid/pureed
thin liquid/pureed/minced & moist
Rifampin Pregnancy And Lactation Text: This medication is Pregnancy Category C and it isn't know if it is safe during pregnancy. It is also excreted in breast milk and should not be used if you are breast feeding.

## 2023-01-06 NOTE — PROGRESS NOTE ADULT - SUBJECTIVE AND OBJECTIVE BOX
OTOLARYNGOLOGY (ENT) PROGRESS NOTE    PATIENT: JEMIMA DUFFY  MRN: [id number]  : 52  [id number]  DATE OF SERVICE:  23  			  Subjective/ Interval:   : patient seen bedside and scoped, decided to bring her to OR  for sinus surgery   : After discussion with neurosurgery and ID team, plan was recommended to proceed with sinus surgery to establish paranasal sinus patency, remove inflammatory necrotic debris and ultimately get source control.   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 The right sphenoid mucosa was completely eroded with grossly exposed bone w deposits of granulation; small area of dura exposed around V2 prominence, with no CSF lerak. There was severe edema in both maxillaries and posterior ethmoids as well.    ALLERGIES:  Allergy Status Unknown      MEDICATIONS:  Antiinfectives:   cefTRIAXone   IVPB 2000 milliGRAM(s) IV Intermittent every 12 hours  metroNIDAZOLE  IVPB 500 milliGRAM(s) IV Intermittent every 6 hours    IV fluids:  sodium chloride 0.9%. 1000 milliLiter(s) IV Continuous <Continuous>    Hematologic/Anticoagulation:    Pain medications/Neuro:  acetaminophen    Suspension .. 650 milliGRAM(s) Oral every 6 hours PRN  levETIRAcetam 1000 milliGRAM(s) Oral two times a day  ondansetron Injectable 4 milliGRAM(s) IV Push every 6 hours PRN    Endocrine Medications:   insulin lispro (ADMELOG) corrective regimen sliding scale   SubCutaneous every 6 hours    All other standing medications:   amLODIPine   Tablet 5 milliGRAM(s) Oral daily  chlorhexidine 0.12% Liquid 15 milliLiter(s) Swish and Spit two times a day  chlorhexidine 2% Cloths 1 Application(s) Topical <User Schedule>  fluticasone propionate 50 MICROgram(s)/spray Nasal Spray 1 Spray(s) Both Nostrils every 12 hours  latanoprost 0.005% Ophthalmic Solution 1 Drop(s) Right EYE at bedtime  nystatin Cream 1 Application(s) Topical two times a day  senna 2 Tablet(s) Oral at bedtime  sodium chloride 0.65% Nasal 1 Spray(s) Both Nostrils four times a day    All other PRN medications:    Vital Signs Last 24 Hrs  T(C): 36.9 (2023 09:14), Max: 36.9 (2023 22:00)  T(F): 98.4 (2023 09:14), Max: 98.4 (2023 22:00)  HR: 98 (2023 09:05) (78 - 110)  BP: 149/67 (2023 09:05) (118/72 - 149/67)  BP(mean): 96 (2023 09:05) (85 - 107)  RR: 17 (2023 09:05) (12 - 18)  SpO2: 99% (2023 09:05) (94% - 100%)    Parameters below as of 2023 09:05  Patient On (Oxygen Delivery Method): room air           @ 07:01  -   @ 07:00  --------------------------------------------------------  IN:    IV PiggyBack: 150 mL    sodium chloride 0.9%: 1800 mL  Total IN: 1950 mL    OUT:    Voided (mL): 2950 mL  Total OUT: 2950 mL    Total NET: -1000 mL       @ 07:01  -  06 @ 11:39  --------------------------------------------------------  IN:    Oral Fluid: 50 mL    sodium chloride 0.9%: 300 mL  Total IN: 350 mL    OUT:    Voided (mL): 250 mL  Total OUT: 250 mL    Total NET: 100 mL      PHYSICAL EXAM:  General: NAD, pt is comfortably laying in bed, attempts to say yes and no to questions, A&O x 3 (person, place, time), pt said her name, on RA   HEENT:  +R facial asymmetry, +NGT, L hemicrani incision site C/D/I with staples  Cardiovascular: RRR, normal S1 and S2   Respiratory: lungs CTAB, no wheezing, rhonchi, or crackles   GI:  abd soft, NTND   Neuro: + mixed aphasia      LABS                       8.6    9.95  )-----------( 427      ( 2023 07:15 )             27.0        139  |  103  |  15  ----------------------------<  158<H>  4.3   |  28  |  0.62    Ca    8.4      2023 07:15  Phos  3.9       Mg     2.1                Coagulation Studies-   PT/INR - ( 2023 07:32 )   PT: 13.6 sec;   INR: 1.14          PTT - ( 2023 07:32 )  PTT:31.0 sec    Endocrine Panel-  Calcium, Total Serum: 8.4 mg/dL ( @ 07:15)        MICROBIOLOGY:  Culture - Tissue with Gram Stain (collected 23 @ 16:40)  Source: .Tissue #5 left sphenoid tissue  Gram Stain (23 @ 20:33):    No organisms seen    Rare WBC's  Preliminary Report (23 @ 07:57):    Culture in progress    Culture - Surgical Swab (collected 23 @ 16:40)  Source: .Surgical Swab #4 left sphenoid #2  Gram Stain (23 @ 20:33):    Rare Gram positive cocci in pairs    Rare to few White blood cells  Preliminary Report (23 @ 07:52):    Culture in progress    Culture - Surgical Swab (collected 23 @ 16:40)  Source: .Surgical Swab #3 left maxillary #2  Gram Stain (23 @ 20:30):    No organisms seen    Rare to few White blood cells  Preliminary Report (23 @ 07:48):    Culture in progress    Culture - Surgical Swab (collected 23 @ 16:40)  Source: .Surgical Swab #2 left maxillary  Gram Stain (23 @ 20:31):    No organisms seen    Rare to few White blood cells    Culture - Surgical Swab (collected 23 @ 16:40)  Source: .Surgical Swab #1 left sphenoid  Gram Stain (23 @ 20:33):    Rare Gram positive cocci in pairs    Rare to few White blood cells  Preliminary Report (23 @ 08:03):    Culture in progress    Culture - Tissue with Gram Stain (collected 22 @ 21:00)  Source: .Tissue 4-brain infection or spec  Gram Stain (22 @ 09:26):    Moderate WBC's    Rare Gram positive cocci in pairs  Preliminary Report (23 @ 09:57):    No growth to date    Culture - Surgical Swab (collected 22 @ 21:00)  Source: .Surgical Swab 3-epidural infection or spec  Gram Stain (22 @ 06:46):    Numerous White blood cells    Few Gram positive cocci in pairs  Preliminary Report (23 @ 14:33):    Rare Peptostreptococcus species    Culture in progress    Culture - Surgical Swab (collected 22 @ 21:00)  Source: .Surgical Swab 2- subdural infection or spec  Gram Stain (22 @ 07:03):    Numerous White blood cells    No organisms seen  Final Report (23 @ 08:29):    No growth    Culture - Surgical Swab (collected 22 @ 21:00)  Source: .Surgical Swab 1 Subdural pus or spec  Gram Stain (22 @ 06:54):    Moderate White blood cells    No organisms seen  Preliminary Report (22 @ 11:17):    No growth to date      Culture Results:   Culture in progress (23 @ 16:40)  Culture Results:   Culture in progress (23 @ 16:40)  Culture Results:   Culture in progress (23 @ 16:40)  Culture Results:   Culture in progress (23 @ 16:40)  Culture Results:   Normal Nose Yamile present  No Methicillin Resistant Staphylococcus aureus (23 @ 09:17)  Culture Results:   No growth at 5 days. (22 @ 02:42)  Culture Results:   No growth at 5 days. (22 @ 02:42)  Culture Results:   No growth to date (22 @ 21:00)  Culture Results:   No growth (22 @ 21:00)  Culture Results:   Rare Peptostreptococcus species  Culture in progress (12-30-22 @ 21:00)  Culture Results:   No growth to date (22 @ 21:00)      Culture - Tissue with Gram Stain (collected 23 @ 16:40)  Source: .Tissue #5 left sphenoid tissue  Gram Stain (23 @ 20:33):    No organisms seen    Rare WBC's  Preliminary Report (23 @ 07:57):    Culture in progress    Culture - Surgical Swab (collected 23 @ 16:40)  Source: .Surgical Swab #4 left sphenoid #2  Gram Stain (23 @ 20:33):    Rare Gram positive cocci in pairs    Rare to few White blood cells  Preliminary Report (23 @ 07:52):    Culture in progress    Culture - Surgical Swab (collected 23 @ 16:40)  Source: .Surgical Swab #3 left maxillary #2  Gram Stain (23 @ 20:30):    No organisms seen    Rare to few White blood cells  Preliminary Report (23 @ 07:48):    Culture in progress    Culture - Surgical Swab (collected 23 @ 16:40)  Source: .Surgical Swab #2 left maxillary  Gram Stain (23 @ 20:31):    No organisms seen    Rare to few White blood cells    Culture - Surgical Swab (collected 23 @ 16:40)  Source: .Surgical Swab #1 left sphenoid  Gram Stain (23 @ 20:33):    Rare Gram positive cocci in pairs    Rare to few White blood cells  Preliminary Report (23 @ 08:03):    Culture in progress    Culture - Nose (collected 23 @ 09:17)  Source: .Nose Nose  Final Report (23 @ 08:28):    Normal Nose Yamile present    No Methicillin Resistant Staphylococcus aureus    Urinalysis with Rflx Culture (collected 22 @ 07:25)    Culture - Blood (collected 22 @ 02:42)  Source: .Blood Blood  Final Report (23 @ 04:00):    No growth at 5 days.    Culture - Blood (collected 22 @ 02:42)  Source: .Blood Blood  Final Report (23 @ 04:00):    No growth at 5 days.    Culture - Tissue with Gram Stain (collected 22 @ 21:00)  Source: .Tissue 4-brain infection or spec  Gram Stain (22 @ 09:26):    Moderate WBC's    Rare Gram positive cocci in pairs  Preliminary Report (23 @ 09:57):    No growth to date    Culture - Surgical Swab (collected 22 @ 21:00)  Source: .Surgical Swab 3-epidural infection or spec  Gram Stain (22 @ 06:46):    Numerous White blood cells    Few Gram positive cocci in pairs  Preliminary Report (23 @ 14:33):    Rare Peptostreptococcus species    Culture in progress    Culture - Surgical Swab (collected 22 @ 21:00)  Source: .Surgical Swab 2- subdural infection or spec  Gram Stain (22 @ 07:03):    Numerous White blood cells    No organisms seen  Final Report (23 @ 08:29):    No growth    Culture - Surgical Swab (collected 22 @ 21:00)  Source: .Surgical Swab 1 Subdural pus or spec  Gram Stain (22 @ 06:54):    Moderate White blood cells    No organisms seen  Preliminary Report (22 @ 11:17):    No growth to date

## 2023-01-06 NOTE — SWALLOW BEDSIDE ASSESSMENT ADULT - SLP PERTINENT HISTORY OF CURRENT PROBLEM
PMHx transferred from [place] c/o slurred speech and right sided weakness. CTH initially negative, repeat on 12/23 showed L sided SDH, pts mental status worsened and CTH on 12/28 significant for L frontoparietal collection, MRI completed showed concern for possible empyema. S/p L hemicraniectomy and washout of L subdural empyema 12/30/22.  Now s/p sinus washout 1/5

## 2023-01-06 NOTE — PROVIDER CONTACT NOTE (OTHER) - RECOMMENDATIONS
Dr. Hawkins reported to have pt return back to bed for chair and remove helmet. As per Dr Jasmine he will inquire of a different helmet for pt.

## 2023-01-06 NOTE — PROGRESS NOTE ADULT - SUBJECTIVE AND OBJECTIVE BOX
INTERVAL HPI/OVERNIGHT EVENTS:    Patient was seen and examined at bedside. S/P OR on 1/5/23 for washout of sinuses.  Dru pus was encountered in the left sphenoid sinus    CONSTITUTIONAL:  Negative fever or chills, feels well, good appetite  EYES:  Negative  blurry vision or double vision  CARDIOVASCULAR:  Negative for chest pain or palpitations  RESPIRATORY:  Negative for cough, wheezing, or SOB   GASTROINTESTINAL:  Negative for nausea, vomiting, diarrhea, constipation, or abdominal pain  GENITOURINARY:  Negative frequency, urgency or dysuria  NEUROLOGIC:  No headache, confusion, dizziness, lightheadedness      ANTIBIOTICS/RELEVANT:    MEDICATIONS  (STANDING):  amLODIPine   Tablet 5 milliGRAM(s) Oral daily  cefTRIAXone   IVPB 2000 milliGRAM(s) IV Intermittent every 12 hours  chlorhexidine 0.12% Liquid 15 milliLiter(s) Swish and Spit two times a day  chlorhexidine 2% Cloths 1 Application(s) Topical <User Schedule>  fluticasone propionate 50 MICROgram(s)/spray Nasal Spray 1 Spray(s) Both Nostrils every 12 hours  insulin lispro (ADMELOG) corrective regimen sliding scale   SubCutaneous every 6 hours  latanoprost 0.005% Ophthalmic Solution 1 Drop(s) Right EYE at bedtime  levETIRAcetam 1000 milliGRAM(s) Oral two times a day  metroNIDAZOLE  IVPB 500 milliGRAM(s) IV Intermittent every 6 hours  nystatin Cream 1 Application(s) Topical two times a day  senna 2 Tablet(s) Oral at bedtime  sodium chloride 0.65% Nasal 1 Spray(s) Both Nostrils four times a day  sodium chloride 0.9%. 1000 milliLiter(s) (100 mL/Hr) IV Continuous <Continuous>    MEDICATIONS  (PRN):  acetaminophen    Suspension .. 650 milliGRAM(s) Oral every 6 hours PRN Temp greater or equal to 38C (100.4F), Mild Pain (1 - 3)  ondansetron Injectable 4 milliGRAM(s) IV Push every 6 hours PRN Nausea and/or Vomiting        Vital Signs Last 24 Hrs  T(C): 36.9 (06 Jan 2023 09:14), Max: 36.9 (05 Jan 2023 22:00)  T(F): 98.4 (06 Jan 2023 09:14), Max: 98.4 (05 Jan 2023 22:00)  HR: 98 (06 Jan 2023 09:05) (78 - 110)  BP: 149/67 (06 Jan 2023 09:05) (118/72 - 149/67)  BP(mean): 96 (06 Jan 2023 09:05) (85 - 107)  RR: 17 (06 Jan 2023 09:05) (12 - 18)  SpO2: 99% (06 Jan 2023 09:05) (94% - 100%)    Parameters below as of 06 Jan 2023 09:05  Patient On (Oxygen Delivery Method): room air        PHYSICAL EXAM:  Constitutional:  no distress  Eyes:VISHAL, EOMI  Ear/Nose/Throat: no oral lesion, no sinus tenderness on percussion	  Neck:   supple  Respiratory: CTA larry  Cardiovascular: S1S2 RRR, no murmurs  Gastrointestinal:soft, (+) BS, no HSM  Extremities:no e/e/c  Vascular: DP Pulse:	right normal; left normal      LABS:                        8.6    9.95  )-----------( 427      ( 06 Jan 2023 07:15 )             27.0     01-06    139  |  103  |  15  ----------------------------<  158<H>  4.3   |  28  |  0.62    Ca    8.4      06 Jan 2023 07:15  Phos  3.9     01-06  Mg     2.1     01-06      PT/INR - ( 05 Jan 2023 07:32 )   PT: 13.6 sec;   INR: 1.14          PTT - ( 05 Jan 2023 07:32 )  PTT:31.0 sec      MICROBIOLOGY:    Culture - Tissue with Gram Stain (01.05.23 @ 16:40)    Gram Stain:   No organisms seen  Rare WBC's    Specimen Source: .Tissue #5 left sphenoid tissue    Culture Results:   Culture in progress      Culture - Surgical Swab (01.05.23 @ 16:40)    Gram Stain:   Rare Gram positive cocci in pairs  Rare to few White blood cells    Specimen Source: .Surgical Swab #4 left sphenoid #2    Culture Results:   Culture in progress      RADIOLOGY & ADDITIONAL STUDIES:

## 2023-01-06 NOTE — PROGRESS NOTE ADULT - ASSESSMENT
73yo F w/ no known PMHx transferred from [place] c/o slurred speech and right sided weakness. CTH initially negative, repeat on 12/23 showed L sided SDH, pts mental status worsened and CTH on 12/28 significant for L frontoparietal collection, MRI completed showed concern for possible empyema. S/p L hemicraniectomy and washout of L subdural empyema 12/30/22.  Now s/p sinus washout 1/5

## 2023-01-06 NOTE — SWALLOW BEDSIDE ASSESSMENT ADULT - SWALLOW EVAL: DIAGNOSIS
Clinical signs of oropharyngeal dysphagia with concern for reduced pharyngeal swallow efficiency, airway protection, and secretion management. Pt appears to be at a heightened risk for aspiration and its negative sequela and would benefit from instrumental swallow testing to further assess swallow anatomy and physiology.
Clinical signs of oropharyngeal dysphagia with inefficient mastication and reduced clearance of solids as well as overt s/s of aspiration with larger liquid boli. Pt appears safe to resume a diet of purees and thin liquids. However, strict asp. precautions in place d/t reduced safety awareness and dependency on others for feeding assistance and oral hygiene.
Patient presents with suspected (at least) moderate oropharyngeal dysphagia burak by reduced oral motor ROM, multiple swallows, and clinical sign of airway protection deficits i/s/o edema post hemicraniectomy. Dysphagia appears to be acute given recent surgery, and is likely to improve as swelling reduces. Recommend continue NPO with meds and nutrition via NGT. Allow 2-3 ice chips/hour for swallow practice and hydration after thorough oral care.

## 2023-01-06 NOTE — SWALLOW BEDSIDE ASSESSMENT ADULT - SLP GENERAL OBSERVATIONS
Patient appreciated awake on RA. Left eye shut 2/2 swelling. Made appropriate eye contact with R eye. Able to follow some directions, however most directions not followed. No verbal communication noted during eval.
Received awake in bed, monitored by vEEG, NGT in situ, non-verbal, inconsistently following 1-step directives; formal speech and lang. eval to follow.
Received awake in bed, NGT in situ, breathing on RA. Continues to present with Broca's aphasia. Verbal output increasing. Tx to follow.

## 2023-01-06 NOTE — PROGRESS NOTE ADULT - SUBJECTIVE AND OBJECTIVE BOX
Patient is a 72y old  Female who presents with a chief complaint of Subdural collection (06 Jan 2023 10:55)      SUBJECTIVE:  Patient seen and examined at bedside.  More interactive today, doing speech therapy.  Was able to say her name Marli today and some words other than prior days of only yes/no.  Writing with L hand. No fever, chills, CP, SOB, N/V    ROS:  All other ROS negative except as above    Vital Signs Last 12 Hrs  T(F): 98.4 (01-06-23 @ 09:14), Max: 98.4 (01-06-23 @ 09:14)  HR: 98 (01-06-23 @ 09:05) (80 - 98)  BP: 149/67 (01-06-23 @ 09:05) (120/61 - 149/67)  BP(mean): 96 (01-06-23 @ 09:05) (85 - 96)  RR: 17 (01-06-23 @ 09:05) (17 - 18)  SpO2: 99% (01-06-23 @ 09:05) (96% - 99%)  I&O's Summary    05 Jan 2023 07:01  -  06 Jan 2023 07:00  --------------------------------------------------------  IN: 1950 mL / OUT: 2950 mL / NET: -1000 mL    06 Jan 2023 07:01  -  06 Jan 2023 12:57  --------------------------------------------------------  IN: 350 mL / OUT: 450 mL / NET: -100 mL        PHYSICAL EXAM:  Constitutional: resting comfortably in bed; NAD, obese  Head: L crani incision c/d/i  Eyes: Pupils with L minimally > R, reactive, EOMI, anicteric sclera  ENT: + NGT, MMM  Neck: supple  Respiratory: CTA B/L; no W/R/R, no retractions  Cardiac: +S1/S2; RRR; no M/R/G  Gastrointestinal: abdomen soft, NT/ND; no rebound or guarding; +BSx4  Extremities: WWP, no clubbing or cyanosis; no peripheral edema, + RUE Midline site c/d/i  Vascular: 2+ radial, femoral, DP/PT pulses B/L  Dermatologic: skin warm, dry and intact; no rashes, wounds, or scars  Neurologic: Awake, able to form more words today, LUE 5/5, RUE 4/5 and weaker hand , moves b/l LEs spontaneously, R facial droop          LABS:                        8.6    9.95  )-----------( 427      ( 06 Jan 2023 07:15 )             27.0     01-06    139  |  103  |  15  ----------------------------<  158<H>  4.3   |  28  |  0.62    Ca    8.4      06 Jan 2023 07:15  Phos  3.9     01-06  Mg     2.1     01-06      PT/INR - ( 05 Jan 2023 07:32 )   PT: 13.6 sec;   INR: 1.14          PTT - ( 05 Jan 2023 07:32 )  PTT:31.0 sec        RADIOLOGY & ADDITIONAL TESTS:  No new imaging    MEDICATIONS  (STANDING):  amLODIPine   Tablet 5 milliGRAM(s) Oral daily  cefTRIAXone   IVPB 2000 milliGRAM(s) IV Intermittent every 12 hours  chlorhexidine 0.12% Liquid 15 milliLiter(s) Swish and Spit two times a day  chlorhexidine 2% Cloths 1 Application(s) Topical <User Schedule>  fluticasone propionate 50 MICROgram(s)/spray Nasal Spray 1 Spray(s) Both Nostrils every 12 hours  insulin lispro (ADMELOG) corrective regimen sliding scale   SubCutaneous every 6 hours  latanoprost 0.005% Ophthalmic Solution 1 Drop(s) Right EYE at bedtime  levETIRAcetam 1000 milliGRAM(s) Oral two times a day  metroNIDAZOLE  IVPB 500 milliGRAM(s) IV Intermittent every 6 hours  nystatin Cream 1 Application(s) Topical two times a day  senna 2 Tablet(s) Oral at bedtime  sodium chloride 0.65% Nasal 1 Spray(s) Both Nostrils four times a day  sodium chloride 0.9%. 1000 milliLiter(s) (100 mL/Hr) IV Continuous <Continuous>    MEDICATIONS  (PRN):  acetaminophen    Suspension .. 650 milliGRAM(s) Oral every 6 hours PRN Temp greater or equal to 38C (100.4F), Mild Pain (1 - 3)  ondansetron Injectable 4 milliGRAM(s) IV Push every 6 hours PRN Nausea and/or Vomiting

## 2023-01-06 NOTE — SWALLOW BEDSIDE ASSESSMENT ADULT - MODE OF PRESENTATION
cup/spoon
feeding assistance/fed with non-dominant hand/cup/spoon/self fed/fed by clinician
Ice chip x3, thin liquid via 1/2 tsp x2/spoon/fed by clinician

## 2023-01-06 NOTE — SWALLOW BEDSIDE ASSESSMENT ADULT - SPECIFY REASON(S)
To assess swallow function and candidacy for PO diet
to reassess swallowing s/p sinus washout
consulted to assess swallowing to determine readiness for PO diet
Oriented - self; Oriented - place; Oriented - time

## 2023-01-06 NOTE — PROGRESS NOTE ADULT - PROBLEM SELECTOR PLAN 6
b/l calf DVTs  - c/w DVT ppx dose Lovenox  - repeat doppler 1/8 per vascular to monitor for propagation

## 2023-01-06 NOTE — PROGRESS NOTE ADULT - PROBLEM SELECTOR PLAN 3
ENT following: c/w Flonase and nasal sodium  - abx as above  - CT scan 1/4 with persistent pansinusitis  - s/p OR washout 1/5, follow up new cultures  - nasal care per ENT

## 2023-01-06 NOTE — SWALLOW BEDSIDE ASSESSMENT ADULT - COMMENTS
FEES completed on 1/4 with a mild paula-pharyngeal dysphagia. Recommendations were for a modified diet of pureed solids and thin liquids.

## 2023-01-06 NOTE — PROGRESS NOTE ADULT - SUBJECTIVE AND OBJECTIVE BOX
ENT Attending    Pt is now s/p craniectomy for subdural empyema.  Imaging c/w a pansinusitis which persists despite neurosurgical intervention and IV abx and sphenoid sinuses likely source of intracranial complications.  After discussion with neurosurgery and ID team, plan was recommended to proceed with sinus surgery to establish paranasal sinus patency, remove inflammatory necrotic debris and ultimately get source control.   Pt taken to OR yesterday - found to have necrotizing left>right sphenoid sinusitis with necrotic exudative debris along the floor of both sphenoid sinuses with eroded intersinus septum and posterior nasal septum. The right sphenoid mucosa was completely eroded with grossly exposed bone w deposits of granulation; small area of dura exposed around V2 prominence, with no CSF lerak. There was severe edema in both maxillaries and posterior ethmoids as well.

## 2023-01-06 NOTE — PROGRESS NOTE ADULT - ASSESSMENT
Assessment and Plan:  JEMIMA DUFFY is a  71 y/o F w/ no known PMHx transferred from [place] c/o slurred speech and right sided weakness. CTH initially negative, repeat on 12/23 showed L sided SDH, pts mental status worsened and CTH on 12/28 significant for L frontoparietal collection, MRI completed showed concern for possible empyema. S/p L hemicraniectomy and washout of L subdural empyema 12/30/22. Imaging c/w a pansinusitis which persists despite neurosurgical intervention and IV abx and sphenoid sinuses likely source of intracranial complications. After discussion with neurosurgery and ID team, plan was recommended to proceed with sinus surgery to establish paranasal sinus patency, remove inflammatory necrotic debris and ultimately get source control.     PLAN:  - Please USE NASAL rinse twice a day ----  use saline water and kendra syringe   - Please continue care as per NSGY   - Continue abx     [facility] at [phone] with any questions/concerns.    Connie Mosher PA-C  01-06-23 @ 11:39   Assessment and Plan:  JEMIMA DUFFY is a  71 y/o F w/ no known PMHx transferred from [place] c/o slurred speech and right sided weakness. CTH initially negative, repeat on 12/23 showed L sided SDH, pts mental status worsened and CTH on 12/28 significant for L frontoparietal collection, MRI completed showed concern for possible empyema. S/p L hemicraniectomy and washout of L subdural empyema 12/30/22. Imaging c/w a pansinusitis which persists despite neurosurgical intervention and IV abx and sphenoid sinuses likely source of intracranial complications. After discussion with neurosurgery and ID team, plan was recommended to proceed with sinus surgery to establish paranasal sinus patency, remove inflammatory necrotic debris and ultimately get source control.     PLAN:  - Please USE NASAL rinse twice a day ----  use saline water and kendra syringe   - Please continue care as per NSGY   Remove smith splints over weekend  - Continue abx     [facility] at [phone] with any questions/concerns.    Connie Mosher PA-C  01-06-23 @ 11:39

## 2023-01-06 NOTE — PROGRESS NOTE ADULT - ASSESSMENT
IMPRESSION:   71 yo female p/w confusion, word-finding difficulty to OSH, found to have pansinusitis and L subdural empyema s/p L hemicraniotomy 12/30.  She is now s/p washout of sphenoid sinus, the likely source of the subdural empyema    Gram stain from the washout on 1/5/23 is showing gram positive cocci in pairs.  I suspect this is the peptostreptococcus     Recommend:  1.  Continue Ceftriaxone 2 grams IV q12 + Metronidazole 750 mg IV q8hrs  2.  Follow up cultures from the OR and sensitivities    ID team 2 will follow.  Dr. Riddle will cover tonight and this weekend. I will return on 1/9/23

## 2023-01-06 NOTE — SWALLOW BEDSIDE ASSESSMENT ADULT - SWALLOW EVAL: RECOMMENDED FEEDING/EATING TECHNIQUES
maintain upright posture during/after eating for 30 mins/no straws/position upright (90 degrees)/small sips/bites

## 2023-01-06 NOTE — SWALLOW BEDSIDE ASSESSMENT ADULT - ADDITIONAL RECOMMENDATIONS
Control risk factors for aspiration PNA via frequent oral hygiene/increasing physical mobility as tolerated

## 2023-01-07 LAB
-  CLINDAMYCIN: SIGNIFICANT CHANGE UP
-  ERYTHROMYCIN: SIGNIFICANT CHANGE UP
-  LINEZOLID: SIGNIFICANT CHANGE UP
-  OXACILLIN: SIGNIFICANT CHANGE UP
-  RIFAMPIN: SIGNIFICANT CHANGE UP
-  TRIMETHOPRIM/SULFAMETHOXAZOLE: SIGNIFICANT CHANGE UP
-  VANCOMYCIN: SIGNIFICANT CHANGE UP
ANION GAP SERPL CALC-SCNC: 7 MMOL/L — SIGNIFICANT CHANGE UP (ref 5–17)
BUN SERPL-MCNC: 15 MG/DL — SIGNIFICANT CHANGE UP (ref 7–23)
CALCIUM SERPL-MCNC: 7.8 MG/DL — LOW (ref 8.4–10.5)
CHLORIDE SERPL-SCNC: 108 MMOL/L — SIGNIFICANT CHANGE UP (ref 96–108)
CO2 SERPL-SCNC: 26 MMOL/L — SIGNIFICANT CHANGE UP (ref 22–31)
CREAT SERPL-MCNC: 0.68 MG/DL — SIGNIFICANT CHANGE UP (ref 0.5–1.3)
CULTURE RESULTS: SIGNIFICANT CHANGE UP
EGFR: 93 ML/MIN/1.73M2 — SIGNIFICANT CHANGE UP
GLUCOSE BLDC GLUCOMTR-MCNC: 116 MG/DL — HIGH (ref 70–99)
GLUCOSE BLDC GLUCOMTR-MCNC: 126 MG/DL — HIGH (ref 70–99)
GLUCOSE BLDC GLUCOMTR-MCNC: 176 MG/DL — HIGH (ref 70–99)
GLUCOSE SERPL-MCNC: 112 MG/DL — HIGH (ref 70–99)
HCT VFR BLD CALC: 24.3 % — LOW (ref 34.5–45)
HGB BLD-MCNC: 7.7 G/DL — LOW (ref 11.5–15.5)
MAGNESIUM SERPL-MCNC: 2 MG/DL — SIGNIFICANT CHANGE UP (ref 1.6–2.6)
MCHC RBC-ENTMCNC: 29.5 PG — SIGNIFICANT CHANGE UP (ref 27–34)
MCHC RBC-ENTMCNC: 31.7 GM/DL — LOW (ref 32–36)
MCV RBC AUTO: 93.1 FL — SIGNIFICANT CHANGE UP (ref 80–100)
METHOD TYPE: SIGNIFICANT CHANGE UP
NRBC # BLD: 0 /100 WBCS — SIGNIFICANT CHANGE UP (ref 0–0)
ORGANISM # SPEC MICROSCOPIC CNT: SIGNIFICANT CHANGE UP
ORGANISM # SPEC MICROSCOPIC CNT: SIGNIFICANT CHANGE UP
PHOSPHATE SERPL-MCNC: 2.6 MG/DL — SIGNIFICANT CHANGE UP (ref 2.5–4.5)
PLATELET # BLD AUTO: 398 K/UL — SIGNIFICANT CHANGE UP (ref 150–400)
POTASSIUM SERPL-MCNC: 3.6 MMOL/L — SIGNIFICANT CHANGE UP (ref 3.5–5.3)
POTASSIUM SERPL-SCNC: 3.6 MMOL/L — SIGNIFICANT CHANGE UP (ref 3.5–5.3)
RBC # BLD: 2.61 M/UL — LOW (ref 3.8–5.2)
RBC # FLD: 17.7 % — HIGH (ref 10.3–14.5)
SODIUM SERPL-SCNC: 141 MMOL/L — SIGNIFICANT CHANGE UP (ref 135–145)
SPECIMEN SOURCE: SIGNIFICANT CHANGE UP
WBC # BLD: 9.07 K/UL — SIGNIFICANT CHANGE UP (ref 3.8–10.5)
WBC # FLD AUTO: 9.07 K/UL — SIGNIFICANT CHANGE UP (ref 3.8–10.5)

## 2023-01-07 PROCEDURE — 99232 SBSQ HOSP IP/OBS MODERATE 35: CPT

## 2023-01-07 PROCEDURE — 99024 POSTOP FOLLOW-UP VISIT: CPT

## 2023-01-07 RX ORDER — VANCOMYCIN HCL 1 G
1750 VIAL (EA) INTRAVENOUS EVERY 12 HOURS
Refills: 0 | Status: DISCONTINUED | OUTPATIENT
Start: 2023-01-08 | End: 2023-01-09

## 2023-01-07 RX ORDER — POTASSIUM CHLORIDE 20 MEQ
40 PACKET (EA) ORAL ONCE
Refills: 0 | Status: COMPLETED | OUTPATIENT
Start: 2023-01-07 | End: 2023-01-07

## 2023-01-07 RX ORDER — VANCOMYCIN HCL 1 G
VIAL (EA) INTRAVENOUS
Refills: 0 | Status: DISCONTINUED | OUTPATIENT
Start: 2023-01-07 | End: 2023-01-09

## 2023-01-07 RX ORDER — SODIUM,POTASSIUM PHOSPHATES 278-250MG
1 POWDER IN PACKET (EA) ORAL ONCE
Refills: 0 | Status: COMPLETED | OUTPATIENT
Start: 2023-01-07 | End: 2023-01-07

## 2023-01-07 RX ORDER — VANCOMYCIN HCL 1 G
1750 VIAL (EA) INTRAVENOUS ONCE
Refills: 0 | Status: COMPLETED | OUTPATIENT
Start: 2023-01-07 | End: 2023-01-07

## 2023-01-07 RX ADMIN — Medication 1 SPRAY(S): at 17:58

## 2023-01-07 RX ADMIN — ENOXAPARIN SODIUM 40 MILLIGRAM(S): 100 INJECTION SUBCUTANEOUS at 09:55

## 2023-01-07 RX ADMIN — Medication 1 SPRAY(S): at 17:59

## 2023-01-07 RX ADMIN — Medication 1 SPRAY(S): at 07:08

## 2023-01-07 RX ADMIN — Medication 40 MILLIEQUIVALENT(S): at 11:58

## 2023-01-07 RX ADMIN — Medication 100 MILLIGRAM(S): at 00:07

## 2023-01-07 RX ADMIN — LEVETIRACETAM 1000 MILLIGRAM(S): 250 TABLET, FILM COATED ORAL at 05:23

## 2023-01-07 RX ADMIN — ENOXAPARIN SODIUM 40 MILLIGRAM(S): 100 INJECTION SUBCUTANEOUS at 21:42

## 2023-01-07 RX ADMIN — SODIUM CHLORIDE 100 MILLILITER(S): 9 INJECTION INTRAMUSCULAR; INTRAVENOUS; SUBCUTANEOUS at 21:43

## 2023-01-07 RX ADMIN — AMLODIPINE BESYLATE 5 MILLIGRAM(S): 2.5 TABLET ORAL at 05:23

## 2023-01-07 RX ADMIN — Medication 250 MILLIGRAM(S): at 18:01

## 2023-01-07 RX ADMIN — CHLORHEXIDINE GLUCONATE 15 MILLILITER(S): 213 SOLUTION TOPICAL at 17:59

## 2023-01-07 RX ADMIN — Medication 100 MILLIGRAM(S): at 11:57

## 2023-01-07 RX ADMIN — NYSTATIN CREAM 1 APPLICATION(S): 100000 CREAM TOPICAL at 17:59

## 2023-01-07 RX ADMIN — Medication 2: at 18:30

## 2023-01-07 RX ADMIN — Medication 1 SPRAY(S): at 00:07

## 2023-01-07 RX ADMIN — NYSTATIN CREAM 1 APPLICATION(S): 100000 CREAM TOPICAL at 05:24

## 2023-01-07 RX ADMIN — Medication 100 MILLIGRAM(S): at 05:22

## 2023-01-07 RX ADMIN — Medication 1 SPRAY(S): at 11:58

## 2023-01-07 RX ADMIN — LATANOPROST 1 DROP(S): 0.05 SOLUTION/ DROPS OPHTHALMIC; TOPICAL at 21:43

## 2023-01-07 RX ADMIN — Medication 1 SPRAY(S): at 05:24

## 2023-01-07 RX ADMIN — Medication 1 PACKET(S): at 11:58

## 2023-01-07 RX ADMIN — LEVETIRACETAM 1000 MILLIGRAM(S): 250 TABLET, FILM COATED ORAL at 17:59

## 2023-01-07 RX ADMIN — CEFTRIAXONE 100 MILLIGRAM(S): 500 INJECTION, POWDER, FOR SOLUTION INTRAMUSCULAR; INTRAVENOUS at 09:55

## 2023-01-07 RX ADMIN — Medication 100 MILLIGRAM(S): at 17:58

## 2023-01-07 RX ADMIN — CHLORHEXIDINE GLUCONATE 15 MILLILITER(S): 213 SOLUTION TOPICAL at 05:23

## 2023-01-07 RX ADMIN — CHLORHEXIDINE GLUCONATE 1 APPLICATION(S): 213 SOLUTION TOPICAL at 05:34

## 2023-01-07 NOTE — PROGRESS NOTE ADULT - ASSESSMENT
72F p/w AMS, found to have pansinusitis and L subdural empyema, now s/p hemicraniectomy with washout on 12/30. OR culture grew peptostreptococcus.  She now underwent sinus washout on 1/5 - all OR culture growing staph epi.    - start vancomycin 1750mg IV q12h. check trough before 4th dose   - cont CTX 2g IV q12h  - cont metronidazole 750mg IV q8h  - f/u OR culture    Team 2 will follow you.  Dr Ayers will resume care on Monday.  Case d/w primary team.    Rosa Maria Riddle MD, MS  Infectious Disease attending  work cell [phone]   For any questions during evening/weekend/holiday, please page ID on call

## 2023-01-07 NOTE — PROGRESS NOTE ADULT - ASSESSMENT
Assessment and Plan:  JEMIMA DUFFY is a  71 y/o F w/ no known PMHx transferred from [place] c/o slurred speech and right sided weakness. CTH initially negative, repeat on 12/23 showed L sided SDH, pts mental status worsened and CTH on 12/28 significant for L frontoparietal collection, MRI completed showed concern for possible empyema. S/p L hemicraniectomy and washout of L subdural empyema 12/30/22. Imaging c/w a pansinusitis which persists despite neurosurgical intervention and IV abx and sphenoid sinuses likely source of intracranial complications. After discussion with neurosurgery and ID team, plan was recommended to proceed with sinus surgery to establish paranasal sinus patency, remove inflammatory necrotic debris and ultimately get source control.     PLAN:  - Please USE NASAL rinse twice a day ----  use saline water and kendra syringe   - Please continue care as per NSGY   - Will remove nasal smith splints  - F/u OR cx - gram with GPCs in pairs  - F/u ID reccs - currently on vanc/flagyl

## 2023-01-07 NOTE — PROGRESS NOTE ADULT - SUBJECTIVE AND OBJECTIVE BOX
HPI:  72F, txfered from [facility]. Presented to [facility] 7 days ago for AMS. CTH done 6 days ago showed spontaneous Left SD collection. MRI done today showed Left SD collection with diffusion restriction c/f empyema and infarct. Also showed Left sinus collection. Was also getting ceftriaxone for presumed UT at OSH. (30 Dec 2022 19:41)    OVERNIGHT EVENTS: DEEPALI    Hospital Course:   12/31: Admitted to NSICU, s/p Left hemicraniectomy and washout of L subdural empyema. Pancultured. Vanc/ceftriaxone/flagyl for empiric coverage. OR cultures demonstrated gram negative cocci in pairs. ENT and ID consulted, recommend cont vanc 2g, flagyl 750mg q8hrs, ceftriaxone 2g q12hrs. Extubated to room air, RUE/RLE strength improving. Pend CT sinus stealth protocol pend per ENT.   1/1: POD2 DEEPALI o/n, neuro stable, tolerating RA post extubation, pending CT sinus w/ stealth protocol per ENT. Blood consent obtained and pt transfused 1u prbc for HGB 7.9-> 6.8 -> 7.4. CHENCHO drain removed.  LE dopplers + B/L peroneal IM calf DVT, SQL increased to BID prophylactic dosing. Failed swallow eval, remains on tube feeds.   1/2: POD3, DEEPALI o/n. Neuro stable. TF increased goal rate to 65, tolerating feeds. S&S to re-eval in 48 hours. Pending final recs from ID. FOBT neg. Nate drain removed.   1/3: POD4. DEEPALI overnight, pt able to say "no" to questions, o/w neuro exam stable. EEG on, dc'd in afternoon, no seizure. F/u final OR cx, then will have PICC placed. Vanc trough pend 1/3 @9pm. Pend repeat dopplers 1/6. SDU status  1/4: POD 5. DEEPALI overnight. Neuro stable. FEES completed and patient cleared for pureed diet. Pending OR with ENT tomorrow. Vancomycin dc'd per ID.  1/5: POD 6. DEEPALI overnight. Neurostable. Repeat CT sinus complete overnight. POD0 b/l sinus surgery with judith purulence to L maxillary sinus and posterior bony erosiun to L sphenoid sinus. NGT replaced by ENT. CXR w/ NGT in distal esophagus, advanced and re-ordered CXR which showed correct placement of NGT.   1/6: POD 7/POD1. DEEPALI ovn, neurologically stable. SQL ppx resumed tonight. Tolerating pureed diet, NGT removed.   1/7: POD8/POD2. DEEPALI o/n neuro stable.     Vital Signs Last 24 Hrs  T(C): 36.8 (06 Jan 2023 22:14), Max: 37 (06 Jan 2023 18:08)  T(F): 98.2 (06 Jan 2023 22:14), Max: 98.6 (06 Jan 2023 18:08)  HR: 88 (07 Jan 2023 00:06) (80 - 114)  BP: 135/63 (07 Jan 2023 00:06) (120/61 - 159/69)  BP(mean): 91 (07 Jan 2023 00:06) (85 - 99)  RR: 18 (07 Jan 2023 00:06) (17 - 18)  SpO2: 97% (07 Jan 2023 00:06) (95% - 100%)    Parameters below as of 07 Jan 2023 00:06  Patient On (Oxygen Delivery Method): room air        I&O's Summary    05 Jan 2023 07:01  -  06 Jan 2023 07:00  --------------------------------------------------------  IN: 1950 mL / OUT: 2950 mL / NET: -1000 mL    06 Jan 2023 07:01  -  07 Jan 2023 01:06  --------------------------------------------------------  IN: 1900 mL / OUT: 1200 mL / NET: 700 mL        PHYSICAL EXAM:  GEN: laying in bed, appears well, NAD  NEURO: AOx3. FC, OE spont, hypophonic, R facial. CNII-XII intact. L pupil 4/brisk, R pupil 3/sluggish, EOMI. No pronator drift. LUE/LLE 5/5. RUE 3/5 prox, HG 2/5. RLE 4/5.  CV: RRR +S1/S2  PULM: CTAB  GI: Abd soft, NT/ND  EXT: ext warm, dry, nontender  WOUND: L hemicrani site c/d/i, flap sunken.    TUBES/LINES:  [] Cannon  [] Lumbar Drain  [] Wound Drains  [] Others      DIET:  [] NPO  [x] Mechanical  [] Tube feeds    LABS:                        8.6    9.95  )-----------( 427      ( 06 Jan 2023 07:15 )             27.0     01-06    139  |  103  |  15  ----------------------------<  158<H>  4.3   |  28  |  0.62    Ca    8.4      06 Jan 2023 07:15  Phos  3.9     01-06  Mg     2.1     01-06      PT/INR - ( 05 Jan 2023 07:32 )   PT: 13.6 sec;   INR: 1.14          PTT - ( 05 Jan 2023 07:32 )  PTT:31.0 sec        CAPILLARY BLOOD GLUCOSE      POCT Blood Glucose.: 139 mg/dL (06 Jan 2023 23:47)  POCT Blood Glucose.: 165 mg/dL (06 Jan 2023 17:22)  POCT Blood Glucose.: 184 mg/dL (06 Jan 2023 11:41)  POCT Blood Glucose.: 151 mg/dL (06 Jan 2023 06:55)      Drug Levels: [] N/A  Vancomycin Level, Trough: 11.6 ug/mL (01-04 @ 06:53)  Vancomycin Level, Trough: 22.0 ug/mL (01-03 @ 21:00)  Vancomycin Level, Trough: 23.0 ug/mL (01-02 @ 05:15)    CSF Analysis: [] N/A      Allergies    Allergy Status Unknown    Intolerances      MEDICATIONS:  Antibiotics:  cefTRIAXone   IVPB 2000 milliGRAM(s) IV Intermittent every 12 hours  metroNIDAZOLE  IVPB 500 milliGRAM(s) IV Intermittent every 6 hours    Neuro:  acetaminophen    Suspension .. 650 milliGRAM(s) Oral every 6 hours PRN  levETIRAcetam 1000 milliGRAM(s) Oral two times a day  ondansetron Injectable 4 milliGRAM(s) IV Push every 6 hours PRN    Anticoagulation:  enoxaparin Injectable 40 milliGRAM(s) SubCutaneous every 12 hours    OTHER:  amLODIPine   Tablet 5 milliGRAM(s) Oral daily  chlorhexidine 0.12% Liquid 15 milliLiter(s) Swish and Spit two times a day  chlorhexidine 2% Cloths 1 Application(s) Topical <User Schedule>  fluticasone propionate 50 MICROgram(s)/spray Nasal Spray 1 Spray(s) Both Nostrils every 12 hours  insulin lispro (ADMELOG) corrective regimen sliding scale   SubCutaneous every 6 hours  latanoprost 0.005% Ophthalmic Solution 1 Drop(s) Right EYE at bedtime  nystatin Cream 1 Application(s) Topical two times a day  senna 2 Tablet(s) Oral at bedtime  sodium chloride 0.65% Nasal 1 Spray(s) Both Nostrils four times a day    IVF:  sodium chloride 0.9%. 1000 milliLiter(s) IV Continuous <Continuous>    CULTURES:  Culture Results:   Culture in progress (01-05 @ 16:40)  Culture Results:   Rare Staphylococcus epidermidis  Culture in progress (01-05 @ 16:40)    RADIOLOGY & ADDITIONAL TESTS:      ASSESSMENT:  71 y/o F w/ no known PMHx transferred from [place] c/o slurred speech and right sided weakness. CTH initially negative, repeat on 12/23 showed L sided SDH, pts mental status worsened and CTH on 12/28 significant for L frontoparietal collection, MRI completed showed concern for possible empyema. S/p L hemicraniectomy and washout of L subdural empyema 12/30/22. S/p sinus surgery with judith purulence to L maxillary sinus and posterior bony erosiun to L sphenoid sinus 1/5    INTRACRANIALHEMORRIAGE    Handoff    MEWS Score    Subdural empyema    Subdural empyema    Craniectomy or craniotomy, emergent    Subdural hemorrhage    Subdural empyema    Hypertension    Morbid obesity    Pansinusitis    Calf DVT (deep venous thrombosis)    Prediabetes    Prophylactic measure    Dysphagia    Anemia    Preoperative examination    Thrombocytosis    Craniectomy or craniotomy, emergent    FESS, with nasal septoplasty and submucosal resection    [email or web address]        PLAN:  NEURO   - Neuro/vitals Q4  - Post-op CTH complete, repeat 1/2 stable  - ENT consulted: CT sinus completed 1/1 and 1/5-extensive opacification of the paranasal sinuses ; rec flonase and nasal saline  - Cont Keppra 1000 BID  - EEG dc'd 1/3, neg for seizure  - Helmet at bedside     PULM   - Extubated 12/31  - Satting well on room air     CARDIO   - TTE  - -160    GI   - pureed diet  - Bowel regimen  - Protonix   - BM 1/6    RENAL  - Voiding  - i's and o's     ENDO  - ISS  - A1c 6.1   - Monitor finger sticks     HEME   - SQL 40 BID (prophylactic dose)  - FOB neg 1/2   - s/p 1u prbc 1/1   - LE doppler 1/1 b/l calf and left peroneal DVTs; repeat 1/8  - Anti xa 1/3 0.22    ID   - ID recs appreciated: ceftriaxone/metronidazole for subdural/epidural empyema  - Vanc dc'd 1/4  - Pancultured 12/31, NGTD 1/5  - F/u hemicrani washout OR cultures, swab 3 growing rare peptostreptococcus 1/6  - F/u ENT OR cultures growing rare GPC in pairs 1/6    Dispo:   - SDU status, Pending AR    Discussed with Dr. D'Amico    Assessment:  Present when checked    []  GCS  E   V  M     Heart Failure: []Acute, [] acute on chronic , []chronic  Heart Failure:  [] Diastolic (HFpEF), [] Systolic (HFrEF), []Combined (HFpEF and HFrEF), [] RHF, [] Pulm HTN, [] Other    [] DIONTE, [] ATN, [] AIN, [] other  [] CKD1, [] CKD2, [] CKD 3, [] CKD 4, [] CKD 5, []ESRD    Encephalopathy: [] Metabolic, [] Hepatic, [] toxic, [] Neurological, [] Other    Abnormal Nurtitional Status: [] malnurtition (see nutrition note), [ ]underweight: BMI < 19, [] morbid obesity: BMI >40, [] Cachexia    [] Sepsis  [] hypovolemic shock,[] cardiogenic shock, [] hemorrhagic shock, [] neuogenic shock  [] Acute Respiratory Failure  []Cerebral edema, [] Brain compression/ herniation,   [] Functional quadriplegia  [] Acute blood loss anemia

## 2023-01-07 NOTE — CONSULT NOTE ADULT - ASSESSMENT
73yo F w/ no known PMHx transferred from [place] c/o slurred speech and right sided weakness. CTH initially negative, repeat on 12/23 showed L sided SDH, pts mental status worsened and CTH on 12/28 significant for L frontoparietal collection, MRI completed showed concern for possible empyema. S/p L hemicraniectomy and washout of L subdural empyema 12/30/22.  Now s/p sinus washout 1/5     Problem/Plan - 1:  ·  Problem: Subdural hemorrhage.   ·  Plan: s/p L hemicraniectomy and washout of L subdural empyema 12/30/22  - repeat post op CT scans stable  - seizure ppx per primary team  - ENT following for pansinusitis, ID following for empyema see below  - plan per primary team.     Problem/Plan - 2:  ·  Problem: Subdural empyema.   ·  Plan: f/u OR Cultures. Surgical culture with peptostreptococcus  - ID following appreciate recs  - c/w CTX/Flagyl       Problem/Plan - 3:  ·  Problem: Pansinusitis.   ·  Plan: ENT following: c/w Flonase and nasal sodium  - abx as above  - CT scan 1/4 with persistent pansinusitis  - s/p OR washout 1/5, follow up new cultures  - nasal care per ENT.     Problem/Plan - 4:  ·  Problem: Hypertension.   ·  Plan: Collateral obtained from daughter, patient had seen cardiologist for cataract surgery preop and was cleared but was told borderline HTN  - c/w Amlodipine 5mg started 1/3.     Problem/Plan - 5:  ·  Problem: Dysphagia.   ·  Plan: NG for tube feeds to be removed now that patient passed dysphagia for pureed diet  - S&S FEES on 1/5 and approved for diet.     Problem/Plan - 6:  ·  Problem: Calf DVT (deep venous thrombosis).   ·  Plan: b/l calf DVTs  - c/w DVT ppx dose Lovenox  - repeat doppler 1/8 per vascular to monitor for propagation.     Problem/Plan - 7:  ·  Problem: Prediabetes.   ·  Plan: A1c 6.1  - c/w ISS  - outpatient follow up with a PCP and would benefit from weight loss long term.     Problem/Plan - 8:  ·  Problem: Anemia.   ·  Plan: Hgb 7-9, unknown baseline  - AOCD based on iron studies  - no signs of active bleeding.     Problem/Plan - 9:  ·  Problem: Thrombocytosis.   ·  Plan: PLT minimally elevated  - likely reactive.     Problem/Plan - 10:  ·  Problem: Morbid obesity.   ·  Plan; BMI 47.8, affects all aspects of care  - outpatient follow up.     Problem/Plan - 11:  ·  Problem: Prophylactic measure.   ·  Plan: DVT ppx: Lovenox  Dispo: PT rec AR.

## 2023-01-07 NOTE — CONSULT NOTE ADULT - SUBJECTIVE AND OBJECTIVE BOX
Patient is a 72y old  Female who presents with a chief complaint of Subdural collection (07 Jan 2023 08:46)    INTERVAL EVENTS:    SUBJECTIVE:  Patient was seen and examined at bedside. Daughter present at the bedside, patient able to answer Hi. Per daughter patient has been able to say more words every day    Review of systems: No fever, chills, dizziness, HA, Changes in vision, CP, dyspnea, nausea or vomiting, dysuria, changes in bowel movements, LE edema. Rest of 12 point Review of systems negative unless otherwise documented elsewhere in note.     Diet, Pureed (01-04-23 @ 11:54) [Active]      MEDICATIONS:  MEDICATIONS  (STANDING):  amLODIPine   Tablet 5 milliGRAM(s) Oral daily  chlorhexidine 0.12% Liquid 15 milliLiter(s) Swish and Spit two times a day  chlorhexidine 2% Cloths 1 Application(s) Topical <User Schedule>  enoxaparin Injectable 40 milliGRAM(s) SubCutaneous every 12 hours  fluticasone propionate 50 MICROgram(s)/spray Nasal Spray 1 Spray(s) Both Nostrils every 12 hours  insulin lispro (ADMELOG) corrective regimen sliding scale   SubCutaneous every 6 hours  latanoprost 0.005% Ophthalmic Solution 1 Drop(s) Right EYE at bedtime  levETIRAcetam 1000 milliGRAM(s) Oral two times a day  metroNIDAZOLE  IVPB 500 milliGRAM(s) IV Intermittent every 6 hours  nystatin Cream 1 Application(s) Topical two times a day  senna 2 Tablet(s) Oral at bedtime  sodium chloride 0.65% Nasal 1 Spray(s) Both Nostrils four times a day  sodium chloride 0.9%. 1000 milliLiter(s) (100 mL/Hr) IV Continuous <Continuous>    MEDICATIONS  (PRN):  acetaminophen    Suspension .. 650 milliGRAM(s) Oral every 6 hours PRN Temp greater or equal to 38C (100.4F), Mild Pain (1 - 3)  ondansetron Injectable 4 milliGRAM(s) IV Push every 6 hours PRN Nausea and/or Vomiting      Allergies    Allergy Status Unknown    Intolerances        OBJECTIVE:  Vital Signs Last 24 Hrs  T(C): 36.8 (07 Jan 2023 13:21), Max: 37 (06 Jan 2023 18:08)  T(F): 98.2 (07 Jan 2023 13:21), Max: 98.6 (06 Jan 2023 18:08)  HR: 88 (07 Jan 2023 12:02) (78 - 104)  BP: 137/63 (07 Jan 2023 12:02) (135/63 - 150/67)  BP(mean): 90 (07 Jan 2023 12:02) (88 - 97)  RR: 17 (07 Jan 2023 12:02) (17 - 18)  SpO2: 96% (07 Jan 2023 12:02) (96% - 99%)    Parameters below as of 07 Jan 2023 12:02  Patient On (Oxygen Delivery Method): room air      I&O's Summary    06 Jan 2023 07:01  -  07 Jan 2023 07:00  --------------------------------------------------------  IN: 2500 mL / OUT: 1850 mL / NET: 650 mL    07 Jan 2023 07:01  -  07 Jan 2023 14:54  --------------------------------------------------------  IN: 640 mL / OUT: 400 mL / NET: 240 mL        PHYSICAL EXAM:  General: awake, alert, making eye contacted, looking comfortable, no labored breathing on RA  HEENT: Left cranial incision, pupils reactive to light right>left.   Lungs: poor inspiration, no crackles, no wheezes  Heart: RRR  Abdomen: soft, non-tender  Extremities:  warm, no edema, no tenderness, no focal deficit   LABS:                        7.7    9.07  )-----------( 398      ( 07 Jan 2023 06:26 )             24.3     01-07    141  |  108  |  15  ----------------------------<  112<H>  3.6   |  26  |  0.68    Ca    7.8<L>      07 Jan 2023 06:26  Phos  2.6     01-07  Mg     2.0     01-07          CAPILLARY BLOOD GLUCOSE      POCT Blood Glucose.: 126 mg/dL (07 Jan 2023 11:32)  POCT Blood Glucose.: 116 mg/dL (07 Jan 2023 06:29)  POCT Blood Glucose.: 139 mg/dL (06 Jan 2023 23:47)  POCT Blood Glucose.: 165 mg/dL (06 Jan 2023 17:22)        MICRODATA:    Culture - Tissue with Gram Stain (collected 05 Jan 2023 16:40)  Source: .Tissue #5 left sphenoid tissue  Gram Stain (05 Jan 2023 20:33):    No organisms seen    Rare WBC's  Preliminary Report (06 Jan 2023 07:57):    Culture in progress    Culture - Surgical Swab (collected 05 Jan 2023 16:40)  Source: .Surgical Swab #4 left sphenoid #2  Gram Stain (05 Jan 2023 20:33):    Rare Gram positive cocci in pairs    Rare to few White blood cells  Preliminary Report (06 Jan 2023 07:52):    Culture in progress    Culture - Surgical Swab (collected 05 Jan 2023 16:40)  Source: .Surgical Swab #3 left maxillary #2  Gram Stain (05 Jan 2023 20:30):    No organisms seen    Rare to few White blood cells  Preliminary Report (06 Jan 2023 07:48):    Culture in progress    Culture - Surgical Swab (collected 05 Jan 2023 16:40)  Source: .Surgical Swab #2 left maxillary  Gram Stain (05 Jan 2023 20:31):    No organisms seen    Rare to few White blood cells  Final Report (07 Jan 2023 12:46):    Rare Staphylococcus epidermidis  Organism: Staphylococcus epidermidis (07 Jan 2023 12:46)  Organism: Staphylococcus epidermidis (07 Jan 2023 12:46)    Culture - Surgical Swab (collected 05 Jan 2023 16:40)  Source: .Surgical Swab #1 left sphenoid  Gram Stain (05 Jan 2023 20:33):    Rare Gram positive cocci in pairs    Rare to few White blood cells  Preliminary Report (06 Jan 2023 08:03):    Culture in progress        RADIOLOGY/OTHER STUDIES:

## 2023-01-07 NOTE — CHART NOTE - NSCHARTNOTEFT_GEN_A_CORE
Chart Note:  all OR cultures are groin Staph epidermidis. Started vancomycin 1750mg BID per ID request.

## 2023-01-07 NOTE — PROGRESS NOTE ADULT - SUBJECTIVE AND OBJECTIVE BOX
OTOLARYNGOLOGY (ENT) PROGRESS NOTE    PATIENT: JEMIMA DUFFY  MRN: [id number]  : 52  [id number]  DATE OF SERVICE:  23  			  Subjective/ Interval:   : patient seen bedside and scoped, decided to bring her to OR  for sinus surgery   : After discussion with neurosurgery and ID team, plan was recommended to proceed with sinus surgery to establish paranasal sinus patency, remove inflammatory necrotic debris and ultimately get source control.   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 The right sphenoid mucosa was completely eroded with grossly exposed bone w deposits of granulation; small area of dura exposed around V2 prominence, with no CSF lerak. There was severe edema in both maxillaries and posterior ethmoids as well.  : No acute events overnight. NGt was removed by primary team because tolerating diet. Per notes, patient is more interactive. At bedside, appears frustrated by lack of communication, but awake/alert with no significant nasal drainage. Per nursing, patient is receiving nasal rinses.      PAST MEDICAL & SURGICAL HISTORY:    Allergy Status Unknown    MEDICATIONS  (STANDING):  amLODIPine   Tablet 5 milliGRAM(s) Oral daily  cefTRIAXone   IVPB 2000 milliGRAM(s) IV Intermittent every 12 hours  chlorhexidine 0.12% Liquid 15 milliLiter(s) Swish and Spit two times a day  chlorhexidine 2% Cloths 1 Application(s) Topical <User Schedule>  enoxaparin Injectable 40 milliGRAM(s) SubCutaneous every 12 hours  fluticasone propionate 50 MICROgram(s)/spray Nasal Spray 1 Spray(s) Both Nostrils every 12 hours  insulin lispro (ADMELOG) corrective regimen sliding scale   SubCutaneous every 6 hours  latanoprost 0.005% Ophthalmic Solution 1 Drop(s) Right EYE at bedtime  levETIRAcetam 1000 milliGRAM(s) Oral two times a day  metroNIDAZOLE  IVPB 500 milliGRAM(s) IV Intermittent every 6 hours  nystatin Cream 1 Application(s) Topical two times a day  senna 2 Tablet(s) Oral at bedtime  sodium chloride 0.65% Nasal 1 Spray(s) Both Nostrils four times a day  sodium chloride 0.9%. 1000 milliLiter(s) (100 mL/Hr) IV Continuous <Continuous>    MEDICATIONS  (PRN):  acetaminophen    Suspension .. 650 milliGRAM(s) Oral every 6 hours PRN Temp greater or equal to 38C (100.4F), Mild Pain (1 - 3)  ondansetron Injectable 4 milliGRAM(s) IV Push every 6 hours PRN Nausea and/or Vomiting      Objective    ICU Vital Signs Last 24 Hrs  T(C): 36.4 (2023 05:00), Max: 37 (2023 18:08)  T(F): 97.5 (2023 05:00), Max: 98.6 (2023 18:08)  HR: 78 (2023 03:54) (78 - 114)  BP: 137/63 (2023 03:54) (126/61 - 159/69)  BP(mean): 91 (2023 03:54) (85 - 99)  ABP: --  ABP(mean): --  RR: 18 (2023 03:54) (17 - 18)  SpO2: 96% (2023 03:54) (95% - 100%)    O2 Parameters below as of 2023 03:54  Patient On (Oxygen Delivery Method): room air        PHYSICAL EXAM:  General: NAD, pt is comfortably laying in bed, attempts to say yes and no to questions, A&O x 3 (person, place, time), pt said her name, on RA   HEENT: L hemicrani incision site C/D/I with staples  Cardiovascular: RRR, normal S1 and S2   Respiratory: lungs CTAB, no wheezing, rhonchi, or crackles   GI:  abd soft, NTND   Neuro: + mixed aphasia                          7.7    9.07  )-----------( 398      ( 2023 06:26 )             24.3     01-07    141  |  108  |  15  ----------------------------<  112<H>  3.6   |  26  |  0.68    Ca    7.8<L>      2023 06:26  Phos  2.6     01-07  Mg     2.0             I&O's Summary    2023 07:01  -  2023 07:00  --------------------------------------------------------  IN: 2500 mL / OUT: 1850 mL / NET: 650 mL

## 2023-01-07 NOTE — PROGRESS NOTE ADULT - SUBJECTIVE AND OBJECTIVE BOX
INFECTIOUS DISEASES CONSULT FOLLOW-UP NOTE    INTERVAL HPI/OVERNIGHT EVENTS:  no event overnight      ROS:   Constitutional, eyes, ENT, cardiovascular, respiratory, gastrointestinal, genitourinary, integumentary, neurological, psychiatric and heme/lymph are otherwise negative other than noted above       ANTIBIOTICS/RELEVANT:    MEDICATIONS  (STANDING):  amLODIPine   Tablet 5 milliGRAM(s) Oral daily  chlorhexidine 0.12% Liquid 15 milliLiter(s) Swish and Spit two times a day  chlorhexidine 2% Cloths 1 Application(s) Topical <User Schedule>  enoxaparin Injectable 40 milliGRAM(s) SubCutaneous every 12 hours  fluticasone propionate 50 MICROgram(s)/spray Nasal Spray 1 Spray(s) Both Nostrils every 12 hours  insulin lispro (ADMELOG) corrective regimen sliding scale   SubCutaneous every 6 hours  latanoprost 0.005% Ophthalmic Solution 1 Drop(s) Right EYE at bedtime  levETIRAcetam 1000 milliGRAM(s) Oral two times a day  metroNIDAZOLE  IVPB 500 milliGRAM(s) IV Intermittent every 6 hours  nystatin Cream 1 Application(s) Topical two times a day  senna 2 Tablet(s) Oral at bedtime  sodium chloride 0.65% Nasal 1 Spray(s) Both Nostrils four times a day  sodium chloride 0.9%. 1000 milliLiter(s) (100 mL/Hr) IV Continuous <Continuous>    MEDICATIONS  (PRN):  acetaminophen    Suspension .. 650 milliGRAM(s) Oral every 6 hours PRN Temp greater or equal to 38C (100.4F), Mild Pain (1 - 3)  ondansetron Injectable 4 milliGRAM(s) IV Push every 6 hours PRN Nausea and/or Vomiting        Vital Signs Last 24 Hrs  T(C): 36.8 (07 Jan 2023 13:21), Max: 37 (06 Jan 2023 18:08)  T(F): 98.2 (07 Jan 2023 13:21), Max: 98.6 (06 Jan 2023 18:08)  HR: 88 (07 Jan 2023 12:02) (78 - 104)  BP: 137/63 (07 Jan 2023 12:02) (135/63 - 150/67)  BP(mean): 90 (07 Jan 2023 12:02) (88 - 97)  RR: 17 (07 Jan 2023 12:02) (17 - 18)  SpO2: 96% (07 Jan 2023 12:02) (96% - 99%)    Parameters below as of 07 Jan 2023 12:02  Patient On (Oxygen Delivery Method): room air        01-06-23 @ 07:01  -  01-07-23 @ 07:00  --------------------------------------------------------  IN: 2500 mL / OUT: 1850 mL / NET: 650 mL    01-07-23 @ 07:01  -  01-07-23 @ 16:05  --------------------------------------------------------  IN: 640 mL / OUT: 400 mL / NET: 240 mL      PHYSICAL EXAM:  Constitutional: alert, NAD  Eyes: the sclera and conjunctiva were normal.   ENT: the ears and nose were normal in appearance.   Neck: the appearance of the neck was normal and the neck was supple.   Pulmonary: no respiratory distress and lungs were clear to auscultation bilaterally.   Heart: heart rate was normal and rhythm regular, normal S1 and S2  Vascular:. there was no peripheral edema  Abdomen: normal bowel sounds, soft, non-tender  Neurological: no focal deficits.   Psychiatric: the affect was normal        LABS:                        7.7    9.07  )-----------( 398      ( 07 Jan 2023 06:26 )             24.3     01-07    141  |  108  |  15  ----------------------------<  112<H>  3.6   |  26  |  0.68    Ca    7.8<L>      07 Jan 2023 06:26  Phos  2.6     01-07  Mg     2.0     01-07            MICROBIOLOGY:      RADIOLOGY & ADDITIONAL STUDIES:  Reviewed INFECTIOUS DISEASES CONSULT FOLLOW-UP NOTE    INTERVAL HPI/OVERNIGHT EVENTS:  no event overnight  patient denied n/v/d abdominal pain   all OR culture growing staph epi    ROS:   Constitutional, eyes, ENT, cardiovascular, respiratory, gastrointestinal, genitourinary, integumentary, neurological, psychiatric and heme/lymph are otherwise negative other than noted above       ANTIBIOTICS/RELEVANT:    MEDICATIONS  (STANDING):  amLODIPine   Tablet 5 milliGRAM(s) Oral daily  chlorhexidine 0.12% Liquid 15 milliLiter(s) Swish and Spit two times a day  chlorhexidine 2% Cloths 1 Application(s) Topical <User Schedule>  enoxaparin Injectable 40 milliGRAM(s) SubCutaneous every 12 hours  fluticasone propionate 50 MICROgram(s)/spray Nasal Spray 1 Spray(s) Both Nostrils every 12 hours  insulin lispro (ADMELOG) corrective regimen sliding scale   SubCutaneous every 6 hours  latanoprost 0.005% Ophthalmic Solution 1 Drop(s) Right EYE at bedtime  levETIRAcetam 1000 milliGRAM(s) Oral two times a day  metroNIDAZOLE  IVPB 500 milliGRAM(s) IV Intermittent every 6 hours  nystatin Cream 1 Application(s) Topical two times a day  senna 2 Tablet(s) Oral at bedtime  sodium chloride 0.65% Nasal 1 Spray(s) Both Nostrils four times a day  sodium chloride 0.9%. 1000 milliLiter(s) (100 mL/Hr) IV Continuous <Continuous>    MEDICATIONS  (PRN):  acetaminophen    Suspension .. 650 milliGRAM(s) Oral every 6 hours PRN Temp greater or equal to 38C (100.4F), Mild Pain (1 - 3)  ondansetron Injectable 4 milliGRAM(s) IV Push every 6 hours PRN Nausea and/or Vomiting        Vital Signs Last 24 Hrs  T(C): 36.8 (07 Jan 2023 13:21), Max: 37 (06 Jan 2023 18:08)  T(F): 98.2 (07 Jan 2023 13:21), Max: 98.6 (06 Jan 2023 18:08)  HR: 88 (07 Jan 2023 12:02) (78 - 104)  BP: 137/63 (07 Jan 2023 12:02) (135/63 - 150/67)  BP(mean): 90 (07 Jan 2023 12:02) (88 - 97)  RR: 17 (07 Jan 2023 12:02) (17 - 18)  SpO2: 96% (07 Jan 2023 12:02) (96% - 99%)    Parameters below as of 07 Jan 2023 12:02  Patient On (Oxygen Delivery Method): room air        01-06-23 @ 07:01  -  01-07-23 @ 07:00  --------------------------------------------------------  IN: 2500 mL / OUT: 1850 mL / NET: 650 mL    01-07-23 @ 07:01  -  01-07-23 @ 16:05  --------------------------------------------------------  IN: 640 mL / OUT: 400 mL / NET: 240 mL      PHYSICAL EXAM:  Constitutional: awake, NAD  Head: surgical wound c/d/i, with staples   Eyes: the sclera and conjunctiva were normal.   ENT: the ears and nose were normal in appearance.   Neck: the appearance of the neck was normal and the neck was supple.   Pulmonary: no respiratory distress and lungs were clear to auscultation bilaterally.   Heart: heart rate was normal and rhythm regular, normal S1 and S2  Vascular:. there was no peripheral edema  Abdomen: normal bowel sounds, soft, non-tender        LABS:                        7.7    9.07  )-----------( 398      ( 07 Jan 2023 06:26 )             24.3     01-07    141  |  108  |  15  ----------------------------<  112<H>  3.6   |  26  |  0.68    Ca    7.8<L>      07 Jan 2023 06:26  Phos  2.6     01-07  Mg     2.0     01-07            MICROBIOLOGY:      RADIOLOGY & ADDITIONAL STUDIES:  Reviewed

## 2023-01-08 LAB
-  CLINDAMYCIN: SIGNIFICANT CHANGE UP
-  ERYTHROMYCIN: SIGNIFICANT CHANGE UP
-  LINEZOLID: SIGNIFICANT CHANGE UP
-  OXACILLIN: SIGNIFICANT CHANGE UP
-  RIFAMPIN: SIGNIFICANT CHANGE UP
-  TRIMETHOPRIM/SULFAMETHOXAZOLE: SIGNIFICANT CHANGE UP
-  VANCOMYCIN: SIGNIFICANT CHANGE UP
ALBUMIN SERPL ELPH-MCNC: 2.6 G/DL — LOW (ref 3.3–5)
ALP SERPL-CCNC: 83 U/L — SIGNIFICANT CHANGE UP (ref 40–120)
ALT FLD-CCNC: 13 U/L — SIGNIFICANT CHANGE UP (ref 10–45)
ANION GAP SERPL CALC-SCNC: 9 MMOL/L — SIGNIFICANT CHANGE UP (ref 5–17)
AST SERPL-CCNC: 14 U/L — SIGNIFICANT CHANGE UP (ref 10–40)
BILIRUB SERPL-MCNC: 0.2 MG/DL — SIGNIFICANT CHANGE UP (ref 0.2–1.2)
BUN SERPL-MCNC: 9 MG/DL — SIGNIFICANT CHANGE UP (ref 7–23)
CALCIUM SERPL-MCNC: 8.6 MG/DL — SIGNIFICANT CHANGE UP (ref 8.4–10.5)
CHLORIDE SERPL-SCNC: 106 MMOL/L — SIGNIFICANT CHANGE UP (ref 96–108)
CO2 SERPL-SCNC: 28 MMOL/L — SIGNIFICANT CHANGE UP (ref 22–31)
CREAT SERPL-MCNC: 0.63 MG/DL — SIGNIFICANT CHANGE UP (ref 0.5–1.3)
CULTURE RESULTS: SIGNIFICANT CHANGE UP
EGFR: 95 ML/MIN/1.73M2 — SIGNIFICANT CHANGE UP
GLUCOSE BLDC GLUCOMTR-MCNC: 100 MG/DL — HIGH (ref 70–99)
GLUCOSE BLDC GLUCOMTR-MCNC: 117 MG/DL — HIGH (ref 70–99)
GLUCOSE BLDC GLUCOMTR-MCNC: 133 MG/DL — HIGH (ref 70–99)
GLUCOSE BLDC GLUCOMTR-MCNC: 145 MG/DL — HIGH (ref 70–99)
GLUCOSE BLDC GLUCOMTR-MCNC: 95 MG/DL — SIGNIFICANT CHANGE UP (ref 70–99)
GLUCOSE SERPL-MCNC: 160 MG/DL — HIGH (ref 70–99)
HCT VFR BLD CALC: 27.5 % — LOW (ref 34.5–45)
HGB BLD-MCNC: 8.6 G/DL — LOW (ref 11.5–15.5)
MAGNESIUM SERPL-MCNC: 1.9 MG/DL — SIGNIFICANT CHANGE UP (ref 1.6–2.6)
MCHC RBC-ENTMCNC: 29.3 PG — SIGNIFICANT CHANGE UP (ref 27–34)
MCHC RBC-ENTMCNC: 31.3 GM/DL — LOW (ref 32–36)
MCV RBC AUTO: 93.5 FL — SIGNIFICANT CHANGE UP (ref 80–100)
METHOD TYPE: SIGNIFICANT CHANGE UP
NRBC # BLD: 0 /100 WBCS — SIGNIFICANT CHANGE UP (ref 0–0)
ORGANISM # SPEC MICROSCOPIC CNT: SIGNIFICANT CHANGE UP
PHOSPHATE SERPL-MCNC: 3.1 MG/DL — SIGNIFICANT CHANGE UP (ref 2.5–4.5)
PLATELET # BLD AUTO: 368 K/UL — SIGNIFICANT CHANGE UP (ref 150–400)
POTASSIUM SERPL-MCNC: 4.7 MMOL/L — SIGNIFICANT CHANGE UP (ref 3.5–5.3)
POTASSIUM SERPL-SCNC: 4.7 MMOL/L — SIGNIFICANT CHANGE UP (ref 3.5–5.3)
PROT SERPL-MCNC: 6.8 G/DL — SIGNIFICANT CHANGE UP (ref 6–8.3)
RBC # BLD: 2.94 M/UL — LOW (ref 3.8–5.2)
RBC # FLD: 17.9 % — HIGH (ref 10.3–14.5)
SODIUM SERPL-SCNC: 143 MMOL/L — SIGNIFICANT CHANGE UP (ref 135–145)
SPECIMEN SOURCE: SIGNIFICANT CHANGE UP
WBC # BLD: 7.22 K/UL — SIGNIFICANT CHANGE UP (ref 3.8–10.5)
WBC # FLD AUTO: 7.22 K/UL — SIGNIFICANT CHANGE UP (ref 3.8–10.5)

## 2023-01-08 PROCEDURE — 99232 SBSQ HOSP IP/OBS MODERATE 35: CPT

## 2023-01-08 PROCEDURE — 93970 EXTREMITY STUDY: CPT | Mod: 26

## 2023-01-08 RX ORDER — ACETAMINOPHEN 500 MG
650 TABLET ORAL EVERY 6 HOURS
Refills: 0 | Status: DISCONTINUED | OUTPATIENT
Start: 2023-01-08 | End: 2023-01-12

## 2023-01-08 RX ORDER — LEVETIRACETAM 250 MG/1
1000 TABLET, FILM COATED ORAL
Refills: 0 | Status: DISCONTINUED | OUTPATIENT
Start: 2023-01-08 | End: 2023-01-12

## 2023-01-08 RX ORDER — CEFTRIAXONE 500 MG/1
2000 INJECTION, POWDER, FOR SOLUTION INTRAMUSCULAR; INTRAVENOUS EVERY 12 HOURS
Refills: 0 | Status: DISCONTINUED | OUTPATIENT
Start: 2023-01-08 | End: 2023-01-12

## 2023-01-08 RX ORDER — MAGNESIUM SULFATE 500 MG/ML
1 VIAL (ML) INJECTION ONCE
Refills: 0 | Status: COMPLETED | OUTPATIENT
Start: 2023-01-08 | End: 2023-01-08

## 2023-01-08 RX ORDER — AMLODIPINE BESYLATE 2.5 MG/1
5 TABLET ORAL DAILY
Refills: 0 | Status: DISCONTINUED | OUTPATIENT
Start: 2023-01-08 | End: 2023-01-09

## 2023-01-08 RX ADMIN — Medication 100 MILLIGRAM(S): at 23:37

## 2023-01-08 RX ADMIN — ENOXAPARIN SODIUM 40 MILLIGRAM(S): 100 INJECTION SUBCUTANEOUS at 09:36

## 2023-01-08 RX ADMIN — Medication 100 MILLIGRAM(S): at 05:17

## 2023-01-08 RX ADMIN — CEFTRIAXONE 100 MILLIGRAM(S): 500 INJECTION, POWDER, FOR SOLUTION INTRAMUSCULAR; INTRAVENOUS at 05:59

## 2023-01-08 RX ADMIN — CHLORHEXIDINE GLUCONATE 15 MILLILITER(S): 213 SOLUTION TOPICAL at 05:30

## 2023-01-08 RX ADMIN — LEVETIRACETAM 1000 MILLIGRAM(S): 250 TABLET, FILM COATED ORAL at 17:55

## 2023-01-08 RX ADMIN — ENOXAPARIN SODIUM 40 MILLIGRAM(S): 100 INJECTION SUBCUTANEOUS at 21:41

## 2023-01-08 RX ADMIN — LEVETIRACETAM 1000 MILLIGRAM(S): 250 TABLET, FILM COATED ORAL at 05:29

## 2023-01-08 RX ADMIN — AMLODIPINE BESYLATE 5 MILLIGRAM(S): 2.5 TABLET ORAL at 05:29

## 2023-01-08 RX ADMIN — Medication 1 SPRAY(S): at 23:38

## 2023-01-08 RX ADMIN — Medication 1 SPRAY(S): at 17:56

## 2023-01-08 RX ADMIN — Medication 100 MILLIGRAM(S): at 00:36

## 2023-01-08 RX ADMIN — Medication 1 SPRAY(S): at 05:32

## 2023-01-08 RX ADMIN — Medication 100 GRAM(S): at 09:36

## 2023-01-08 RX ADMIN — CHLORHEXIDINE GLUCONATE 1 APPLICATION(S): 213 SOLUTION TOPICAL at 05:33

## 2023-01-08 RX ADMIN — CHLORHEXIDINE GLUCONATE 15 MILLILITER(S): 213 SOLUTION TOPICAL at 17:57

## 2023-01-08 RX ADMIN — NYSTATIN CREAM 1 APPLICATION(S): 100000 CREAM TOPICAL at 17:57

## 2023-01-08 RX ADMIN — LATANOPROST 1 DROP(S): 0.05 SOLUTION/ DROPS OPHTHALMIC; TOPICAL at 21:42

## 2023-01-08 RX ADMIN — Medication 1 SPRAY(S): at 00:15

## 2023-01-08 RX ADMIN — NYSTATIN CREAM 1 APPLICATION(S): 100000 CREAM TOPICAL at 05:59

## 2023-01-08 RX ADMIN — Medication 100 MILLIGRAM(S): at 18:28

## 2023-01-08 RX ADMIN — Medication 250 MILLIGRAM(S): at 06:47

## 2023-01-08 RX ADMIN — CEFTRIAXONE 100 MILLIGRAM(S): 500 INJECTION, POWDER, FOR SOLUTION INTRAMUSCULAR; INTRAVENOUS at 17:45

## 2023-01-08 RX ADMIN — Medication 250 MILLIGRAM(S): at 19:21

## 2023-01-08 RX ADMIN — CEFTRIAXONE 100 MILLIGRAM(S): 500 INJECTION, POWDER, FOR SOLUTION INTRAMUSCULAR; INTRAVENOUS at 01:26

## 2023-01-08 RX ADMIN — Medication 1 SPRAY(S): at 12:12

## 2023-01-08 RX ADMIN — Medication 100 MILLIGRAM(S): at 12:12

## 2023-01-08 NOTE — PROGRESS NOTE ADULT - SUBJECTIVE AND OBJECTIVE BOX
SUBJECTIVE: Patient denies pain or new symptoms at this time.     HOSPITAL COURSE:  12/31: Admitted to NSICU, s/p Left hemicraniectomy and washout of L subdural empyema. Pancultured. Vanc/ceftriaxone/flagyl for empiric coverage. OR cultures demonstrated gram negative cocci in pairs. ENT and ID consulted, recommend cont vanc 2g, flagyl 750mg q8hrs, ceftriaxone 2g q12hrs. Extubated to room air, RUE/RLE strength improving. Pend CT sinus stealth protocol pend per ENT.   1/1: POD2 DEEPALI o/n, neuro stable, tolerating RA post extubation, pending CT sinus w/ stealth protocol per ENT. Blood consent obtained and pt transfused 1u prbc for HGB 7.9-> 6.8 -> 7.4. CHENCHO drain removed.  LE dopplers + B/L peroneal IM calf DVT, SQL increased to BID prophylactic dosing. Failed swallow eval, remains on tube feeds.   1/2: POD3, DEEPALI o/n. Neuro stable. TF increased goal rate to 65, tolerating feeds. S&S to re-eval in 48 hours. Pending final recs from ID. FOBT neg. Nate drain removed.   1/3: POD4. DEEPALI overnight, pt able to say "no" to questions, o/w neuro exam stable. EEG on, dc'd in afternoon, no seizure. F/u final OR cx, then will have PICC placed. Vanc trough pend 1/3 @9pm. Pend repeat dopplers 1/6. SDU status  1/4: POD 5. DEEPALI overnight. Neuro stable. FEES completed and patient cleared for pureed diet. Pending OR with ENT tomorrow. Vancomycin dc'd per ID.  1/5: POD 6. DEEPALI overnight. Neurostable. Repeat CT sinus complete overnight. POD0 b/l sinus surgery with judith purulence to L maxillary sinus and posterior bony erosiun to L sphenoid sinus. NGT replaced by ENT. CXR w/ NGT in distal esophagus, advanced and re-ordered CXR which showed correct placement of NGT.   1/6: POD 7/POD1. DEEPALI ovn, neurologically stable. SQL ppx resumed tonight. Tolerating pureed diet, NGT removed.   1/7: POD8/POD2. DEEPALI o/n neuro stable. Al cx are groin staph. epidermidis, Vancomycin started.  1/8: POD 9/3. DEEPALI o/n. Neuro stable. Pending AR.     Vital Signs Last 24 Hrs  T(C): 36.7 (07 Jan 2023 22:18), Max: 36.8 (07 Jan 2023 13:21)  T(F): 98 (07 Jan 2023 22:18), Max: 98.2 (07 Jan 2023 13:21)  HR: 70 (07 Jan 2023 23:54) (70 - 96)  BP: 141/65 (07 Jan 2023 23:54) (137/63 - 150/67)  BP(mean): 94 (07 Jan 2023 23:54) (90 - 97)  RR: 17 (07 Jan 2023 23:54) (17 - 18)  SpO2: 96% (07 Jan 2023 23:54) (96% - 100%)    Parameters below as of 07 Jan 2023 23:54  Patient On (Oxygen Delivery Method): room air    I&O's Summary    06 Jan 2023 07:01  -  07 Jan 2023 07:00  --------------------------------------------------------  IN: 2500 mL / OUT: 1850 mL / NET: 650 mL    07 Jan 2023 07:01  -  08 Jan 2023 01:54  --------------------------------------------------------  IN: 2140 mL / OUT: 2650 mL / NET: -510 mL    PHYSICAL EXAM:  GEN: laying in bed, appears well, NAD  NEURO: AOx3. FC, OE spont, hypophonic, R facial. CNII-XII intact. L pupil 4/brisk, R pupil 3/sluggish, EOMI. No pronator drift. LUE/LLE 5/5. RUE 3/5 prox, HG 2/5. RLE 4/5.  CV: RRR +S1/S2  PULM: CTAB  GI: Abd soft, NT/ND  EXT: ext warm, dry, nontender  WOUND: L hemicrani site c/d/i, flap sunken.    LABS:                        7.7    9.07  )-----------( 398      ( 07 Jan 2023 06:26 )             24.3     01-07    141  |  108  |  15  ----------------------------<  112<H>  3.6   |  26  |  0.68    Ca    7.8<L>      07 Jan 2023 06:26  Phos  2.6     01-07  Mg     2.0     01-07              CAPILLARY BLOOD GLUCOSE      POCT Blood Glucose.: 95 mg/dL (08 Jan 2023 00:35)  POCT Blood Glucose.: 176 mg/dL (07 Jan 2023 18:11)  POCT Blood Glucose.: 126 mg/dL (07 Jan 2023 11:32)  POCT Blood Glucose.: 116 mg/dL (07 Jan 2023 06:29)      Drug Levels: [] N/A  Vancomycin Level, Trough: 11.6 ug/mL (01-04 @ 06:53)  Vancomycin Level, Trough: 22.0 ug/mL (01-03 @ 21:00)  Vancomycin Level, Trough: 23.0 ug/mL (01-02 @ 05:15)    CSF Analysis: [] N/A      Allergies    Allergy Status Unknown    Intolerances      MEDICATIONS:  Antibiotics:  cefTRIAXone   IVPB 2000 milliGRAM(s) IV Intermittent every 12 hours  metroNIDAZOLE  IVPB 500 milliGRAM(s) IV Intermittent every 6 hours  vancomycin  IVPB      vancomycin  IVPB 1750 milliGRAM(s) IV Intermittent every 12 hours    Neuro:  acetaminophen    Suspension .. 650 milliGRAM(s) Oral every 6 hours PRN  levETIRAcetam 1000 milliGRAM(s) Oral two times a day  ondansetron Injectable 4 milliGRAM(s) IV Push every 6 hours PRN    Anticoagulation:  enoxaparin Injectable 40 milliGRAM(s) SubCutaneous every 12 hours    OTHER:  amLODIPine   Tablet 5 milliGRAM(s) Oral daily  chlorhexidine 0.12% Liquid 15 milliLiter(s) Swish and Spit two times a day  chlorhexidine 2% Cloths 1 Application(s) Topical <User Schedule>  fluticasone propionate 50 MICROgram(s)/spray Nasal Spray 1 Spray(s) Both Nostrils every 12 hours  insulin lispro (ADMELOG) corrective regimen sliding scale   SubCutaneous every 6 hours  latanoprost 0.005% Ophthalmic Solution 1 Drop(s) Right EYE at bedtime  nystatin Cream 1 Application(s) Topical two times a day  senna 2 Tablet(s) Oral at bedtime  sodium chloride 0.65% Nasal 1 Spray(s) Both Nostrils four times a day    IVF:    CULTURES:  Culture Results:   Growth in fluid media only Staphylococcus epidermidis  Susceptibility to follow. (01-05 @ 16:40)  Culture Results:   Rare Staphylococcus epidermidis (01-05 @ 16:40)    RADIOLOGY & ADDITIONAL TESTS:    ASSESSMENT:  71 y/o F w/ no known PMHx transferred from [place] c/o slurred speech and right sided weakness. CTH initially negative, repeat on 12/23 showed L sided SDH, pts mental status worsened and CTH on 12/28 significant for L frontoparietal collection, MRI completed showed concern for possible empyema. S/p L hemicraniectomy and washout of L subdural empyema 12/30/22. S/p sinus surgery with judith purulence to L maxillary sinus and posterior bony erosiun to L sphenoid sinus 1/5.     PLAN:  NEURO   - Neuro/vitals Q4  - Post-op CTH complete, repeat 1/2 stable  - ENT consulted: CT sinus completed 1/1 and 1/5-extensive opacification of the paranasal sinuses ; rec flonase and nasal saline  - Cont Keppra 1000 BID  - EEG dc'd 1/3, neg for seizure  - Helmet at bedside     PULM   - Maintain SpO2 > 92%     CARDIO   - TTE   - -160    GI   - Pureed diet, no NGT (if needed c/s ENT)   - Bowel regimen  - Protonix   - BM 1/6    RENAL  - Voiding    ENDO  - ISS  - A1c 6.1     HEME   - SQL 40 BID (prophylactic dose)  - FOB neg 1/2   - s/p 1u prbc 1/1   - LE doppler 1/1 b/l calf and left peroneal DVTs; repeat 1/8  - Anti xa 1/3 0.22    ID   - ID recs appreciated: ceftriaxone/metronidazole/vancomycin for subdural/epidural empyema  - Hemicrani washout OR cultures, rare peptostreptococcus 1/6 + staph epi    Dispo:   - SDU status, Pending AR    Discussed with Dr. D'Amico

## 2023-01-08 NOTE — CONSULT NOTE ADULT - ASSESSMENT
73yo F w/ no known PMHx transferred from [place] c/o slurred speech and right sided weakness. CTH initially negative, repeat on 12/23 showed L sided SDH, pts mental status worsened and CTH on 12/28 significant for L frontoparietal collection, MRI completed showed concern for possible empyema. S/p L hemicraniectomy and washout of L subdural empyema 12/30/22.  Now s/p sinus washout 1/5     Problem/Plan - 1:  ·  Problem: Subdural hemorrhage.   ·  Plan: s/p L hemicraniectomy and washout of L subdural empyema 12/30/22  - repeat post op CT scans stable  - seizure ppx per primary team  - ENT following for pansinusitis, ID following for empyema see below  - plan per primary team.     Problem/Plan - 2:  ·  Problem: Subdural empyema.   - ID following appreciate recs  -started on Vancomycin for staph Epidermidis. Get ID follow-up  -atb per ID and primary team. Currently on CTX/Vanc/Metronidazol        Problem/Plan - 3:  ·  Problem: Pansinusitis.   -s/p OR. Follow-up cultures   - abx as above  - nasal care per ENT.     Problem/Plan - 4:  ·  Problem: Hypertension.   ·  Plan: Collateral obtained from daughter, patient had seen cardiologist for cataract surgery preop and was cleared but was told borderline HTN  - c/w Amlodipine 5mg started 1/3.     Problem/Plan - 5:  ·  Problem: Dysphagia.   ·  Plan: NG for tube feeds to be removed that patient passed dysphagia for pureed diet  - S&S FEES on 1/5 and approved for diet.     Problem/Plan - 6:  ·  Problem: Calf DVT (deep venous thrombosis).   ·  Plan: b/l calf DVTs  - c/w DVT ppx dose Lovenox  - repeat doppler 1/8 per vascular to monitor for propagation, still pending      Problem/Plan - 7:  ·  Problem: Prediabetes.   ·  Plan: A1c 6.1  - c/w ISS  - outpatient follow up with a PCP and would benefit from weight loss long term.     Problem/Plan - 8:  ·  Problem: Anemia.   ·  Plan: Hgb 7-9, unknown baseline  - AOCD based on iron studies  - no signs of active bleeding.     Problem/Plan - 9:  ·  Problem: Thrombocytosis.   ·  Plan: PLT minimally elevated  - likely reactive.     Problem/Plan - 10:  ·  Problem: Morbid obesity.   ·  Plan; BMI 47.8, affects all aspects of care  - outpatient follow up.     Problem/Plan - 11:  ·  Problem: Prophylactic measure.   ·  Plan: DVT ppx: Lovenox    Discussed with primary team. Medicine service remains available to assist with any questions or concerns.

## 2023-01-08 NOTE — PROGRESS NOTE ADULT - ASSESSMENT
Assessment and Plan:  JEMIMA DUFFY is a  73 y/o F w/ no known PMHx transferred from [place] c/o slurred speech and right sided weakness. CTH initially negative, repeat on 12/23 showed L sided SDH, pts mental status worsened and CTH on 12/28 significant for L frontoparietal collection, MRI completed showed concern for possible empyema. S/p L hemicraniectomy and washout of L subdural empyema 12/30/22. Imaging c/w a pansinusitis which persists despite neurosurgical intervention and IV abx and sphenoid sinuses likely source of intracranial complications. After discussion with neurosurgery and ID team, plan was recommended to proceed with sinus surgery to establish paranasal sinus patency, remove inflammatory necrotic debris and ultimately get source control.     PLAN:  - Please USE NASAL rinse twice a day ----  use saline water and kendra syringe   - Please continue care as per NSGY   - Will remove nasal smith splints  - F/u OR cx - gram with GPCs in pairs  - F/u ID reccs - currently on vanc/flagyl/ceftriaxone

## 2023-01-08 NOTE — CONSULT NOTE ADULT - SUBJECTIVE AND OBJECTIVE BOX
Patient is a 72y old  Female who presents with a chief complaint of Subdural collection (08 Jan 2023 08:55)    INTERVAL EVENTS:    SUBJECTIVE:  Patient was seen and examined at bedside. More interactive, reports feeling fine, denies chest pain, no abdominal pain, no SOB. Daughter at the bedside. Nursing reports lose stools, no diarrhea. Denies other complaints  Started on Vancomycin yesterday, repeat Doppler US LE pending.    Rest of 12 point Review of systems negative unless otherwise documented elsewhere in note.     Diet, Pureed (01-04-23 @ 11:54) [Active]      MEDICATIONS:  MEDICATIONS  (STANDING):  amLODIPine   Tablet 5 milliGRAM(s) Oral daily  cefTRIAXone   IVPB 2000 milliGRAM(s) IV Intermittent every 12 hours  chlorhexidine 0.12% Liquid 15 milliLiter(s) Swish and Spit two times a day  chlorhexidine 2% Cloths 1 Application(s) Topical <User Schedule>  enoxaparin Injectable 40 milliGRAM(s) SubCutaneous every 12 hours  fluticasone propionate 50 MICROgram(s)/spray Nasal Spray 1 Spray(s) Both Nostrils every 12 hours  insulin lispro (ADMELOG) corrective regimen sliding scale   SubCutaneous every 6 hours  latanoprost 0.005% Ophthalmic Solution 1 Drop(s) Right EYE at bedtime  levETIRAcetam 1000 milliGRAM(s) Oral two times a day  metroNIDAZOLE  IVPB 500 milliGRAM(s) IV Intermittent every 6 hours  nystatin Cream 1 Application(s) Topical two times a day  senna 2 Tablet(s) Oral at bedtime  sodium chloride 0.65% Nasal 1 Spray(s) Both Nostrils four times a day  vancomycin  IVPB      vancomycin  IVPB 1750 milliGRAM(s) IV Intermittent every 12 hours    MEDICATIONS  (PRN):  acetaminophen    Suspension .. 650 milliGRAM(s) Oral every 6 hours PRN Temp greater or equal to 38C (100.4F), Mild Pain (1 - 3)  ondansetron Injectable 4 milliGRAM(s) IV Push every 6 hours PRN Nausea and/or Vomiting      Allergies    Allergy Status Unknown    Intolerances        OBJECTIVE:  Vital Signs Last 24 Hrs  T(C): 36.9 (08 Jan 2023 09:00), Max: 36.9 (08 Jan 2023 09:00)  T(F): 98.4 (08 Jan 2023 09:00), Max: 98.4 (08 Jan 2023 09:00)  HR: 90 (08 Jan 2023 08:13) (70 - 90)  BP: 158/71 (08 Jan 2023 08:13) (131/61 - 158/71)  BP(mean): 102 (08 Jan 2023 08:13) (88 - 102)  RR: 18 (08 Jan 2023 08:13) (16 - 18)  SpO2: 99% (08 Jan 2023 08:13) (96% - 100%)    Parameters below as of 08 Jan 2023 08:13  Patient On (Oxygen Delivery Method): room air      I&O's Summary    07 Jan 2023 07:01  -  08 Jan 2023 07:00  --------------------------------------------------------  IN: 2140 mL / OUT: 3550 mL / NET: -1410 mL    08 Jan 2023 07:01  -  08 Jan 2023 11:31  --------------------------------------------------------  IN: 540 mL / OUT: 800 mL / NET: -260 mL        PHYSICAL EXAM:  General: awake, alert, making eye contacted, looking comfortable, + aphasia, no labored breathing on RA  HEENT: Left cranial incision, pupils reactive to light right>left.   Lungs: poor inspiration, no crackles, no wheezes  Heart: RRR  Abdomen: soft, non-tender  Extremities:  warm, no edema, no tenderness, no focal deficit     LABS:                        8.6    7.22  )-----------( 368      ( 08 Jan 2023 07:08 )             27.5     01-08    143  |  106  |  9   ----------------------------<  160<H>  4.7   |  28  |  0.63    Ca    8.6      08 Jan 2023 07:08  Phos  3.1     01-08  Mg     1.9     01-08    TPro  6.8  /  Alb  2.6<L>  /  TBili  0.2  /  DBili  x   /  AST  14  /  ALT  13  /  AlkPhos  83  01-08    LIVER FUNCTIONS - ( 08 Jan 2023 07:08 )  Alb: 2.6 g/dL / Pro: 6.8 g/dL / ALK PHOS: 83 U/L / ALT: 13 U/L / AST: 14 U/L / GGT: x             CAPILLARY BLOOD GLUCOSE      POCT Blood Glucose.: 117 mg/dL (08 Jan 2023 11:29)  POCT Blood Glucose.: 133 mg/dL (08 Jan 2023 06:59)  POCT Blood Glucose.: 95 mg/dL (08 Jan 2023 00:35)  POCT Blood Glucose.: 176 mg/dL (07 Jan 2023 18:11)  POCT Blood Glucose.: 126 mg/dL (07 Jan 2023 11:32)        MICRODATA:    Culture - Tissue with Gram Stain (collected 05 Jan 2023 16:40)  Source: .Tissue #5 left sphenoid tissue  Gram Stain (05 Jan 2023 20:33):    No organisms seen    Rare WBC's  Final Report (08 Jan 2023 10:03):    Growth in fluid media only Staphylococcus epidermidis  Organism: Staphylococcus epidermidis (08 Jan 2023 10:03)  Organism: Staphylococcus epidermidis (08 Jan 2023 10:03)    Culture - Surgical Swab (collected 05 Jan 2023 16:40)  Source: .Surgical Swab #4 left sphenoid #2  Gram Stain (05 Jan 2023 20:33):    Rare Gram positive cocci in pairs    Rare to few White blood cells  Final Report (08 Jan 2023 09:56):    Growth in fluid media only Staphylococcus epidermidis  Organism: Staphylococcus epidermidis (08 Jan 2023 09:56)  Organism: Staphylococcus epidermidis (08 Jan 2023 09:56)    Culture - Surgical Swab (collected 05 Jan 2023 16:40)  Source: .Surgical Swab #3 left maxillary #2  Gram Stain (05 Jan 2023 20:30):    No organisms seen    Rare to few White blood cells  Final Report (08 Jan 2023 09:55):    Growth in fluid media only Staphylococcus epidermidis  Organism: Staphylococcus epidermidis (08 Jan 2023 09:55)  Organism: Staphylococcus epidermidis (08 Jan 2023 09:55)    Culture - Surgical Swab (collected 05 Jan 2023 16:40)  Source: .Surgical Swab #2 left maxillary  Gram Stain (05 Jan 2023 20:31):    No organisms seen    Rare to few White blood cells  Final Report (07 Jan 2023 12:46):    Rare Staphylococcus epidermidis  Organism: Staphylococcus epidermidis (07 Jan 2023 12:46)  Organism: Staphylococcus epidermidis (07 Jan 2023 12:46)    Culture - Surgical Swab (collected 05 Jan 2023 16:40)  Source: .Surgical Swab #1 left sphenoid  Gram Stain (05 Jan 2023 20:33):    Rare Gram positive cocci in pairs    Rare to few White blood cells  Final Report (08 Jan 2023 10:03):    Growth in fluid media only Staphylococcus epidermidis  Organism: Staphylococcus epidermidis (08 Jan 2023 10:03)  Organism: Staphylococcus epidermidis (08 Jan 2023 10:03)        RADIOLOGY/OTHER STUDIES:

## 2023-01-08 NOTE — PROGRESS NOTE ADULT - SUBJECTIVE AND OBJECTIVE BOX
OTOLARYNGOLOGY (ENT) PROGRESS NOTE    PATIENT: JEMIMA DUFFY  MRN: [id number]  : 52  [id number]  DATE OF SERVICE:  23  			  Subjective/ Interval:   : patient seen bedside and scoped, decided to bring her to OR  for sinus surgery   : After discussion with neurosurgery and ID team, plan was recommended to proceed with sinus surgery to establish paranasal sinus patency, remove inflammatory necrotic debris and ultimately get source control.   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 The right sphenoid mucosa was completely eroded with grossly exposed bone w deposits of granulation; small area of dura exposed around V2 prominence, with no CSF lerak. There was severe edema in both maxillaries and posterior ethmoids as well.  : No acute events overnight. NGt was removed by primary team because tolerating diet. Per notes, patient is more interactive. At bedside, appears frustrated by lack of communication, but awake/alert with no significant nasal drainage. Per nursing, patient is receiving nasal rinses.  : NAEON - Vanc started by primary team because cx growing staph epi. Minimal nasal drainage, on nasal rinses. Afebrile with downtrending WBC.      PAST MEDICAL & SURGICAL HISTORY:    Allergy Status Unknown    MEDICATIONS  (STANDING):  amLODIPine   Tablet 5 milliGRAM(s) Oral daily  cefTRIAXone   IVPB 2000 milliGRAM(s) IV Intermittent every 12 hours  chlorhexidine 0.12% Liquid 15 milliLiter(s) Swish and Spit two times a day  chlorhexidine 2% Cloths 1 Application(s) Topical <User Schedule>  enoxaparin Injectable 40 milliGRAM(s) SubCutaneous every 12 hours  fluticasone propionate 50 MICROgram(s)/spray Nasal Spray 1 Spray(s) Both Nostrils every 12 hours  insulin lispro (ADMELOG) corrective regimen sliding scale   SubCutaneous every 6 hours  latanoprost 0.005% Ophthalmic Solution 1 Drop(s) Right EYE at bedtime  levETIRAcetam 1000 milliGRAM(s) Oral two times a day  metroNIDAZOLE  IVPB 500 milliGRAM(s) IV Intermittent every 6 hours  nystatin Cream 1 Application(s) Topical two times a day  senna 2 Tablet(s) Oral at bedtime  sodium chloride 0.65% Nasal 1 Spray(s) Both Nostrils four times a day  sodium chloride 0.9%. 1000 milliLiter(s) (100 mL/Hr) IV Continuous <Continuous>    MEDICATIONS  (PRN):  acetaminophen    Suspension .. 650 milliGRAM(s) Oral every 6 hours PRN Temp greater or equal to 38C (100.4F), Mild Pain (1 - 3)  ondansetron Injectable 4 milliGRAM(s) IV Push every 6 hours PRN Nausea and/or Vomiting      Objective    ICU Vital Signs Last 24 Hrs  T(C): 36.8 (2023 05:08), Max: 36.8 (2023 13:21)  T(F): 98.3 (2023 05:08), Max: 98.3 (2023 05:08)  HR: 76 (2023 03:45) (70 - 88)  BP: 131/61 (2023 03:45) (131/61 - 145/65)  BP(mean): 88 (2023 03:45) (88 - 94)  ABP: --  ABP(mean): --  RR: 16 (2023 03:45) (16 - 18)  SpO2: 98% (2023 03:45) (96% - 100%)    O2 Parameters below as of 2023 03:45  Patient On (Oxygen Delivery Method): room air            O2 Parameters below as of 2023 03:54  Patient On (Oxygen Delivery Method): room air        PHYSICAL EXAM:  General: NAD, pt is comfortably laying in bed, attempts to say yes and no to questions, A&O x 3 (person, place, time), pt said her name, on RA   HEENT: L hemicrani incision site C/D/I with staples  Cardiovascular: RRR, normal S1 and S2   Respiratory: lungs CTAB, no wheezing, rhonchi, or crackles   GI:  abd soft, NTND   Neuro: + mixed aphasia

## 2023-01-09 LAB
ANION GAP SERPL CALC-SCNC: 10 MMOL/L — SIGNIFICANT CHANGE UP (ref 5–17)
BUN SERPL-MCNC: 8 MG/DL — SIGNIFICANT CHANGE UP (ref 7–23)
CALCIUM SERPL-MCNC: 8.4 MG/DL — SIGNIFICANT CHANGE UP (ref 8.4–10.5)
CHLORIDE SERPL-SCNC: 103 MMOL/L — SIGNIFICANT CHANGE UP (ref 96–108)
CO2 SERPL-SCNC: 28 MMOL/L — SIGNIFICANT CHANGE UP (ref 22–31)
CREAT SERPL-MCNC: 0.6 MG/DL — SIGNIFICANT CHANGE UP (ref 0.5–1.3)
EGFR: 96 ML/MIN/1.73M2 — SIGNIFICANT CHANGE UP
GLUCOSE BLDC GLUCOMTR-MCNC: 122 MG/DL — HIGH (ref 70–99)
GLUCOSE BLDC GLUCOMTR-MCNC: 156 MG/DL — HIGH (ref 70–99)
GLUCOSE BLDC GLUCOMTR-MCNC: 180 MG/DL — HIGH (ref 70–99)
GLUCOSE SERPL-MCNC: 138 MG/DL — HIGH (ref 70–99)
HCT VFR BLD CALC: 28.3 % — LOW (ref 34.5–45)
HGB BLD-MCNC: 9 G/DL — LOW (ref 11.5–15.5)
MAGNESIUM SERPL-MCNC: 1.9 MG/DL — SIGNIFICANT CHANGE UP (ref 1.6–2.6)
MCHC RBC-ENTMCNC: 29.4 PG — SIGNIFICANT CHANGE UP (ref 27–34)
MCHC RBC-ENTMCNC: 31.8 GM/DL — LOW (ref 32–36)
MCV RBC AUTO: 92.5 FL — SIGNIFICANT CHANGE UP (ref 80–100)
NRBC # BLD: 0 /100 WBCS — SIGNIFICANT CHANGE UP (ref 0–0)
PHOSPHATE SERPL-MCNC: 3 MG/DL — SIGNIFICANT CHANGE UP (ref 2.5–4.5)
PLATELET # BLD AUTO: 364 K/UL — SIGNIFICANT CHANGE UP (ref 150–400)
POTASSIUM SERPL-MCNC: 3.9 MMOL/L — SIGNIFICANT CHANGE UP (ref 3.5–5.3)
POTASSIUM SERPL-SCNC: 3.9 MMOL/L — SIGNIFICANT CHANGE UP (ref 3.5–5.3)
RBC # BLD: 3.06 M/UL — LOW (ref 3.8–5.2)
RBC # FLD: 17.7 % — HIGH (ref 10.3–14.5)
SODIUM SERPL-SCNC: 141 MMOL/L — SIGNIFICANT CHANGE UP (ref 135–145)
VANCOMYCIN TROUGH SERPL-MCNC: 22.6 UG/ML — HIGH (ref 10–20)
WBC # BLD: 7.65 K/UL — SIGNIFICANT CHANGE UP (ref 3.8–10.5)
WBC # FLD AUTO: 7.65 K/UL — SIGNIFICANT CHANGE UP (ref 3.8–10.5)

## 2023-01-09 PROCEDURE — 99232 SBSQ HOSP IP/OBS MODERATE 35: CPT

## 2023-01-09 PROCEDURE — 99233 SBSQ HOSP IP/OBS HIGH 50: CPT

## 2023-01-09 PROCEDURE — 36569 INSJ PICC 5 YR+ W/O IMAGING: CPT

## 2023-01-09 PROCEDURE — 76937 US GUIDE VASCULAR ACCESS: CPT | Mod: 26,59

## 2023-01-09 RX ORDER — VANCOMYCIN HCL 1 G
1500 VIAL (EA) INTRAVENOUS EVERY 12 HOURS
Refills: 0 | Status: DISCONTINUED | OUTPATIENT
Start: 2023-01-09 | End: 2023-01-12

## 2023-01-09 RX ORDER — AMLODIPINE BESYLATE 2.5 MG/1
10 TABLET ORAL DAILY
Refills: 0 | Status: DISCONTINUED | OUTPATIENT
Start: 2023-01-09 | End: 2023-01-09

## 2023-01-09 RX ORDER — AMLODIPINE BESYLATE 2.5 MG/1
10 TABLET ORAL DAILY
Refills: 0 | Status: DISCONTINUED | OUTPATIENT
Start: 2023-01-09 | End: 2023-01-12

## 2023-01-09 RX ORDER — SODIUM CHLORIDE 9 MG/ML
10 INJECTION INTRAMUSCULAR; INTRAVENOUS; SUBCUTANEOUS
Refills: 0 | Status: DISCONTINUED | OUTPATIENT
Start: 2023-01-09 | End: 2023-01-12

## 2023-01-09 RX ADMIN — CHLORHEXIDINE GLUCONATE 15 MILLILITER(S): 213 SOLUTION TOPICAL at 05:16

## 2023-01-09 RX ADMIN — NYSTATIN CREAM 1 APPLICATION(S): 100000 CREAM TOPICAL at 05:18

## 2023-01-09 RX ADMIN — CHLORHEXIDINE GLUCONATE 1 APPLICATION(S): 213 SOLUTION TOPICAL at 05:16

## 2023-01-09 RX ADMIN — Medication 2: at 18:15

## 2023-01-09 RX ADMIN — LEVETIRACETAM 1000 MILLIGRAM(S): 250 TABLET, FILM COATED ORAL at 05:15

## 2023-01-09 RX ADMIN — Medication 100 MILLIGRAM(S): at 23:14

## 2023-01-09 RX ADMIN — ENOXAPARIN SODIUM 40 MILLIGRAM(S): 100 INJECTION SUBCUTANEOUS at 09:39

## 2023-01-09 RX ADMIN — Medication 100 MILLIGRAM(S): at 12:09

## 2023-01-09 RX ADMIN — Medication 1 SPRAY(S): at 23:14

## 2023-01-09 RX ADMIN — LEVETIRACETAM 1000 MILLIGRAM(S): 250 TABLET, FILM COATED ORAL at 17:14

## 2023-01-09 RX ADMIN — ENOXAPARIN SODIUM 40 MILLIGRAM(S): 100 INJECTION SUBCUTANEOUS at 23:14

## 2023-01-09 RX ADMIN — NYSTATIN CREAM 1 APPLICATION(S): 100000 CREAM TOPICAL at 17:14

## 2023-01-09 RX ADMIN — AMLODIPINE BESYLATE 5 MILLIGRAM(S): 2.5 TABLET ORAL at 05:15

## 2023-01-09 RX ADMIN — SENNA PLUS 2 TABLET(S): 8.6 TABLET ORAL at 23:14

## 2023-01-09 RX ADMIN — CEFTRIAXONE 100 MILLIGRAM(S): 500 INJECTION, POWDER, FOR SOLUTION INTRAMUSCULAR; INTRAVENOUS at 05:15

## 2023-01-09 RX ADMIN — LATANOPROST 1 DROP(S): 0.05 SOLUTION/ DROPS OPHTHALMIC; TOPICAL at 23:14

## 2023-01-09 RX ADMIN — Medication 1 SPRAY(S): at 05:18

## 2023-01-09 RX ADMIN — Medication 1 SPRAY(S): at 12:09

## 2023-01-09 RX ADMIN — Medication 1 SPRAY(S): at 17:13

## 2023-01-09 RX ADMIN — Medication 300 MILLIGRAM(S): at 17:15

## 2023-01-09 RX ADMIN — CHLORHEXIDINE GLUCONATE 15 MILLILITER(S): 213 SOLUTION TOPICAL at 17:13

## 2023-01-09 RX ADMIN — Medication 1 SPRAY(S): at 17:14

## 2023-01-09 RX ADMIN — Medication 100 MILLIGRAM(S): at 17:14

## 2023-01-09 RX ADMIN — Medication 300 MILLIGRAM(S): at 09:39

## 2023-01-09 RX ADMIN — Medication 2: at 06:43

## 2023-01-09 RX ADMIN — Medication 100 MILLIGRAM(S): at 05:17

## 2023-01-09 RX ADMIN — CEFTRIAXONE 100 MILLIGRAM(S): 500 INJECTION, POWDER, FOR SOLUTION INTRAMUSCULAR; INTRAVENOUS at 17:14

## 2023-01-09 NOTE — CHART NOTE - NSCHARTNOTEFT_GEN_A_CORE
Admitting Diagnosis:   Patient is a 72y old  Female who presents with a chief complaint of Subdural collection (09 Jan 2023 13:34)    PAST MEDICAL & SURGICAL HISTORY:  No known PMH    Current Nutrition Order:  Pureed diet    PO Intake: Good (%) [ x  ]  Fair (50-75%) [ x  ] Poor (<25%) [   ]    GI Issues:   WDL, last BM 1/8  No n/v/d/c reported. fecal incontinence.   No abd distention or discomfort    Pain:  No pain noted    Skin Integrity:  Surgical incision, jeanna score 16  +1 generalized pitting edema  No pressure ulcers noted    Labs:   01-09    141  |  103  |  8   ----------------------------<  138<H>  3.9   |  28  |  0.60    Ca    8.4      09 Jan 2023 06:08  Phos  3.0     01-09  Mg     1.9     01-09    TPro  6.8  /  Alb  2.6<L>  /  TBili  0.2  /  DBili  x   /  AST  14  /  ALT  13  /  AlkPhos  83  01-08    CAPILLARY BLOOD GLUCOSE    POCT Blood Glucose.: 122 mg/dL (09 Jan 2023 11:47)  POCT Blood Glucose.: 156 mg/dL (09 Jan 2023 06:39)  POCT Blood Glucose.: 100 mg/dL (08 Jan 2023 23:40)  POCT Blood Glucose.: 145 mg/dL (08 Jan 2023 17:53)    Medications:  MEDICATIONS  (STANDING):  amLODIPine   Tablet 10 milliGRAM(s) Oral daily  cefTRIAXone   IVPB 2000 milliGRAM(s) IV Intermittent every 12 hours  chlorhexidine 0.12% Liquid 15 milliLiter(s) Swish and Spit two times a day  chlorhexidine 2% Cloths 1 Application(s) Topical <User Schedule>  enoxaparin Injectable 40 milliGRAM(s) SubCutaneous every 12 hours  fluticasone propionate 50 MICROgram(s)/spray Nasal Spray 1 Spray(s) Both Nostrils every 12 hours  insulin lispro (ADMELOG) corrective regimen sliding scale   SubCutaneous every 6 hours  latanoprost 0.005% Ophthalmic Solution 1 Drop(s) Right EYE at bedtime  levETIRAcetam 1000 milliGRAM(s) Oral two times a day  metroNIDAZOLE  IVPB 500 milliGRAM(s) IV Intermittent every 6 hours  nystatin Cream 1 Application(s) Topical two times a day  senna 2 Tablet(s) Oral at bedtime  sodium chloride 0.65% Nasal 1 Spray(s) Both Nostrils four times a day  vancomycin  IVPB 1500 milliGRAM(s) IV Intermittent every 12 hours    MEDICATIONS  (PRN):  acetaminophen    Suspension .. 650 milliGRAM(s) Oral every 6 hours PRN Temp greater or equal to 38C (100.4F), Mild Pain (1 - 3)  ondansetron Injectable 4 milliGRAM(s) IV Push every 6 hours PRN Nausea and/or Vomiting    Admitting Anthropometrics:  Height for BMI (FEET)	5 Feet  Height for BMI (INCHES)	4 Inch(s)  Height for BMI (CENTIMETERS)	162.56 Centimeter(s)  Weight for BMI (lbs)	278.4 lb  Weight for BMI (kg)	126.3 kg  Body Mass Index	47.7  IBW 120lbs  %%    Weight Change:   No new weights obtained during this admission. Please cont to trend weight biweekly.     Nutrition Focused Physical Exam: Completed [   ]  Not Pertinent [  x ]    Estimated energy needs:   Based on Standards of Care pt within % IBW thus actual body weight used for all calculations. Needs adjusted for advanced age, morbid obesity, post-op wound healing.   Kcal (30-35 kcal/kg): 1867-8656 kcal  Protein (1.2-1.5 g/kg pro): 66-82g pro  **Adjust fluids per team    Subjective:   73yo F w/ no known PMHx. on 12/22 presented to [facility] c/o slurred speech and R sided weakness, stroke code called, imaging initially negative for stroke or hemorrhage, pt's neuro exam worsened and subsequent CTH the following day showed L sided SDH, admitted to ICU for further monitoring. Pt neuro declined on 12/25 with AMS requiring intubation, noted to have episodes of twitching concerning for seizures, started on keppra and EEG placed. No epileptiform activity noted. CTH on 12/28 significant for L frontoparietal collection, MRI completed showed concern for possible empyema. Daughter reported pt likely had a sinus infection recently, unsure of abx. At [place] pt started on ceftriaxone for UTI that was switched to vancomycin this am. Pt transferred to [facility] for further management and now s/p Left hemicraniectomy and washout of L subdural empyema 12/31. Transferred back to ICU intubated for further monitoring, and extubated later that day to RA 12/31. Pt failed SLP eval 1/1 and initiated on EN feeds 1/1. Pt now s/p FEES 1/4 with recommendations for pureed and thin liquids. Pt with minimal dentition so other textures were not tested- SLP will cont to follow and adjust recs prn.     On assessment, pt resting in bed, noted to have difficulty word finding, but otherwise able to participate in assessment. Pt does better with yes and no questions. Currently on pureed diet tolerating PO well, consuming >50% of most meals. No reported n/v/d/c. No abd distention or discomfort noted. Last BM 1/8. RD cont to encourage adequate PO intake with emphasis on lean protein to support wound healing. RD to follow.        Previous Nutrition Diagnosis:  Inadequate Oral Intake r/t inability to meet nutritional needs orally AEB necessity for NPO with nasogastric enteral feeding status    Active [   ]  Resolved [ x  ]    If resolved, new PES: Increased kcal, pro needs RT wound healing post-op AEB s/p Left hemicraniectomy and washout of L subdural empyema    Goal: Pt to consistently meet at least 75% of EEE during admission    Recommendations:  1. Pureed diet  >> Cont to adjust diet consistency per SLPs recommendations for safest, least restrictive texture modification   2. Pain and bowel regimen per team   3. Cont to monitor lytes and replete prn   4. RD diet edu prn  **Disc with teams    Education:   RD cont to encourage adequate PO intake with emphasis on lean protein to support wound healing.     Risk Level: High [   ] Moderate [ x  ] Low [   ].

## 2023-01-09 NOTE — PROGRESS NOTE ADULT - SUBJECTIVE AND OBJECTIVE BOX
SUBJECTIVE: Patient awake and alert, able to communicate her needs despite expressive aphasia. Denies pain or new symptoms at this time.     HOSPITAL COURSE:  12/31: Admitted to NSICU, s/p Left hemicraniectomy and washout of L subdural empyema. Pancultured. Vanc/ceftriaxone/flagyl for empiric coverage. OR cultures demonstrated gram negative cocci in pairs. ENT and ID consulted, recommend cont vanc 2g, flagyl 750mg q8hrs, ceftriaxone 2g q12hrs. Extubated to room air, RUE/RLE strength improving. Pend CT sinus stealth protocol pend per ENT.   1/1: POD2 DEEPALI o/n, neuro stable, tolerating RA post extubation, pending CT sinus w/ stealth protocol per ENT. Blood consent obtained and pt transfused 1u prbc for HGB 7.9-> 6.8 -> 7.4. CHENCHO drain removed.  LE dopplers + B/L peroneal IM calf DVT, SQL increased to BID prophylactic dosing. Failed swallow eval, remains on tube feeds.   1/2: POD3, DEEPALI o/n. Neuro stable. TF increased goal rate to 65, tolerating feeds. S&S to re-eval in 48 hours. Pending final recs from ID. FOBT neg. Nate drain removed.   1/3: POD4. DEEPALI overnight, pt able to say "no" to questions, o/w neuro exam stable. EEG on, dc'd in afternoon, no seizure. F/u final OR cx, then will have PICC placed. Vanc trough pend 1/3 @9pm. Pend repeat dopplers 1/6. SDU status  1/4: POD 5. DEEPALI overnight. Neuro stable. FEES completed and patient cleared for pureed diet. Pending OR with ENT tomorrow. Vancomycin dc'd per ID.  1/5: POD 6. DEEPALI overnight. Neurostable. Repeat CT sinus complete overnight. POD0 b/l sinus surgery with judith purulence to L maxillary sinus and posterior bony erosiun to L sphenoid sinus. NGT replaced by ENT. CXR w/ NGT in distal esophagus, advanced and re-ordered CXR which showed correct placement of NGT.   1/6: POD 7/POD1. DEEPALI ovn, neurologically stable. SQL ppx resumed tonight. Tolerating pureed diet, NGT removed.   1/7: POD8/POD2. DEEPALI o/n neuro stable. Al cx are groin staph. epidermidis, Vancomycin started.  1/8: POD 9/3. DEEPALI o/n. Neuro stable. Pending AR.   1/9: POD 10/4. DEEPALI o/n. Neuro stable. Pending AR.     Vital Signs Last 24 Hrs  T(C): 36.9 (08 Jan 2023 22:18), Max: 37.7 (08 Jan 2023 14:00)  T(F): 98.4 (08 Jan 2023 22:18), Max: 99.9 (08 Jan 2023 14:00)  HR: 82 (08 Jan 2023 23:40) (76 - 94)  BP: 150/66 (08 Jan 2023 23:40) (131/61 - 158/71)  BP(mean): 95 (08 Jan 2023 23:40) (88 - 104)  RR: 17 (08 Jan 2023 23:40) (16 - 18)  SpO2: 98% (08 Jan 2023 23:40) (98% - 99%)    Parameters below as of 08 Jan 2023 23:40  Patient On (Oxygen Delivery Method): room air    I&O's Summary    07 Jan 2023 07:01  -  08 Jan 2023 07:00  --------------------------------------------------------  IN: 2140 mL / OUT: 3550 mL / NET: -1410 mL    08 Jan 2023 07:01  -  09 Jan 2023 00:32  --------------------------------------------------------  IN: 1530 mL / OUT: 2500 mL / NET: -970 mL    PHYSICAL EXAM:  GEN: laying in bed, appears well, NAD  NEURO: AOx3. FC, OE spont, hypophonic, R facial. CNII-XII intact. L pupil 4/brisk, R pupil 3/sluggish, EOMI. No pronator drift. LUE/LLE 5/5. RUE 3/5 prox, HG 2/5. RLE 4/5.  CV: RRR +S1/S2  PULM: CTAB  GI: Abd soft, NT/ND  EXT: ext warm, dry, nontender  WOUND: L hemicrani site c/d/i, flap sunken, staples in place     LABS:                        8.6    7.22  )-----------( 368      ( 08 Jan 2023 07:08 )             27.5     01-08    143  |  106  |  9   ----------------------------<  160<H>  4.7   |  28  |  0.63    Ca    8.6      08 Jan 2023 07:08  Phos  3.1     01-08  Mg     1.9     01-08    TPro  6.8  /  Alb  2.6<L>  /  TBili  0.2  /  DBili  x   /  AST  14  /  ALT  13  /  AlkPhos  83  01-08    CAPILLARY BLOOD GLUCOSE    POCT Blood Glucose.: 100 mg/dL (08 Jan 2023 23:40)  POCT Blood Glucose.: 145 mg/dL (08 Jan 2023 17:53)  POCT Blood Glucose.: 117 mg/dL (08 Jan 2023 11:29)  POCT Blood Glucose.: 133 mg/dL (08 Jan 2023 06:59)  POCT Blood Glucose.: 95 mg/dL (08 Jan 2023 00:35)      Drug Levels: [] N/A  Vancomycin Level, Trough: 11.6 ug/mL (01-04 @ 06:53)  Vancomycin Level, Trough: 22.0 ug/mL (01-03 @ 21:00)  Vancomycin Level, Trough: 23.0 ug/mL (01-02 @ 05:15)    CSF Analysis: [] N/A    Allergies    Allergy Status Unknown    Intolerances      MEDICATIONS:  Antibiotics:  cefTRIAXone   IVPB 2000 milliGRAM(s) IV Intermittent every 12 hours  metroNIDAZOLE  IVPB 500 milliGRAM(s) IV Intermittent every 6 hours  vancomycin  IVPB      vancomycin  IVPB 1750 milliGRAM(s) IV Intermittent every 12 hours    Neuro:  acetaminophen    Suspension .. 650 milliGRAM(s) Oral every 6 hours PRN  levETIRAcetam 1000 milliGRAM(s) Oral two times a day  ondansetron Injectable 4 milliGRAM(s) IV Push every 6 hours PRN    Anticoagulation:  enoxaparin Injectable 40 milliGRAM(s) SubCutaneous every 12 hours    OTHER:  amLODIPine   Tablet 5 milliGRAM(s) Oral daily  chlorhexidine 0.12% Liquid 15 milliLiter(s) Swish and Spit two times a day  chlorhexidine 2% Cloths 1 Application(s) Topical <User Schedule>  fluticasone propionate 50 MICROgram(s)/spray Nasal Spray 1 Spray(s) Both Nostrils every 12 hours  insulin lispro (ADMELOG) corrective regimen sliding scale   SubCutaneous every 6 hours  latanoprost 0.005% Ophthalmic Solution 1 Drop(s) Right EYE at bedtime  nystatin Cream 1 Application(s) Topical two times a day  senna 2 Tablet(s) Oral at bedtime  sodium chloride 0.65% Nasal 1 Spray(s) Both Nostrils four times a day    IVF:    CULTURES:  Culture Results:   Growth in fluid media only Staphylococcus epidermidis (01-05 @ 16:40)  Culture Results:   Rare Staphylococcus epidermidis (01-05 @ 16:40)    RADIOLOGY & ADDITIONAL TESTS:      ASSESSMENT:  71 y/o F w/ no known PMHx transferred from [place] c/o slurred speech and right sided weakness. CTH initially negative, repeat on 12/23 showed L sided SDH, pts mental status worsened and CTH on 12/28 significant for L frontoparietal collection, MRI completed showed concern for possible empyema. S/p L hemicraniectomy and washout of L subdural empyema 12/30/22. S/p sinus surgery with judith purulence to L maxillary sinus and posterior bony erosiun to L sphenoid sinus 1/5.     PLAN:  NEURO   - Neuro/vitals Q4  - Post-op CTH complete, repeat 1/2 stable  - ENT consulted: CT sinus completed 1/1 and 1/5-extensive opacification of the paranasal sinuses ; rec Flonase and nasal saline, smith splints removed 1/8  - Cont Keppra 1000 BID  - EEG dc'd 1/3, neg for seizure  - Helmet at bedside     PULM   - Maintain SpO2 > 92%     CARDIO   - TTE   - -160    GI   - Pureed diet, no NGT (if needed c/s ENT)   - Bowel regimen  - Protonix   - BM 1/6    RENAL  - Voiding    ENDO  - ISS  - A1c 6.1     HEME   - SQL 40 BID (prophylactic dose)  - FOB neg 1/2   - s/p 1u prbc 1/1   - LE doppler 1/1 b/l calf and left peroneal DVTs; repeat 1/8  - Anti xa 1/3 0.22    ID   - ID recs appreciated: ceftriaxone/metronidazole/vancomycin for subdural/epidural empyema  - Hemicrani washout OR cultures, rare peptostreptococcus 1/6 + staph epi    Dispo:   - SDU status, Pending AR    Discussed with Dr. D'Amico

## 2023-01-09 NOTE — CHART NOTE - NSCHARTNOTEFT_GEN_A_CORE
Patient neurologically stable. Norvasc increased to 10mg daily. Vanc trough 22, decreased dose from 1750mg to 1500mg. Pending exchange from midline to PICC.

## 2023-01-09 NOTE — PROGRESS NOTE ADULT - PROBLEM SELECTOR PLAN 11
DVT ppx: Lovenox    Discussed with primary team. Medicine service remains available to assist with any questions or concerns

## 2023-01-09 NOTE — PROGRESS NOTE ADULT - PROBLEM SELECTOR PLAN 4
Collateral obtained from daughter, patient had seen cardiologist for cataract surgery preop and was cleared but was told borderline HTN  - remains above goal  - increase amlodipine to 10mg

## 2023-01-09 NOTE — PROGRESS NOTE ADULT - SUBJECTIVE AND OBJECTIVE BOX
INTERVAL HPI/OVERNIGHT EVENTS:    Patient was seen and examined at bedside.  Feels well.      CONSTITUTIONAL:  Negative fever or chills, feels well, good appetite  EYES:  Negative  blurry vision or double vision  CARDIOVASCULAR:  Negative for chest pain or palpitations  RESPIRATORY:  Negative for cough, wheezing, or SOB   GASTROINTESTINAL:  Negative for nausea, vomiting, diarrhea, constipation, or abdominal pain  GENITOURINARY:  Negative frequency, urgency or dysuria  NEUROLOGIC:  No headache, confusion, dizziness, lightheadedness      ANTIBIOTICS/RELEVANT:    MEDICATIONS  (STANDING):  amLODIPine   Tablet 10 milliGRAM(s) Oral daily  cefTRIAXone   IVPB 2000 milliGRAM(s) IV Intermittent every 12 hours  chlorhexidine 0.12% Liquid 15 milliLiter(s) Swish and Spit two times a day  chlorhexidine 2% Cloths 1 Application(s) Topical <User Schedule>  enoxaparin Injectable 40 milliGRAM(s) SubCutaneous every 12 hours  fluticasone propionate 50 MICROgram(s)/spray Nasal Spray 1 Spray(s) Both Nostrils every 12 hours  insulin lispro (ADMELOG) corrective regimen sliding scale   SubCutaneous every 6 hours  latanoprost 0.005% Ophthalmic Solution 1 Drop(s) Right EYE at bedtime  levETIRAcetam 1000 milliGRAM(s) Oral two times a day  metroNIDAZOLE  IVPB 500 milliGRAM(s) IV Intermittent every 6 hours  nystatin Cream 1 Application(s) Topical two times a day  senna 2 Tablet(s) Oral at bedtime  sodium chloride 0.65% Nasal 1 Spray(s) Both Nostrils four times a day  vancomycin  IVPB 1500 milliGRAM(s) IV Intermittent every 12 hours    MEDICATIONS  (PRN):  acetaminophen    Suspension .. 650 milliGRAM(s) Oral every 6 hours PRN Temp greater or equal to 38C (100.4F), Mild Pain (1 - 3)  ondansetron Injectable 4 milliGRAM(s) IV Push every 6 hours PRN Nausea and/or Vomiting        Vital Signs Last 24 Hrs  T(C): 36.9 (09 Jan 2023 09:14), Max: 37.7 (08 Jan 2023 14:00)  T(F): 98.4 (09 Jan 2023 09:14), Max: 99.9 (08 Jan 2023 14:00)  HR: 88 (09 Jan 2023 11:44) (68 - 104)  BP: 159/69 (09 Jan 2023 11:44) (138/60 - 163/72)  BP(mean): 99 (09 Jan 2023 11:44) (90 - 104)  RR: 17 (09 Jan 2023 11:44) (17 - 18)  SpO2: 97% (09 Jan 2023 11:44) (97% - 100%)    Parameters below as of 09 Jan 2023 11:44  Patient On (Oxygen Delivery Method): room air        PHYSICAL EXAM:  Constitutional:  non-toxic, no distress  Eyes:VISHAL, EOMI  Ear/Nose/Throat: no oral lesion, no sinus tenderness on percussion	  Neck:  supple  Respiratory: CTA larry  Cardiovascular: S1S2 RRR, no murmurs  Gastrointestinal:soft, (+) BS, no HSM  Extremities:no e/e/c  Vascular: DP Pulse:	right normal; left normal      LABS:                        9.0    7.65  )-----------( 364      ( 09 Jan 2023 06:08 )             28.3     01-09    141  |  103  |  8   ----------------------------<  138<H>  3.9   |  28  |  0.60    Ca    8.4      09 Jan 2023 06:08  Phos  3.0     01-09  Mg     1.9     01-09    TPro  6.8  /  Alb  2.6<L>  /  TBili  0.2  /  DBili  x   /  AST  14  /  ALT  13  /  AlkPhos  83  01-08          MICROBIOLOGY:    Culture - Tissue with Gram Stain (01.05.23 @ 16:40)    -  Trimethoprim/Sulfamethoxazole: S <=0.5/9.5    -  Vancomycin: S 2    Gram Stain:   No organisms seen  Rare WBC's    -  Clindamycin: R >4    -  Erythromycin: R >4    -  Linezolid: S 2    -  Oxacillin: R >2    -  Rifampin: S <=1 Should not be used as monotherapy    Specimen Source: .Tissue #5 left sphenoid tissue    Culture Results:   Growth in fluid media only Staphylococcus epidermidis    Organism Identification: Staphylococcus epidermidis    Organism: Staphylococcus epidermidis    Method Type: JULIA        RADIOLOGY & ADDITIONAL STUDIES:

## 2023-01-09 NOTE — PROGRESS NOTE ADULT - ASSESSMENT
Assessment and Plan:  JEMIMA DUFFY is a  73 y/o F w/ no known PMHx transferred from [place] c/o slurred speech and right sided weakness. CTH initially negative, repeat on 12/23 showed L sided SDH, pts mental status worsened and CTH on 12/28 significant for L frontoparietal collection, MRI completed showed concern for possible empyema. S/p L hemicraniectomy and washout of L subdural empyema 12/30/22. Imaging c/w a pansinusitis which persists despite neurosurgical intervention and IV abx and sphenoid sinuses likely source of intracranial complications. After discussion with neurosurgery and ID team, plan was recommended to proceed with sinus surgery to establish paranasal sinus patency, remove inflammatory necrotic debris and ultimately get source control.     PLAN:  - Please USE NASAL rinse twice a day ----  use saline water and kendra syringe   - Please continue care as per NSGY   - Will remove nasal smith splints  -  OR cx - gram with GPCs in pairs- Staphylococcus epidermidis  - F/u ID reccs - currently on vanc/flagyl/ceftriaxone    [facility] at [phone] with any questions/concerns.    Connie Mosher PA-C  01-09-23 @ 08:27   Assessment and Plan:  JEMIMA DUFFY is a  71 y/o F w/ no known PMHx transferred from [place] c/o slurred speech and right sided weakness. CTH initially negative, repeat on 12/23 showed L sided SDH, pts mental status worsened and CTH on 12/28 significant for L frontoparietal collection, MRI completed showed concern for possible empyema. S/p L hemicraniectomy and washout of L subdural empyema 12/30/22. Imaging c/w a pansinusitis which persists despite neurosurgical intervention and IV abx and sphenoid sinuses likely source of intracranial complications. After discussion with neurosurgery and ID team, plan was recommended to proceed with sinus surgery to establish paranasal sinus patency, remove inflammatory necrotic debris and ultimately get source control.     PLAN:  - Please USE NASAL rinse twice a day ----  use saline water and kendra syringe   - Please continue care as per NSGY   -  OR cx - gram with GPCs in pairs- Staphylococcus epidermidis  - F/u ID reccs - currently on vanc/flagyl/ceftriaxone    [facility] at [phone] with any questions/concerns.    Connie Mosher PA-C  01-09-23 @ 08:27

## 2023-01-09 NOTE — PROCEDURE NOTE - NSPOSTCAREGUIDE_GEN_A_CORE
Verbal/written post procedure instructions were given to patient/caregiver
Verbal/written post procedure instructions were given to patient/caregiver
Verbal/written post procedure instructions were given to patient/caregiver/Instructed patient/caregiver regarding signs and symptoms of infection

## 2023-01-09 NOTE — PROGRESS NOTE ADULT - SUBJECTIVE AND OBJECTIVE BOX
Patient is a 72y old  Female who presents with a chief complaint of Subdural collection (09 Jan 2023 08:25)      SUBJECTIVE:  Patient seen and examined at bedside.  Eating breakfast with assistance.  Denies any pain or discomfort.  Answers questions with short answers appropriately    ROS:  Denies fevers, chills, cough, SOB, chest pain, abdominal pain, N/V, dysuria or new rash.  All other ROS negative except as above    Vital Signs Last 12 Hrs  T(F): 98.4 (01-09-23 @ 09:14), Max: 98.4 (01-09-23 @ 09:14)  HR: 80 (01-09-23 @ 08:26) (68 - 92)  BP: 152/72 (01-09-23 @ 08:26) (150/66 - 163/72)  BP(mean): 103 (01-09-23 @ 08:26) (95 - 103)  RR: 17 (01-09-23 @ 08:26) (17 - 17)  SpO2: 97% (01-09-23 @ 08:26) (97% - 100%)  I&O's Summary    08 Jan 2023 07:01  -  09 Jan 2023 07:00  --------------------------------------------------------  IN: 1780 mL / OUT: 2950 mL / NET: -1170 mL    09 Jan 2023 07:01  -  09 Jan 2023 10:57  --------------------------------------------------------  IN: 0 mL / OUT: 550 mL / NET: -550 mL        PHYSICAL EXAM:  Constitutional: NAD, comfortable in bed.  HEENT: EOMI, no conjunctival pallor or scleral icterus, MMM, L crani site c/d/i, some sinking in of area  Neck: Supple  Respiratory: CTA B/L. No w/r/r.   Cardiovascular: RRR, normal S1 and S2, no m/r/g.   Gastrointestinal: +BS, soft NTND, no guarding or rebound tenderness, no palpable masses   Extremities: wwp; no cyanosis, clubbing or edema.   Vascular: Pulses equal and strong throughout.   Neurological: AAOx3, some expressive aphasia, L side strength intact, RLE 4/5, RUE 3+/5 no CN deficits.   Skin: No gross skin abnormalities or rashes        LABS:                        9.0    7.65  )-----------( 364      ( 09 Jan 2023 06:08 )             28.3     01-09    141  |  103  |  8   ----------------------------<  138<H>  3.9   |  28  |  0.60    Ca    8.4      09 Jan 2023 06:08  Phos  3.0     01-09  Mg     1.9     01-09    TPro  6.8  /  Alb  2.6<L>  /  TBili  0.2  /  DBili  x   /  AST  14  /  ALT  13  /  AlkPhos  83  01-08            RADIOLOGY & ADDITIONAL TESTS:  < from: US Duplex Venous Lower Ext Complete, Bilateral (01.08.23 @ 18:30) >  IMPRESSION:  Acute deep venous thrombosis: below the knee in the bilateral peroneal   veins and intramuscularbranches, unchanged.    < end of copied text >      MEDICATIONS  (STANDING):  amLODIPine   Tablet 10 milliGRAM(s) Oral daily  cefTRIAXone   IVPB 2000 milliGRAM(s) IV Intermittent every 12 hours  chlorhexidine 0.12% Liquid 15 milliLiter(s) Swish and Spit two times a day  chlorhexidine 2% Cloths 1 Application(s) Topical <User Schedule>  enoxaparin Injectable 40 milliGRAM(s) SubCutaneous every 12 hours  fluticasone propionate 50 MICROgram(s)/spray Nasal Spray 1 Spray(s) Both Nostrils every 12 hours  insulin lispro (ADMELOG) corrective regimen sliding scale   SubCutaneous every 6 hours  latanoprost 0.005% Ophthalmic Solution 1 Drop(s) Right EYE at bedtime  levETIRAcetam 1000 milliGRAM(s) Oral two times a day  metroNIDAZOLE  IVPB 500 milliGRAM(s) IV Intermittent every 6 hours  nystatin Cream 1 Application(s) Topical two times a day  senna 2 Tablet(s) Oral at bedtime  sodium chloride 0.65% Nasal 1 Spray(s) Both Nostrils four times a day  vancomycin  IVPB 1500 milliGRAM(s) IV Intermittent every 12 hours    MEDICATIONS  (PRN):  acetaminophen    Suspension .. 650 milliGRAM(s) Oral every 6 hours PRN Temp greater or equal to 38C (100.4F), Mild Pain (1 - 3)  ondansetron Injectable 4 milliGRAM(s) IV Push every 6 hours PRN Nausea and/or Vomiting

## 2023-01-09 NOTE — PROGRESS NOTE ADULT - PROBLEM SELECTOR PLAN 6
b/l calf DVTs  - c/w DVT ppx dose Lovenox  - repeat doppler 1/8 requested by vascular to monitor, no propagation

## 2023-01-09 NOTE — PROGRESS NOTE ADULT - PROBLEM SELECTOR PLAN 2
- ID following appreciate recs  - Currently on CTX/Vanc/Metronidazole.  Redose vanc by trough  - Midline to be converted to PICC  - f/u with ID regarding duration of abx

## 2023-01-09 NOTE — PROGRESS NOTE ADULT - ASSESSMENT
73yo F w/ no known PMHx transferred from [place] c/o slurred speech and right sided weakness. CTH initially negative, repeat on 12/23 showed L sided SDH, pts mental status worsened and CTH on 12/28 significant for L frontoparietal collection, MRI completed showed concern for possible empyema. S/p L hemicraniectomy and washout of L subdural empyema 12/30/22.  Now s/p sinus washout 1/5

## 2023-01-09 NOTE — PROGRESS NOTE ADULT - SUBJECTIVE AND OBJECTIVE BOX
OTOLARYNGOLOGY (ENT) PROGRESS NOTE    PATIENT: JEMIMA DUFFY  MRN: [id number]  : 52  [id number]  DATE OF SERVICE:  23  			          now POD *** s/p ***    Subjective/ Interval:   		  : patient seen bedside and scoped, decided to bring her to OR  for sinus surgery   : After discussion with neurosurgery and ID team, plan was recommended to proceed with sinus surgery to establish paranasal sinus patency, remove inflammatory necrotic debris and ultimately get source control.   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 The right sphenoid mucosa was completely eroded with grossly exposed bone w deposits of granulation; small area of dura exposed around V2 prominence, with no CSF lerak. There was severe edema in both maxillaries and posterior ethmoids as well.  : No acute events overnight. NGt was removed by primary team because tolerating diet. Per notes, patient is more interactive. At bedside, appears frustrated by lack of communication, but awake/alert with no significant nasal drainage. Per nursing, patient is receiving nasal rinses.  : NAEON - Vanc started by primary team because cx growing staph epi. Minimal nasal drainage, on nasal rinses. Afebrile with downtrending WBC.  : Patient seen bedside, nasal drainage improving, continue nasal rinses, afebrile,   ALLERGIES:  Allergy Status Unknown      MEDICATIONS:  Antiinfectives:   cefTRIAXone   IVPB 2000 milliGRAM(s) IV Intermittent every 12 hours  metroNIDAZOLE  IVPB 500 milliGRAM(s) IV Intermittent every 6 hours  vancomycin  IVPB 1500 milliGRAM(s) IV Intermittent every 12 hours    IV fluids:    Hematologic/Anticoagulation:  enoxaparin Injectable 40 milliGRAM(s) SubCutaneous every 12 hours    Pain medications/Neuro:  acetaminophen    Suspension .. 650 milliGRAM(s) Oral every 6 hours PRN  levETIRAcetam 1000 milliGRAM(s) Oral two times a day  ondansetron Injectable 4 milliGRAM(s) IV Push every 6 hours PRN    Endocrine Medications:   insulin lispro (ADMELOG) corrective regimen sliding scale   SubCutaneous every 6 hours    All other standing medications:   amLODIPine   Tablet 5 milliGRAM(s) Oral daily  chlorhexidine 0.12% Liquid 15 milliLiter(s) Swish and Spit two times a day  chlorhexidine 2% Cloths 1 Application(s) Topical <User Schedule>  fluticasone propionate 50 MICROgram(s)/spray Nasal Spray 1 Spray(s) Both Nostrils every 12 hours  latanoprost 0.005% Ophthalmic Solution 1 Drop(s) Right EYE at bedtime  nystatin Cream 1 Application(s) Topical two times a day  senna 2 Tablet(s) Oral at bedtime  sodium chloride 0.65% Nasal 1 Spray(s) Both Nostrils four times a day    All other PRN medications:    Vital Signs Last 24 Hrs  T(C): 36.3 (2023 04:45), Max: 37.7 (2023 14:00)  T(F): 97.3 (2023 04:45), Max: 99.9 (2023 14:00)  HR: 72 (2023 04:27) (68 - 94)  BP: 163/72 (2023 04:27) (138/60 - 163/72)  BP(mean): 103 (2023 04:27) (89 - 104)  RR: 17 (2023 04:27) (17 - 18)  SpO2: 100% (2023 04:27) (98% - 100%)    Parameters below as of 2023 04:27  Patient On (Oxygen Delivery Method): room air           @ 07:01  -   @ 07:00  --------------------------------------------------------  IN:    IV PiggyBack: 1300 mL    Oral Fluid: 480 mL  Total IN: 1780 mL    OUT:    Voided (mL): 2950 mL  Total OUT: 2950 mL    Total NET: -1170 mL          PHYSICAL EXAM:  General: NAD, pt is comfortably laying in bed, attempts to say yes and no to questions, A&O x 3 (person, place, time), pt said her name, on RA   HEENT: L hemicrani incision site C/D/I with staples, mild nasal drainage   Cardiovascular: RRR, normal S1 and S2   Respiratory: lungs CTAB, no wheezing, rhonchi, or crackles   GI:  abd soft, NTND   Neuro: + mixed aphasia    LABS                       9.0    7.65  )-----------( 364      ( 2023 06:08 )             28.3        141  |  103  |  8   ----------------------------<  138<H>  3.9   |  28  |  0.60    Ca    8.4      2023 06:08  Phos  3.0       Mg     1.9         TPro  6.8  /  Alb  2.6<L>  /  TBili  0.2  /  DBili  x   /  AST  14  /  ALT  13  /  AlkPhos  83           Coagulation Studies-       Endocrine Panel-  Calcium, Total Serum: 8.4 mg/dL ( @ 06:08)      MICROBIOLOGY:  Culture - Tissue with Gram Stain (collected 23 @ 16:40)  Source: .Tissue #5 left sphenoid tissue  Gram Stain (23 @ 20:33):    No organisms seen    Rare WBC's  Final Report (23 @ 10:03):    Growth in fluid media only Staphylococcus epidermidis  Organism: Staphylococcus epidermidis (23 @ 10:03)  Organism: Staphylococcus epidermidis (23 @ 10:03)      -  Clindamycin: R >4      -  Erythromycin: R >4      -  Linezolid: S 2      -  Oxacillin: R >2      -  Rifampin: S <=1 Should not be used as monotherapy      -  Trimethoprim/Sulfamethoxazole: S <=0.5/9.5      -  Vancomycin: S 2      Method Type: JULIA    Culture - Surgical Swab (collected 23 @ 16:40)  Source: .Surgical Swab #4 left sphenoid #2  Gram Stain (23 @ 20:33):    Rare Gram positive cocci in pairs    Rare to few White blood cells  Final Report (23 @ 09:56):    Growth in fluid media only Staphylococcus epidermidis  Organism: Staphylococcus epidermidis (23 @ 09:56)  Organism: Staphylococcus epidermidis (23 @ 09:56)      -  Clindamycin: R >4      -  Erythromycin: R >4      -  Linezolid: S 1      -  Oxacillin: R >2      -  Rifampin: S <=1 Should not be used as monotherapy      -  Trimethoprim/Sulfamethoxazole: S <=0.5/9.5      -  Vancomycin: S 2      Method Type: JULIA    Culture - Surgical Swab (collected 23 @ 16:40)  Source: .Surgical Swab #3 left maxillary #2  Gram Stain (23 @ 20:30):    No organisms seen    Rare to few White blood cells  Final Report (23 @ 09:55):    Growth in fluid media only Staphylococcus epidermidis  Organism: Staphylococcus epidermidis (23 @ 09:55)  Organism: Staphylococcus epidermidis (23 @ 09:55)      -  Clindamycin: R >4      -  Erythromycin: R >4      -  Linezolid: S 1      -  Oxacillin: R >2      -  Rifampin: S <=1 Should not be used as monotherapy      -  Trimethoprim/Sulfamethoxazole: S <=0.5/9.5      -  Vancomycin: S 2      Method Type: JULIA    Culture - Surgical Swab (collected 23 @ 16:40)  Source: .Surgical Swab #2 left maxillary  Gram Stain (23 @ 20:31):    No organisms seen    Rare to few White blood cells  Final Report (23 @ 12:46):    Rare Staphylococcus epidermidis  Organism: Staphylococcus epidermidis (23 @ 12:46)  Organism: Staphylococcus epidermidis (23 @ 12:46)      -  Clindamycin: R >4      -  Erythromycin: R >4      -  Linezolid: S 1      -  Oxacillin: R >2      -  Rifampin: S <=1 Should not be used as monotherapy      -  Trimethoprim/Sulfamethoxazole: S <=0.5/9.5      -  Vancomycin: S 2      Method Type: JULIA    Culture - Surgical Swab (collected 23 @ 16:40)  Source: .Surgical Swab #1 left sphenoid  Gram Stain (23 @ 20:33):    Rare Gram positive cocci in pairs    Rare to few White blood cells  Final Report (23 @ 10:03):    Growth in fluid media only Staphylococcus epidermidis  Organism: Staphylococcus epidermidis (23 @ 10:03)  Organism: Staphylococcus epidermidis (23 @ 10:03)      -  Clindamycin: R >4      -  Erythromycin: R >4      -  Linezolid: S 1      -  Oxacillin: R >2      -  Rifampin: S <=1 Should not be used as monotherapy      -  Trimethoprim/Sulfamethoxazole: S <=0.5/9.5      -  Vancomycin: S 2      Method Type: JULIA      Culture Results:   Growth in fluid media only Staphylococcus epidermidis (23 @ 16:40)  Culture Results:   Rare Staphylococcus epidermidis (23 @ 16:40)  Culture Results:   Growth in fluid media only Staphylococcus epidermidis (23 @ 16:40)  Culture Results:   Growth in fluid media only Staphylococcus epidermidis (23 @ 16:40)  Culture Results:   Growth in fluid media only Staphylococcus epidermidis (23 @ 16:40)  Culture Results:   Normal Nose Yamile present  No Methicillin Resistant Staphylococcus aureus (23 @ 09:17)  Culture Results:   No growth at 5 days. (22 @ 02:42)  Culture Results:   No growth at 5 days. (22 @ 02:42)  Culture Results:   No growth to date (22 @ 21:00)  Culture Results:   No growth (22 @ 21:00)  Culture Results:   Rare Peptostreptococcus species ... Beta lactamase negative  Culture in progress (22 @ 21:00)  Culture Results:   No growth to date (22 @ 21:00)      Culture - Tissue with Gram Stain (collected 23 @ 16:40)  Source: .Tissue #5 left sphenoid tissue  Gram Stain (23 @ 20:33):    No organisms seen    Rare WBC's  Final Report (23 @ 10:03):    Growth in fluid media only Staphylococcus epidermidis  Organism: Staphylococcus epidermidis (23 @ 10:03)  Organism: Staphylococcus epidermidis (23 @ 10:03)      -  Clindamycin: R >4      -  Erythromycin: R >4      -  Linezolid: S 2      -  Oxacillin: R >2      -  Rifampin: S <=1 Should not be used as monotherapy      -  Trimethoprim/Sulfamethoxazole: S <=0.5/9.5      -  Vancomycin: S 2      Method Type: JULIA    Culture - Surgical Swab (collected 23 @ 16:40)  Source: .Surgical Swab #4 left sphenoid #2  Gram Stain (23 @ 20:33):    Rare Gram positive cocci in pairs    Rare to few White blood cells  Final Report (23 @ 09:56):    Growth in fluid media only Staphylococcus epidermidis  Organism: Staphylococcus epidermidis (23 @ 09:56)  Organism: Staphylococcus epidermidis (23 @ 09:56)      -  Clindamycin: R >4      -  Erythromycin: R >4      -  Linezolid: S 1      -  Oxacillin: R >2      -  Rifampin: S <=1 Should not be used as monotherapy      -  Trimethoprim/Sulfamethoxazole: S <=0.5/9.5      -  Vancomycin: S 2      Method Type: JULIA    Culture - Surgical Swab (collected 23 @ 16:40)  Source: .Surgical Swab #3 left maxillary #2  Gram Stain (23 @ 20:30):    No organisms seen    Rare to few White blood cells  Final Report (23 @ 09:55):    Growth in fluid media only Staphylococcus epidermidis  Organism: Staphylococcus epidermidis (23 @ 09:55)  Organism: Staphylococcus epidermidis (23 @ 09:55)      -  Clindamycin: R >4      -  Erythromycin: R >4      -  Linezolid: S 1      -  Oxacillin: R >2      -  Rifampin: S <=1 Should not be used as monotherapy      -  Trimethoprim/Sulfamethoxazole: S <=0.5/9.5      -  Vancomycin: S 2      Method Type: JULIA    Culture - Surgical Swab (collected 23 @ 16:40)  Source: .Surgical Swab #2 left maxillary  Gram Stain (23 @ 20:31):    No organisms seen    Rare to few White blood cells  Final Report (23 @ 12:46):    Rare Staphylococcus epidermidis  Organism: Staphylococcus epidermidis (23 @ 12:46)  Organism: Staphylococcus epidermidis (23 @ 12:46)      -  Clindamycin: R >4      -  Erythromycin: R >4      -  Linezolid: S 1      -  Oxacillin: R >2      -  Rifampin: S <=1 Should not be used as monotherapy      -  Trimethoprim/Sulfamethoxazole: S <=0.5/9.5      -  Vancomycin: S 2      Method Type: JULIA    Culture - Surgical Swab (collected 23 @ 16:40)  Source: .Surgical Swab #1 left sphenoid  Gram Stain (23 @ 20:33):    Rare Gram positive cocci in pairs    Rare to few White blood cells  Final Report (23 @ 10:03):    Growth in fluid media only Staphylococcus epidermidis  Organism: Staphylococcus epidermidis (23 @ 10:03)  Organism: Staphylococcus epidermidis (23 @ 10:03)      -  Clindamycin: R >4      -  Erythromycin: R >4      -  Linezolid: S 1      -  Oxacillin: R >2      -  Rifampin: S <=1 Should not be used as monotherapy      -  Trimethoprim/Sulfamethoxazole: S <=0.5/9.5      -  Vancomycin: S 2      Method Type: JULIA    Culture - Nose (collected 23 @ 09:17)  Source: .Nose Nose  Final Report (23 @ 08:28):    Normal Nose Yamile present    No Methicillin Resistant Staphylococcus aureus     OTOLARYNGOLOGY (ENT) PROGRESS NOTE    PATIENT: JEMIMA DUFFY  MRN: [id number]  : 52  [id number]  DATE OF SERVICE:  23  			           Subjective/ Interval:   		  : patient seen bedside and scoped, decided to bring her to OR  for sinus surgery   : After discussion with neurosurgery and ID team, plan was recommended to proceed with sinus surgery to establish paranasal sinus patency, remove inflammatory necrotic debris and ultimately get source control.   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 The right sphenoid mucosa was completely eroded with grossly exposed bone w deposits of granulation; small area of dura exposed around V2 prominence, with no CSF lerak. There was severe edema in both maxillaries and posterior ethmoids as well.  : No acute events overnight. NGt was removed by primary team because tolerating diet. Per notes, patient is more interactive. At bedside, appears frustrated by lack of communication, but awake/alert with no significant nasal drainage. Per nursing, patient is receiving nasal rinses.  : NAEON - Vanc started by primary team because cx growing staph epi. Minimal nasal drainage, on nasal rinses. Afebrile with downtrending WBC.  : Patient seen bedside, nasal drainage improving, continue nasal rinses, afebrile,   ALLERGIES:  Allergy Status Unknown      MEDICATIONS:  Antiinfectives:   cefTRIAXone   IVPB 2000 milliGRAM(s) IV Intermittent every 12 hours  metroNIDAZOLE  IVPB 500 milliGRAM(s) IV Intermittent every 6 hours  vancomycin  IVPB 1500 milliGRAM(s) IV Intermittent every 12 hours    IV fluids:    Hematologic/Anticoagulation:  enoxaparin Injectable 40 milliGRAM(s) SubCutaneous every 12 hours    Pain medications/Neuro:  acetaminophen    Suspension .. 650 milliGRAM(s) Oral every 6 hours PRN  levETIRAcetam 1000 milliGRAM(s) Oral two times a day  ondansetron Injectable 4 milliGRAM(s) IV Push every 6 hours PRN    Endocrine Medications:   insulin lispro (ADMELOG) corrective regimen sliding scale   SubCutaneous every 6 hours    All other standing medications:   amLODIPine   Tablet 5 milliGRAM(s) Oral daily  chlorhexidine 0.12% Liquid 15 milliLiter(s) Swish and Spit two times a day  chlorhexidine 2% Cloths 1 Application(s) Topical <User Schedule>  fluticasone propionate 50 MICROgram(s)/spray Nasal Spray 1 Spray(s) Both Nostrils every 12 hours  latanoprost 0.005% Ophthalmic Solution 1 Drop(s) Right EYE at bedtime  nystatin Cream 1 Application(s) Topical two times a day  senna 2 Tablet(s) Oral at bedtime  sodium chloride 0.65% Nasal 1 Spray(s) Both Nostrils four times a day    All other PRN medications:    Vital Signs Last 24 Hrs  T(C): 36.3 (2023 04:45), Max: 37.7 (2023 14:00)  T(F): 97.3 (2023 04:45), Max: 99.9 (2023 14:00)  HR: 72 (2023 04:27) (68 - 94)  BP: 163/72 (2023 04:27) (138/60 - 163/72)  BP(mean): 103 (2023 04:27) (89 - 104)  RR: 17 (2023 04:27) (17 - 18)  SpO2: 100% (2023 04:27) (98% - 100%)    Parameters below as of 2023 04:27  Patient On (Oxygen Delivery Method): room air           @ 07:01  -   @ 07:00  --------------------------------------------------------  IN:    IV PiggyBack: 1300 mL    Oral Fluid: 480 mL  Total IN: 1780 mL    OUT:    Voided (mL): 2950 mL  Total OUT: 2950 mL    Total NET: -1170 mL          PHYSICAL EXAM:  General: NAD, pt is comfortably laying in bed, attempts to say yes and no to questions, A&O x 3 (person, place, time), pt said her name, on RA   HEENT: L hemicrani incision site C/D/I with staples, mild nasal drainage   Cardiovascular: RRR, normal S1 and S2   Respiratory: lungs CTAB, no wheezing, rhonchi, or crackles   GI:  abd soft, NTND   Neuro: + mixed aphasia    LABS                       9.0    7.65  )-----------( 364      ( 2023 06:08 )             28.3        141  |  103  |  8   ----------------------------<  138<H>  3.9   |  28  |  0.60    Ca    8.4      2023 06:08  Phos  3.0       Mg     1.9         TPro  6.8  /  Alb  2.6<L>  /  TBili  0.2  /  DBili  x   /  AST  14  /  ALT  13  /  AlkPhos  83           Coagulation Studies-       Endocrine Panel-  Calcium, Total Serum: 8.4 mg/dL ( @ 06:08)      MICROBIOLOGY:  Culture - Tissue with Gram Stain (collected 23 @ 16:40)  Source: .Tissue #5 left sphenoid tissue  Gram Stain (23 @ 20:33):    No organisms seen    Rare WBC's  Final Report (23 @ 10:03):    Growth in fluid media only Staphylococcus epidermidis  Organism: Staphylococcus epidermidis (23 @ 10:03)  Organism: Staphylococcus epidermidis (23 @ 10:03)      -  Clindamycin: R >4      -  Erythromycin: R >4      -  Linezolid: S 2      -  Oxacillin: R >2      -  Rifampin: S <=1 Should not be used as monotherapy      -  Trimethoprim/Sulfamethoxazole: S <=0.5/9.5      -  Vancomycin: S 2      Method Type: JULIA    Culture - Surgical Swab (collected 23 @ 16:40)  Source: .Surgical Swab #4 left sphenoid #2  Gram Stain (23 @ 20:33):    Rare Gram positive cocci in pairs    Rare to few White blood cells  Final Report (23 @ 09:56):    Growth in fluid media only Staphylococcus epidermidis  Organism: Staphylococcus epidermidis (23 @ 09:56)  Organism: Staphylococcus epidermidis (23 @ 09:56)      -  Clindamycin: R >4      -  Erythromycin: R >4      -  Linezolid: S 1      -  Oxacillin: R >2      -  Rifampin: S <=1 Should not be used as monotherapy      -  Trimethoprim/Sulfamethoxazole: S <=0.5/9.5      -  Vancomycin: S 2      Method Type: JULIA    Culture - Surgical Swab (collected 23 @ 16:40)  Source: .Surgical Swab #3 left maxillary #2  Gram Stain (23 @ 20:30):    No organisms seen    Rare to few White blood cells  Final Report (23 @ 09:55):    Growth in fluid media only Staphylococcus epidermidis  Organism: Staphylococcus epidermidis (23 @ 09:55)  Organism: Staphylococcus epidermidis (23 @ 09:55)      -  Clindamycin: R >4      -  Erythromycin: R >4      -  Linezolid: S 1      -  Oxacillin: R >2      -  Rifampin: S <=1 Should not be used as monotherapy      -  Trimethoprim/Sulfamethoxazole: S <=0.5/9.5      -  Vancomycin: S 2      Method Type: JULIA    Culture - Surgical Swab (collected 23 @ 16:40)  Source: .Surgical Swab #2 left maxillary  Gram Stain (23 @ 20:31):    No organisms seen    Rare to few White blood cells  Final Report (23 @ 12:46):    Rare Staphylococcus epidermidis  Organism: Staphylococcus epidermidis (23 @ 12:46)  Organism: Staphylococcus epidermidis (23 @ 12:46)      -  Clindamycin: R >4      -  Erythromycin: R >4      -  Linezolid: S 1      -  Oxacillin: R >2      -  Rifampin: S <=1 Should not be used as monotherapy      -  Trimethoprim/Sulfamethoxazole: S <=0.5/9.5      -  Vancomycin: S 2      Method Type: JULIA    Culture - Surgical Swab (collected 23 @ 16:40)  Source: .Surgical Swab #1 left sphenoid  Gram Stain (23 @ 20:33):    Rare Gram positive cocci in pairs    Rare to few White blood cells  Final Report (23 @ 10:03):    Growth in fluid media only Staphylococcus epidermidis  Organism: Staphylococcus epidermidis (23 @ 10:03)  Organism: Staphylococcus epidermidis (23 @ 10:03)      -  Clindamycin: R >4      -  Erythromycin: R >4      -  Linezolid: S 1      -  Oxacillin: R >2      -  Rifampin: S <=1 Should not be used as monotherapy      -  Trimethoprim/Sulfamethoxazole: S <=0.5/9.5      -  Vancomycin: S 2      Method Type: JULIA      Culture Results:   Growth in fluid media only Staphylococcus epidermidis (23 @ 16:40)  Culture Results:   Rare Staphylococcus epidermidis (23 @ 16:40)  Culture Results:   Growth in fluid media only Staphylococcus epidermidis (23 @ 16:40)  Culture Results:   Growth in fluid media only Staphylococcus epidermidis (23 @ 16:40)  Culture Results:   Growth in fluid media only Staphylococcus epidermidis (23 @ 16:40)  Culture Results:   Normal Nose Yamile present  No Methicillin Resistant Staphylococcus aureus (23 @ 09:17)  Culture Results:   No growth at 5 days. (22 @ 02:42)  Culture Results:   No growth at 5 days. (22 @ 02:42)  Culture Results:   No growth to date (22 @ 21:00)  Culture Results:   No growth (22 @ 21:00)  Culture Results:   Rare Peptostreptococcus species ... Beta lactamase negative  Culture in progress (22 @ 21:00)  Culture Results:   No growth to date (22 @ 21:00)      Culture - Tissue with Gram Stain (collected 23 @ 16:40)  Source: .Tissue #5 left sphenoid tissue  Gram Stain (23 @ 20:33):    No organisms seen    Rare WBC's  Final Report (23 @ 10:03):    Growth in fluid media only Staphylococcus epidermidis  Organism: Staphylococcus epidermidis (23 @ 10:03)  Organism: Staphylococcus epidermidis (23 @ 10:03)      -  Clindamycin: R >4      -  Erythromycin: R >4      -  Linezolid: S 2      -  Oxacillin: R >2      -  Rifampin: S <=1 Should not be used as monotherapy      -  Trimethoprim/Sulfamethoxazole: S <=0.5/9.5      -  Vancomycin: S 2      Method Type: JULIA    Culture - Surgical Swab (collected 23 @ 16:40)  Source: .Surgical Swab #4 left sphenoid #2  Gram Stain (23 @ 20:33):    Rare Gram positive cocci in pairs    Rare to few White blood cells  Final Report (23 @ 09:56):    Growth in fluid media only Staphylococcus epidermidis  Organism: Staphylococcus epidermidis (23 @ 09:56)  Organism: Staphylococcus epidermidis (23 @ 09:56)      -  Clindamycin: R >4      -  Erythromycin: R >4      -  Linezolid: S 1      -  Oxacillin: R >2      -  Rifampin: S <=1 Should not be used as monotherapy      -  Trimethoprim/Sulfamethoxazole: S <=0.5/9.5      -  Vancomycin: S 2      Method Type: JULIA    Culture - Surgical Swab (collected 23 @ 16:40)  Source: .Surgical Swab #3 left maxillary #2  Gram Stain (23 @ 20:30):    No organisms seen    Rare to few White blood cells  Final Report (23 @ 09:55):    Growth in fluid media only Staphylococcus epidermidis  Organism: Staphylococcus epidermidis (23 @ 09:55)  Organism: Staphylococcus epidermidis (23 @ 09:55)      -  Clindamycin: R >4      -  Erythromycin: R >4      -  Linezolid: S 1      -  Oxacillin: R >2      -  Rifampin: S <=1 Should not be used as monotherapy      -  Trimethoprim/Sulfamethoxazole: S <=0.5/9.5      -  Vancomycin: S 2      Method Type: JULIA    Culture - Surgical Swab (collected 23 @ 16:40)  Source: .Surgical Swab #2 left maxillary  Gram Stain (23 @ 20:31):    No organisms seen    Rare to few White blood cells  Final Report (23 @ 12:46):    Rare Staphylococcus epidermidis  Organism: Staphylococcus epidermidis (23 @ 12:46)  Organism: Staphylococcus epidermidis (23 @ 12:46)      -  Clindamycin: R >4      -  Erythromycin: R >4      -  Linezolid: S 1      -  Oxacillin: R >2      -  Rifampin: S <=1 Should not be used as monotherapy      -  Trimethoprim/Sulfamethoxazole: S <=0.5/9.5      -  Vancomycin: S 2      Method Type: JULIA    Culture - Surgical Swab (collected 23 @ 16:40)  Source: .Surgical Swab #1 left sphenoid  Gram Stain (23 @ 20:33):    Rare Gram positive cocci in pairs    Rare to few White blood cells  Final Report (23 @ 10:03):    Growth in fluid media only Staphylococcus epidermidis  Organism: Staphylococcus epidermidis (23 @ 10:03)  Organism: Staphylococcus epidermidis (23 @ 10:03)      -  Clindamycin: R >4      -  Erythromycin: R >4      -  Linezolid: S 1      -  Oxacillin: R >2      -  Rifampin: S <=1 Should not be used as monotherapy      -  Trimethoprim/Sulfamethoxazole: S <=0.5/9.5      -  Vancomycin: S 2      Method Type: JULIA    Culture - Nose (collected 23 @ 09:17)  Source: .Nose Nose  Final Report (23 @ 08:28):    Normal Nose Yamile present    No Methicillin Resistant Staphylococcus aureus

## 2023-01-10 DIAGNOSIS — R56.9 UNSPECIFIED CONVULSIONS: ICD-10-CM

## 2023-01-10 DIAGNOSIS — G40.909 EPILEPSY, UNSPECIFIED, NOT INTRACTABLE, WITHOUT STATUS EPILEPTICUS: ICD-10-CM

## 2023-01-10 DIAGNOSIS — I26.99 OTHER PULMONARY EMBOLISM WITHOUT ACUTE COR PULMONALE: ICD-10-CM

## 2023-01-10 LAB
ANION GAP SERPL CALC-SCNC: 8 MMOL/L — SIGNIFICANT CHANGE UP (ref 5–17)
BUN SERPL-MCNC: 8 MG/DL — SIGNIFICANT CHANGE UP (ref 7–23)
CALCIUM SERPL-MCNC: 8.5 MG/DL — SIGNIFICANT CHANGE UP (ref 8.4–10.5)
CHLORIDE SERPL-SCNC: 104 MMOL/L — SIGNIFICANT CHANGE UP (ref 96–108)
CO2 SERPL-SCNC: 28 MMOL/L — SIGNIFICANT CHANGE UP (ref 22–31)
CREAT SERPL-MCNC: 0.59 MG/DL — SIGNIFICANT CHANGE UP (ref 0.5–1.3)
EGFR: 96 ML/MIN/1.73M2 — SIGNIFICANT CHANGE UP
GLUCOSE BLDC GLUCOMTR-MCNC: 101 MG/DL — HIGH (ref 70–99)
GLUCOSE BLDC GLUCOMTR-MCNC: 109 MG/DL — HIGH (ref 70–99)
GLUCOSE BLDC GLUCOMTR-MCNC: 121 MG/DL — HIGH (ref 70–99)
GLUCOSE BLDC GLUCOMTR-MCNC: 135 MG/DL — HIGH (ref 70–99)
GLUCOSE BLDC GLUCOMTR-MCNC: 208 MG/DL — HIGH (ref 70–99)
GLUCOSE SERPL-MCNC: 119 MG/DL — HIGH (ref 70–99)
HCT VFR BLD CALC: 26.9 % — LOW (ref 34.5–45)
HGB BLD-MCNC: 8.7 G/DL — LOW (ref 11.5–15.5)
MAGNESIUM SERPL-MCNC: 1.8 MG/DL — SIGNIFICANT CHANGE UP (ref 1.6–2.6)
MCHC RBC-ENTMCNC: 30 PG — SIGNIFICANT CHANGE UP (ref 27–34)
MCHC RBC-ENTMCNC: 32.3 GM/DL — SIGNIFICANT CHANGE UP (ref 32–36)
MCV RBC AUTO: 92.8 FL — SIGNIFICANT CHANGE UP (ref 80–100)
NRBC # BLD: 0 /100 WBCS — SIGNIFICANT CHANGE UP (ref 0–0)
PLATELET # BLD AUTO: 320 K/UL — SIGNIFICANT CHANGE UP (ref 150–400)
POTASSIUM SERPL-MCNC: 3.7 MMOL/L — SIGNIFICANT CHANGE UP (ref 3.5–5.3)
POTASSIUM SERPL-SCNC: 3.7 MMOL/L — SIGNIFICANT CHANGE UP (ref 3.5–5.3)
RBC # BLD: 2.9 M/UL — LOW (ref 3.8–5.2)
RBC # FLD: 17.9 % — HIGH (ref 10.3–14.5)
SARS-COV-2 RNA SPEC QL NAA+PROBE: SIGNIFICANT CHANGE UP
SODIUM SERPL-SCNC: 140 MMOL/L — SIGNIFICANT CHANGE UP (ref 135–145)
VANCOMYCIN TROUGH SERPL-MCNC: 9.6 UG/ML — LOW (ref 10–20)
WBC # BLD: 7.43 K/UL — SIGNIFICANT CHANGE UP (ref 3.8–10.5)
WBC # FLD AUTO: 7.43 K/UL — SIGNIFICANT CHANGE UP (ref 3.8–10.5)

## 2023-01-10 PROCEDURE — 99232 SBSQ HOSP IP/OBS MODERATE 35: CPT

## 2023-01-10 PROCEDURE — 71275 CT ANGIOGRAPHY CHEST: CPT | Mod: 26

## 2023-01-10 PROCEDURE — 99233 SBSQ HOSP IP/OBS HIGH 50: CPT

## 2023-01-10 RX ORDER — POTASSIUM CHLORIDE 20 MEQ
40 PACKET (EA) ORAL ONCE
Refills: 0 | Status: COMPLETED | OUTPATIENT
Start: 2023-01-10 | End: 2023-01-10

## 2023-01-10 RX ORDER — VANCOMYCIN HCL 1 G
1.5 VIAL (EA) INTRAVENOUS
Qty: 0 | Refills: 0 | DISCHARGE
Start: 2023-01-10

## 2023-01-10 RX ORDER — METRONIDAZOLE 500 MG
100 TABLET ORAL
Qty: 0 | Refills: 0 | DISCHARGE
Start: 2023-01-10

## 2023-01-10 RX ORDER — SODIUM CHLORIDE 0.65 %
1 AEROSOL, SPRAY (ML) NASAL
Qty: 0 | Refills: 0 | DISCHARGE
Start: 2023-01-10

## 2023-01-10 RX ORDER — SENNA PLUS 8.6 MG/1
2 TABLET ORAL
Qty: 0 | Refills: 0 | DISCHARGE
Start: 2023-01-10

## 2023-01-10 RX ORDER — SENNA PLUS 8.6 MG/1
2 TABLET ORAL AT BEDTIME
Refills: 0 | Status: DISCONTINUED | OUTPATIENT
Start: 2023-01-10 | End: 2023-01-12

## 2023-01-10 RX ORDER — ENOXAPARIN SODIUM 100 MG/ML
120 INJECTION SUBCUTANEOUS EVERY 12 HOURS
Refills: 0 | Status: DISCONTINUED | OUTPATIENT
Start: 2023-01-10 | End: 2023-01-12

## 2023-01-10 RX ORDER — ENOXAPARIN SODIUM 100 MG/ML
40 INJECTION SUBCUTANEOUS
Qty: 0 | Refills: 0 | DISCHARGE
Start: 2023-01-10

## 2023-01-10 RX ORDER — POTASSIUM CHLORIDE 20 MEQ
40 PACKET (EA) ORAL ONCE
Refills: 0 | Status: DISCONTINUED | OUTPATIENT
Start: 2023-01-10 | End: 2023-01-10

## 2023-01-10 RX ORDER — FLUTICASONE PROPIONATE 50 MCG
1 SPRAY, SUSPENSION NASAL
Qty: 0 | Refills: 0 | DISCHARGE
Start: 2023-01-10

## 2023-01-10 RX ORDER — ACETAMINOPHEN 500 MG
650 TABLET ORAL
Qty: 0 | Refills: 0 | DISCHARGE
Start: 2023-01-10

## 2023-01-10 RX ORDER — NYSTATIN CREAM 100000 [USP'U]/G
1 CREAM TOPICAL
Qty: 0 | Refills: 0 | DISCHARGE
Start: 2023-01-10

## 2023-01-10 RX ORDER — METRONIDAZOLE 500 MG
750 TABLET ORAL EVERY 8 HOURS
Refills: 0 | Status: DISCONTINUED | OUTPATIENT
Start: 2023-01-10 | End: 2023-01-11

## 2023-01-10 RX ORDER — CEFTRIAXONE 500 MG/1
2000 INJECTION, POWDER, FOR SOLUTION INTRAMUSCULAR; INTRAVENOUS
Qty: 0 | Refills: 0 | DISCHARGE
Start: 2023-01-10

## 2023-01-10 RX ORDER — AMLODIPINE BESYLATE 2.5 MG/1
1 TABLET ORAL
Qty: 0 | Refills: 0 | DISCHARGE
Start: 2023-01-10

## 2023-01-10 RX ORDER — LEVETIRACETAM 250 MG/1
1 TABLET, FILM COATED ORAL
Qty: 0 | Refills: 0 | DISCHARGE
Start: 2023-01-10

## 2023-01-10 RX ADMIN — ENOXAPARIN SODIUM 120 MILLIGRAM(S): 100 INJECTION SUBCUTANEOUS at 22:39

## 2023-01-10 RX ADMIN — CEFTRIAXONE 100 MILLIGRAM(S): 500 INJECTION, POWDER, FOR SOLUTION INTRAMUSCULAR; INTRAVENOUS at 06:31

## 2023-01-10 RX ADMIN — CHLORHEXIDINE GLUCONATE 1 APPLICATION(S): 213 SOLUTION TOPICAL at 06:32

## 2023-01-10 RX ADMIN — Medication 100 MILLIGRAM(S): at 19:17

## 2023-01-10 RX ADMIN — NYSTATIN CREAM 1 APPLICATION(S): 100000 CREAM TOPICAL at 06:32

## 2023-01-10 RX ADMIN — NYSTATIN CREAM 1 APPLICATION(S): 100000 CREAM TOPICAL at 18:08

## 2023-01-10 RX ADMIN — Medication 1 SPRAY(S): at 18:07

## 2023-01-10 RX ADMIN — CHLORHEXIDINE GLUCONATE 15 MILLILITER(S): 213 SOLUTION TOPICAL at 06:31

## 2023-01-10 RX ADMIN — SENNA PLUS 2 TABLET(S): 8.6 TABLET ORAL at 22:39

## 2023-01-10 RX ADMIN — LEVETIRACETAM 1000 MILLIGRAM(S): 250 TABLET, FILM COATED ORAL at 06:31

## 2023-01-10 RX ADMIN — Medication 100 MILLIGRAM(S): at 11:22

## 2023-01-10 RX ADMIN — Medication 1 SPRAY(S): at 06:32

## 2023-01-10 RX ADMIN — CHLORHEXIDINE GLUCONATE 15 MILLILITER(S): 213 SOLUTION TOPICAL at 18:08

## 2023-01-10 RX ADMIN — Medication 1 SPRAY(S): at 06:31

## 2023-01-10 RX ADMIN — Medication 4: at 00:30

## 2023-01-10 RX ADMIN — Medication 300 MILLIGRAM(S): at 06:32

## 2023-01-10 RX ADMIN — Medication 300 MILLIGRAM(S): at 19:17

## 2023-01-10 RX ADMIN — Medication 40 MILLIEQUIVALENT(S): at 10:20

## 2023-01-10 RX ADMIN — LATANOPROST 1 DROP(S): 0.05 SOLUTION/ DROPS OPHTHALMIC; TOPICAL at 22:39

## 2023-01-10 RX ADMIN — Medication 100 MILLIGRAM(S): at 06:30

## 2023-01-10 RX ADMIN — LEVETIRACETAM 1000 MILLIGRAM(S): 250 TABLET, FILM COATED ORAL at 18:08

## 2023-01-10 RX ADMIN — CEFTRIAXONE 100 MILLIGRAM(S): 500 INJECTION, POWDER, FOR SOLUTION INTRAMUSCULAR; INTRAVENOUS at 18:08

## 2023-01-10 RX ADMIN — Medication 1 SPRAY(S): at 11:22

## 2023-01-10 RX ADMIN — ENOXAPARIN SODIUM 40 MILLIGRAM(S): 100 INJECTION SUBCUTANEOUS at 09:51

## 2023-01-10 NOTE — PROGRESS NOTE ADULT - PROBLEM SELECTOR PLAN 4
Collateral obtained from daughter, patient had seen cardiologist for cataract surgery preop and was cleared but was told borderline HTN    - amlodipine to 10mg  - Monitor for now, may add a 2nd agent if needed - ID following appreciate recs  - Currently on CTX/Vanc/Metronidazole --> monitor vanc trough  - PICC placed 1/9. Midline in contralateral arm to be removed  - Abx until 2/16

## 2023-01-10 NOTE — PROVIDER CONTACT NOTE (OTHER) - BACKGROUND
Pt. Advised of medication sent to pharmacy, and also advised to keep upcoming appointment. Pt. Agreed.
. S/p L hemicraniectomy and washout of L subdural empyema 12/30/22. S/p sinus surgery with judith purulence to L maxillary sinus and posterior bony erosiun to L sphenoid sinus 1/5.
Pt with left sided SDH, ICH. subdural empyema. S/P L subdural hemicraniotomy and washout L subdural empyema 12/39/22.
pt s/p sinus surgery

## 2023-01-10 NOTE — PROVIDER CONTACT NOTE (OTHER) - SITUATION
Pt c/o pain and discomfort with helmet
pt aox4; vs taken twice and was 163/70 then 163/72
Patient had small bloodclot come out of the vagina and bloody tinged urine in the canister from the purewick

## 2023-01-10 NOTE — CHART NOTE - NSCHARTNOTEFT_GEN_A_CORE
1/10: POD 11/5. DEEPALI o/n. Pending AR. CT PE protocol completed, will increase SQL dosing to full dose AC, pend repeat echo to evaluate for right heart strain. Vanc trough tonight at 5pm. Pend possible discharge to AR tomorrow.

## 2023-01-10 NOTE — PROGRESS NOTE ADULT - SUBJECTIVE AND OBJECTIVE BOX
INTERVAL HPI/OVERNIGHT EVENTS:    Patient was seen and examined at bedside.  Undergoing PT.  Feels well    CONSTITUTIONAL:  Negative fever or chills, feels well, good appetite  EYES:  Negative  blurry vision or double vision  CARDIOVASCULAR:  Negative for chest pain or palpitations  RESPIRATORY:  Negative for cough, wheezing, or SOB   GASTROINTESTINAL:  Negative for nausea, vomiting, diarrhea, constipation, or abdominal pain  GENITOURINARY:  Negative frequency, urgency or dysuria  NEUROLOGIC:  No headache, confusion, dizziness, lightheadedness      ANTIBIOTICS/RELEVANT:    MEDICATIONS  (STANDING):  amLODIPine   Tablet 10 milliGRAM(s) Oral daily  cefTRIAXone   IVPB 2000 milliGRAM(s) IV Intermittent every 12 hours  chlorhexidine 0.12% Liquid 15 milliLiter(s) Swish and Spit two times a day  chlorhexidine 2% Cloths 1 Application(s) Topical <User Schedule>  enoxaparin Injectable 40 milliGRAM(s) SubCutaneous every 12 hours  fluticasone propionate 50 MICROgram(s)/spray Nasal Spray 1 Spray(s) Both Nostrils every 12 hours  insulin lispro (ADMELOG) corrective regimen sliding scale   SubCutaneous every 6 hours  latanoprost 0.005% Ophthalmic Solution 1 Drop(s) Right EYE at bedtime  levETIRAcetam 1000 milliGRAM(s) Oral two times a day  metroNIDAZOLE  IVPB 750 milliGRAM(s) IV Intermittent every 8 hours  nystatin Cream 1 Application(s) Topical two times a day  senna 2 Tablet(s) Oral at bedtime  sodium chloride 0.65% Nasal 1 Spray(s) Both Nostrils four times a day  vancomycin  IVPB 1500 milliGRAM(s) IV Intermittent every 12 hours    MEDICATIONS  (PRN):  acetaminophen    Suspension .. 650 milliGRAM(s) Oral every 6 hours PRN Temp greater or equal to 38C (100.4F), Mild Pain (1 - 3)  ondansetron Injectable 4 milliGRAM(s) IV Push every 6 hours PRN Nausea and/or Vomiting  sodium chloride 0.9% lock flush 10 milliLiter(s) IV Push every 1 hour PRN Pre/post blood products, medications, blood draw, and to maintain line patency        Vital Signs Last 24 Hrs  T(C): 36.4 (10 Myles 2023 09:09), Max: 37.4 (09 Jan 2023 21:33)  T(F): 97.5 (10 Myles 2023 09:09), Max: 99.4 (09 Jan 2023 21:33)  HR: 114 (10 Myles 2023 12:01) (82 - 114)  BP: 146/83 (10 Myles 2023 12:01) (133/61 - 175/80)  BP(mean): 107 (10 Myles 2023 12:01) (88 - 115)  RR: 16 (10 Myles 2023 12:01) (16 - 18)  SpO2: 96% (10 Myles 2023 12:01) (96% - 98%)    Parameters below as of 10 Myles 2023 12:01  Patient On (Oxygen Delivery Method): room air        PHYSICAL EXAM:  Constitutional:  non-toxic, no distress  Eyes:VISHAL, EOMI  Ear/Nose/Throat: no oral lesion, no sinus tenderness on percussion	  Neck:  supple  Respiratory: CTA larry  Cardiovascular: S1S2 RRR, no murmurs  Gastrointestinal:soft, (+) BS, no HSM  Extremities:no e/e/c  Vascular: DP Pulse:	right normal; left normal      LABS:                        8.7    7.43  )-----------( 320      ( 10 Myles 2023 05:30 )             26.9     01-10    140  |  104  |  8   ----------------------------<  119<H>  3.7   |  28  |  0.59    Ca    8.5      10 Myles 2023 05:30  Phos  3.0     01-09  Mg     1.8     01-10            MICROBIOLOGY:    Culture - Tissue with Gram Stain (01.05.23 @ 16:40)    -  Trimethoprim/Sulfamethoxazole: S <=0.5/9.5    -  Vancomycin: S 2    Gram Stain:   No organisms seen  Rare WBC's    -  Clindamycin: R >4    -  Erythromycin: R >4    -  Linezolid: S 2    -  Oxacillin: R >2    -  Rifampin: S <=1 Should not be used as monotherapy    Specimen Source: .Tissue #5 left sphenoid tissue    Culture Results:   Growth in fluid media only Staphylococcus epidermidis    Organism Identification: Staphylococcus epidermidis    Organism: Staphylococcus epidermidis    Method Type: JULIA        RADIOLOGY & ADDITIONAL STUDIES:

## 2023-01-10 NOTE — PROGRESS NOTE ADULT - SUBJECTIVE AND OBJECTIVE BOX
SUBJECTIVE: Patient states she watched HGTV and feels good. Denies pain or new symptoms at this time.     HOSPITAL COURSE:  12/31: Admitted to NSICU, s/p Left hemicraniectomy and washout of L subdural empyema. Pancultured. Vanc/ceftriaxone/flagyl for empiric coverage. OR cultures demonstrated gram negative cocci in pairs. ENT and ID consulted, recommend cont vanc 2g, flagyl 750mg q8hrs, ceftriaxone 2g q12hrs. Extubated to room air, RUE/RLE strength improving. Pend CT sinus stealth protocol pend per ENT.   1/1: POD2 DEEPALI o/n, neuro stable, tolerating RA post extubation, pending CT sinus w/ stealth protocol per ENT. Blood consent obtained and pt transfused 1u prbc for HGB 7.9-> 6.8 -> 7.4. CHENCHO drain removed.  LE dopplers + B/L peroneal IM calf DVT, SQL increased to BID prophylactic dosing. Failed swallow eval, remains on tube feeds.   1/2: POD3, DEEPALI o/n. Neuro stable. TF increased goal rate to 65, tolerating feeds. S&S to re-eval in 48 hours. Pending final recs from ID. FOBT neg. Nate drain removed.   1/3: POD4. DEEPALI overnight, pt able to say "no" to questions, o/w neuro exam stable. EEG on, dc'd in afternoon, no seizure. F/u final OR cx, then will have PICC placed. Vanc trough pend 1/3 @9pm. Pend repeat dopplers 1/6. SDU status  1/4: POD 5. DEEPALI overnight. Neuro stable. FEES completed and patient cleared for pureed diet. Pending OR with ENT tomorrow. Vancomycin dc'd per ID.  1/5: POD 6. DEEPALI overnight. Neurostable. Repeat CT sinus complete overnight. POD0 b/l sinus surgery with judith purulence to L maxillary sinus and posterior bony erosiun to L sphenoid sinus. NGT replaced by ENT. CXR w/ NGT in distal esophagus, advanced and re-ordered CXR which showed correct placement of NGT.   1/6: POD 7/POD1. DEEPALI ovn, neurologically stable. SQL ppx resumed tonight. Tolerating pureed diet, NGT removed.   1/7: POD8/POD2. DEEPALI o/n neuro stable. Al cx are groin staph. epidermidis, Vancomycin started.  1/8: POD 9/3. DEEPALI o/n. Neuro stable. Pending AR.   1/9: POD 10/4. DEEPALI o/n. Neuro stable. Pending AR.   1/10: POD 11/5. DEEPALI o/n. Pending AR.     Vital Signs Last 24 Hrs  T(C): 37.4 (09 Jan 2023 21:33), Max: 37.4 (09 Jan 2023 21:33)  T(F): 99.4 (09 Jan 2023 21:33), Max: 99.4 (09 Jan 2023 21:33)  HR: 82 (10 Myles 2023 00:27) (68 - 104)  BP: 145/65 (10 Myles 2023 00:27) (145/65 - 163/72)  BP(mean): 94 (10 Myles 2023 00:27) (94 - 103)  RR: 18 (10 Myles 2023 00:27) (16 - 18)  SpO2: 97% (10 Myles 2023 00:27) (97% - 100%)    Parameters below as of 10 Myles 2023 00:27  Patient On (Oxygen Delivery Method): room air    I&O's Summary    08 Jan 2023 07:01  -  09 Jan 2023 07:00  --------------------------------------------------------  IN: 1780 mL / OUT: 2950 mL / NET: -1170 mL    09 Jan 2023 07:01  -  10 Myles 2023 00:32  --------------------------------------------------------  IN: 1460 mL / OUT: 1250 mL / NET: 210 mL    PHYSICAL EXAM:  GEN: laying in bed, appears well, NAD  NEURO: AOx3. FC, OE spont, hypophonic, R facial. CNII-XII intact. L pupil 4/brisk, R pupil 3/sluggish, EOMI. No pronator drift. LUE/LLE 5/5. RUE 3/5 prox, HG 2/5. RLE 4/5   CV: RRR +S1/S2  PULM: CTAB  GI: Abd soft, NT/ND  EXT: ext warm, dry, nontender  WOUND: L hemicrani site c/d/i, flap sunken, staples in place     LABS:                        9.0    7.65  )-----------( 364      ( 09 Jan 2023 06:08 )             28.3     01-09    141  |  103  |  8   ----------------------------<  138<H>  3.9   |  28  |  0.60    Ca    8.4      09 Jan 2023 06:08  Phos  3.0     01-09  Mg     1.9     01-09    TPro  6.8  /  Alb  2.6<L>  /  TBili  0.2  /  DBili  x   /  AST  14  /  ALT  13  /  AlkPhos  83  01-08    CAPILLARY BLOOD GLUCOSE    POCT Blood Glucose.: 208 mg/dL (10 Myles 2023 00:24)  POCT Blood Glucose.: 180 mg/dL (09 Jan 2023 18:08)  POCT Blood Glucose.: 122 mg/dL (09 Jan 2023 11:47)  POCT Blood Glucose.: 156 mg/dL (09 Jan 2023 06:39)      Drug Levels: [] N/A  Vancomycin Level, Trough: 22.6 ug/mL (01-09 @ 06:08)  Vancomycin Level, Trough: 11.6 ug/mL (01-04 @ 06:53)  Vancomycin Level, Trough: 22.0 ug/mL (01-03 @ 21:00)    CSF Analysis: [] N/A      Allergies    Allergy Status Unknown    Intolerances      MEDICATIONS:  Antibiotics:  cefTRIAXone   IVPB 2000 milliGRAM(s) IV Intermittent every 12 hours  metroNIDAZOLE  IVPB 500 milliGRAM(s) IV Intermittent every 6 hours  vancomycin  IVPB 1500 milliGRAM(s) IV Intermittent every 12 hours    Neuro:  acetaminophen    Suspension .. 650 milliGRAM(s) Oral every 6 hours PRN  levETIRAcetam 1000 milliGRAM(s) Oral two times a day  ondansetron Injectable 4 milliGRAM(s) IV Push every 6 hours PRN    Anticoagulation:  enoxaparin Injectable 40 milliGRAM(s) SubCutaneous every 12 hours    OTHER:  amLODIPine   Tablet 10 milliGRAM(s) Oral daily  chlorhexidine 0.12% Liquid 15 milliLiter(s) Swish and Spit two times a day  chlorhexidine 2% Cloths 1 Application(s) Topical <User Schedule>  fluticasone propionate 50 MICROgram(s)/spray Nasal Spray 1 Spray(s) Both Nostrils every 12 hours  insulin lispro (ADMELOG) corrective regimen sliding scale   SubCutaneous every 6 hours  latanoprost 0.005% Ophthalmic Solution 1 Drop(s) Right EYE at bedtime  nystatin Cream 1 Application(s) Topical two times a day  senna 2 Tablet(s) Oral at bedtime  sodium chloride 0.65% Nasal 1 Spray(s) Both Nostrils four times a day    IVF:    CULTURES:  Culture Results:   Growth in fluid media only Staphylococcus epidermidis (01-05 @ 16:40)  Culture Results:   Rare Staphylococcus epidermidis (01-05 @ 16:40)    RADIOLOGY & ADDITIONAL TESTS:    ASSESSMENT:  71 y/o F w/ no known PMHx transferred from [place] c/o slurred speech and right sided weakness. CTH initially negative, repeat on 12/23 showed L sided SDH, pts mental status worsened and CTH on 12/28 significant for L frontoparietal collection, MRI completed showed concern for possible empyema. S/p L hemicraniectomy and washout of L subdural empyema 12/30/22. S/p sinus surgery with judith purulence to L maxillary sinus and posterior bony erosiun to L sphenoid sinus 1/5.     PLAN:  NEURO   - Neuro/vitals Q4   - Post-op CTH complete, repeat 1/2 stable  - ENT consulted: CT sinus completed 1/1 and 1/5-extensive opacification of the paranasal sinuses ; rec flonase and nasal saline  - Cont Keppra 1000 BID  - EEG dc'd 1/3, neg for seizure  - Helmet at bedside     PULM   - Maintain SpO2 > 92%     CARDIO   - TTE   - Amlodipine 10 mg daily   - -160    GI   - Pureed diet, no NGT (if needed c/s ENT)   - Bowel regimen  - Protonix   - BM 1/6    RENAL  - Voiding    ENDO  - ISS  - A1c 6.1     HEME   - SQL 40 BID (prophylactic dose)  - FOB neg 1/2   - s/p 1u prbc 1/1   - LE doppler 1/1 b/l calf and left peroneal DVTs; repeat 1/8 unchanged   - Anti xa 1/3 0.22    ID   - ID recs appreciated: ceftriaxone/metronidazole/vancomycin for subdural/epidural empyema--> final abx end date 2/16   - pend PICC placement   - Hemicrani washout OR cultures, rare peptostreptococcus 1/6 + staph epi    Dispo:   - SDU status, Pending AR    Discussed with Dr. D'Amico

## 2023-01-10 NOTE — PROGRESS NOTE ADULT - ASSESSMENT
IMPRESSION:   71 yo female p/w confusion, word-finding difficulty to OSH, found to have pansinusitis and L subdural empyema s/p L hemicraniotomy 12/30.  She is now s/p washout of sphenoid sinus, the likely source of the subdural empyema    Gram stain from the washout on 1/5/23 is showing gram positive cocci in pairs.  This was surprisingly staphylococcus epidermidis    Recommend:  1.  Continue Vancomycin 1500 mg IV q12.  Check trough before the PM dose   2.  Continue Ceftriaxone 2 grams IV q12  3.  Continue Metronidazole. Ok to switch it to 750 mg PO q8hrs for easier dosing  4.  She will need 6 week of IV antibiotics (day 1 = 1/5/23; final day is 2/16/23)  5.  Needs weekly CBC, CMP, ESR, CRP, vanco trough     ID team 2 will follow

## 2023-01-10 NOTE — PROGRESS NOTE ADULT - PROBLEM SELECTOR PLAN 11
DVT ppx: Lovenox    Discussed with primary team. Medicine service remains available to assist with any questions or concerns  Pending acute rehab placement BMI 47.8, affects all aspects of care  - outpatient follow up

## 2023-01-10 NOTE — PROVIDER CONTACT NOTE (OTHER) - REASON
Patient had small bloodclot come out of the vagina and bloody tinged urine in the canister from the purewick
Pt c/o pain and discomfort with use of head helmet
pt bp a little above parameters

## 2023-01-10 NOTE — PROGRESS NOTE ADULT - PROBLEM SELECTOR PLAN 6
b/l calf DVTs  - c/w DVT ppx dose Lovenox  - repeat doppler 1/8 requested by vascular to monitor, no propagation Collateral obtained from daughter, patient had seen cardiologist for cataract surgery preop and was cleared but was told borderline HTN    - amlodipine to 10mg  - Monitor for now, may add a 2nd agent if needed

## 2023-01-10 NOTE — PROGRESS NOTE ADULT - PROBLEM SELECTOR PLAN 8
Hgb 7-9, unknown baseline  - AOCD based on iron studies  - no signs of active bleeding A1c 6.1  - c/w ISS  - outpatient follow up with a PCP and would benefit from weight loss long term.

## 2023-01-10 NOTE — PROVIDER CONTACT NOTE (OTHER) - ACTION/TREATMENT ORDERED:
As per provider, continue to monitor patient,  and will assess the CT scan for the patient. Upon assessing the CT scan, provider advised me that patient has PE, will lower lovenox dose.

## 2023-01-10 NOTE — PROGRESS NOTE ADULT - PROBLEM SELECTOR PLAN 7
A1c 6.1  - c/w ISS  - outpatient follow up with a PCP and would benefit from weight loss long term. Resolved, c/w pureed diet per S&S

## 2023-01-10 NOTE — PROGRESS NOTE ADULT - PROBLEM SELECTOR PLAN 3
- s/p washout  - abx as above  - nasal care per ENT b/l calf DVTs  - c/w DVT ppx dose Lovenox  - repeat doppler 1/8 requested by vascular to monitor, no propagation

## 2023-01-10 NOTE — PROGRESS NOTE ADULT - ASSESSMENT
73yo F w/ no known PMHx transferred from [place] c/o slurred speech and right sided weakness. CTH initially negative, repeat on 12/23 showed L sided SDH, pts mental status worsened and CTH on 12/28 significant for L frontoparietal collection, MRI completed showed concern for possible empyema. S/p L hemicraniectomy and washout of L subdural empyema 12/30/22. Now s/p sinus washout 1/5 and pending AR.

## 2023-01-10 NOTE — PROGRESS NOTE ADULT - SUBJECTIVE AND OBJECTIVE BOX
Patient is a 72y old  Female who presents with a chief complaint of Subdural collection (10 Myles 2023 12:51)      SUBJECTIVE / OVERNIGHT EVENTS: Patient seen and examined at bedside. No acute events overnight. No complaints. Sitting OOBTC with daughter at bedside and helmet on.    MEDICATIONS  (STANDING):  amLODIPine   Tablet 10 milliGRAM(s) Oral daily  cefTRIAXone   IVPB 2000 milliGRAM(s) IV Intermittent every 12 hours  chlorhexidine 0.12% Liquid 15 milliLiter(s) Swish and Spit two times a day  chlorhexidine 2% Cloths 1 Application(s) Topical <User Schedule>  enoxaparin Injectable 40 milliGRAM(s) SubCutaneous every 12 hours  fluticasone propionate 50 MICROgram(s)/spray Nasal Spray 1 Spray(s) Both Nostrils every 12 hours  insulin lispro (ADMELOG) corrective regimen sliding scale   SubCutaneous every 6 hours  latanoprost 0.005% Ophthalmic Solution 1 Drop(s) Right EYE at bedtime  levETIRAcetam 1000 milliGRAM(s) Oral two times a day  metroNIDAZOLE  IVPB 750 milliGRAM(s) IV Intermittent every 8 hours  nystatin Cream 1 Application(s) Topical two times a day  senna 2 Tablet(s) Oral at bedtime  sodium chloride 0.65% Nasal 1 Spray(s) Both Nostrils four times a day  vancomycin  IVPB 1500 milliGRAM(s) IV Intermittent every 12 hours    MEDICATIONS  (PRN):  acetaminophen    Suspension .. 650 milliGRAM(s) Oral every 6 hours PRN Temp greater or equal to 38C (100.4F), Mild Pain (1 - 3)  ondansetron Injectable 4 milliGRAM(s) IV Push every 6 hours PRN Nausea and/or Vomiting  sodium chloride 0.9% lock flush 10 milliLiter(s) IV Push every 1 hour PRN Pre/post blood products, medications, blood draw, and to maintain line patency      CAPILLARY BLOOD GLUCOSE      POCT Blood Glucose.: 135 mg/dL (10 Myles 2023 11:35)  POCT Blood Glucose.: 121 mg/dL (10 Myles 2023 06:37)  POCT Blood Glucose.: 208 mg/dL (10 Myles 2023 00:24)  POCT Blood Glucose.: 180 mg/dL (09 Jan 2023 18:08)    I&O's Summary    09 Jan 2023 07:01  -  10 Myles 2023 07:00  --------------------------------------------------------  IN: 1460 mL / OUT: 2450 mL / NET: -990 mL    10 Myles 2023 07:01  -  10 Myles 2023 13:17  --------------------------------------------------------  IN: 600 mL / OUT: 450 mL / NET: 150 mL        PHYSICAL EXAM:  Vital Signs Last 24 Hrs  T(C): 36.4 (10 Myles 2023 09:09), Max: 37.4 (09 Jan 2023 21:33)  T(F): 97.5 (10 Myles 2023 09:09), Max: 99.4 (09 Jan 2023 21:33)  HR: 114 (10 Myles 2023 12:01) (82 - 114)  BP: 146/83 (10 Myles 2023 12:01) (133/61 - 175/80)  BP(mean): 107 (10 Myles 2023 12:01) (88 - 115)  RR: 16 (10 Myles 2023 12:01) (16 - 18)  SpO2: 96% (10 Myles 2023 12:01) (96% - 98%)    Parameters below as of 10 Myles 2023 12:01  Patient On (Oxygen Delivery Method): room air        GEN: female in NAD, appears comfortable, no diaphoresis  EYES: No scleral injection, PERRL, EOMI  ENTM: neck supple & symmetric without tracheal deviation, moist membranes, no gross hearing impairment, thyroid gland not enlarged  CV: +S1/S2, no m/r/g, no abdominal bruit, no LE edema  RESP: breathing comfortably, no respiratory accessory muscle use, CTAB, no w/r/r  GI: normoactive BS, soft, NTND, no rebounding/guarding, no palpable masses    LABS:                        8.7    7.43  )-----------( 320      ( 10 Myles 2023 05:30 )             26.9     01-10    140  |  104  |  8   ----------------------------<  119<H>  3.7   |  28  |  0.59    Ca    8.5      10 Myles 2023 05:30  Phos  3.0     01-09  Mg     1.8     01-10                  RADIOLOGY & ADDITIONAL TESTS:  Results Reviewed:   Imaging Personally Reviewed:  Electrocardiogram Personally Reviewed:    COORDINATION OF CARE:  Care Discussed with Consultants/Other Providers [Y/N]:  Prior or Outpatient Records Reviewed [Y/N]:

## 2023-01-10 NOTE — PROGRESS NOTE ADULT - PROBLEM SELECTOR PLAN 2
- ID following appreciate recs  - Currently on CTX/Vanc/Metronidazole --> monitor vanc trough  - PICC placed 1/9. Midline in contralateral arm to be removed  - Abx until 2/16 On 1/10 [facility] physician requested screening CTA Chest to r/o PE due to mild tachycardia. Patient was not hypoxic. CTA Chest showed: "Since December 31, 2022, acute pulmonary embolism, involving right distal lower lobe, few right upper and lower lobe segmental branches; left interlobar, lingual and few lower lobe segmental branches. No right   heart strain. RV/LV ratio 1.0."    - Discussed with neurosurgery, if no contraindication would start full dose anticoagulation  - Monitor hemodynamics including hypotension and hypoxia  - TTE to r/o RV dysfunction  - Recent LE duplex appreciated below

## 2023-01-11 LAB
-  AMOXICILLIN/CLAVULANIC ACID: SIGNIFICANT CHANGE UP
-  CLINDAMYCIN: SIGNIFICANT CHANGE UP
-  ERTAPENEM: SIGNIFICANT CHANGE UP
-  MEROPENEM: SIGNIFICANT CHANGE UP
-  METRONIDAZOLE: SIGNIFICANT CHANGE UP
-  PIPERACILLIN/TAZOBACTAM: SIGNIFICANT CHANGE UP
ANION GAP SERPL CALC-SCNC: 12 MMOL/L — SIGNIFICANT CHANGE UP (ref 5–17)
BUN SERPL-MCNC: 8 MG/DL — SIGNIFICANT CHANGE UP (ref 7–23)
CALCIUM SERPL-MCNC: 8.6 MG/DL — SIGNIFICANT CHANGE UP (ref 8.4–10.5)
CHLORIDE SERPL-SCNC: 103 MMOL/L — SIGNIFICANT CHANGE UP (ref 96–108)
CO2 SERPL-SCNC: 24 MMOL/L — SIGNIFICANT CHANGE UP (ref 22–31)
CREAT SERPL-MCNC: 0.59 MG/DL — SIGNIFICANT CHANGE UP (ref 0.5–1.3)
CULTURE RESULTS: SIGNIFICANT CHANGE UP
EGFR: 96 ML/MIN/1.73M2 — SIGNIFICANT CHANGE UP
GLUCOSE BLDC GLUCOMTR-MCNC: 101 MG/DL — HIGH (ref 70–99)
GLUCOSE BLDC GLUCOMTR-MCNC: 111 MG/DL — HIGH (ref 70–99)
GLUCOSE SERPL-MCNC: 164 MG/DL — HIGH (ref 70–99)
HCT VFR BLD CALC: 30.8 % — LOW (ref 34.5–45)
HGB BLD-MCNC: 9.5 G/DL — LOW (ref 11.5–15.5)
MAGNESIUM SERPL-MCNC: 2.1 MG/DL — SIGNIFICANT CHANGE UP (ref 1.6–2.6)
MCHC RBC-ENTMCNC: 29.6 PG — SIGNIFICANT CHANGE UP (ref 27–34)
MCHC RBC-ENTMCNC: 30.8 GM/DL — LOW (ref 32–36)
MCV RBC AUTO: 96 FL — SIGNIFICANT CHANGE UP (ref 80–100)
METHOD TYPE: SIGNIFICANT CHANGE UP
NRBC # BLD: 0 /100 WBCS — SIGNIFICANT CHANGE UP (ref 0–0)
ORGANISM # SPEC MICROSCOPIC CNT: SIGNIFICANT CHANGE UP
ORGANISM # SPEC MICROSCOPIC CNT: SIGNIFICANT CHANGE UP
PHOSPHATE SERPL-MCNC: 2.7 MG/DL — SIGNIFICANT CHANGE UP (ref 2.5–4.5)
PLATELET # BLD AUTO: 296 K/UL — SIGNIFICANT CHANGE UP (ref 150–400)
POTASSIUM SERPL-MCNC: 3.8 MMOL/L — SIGNIFICANT CHANGE UP (ref 3.5–5.3)
POTASSIUM SERPL-SCNC: 3.8 MMOL/L — SIGNIFICANT CHANGE UP (ref 3.5–5.3)
RBC # BLD: 3.21 M/UL — LOW (ref 3.8–5.2)
RBC # FLD: 18.6 % — HIGH (ref 10.3–14.5)
SODIUM SERPL-SCNC: 139 MMOL/L — SIGNIFICANT CHANGE UP (ref 135–145)
SPECIMEN SOURCE: SIGNIFICANT CHANGE UP
VANCOMYCIN TROUGH SERPL-MCNC: 17.4 UG/ML — SIGNIFICANT CHANGE UP (ref 10–20)
WBC # BLD: 7.64 K/UL — SIGNIFICANT CHANGE UP (ref 3.8–10.5)
WBC # FLD AUTO: 7.64 K/UL — SIGNIFICANT CHANGE UP (ref 3.8–10.5)

## 2023-01-11 PROCEDURE — 93306 TTE W/DOPPLER COMPLETE: CPT | Mod: 26

## 2023-01-11 PROCEDURE — 99024 POSTOP FOLLOW-UP VISIT: CPT

## 2023-01-11 PROCEDURE — 99232 SBSQ HOSP IP/OBS MODERATE 35: CPT

## 2023-01-11 PROCEDURE — 99233 SBSQ HOSP IP/OBS HIGH 50: CPT

## 2023-01-11 RX ORDER — MAGNESIUM SULFATE 500 MG/ML
1 VIAL (ML) INJECTION ONCE
Refills: 0 | Status: COMPLETED | OUTPATIENT
Start: 2023-01-11 | End: 2023-01-11

## 2023-01-11 RX ORDER — POTASSIUM CHLORIDE 20 MEQ
20 PACKET (EA) ORAL
Refills: 0 | Status: COMPLETED | OUTPATIENT
Start: 2023-01-11 | End: 2023-01-11

## 2023-01-11 RX ORDER — METRONIDAZOLE 500 MG
750 TABLET ORAL EVERY 8 HOURS
Refills: 0 | Status: DISCONTINUED | OUTPATIENT
Start: 2023-01-11 | End: 2023-01-12

## 2023-01-11 RX ORDER — ENOXAPARIN SODIUM 100 MG/ML
120 INJECTION SUBCUTANEOUS
Qty: 0 | Refills: 0 | DISCHARGE
Start: 2023-01-11

## 2023-01-11 RX ADMIN — SENNA PLUS 2 TABLET(S): 8.6 TABLET ORAL at 21:01

## 2023-01-11 RX ADMIN — LEVETIRACETAM 1000 MILLIGRAM(S): 250 TABLET, FILM COATED ORAL at 05:35

## 2023-01-11 RX ADMIN — Medication 20 MILLIEQUIVALENT(S): at 09:39

## 2023-01-11 RX ADMIN — CEFTRIAXONE 100 MILLIGRAM(S): 500 INJECTION, POWDER, FOR SOLUTION INTRAMUSCULAR; INTRAVENOUS at 05:34

## 2023-01-11 RX ADMIN — Medication 1 SPRAY(S): at 05:35

## 2023-01-11 RX ADMIN — Medication 100 GRAM(S): at 08:02

## 2023-01-11 RX ADMIN — Medication 1 SPRAY(S): at 23:00

## 2023-01-11 RX ADMIN — Medication 300 MILLIGRAM(S): at 05:34

## 2023-01-11 RX ADMIN — LEVETIRACETAM 1000 MILLIGRAM(S): 250 TABLET, FILM COATED ORAL at 17:24

## 2023-01-11 RX ADMIN — CEFTRIAXONE 100 MILLIGRAM(S): 500 INJECTION, POWDER, FOR SOLUTION INTRAMUSCULAR; INTRAVENOUS at 17:25

## 2023-01-11 RX ADMIN — Medication 1 SPRAY(S): at 12:26

## 2023-01-11 RX ADMIN — Medication 100 MILLIGRAM(S): at 03:13

## 2023-01-11 RX ADMIN — Medication 750 MILLIGRAM(S): at 21:00

## 2023-01-11 RX ADMIN — NYSTATIN CREAM 1 APPLICATION(S): 100000 CREAM TOPICAL at 05:35

## 2023-01-11 RX ADMIN — CHLORHEXIDINE GLUCONATE 1 APPLICATION(S): 213 SOLUTION TOPICAL at 05:36

## 2023-01-11 RX ADMIN — ENOXAPARIN SODIUM 120 MILLIGRAM(S): 100 INJECTION SUBCUTANEOUS at 09:39

## 2023-01-11 RX ADMIN — NYSTATIN CREAM 1 APPLICATION(S): 100000 CREAM TOPICAL at 17:24

## 2023-01-11 RX ADMIN — Medication 750 MILLIGRAM(S): at 14:47

## 2023-01-11 RX ADMIN — CHLORHEXIDINE GLUCONATE 15 MILLILITER(S): 213 SOLUTION TOPICAL at 17:23

## 2023-01-11 RX ADMIN — LATANOPROST 1 DROP(S): 0.05 SOLUTION/ DROPS OPHTHALMIC; TOPICAL at 21:00

## 2023-01-11 RX ADMIN — ENOXAPARIN SODIUM 120 MILLIGRAM(S): 100 INJECTION SUBCUTANEOUS at 21:02

## 2023-01-11 RX ADMIN — Medication 20 MILLIEQUIVALENT(S): at 08:02

## 2023-01-11 RX ADMIN — AMLODIPINE BESYLATE 10 MILLIGRAM(S): 2.5 TABLET ORAL at 05:35

## 2023-01-11 RX ADMIN — Medication 1 SPRAY(S): at 17:23

## 2023-01-11 RX ADMIN — Medication 300 MILLIGRAM(S): at 18:51

## 2023-01-11 RX ADMIN — CHLORHEXIDINE GLUCONATE 15 MILLILITER(S): 213 SOLUTION TOPICAL at 05:35

## 2023-01-11 RX ADMIN — Medication 1 SPRAY(S): at 17:24

## 2023-01-11 NOTE — PROGRESS NOTE ADULT - SUBJECTIVE AND OBJECTIVE BOX
HPI:  72F, txfered from [facility]. Presented to [facility] 7 days ago for AMS. CTH done 6 days ago showed spontaneous Left SD collection. MRI done today showed Left SD collection with diffusion restriction c/f empyema and infarct. Also showed Left sinus collection. Was also getting ceftriaxone for presumed UT at OSH. (30 Dec 2022 19:41)    OVERNIGHT EVENTS: Denies complaints of pain or discomfort at this time.    HOSPITAL COURSE:   12/31: Admitted to NSICU, s/p Left hemicraniectomy and washout of L subdural empyema. Pancultured. Vanc/ceftriaxone/flagyl for empiric coverage. OR cultures demonstrated gram negative cocci in pairs. ENT and ID consulted, recommend cont vanc 2g, flagyl 750mg q8hrs, ceftriaxone 2g q12hrs. Extubated to room air, RUE/RLE strength improving. Pend CT sinus stealth protocol pend per ENT.   1/1: POD2 DEEPALI o/n, neuro stable, tolerating RA post extubation, pending CT sinus w/ stealth protocol per ENT. Blood consent obtained and pt transfused 1u prbc for HGB 7.9-> 6.8 -> 7.4. CHENCHO drain removed.  LE dopplers + B/L peroneal IM calf DVT, SQL increased to BID prophylactic dosing. Failed swallow eval, remains on tube feeds.   1/2: POD3, DEEPALI o/n. Neuro stable. TF increased goal rate to 65, tolerating feeds. S&S to re-eval in 48 hours. Pending final recs from ID. FOBT neg. Nate drain removed.   1/3: POD4. DEEPALI overnight, pt able to say "no" to questions, o/w neuro exam stable. EEG on, dc'd in afternoon, no seizure. F/u final OR cx, then will have PICC placed. Vanc trough pend 1/3 @9pm. Pend repeat dopplers 1/6. SDU status  1/4: POD 5. DEEPALI overnight. Neuro stable. FEES completed and patient cleared for pureed diet. Pending OR with ENT tomorrow. Vancomycin dc'd per ID.  1/5: POD 6. DEEPALI overnight. Neurostable. Repeat CT sinus complete overnight. POD0 b/l sinus surgery with judith purulence to L maxillary sinus and posterior bony erosiun to L sphenoid sinus. NGT replaced by ENT. CXR w/ NGT in distal esophagus, advanced and re-ordered CXR which showed correct placement of NGT.   1/6: POD 7/POD1. DEEPALI ovn, neurologically stable. SQL ppx resumed tonight. Tolerating pureed diet, NGT removed.   1/7: POD8/POD2. DEEPALI o/n neuro stable. Al cx are groin staph. epidermidis, Vancomycin started.  1/8: POD 9/3. DEEPALI o/n. Neuro stable. Pending AR.   1/9: POD 10/4. DEEPALI o/n. Neuro stable. Pending AR.   1/10: POD 11/5. DEEPALI o/n. Pending AR. CT PE protocol completed, will increase SQL dosing to full dose AC, pend repeat echo to evaluate for right heart strain. Vanc trough tonight at 5pm. Pend possible discharge to AR tomorrow. Midline leaking overnight, removed.  1/11: POD12/6, DEEPALI overnight, neuro stable, echo pending.     Vital Signs Last 24 Hrs  T(C): 36.9 (10 Myles 2023 21:17), Max: 36.9 (10 Myles 2023 21:17)  T(F): 98.4 (10 Myles 2023 21:17), Max: 98.4 (10 Myles 2023 21:17)  HR: 84 (11 Jan 2023 00:04) (84 - 114)  BP: 155/67 (11 Jan 2023 00:04) (133/61 - 175/80)  BP(mean): 96 (11 Jan 2023 00:04) (88 - 115)  RR: 18 (11 Jan 2023 00:04) (16 - 18)  SpO2: 97% (11 Jan 2023 00:04) (96% - 100%)    Parameters below as of 11 Jan 2023 00:04  Patient On (Oxygen Delivery Method): room air        I&O's Summary    09 Jan 2023 07:01  -  10 Myles 2023 07:00  --------------------------------------------------------  IN: 1460 mL / OUT: 2450 mL / NET: -990 mL    10 Myles 2023 07:01  -  11 Jan 2023 00:54  --------------------------------------------------------  IN: 600 mL / OUT: 1050 mL / NET: -450 mL        PHYSICAL EXAM:  General: NAD, pt is comfortably sitting up in bed, on room air  HEENT: CN II-XII grossly intact, Right pupil 3mm sluggishly reactive, Left pupil 4-4.5mm briskly reactive. EOMI b/l, face symmetric, tongue midline, neck FROM  Cardiovascular: RRR, normal S1 and S2   Respiratory: lungs CTAB, no wheezing, rhonchi, or crackles   GI: normoactive BS to auscultation, abd soft, NTND   Neuro: A&Ox3, expressive phasia with word finding difficulty. Speech clear, no dysmetria, no pronator drift. Follows commands.  BENDER x4 spontaneously, LUE/LLE 5/5 strength, RUE 4/5, RLE 4+/5. SILT throughout. +Flap soft and sunken.   Extremities: warm and well perfused. +LUE PICC   Wound/incision: +Left craniotomy incision with staples in place, C/D/I       TUBES/LINES:  [] Cannon  [] Lumbar Drain  [] Wound Drains  [x] Others - LUE PICC       DIET:  [] NPO  [x] Mechanical  [] Tube feeds    LABS:                        8.7    7.43  )-----------( 320      ( 10 Myles 2023 05:30 )             26.9     01-10    140  |  104  |  8   ----------------------------<  119<H>  3.7   |  28  |  0.59    Ca    8.5      10 Myles 2023 05:30  Phos  3.0     01-09  Mg     1.8     01-10              CAPILLARY BLOOD GLUCOSE      POCT Blood Glucose.: 109 mg/dL (10 Myles 2023 23:33)  POCT Blood Glucose.: 101 mg/dL (10 Myles 2023 17:15)  POCT Blood Glucose.: 135 mg/dL (10 Myles 2023 11:35)  POCT Blood Glucose.: 121 mg/dL (10 Myles 2023 06:37)      Drug Levels: [] N/A  Vancomycin Level, Trough: 9.6 ug/mL (01-10 @ 17:51)  Vancomycin Level, Trough: 22.6 ug/mL (01-09 @ 06:08)  Vancomycin Level, Trough: 11.6 ug/mL (01-04 @ 06:53)    CSF Analysis: [] N/A      Allergies    Allergy Status Unknown    Intolerances      MEDICATIONS:  Antibiotics:  cefTRIAXone   IVPB 2000 milliGRAM(s) IV Intermittent every 12 hours  metroNIDAZOLE  IVPB 750 milliGRAM(s) IV Intermittent every 8 hours  vancomycin  IVPB 1500 milliGRAM(s) IV Intermittent every 12 hours    Neuro:  acetaminophen    Suspension .. 650 milliGRAM(s) Oral every 6 hours PRN  levETIRAcetam 1000 milliGRAM(s) Oral two times a day  ondansetron Injectable 4 milliGRAM(s) IV Push every 6 hours PRN    Anticoagulation:  enoxaparin Injectable 120 milliGRAM(s) SubCutaneous every 12 hours    OTHER:  amLODIPine   Tablet 10 milliGRAM(s) Oral daily  chlorhexidine 0.12% Liquid 15 milliLiter(s) Swish and Spit two times a day  chlorhexidine 2% Cloths 1 Application(s) Topical <User Schedule>  fluticasone propionate 50 MICROgram(s)/spray Nasal Spray 1 Spray(s) Both Nostrils every 12 hours  insulin lispro (ADMELOG) corrective regimen sliding scale   SubCutaneous every 6 hours  latanoprost 0.005% Ophthalmic Solution 1 Drop(s) Right EYE at bedtime  nystatin Cream 1 Application(s) Topical two times a day  senna 2 Tablet(s) Oral at bedtime  sodium chloride 0.65% Nasal 1 Spray(s) Both Nostrils four times a day    IVF:    CULTURES:  Culture Results:   Growth in fluid media only Staphylococcus epidermidis (01-05 @ 16:40)  Culture Results:   Rare Staphylococcus epidermidis (01-05 @ 16:40)    RADIOLOGY & ADDITIONAL TESTS:  < from: CT Angio Chest PE Protocol w/ IV Cont (01.10.23 @ 14:08) >  IMPRESSION:    1. Since December 31, 2022, acute pulmonary embolism, involving right   distal lower lobe, few right upper and lower lobe segmental branches;   left interlobar, lingual and few lower lobe segmental branches. No right   heart strain. RV/LV ratio 1.0. This was discussed with neurosurgery OMER Ledbetter January 10, 2023 at 2:20 PM  2. Clear lungs.    < end of copied text >      ASSESSMENT:  71 y/o F w/ no known PMHx transferred from [place] c/o slurred speech and right sided weakness. CTH initially negative, repeat on 12/23 showed L sided SDH, pts mental status worsened and CTH on 12/28 significant for L frontoparietal collection, MRI completed showed concern for possible empyema. S/p L hemicraniectomy and washout of L subdural empyema 12/30/22. S/p sinus surgery with judith purulence to L maxillary sinus and posterior bony erosiun to L sphenoid sinus 1/5.     INTRACRANIALHEMORRIAGE    Nonintractable epilepsy without status epilepticus    Convulsions    Handoff    MEWS Score    Subdural empyema    Subdural empyema    Craniectomy or craniotomy, emergent    Subdural empyema    Subdural hemorrhage    Subdural empyema    Hypertension    Morbid obesity    Pansinusitis    Calf DVT (deep venous thrombosis)    Prediabetes    Prophylactic measure    Dysphagia    Anemia    Preoperative examination    Thrombocytosis    Pulmonary embolism    Craniectomy or craniotomy, emergent    FESS, with nasal septoplasty and submucosal resection    Hypertension    Morbid obesity    Pansinusitis    Calf DVT (deep venous thrombosis)    Prediabetes    SysAdmin_VstLnk        PLAN:  NEURO   - Neuro/vitals Q4   - Post-op CTH complete, repeat 1/2 stable  - ENT consulted: CT sinus completed 1/1 and 1/5-extensive opacification of the paranasal sinuses ; rec flonase and nasal saline  - Cont Keppra 1000 BID  - EEG dc'd 1/3, neg for seizure  - Helmet at bedside     PULM   - Maintain SpO2 > 92%     CARDIO   - TTE pending to assess for right heart strain.   - Amlodipine 10 mg daily   - -160    GI   - Pureed diet, no NGT (if needed c/s ENT)   - Bowel regimen  - BM 1/10    RENAL  - Voiding    ENDO  - ISS  - A1c 6.1     HEME   - FOB neg 1/2   - s/p 1u prbc 1/1   - LE doppler 1/1 b/l calf and left peroneal DVTs; repeat 1/8 unchanged   - Anti xa 1/3 0.22  - b/l mild segmental/lobar PE on CT PE protocol 1/10  - started on  BID 1/10    ID   - ID recs appreciated: ceftriaxone/metronidazole/vancomycin for subdural/epidural empyema--> final abx end date 2/16   - PICC placed 1/9  - Hemicrani washout OR cultures, rare peptostreptococcus 1/6 + staph epi    Dispo:   - SDU status, Pending AR    Discussed with Dr. D'Amico    Assessment:  Present when checked    []  GCS  E   V  M     Heart Failure: []Acute, [] acute on chronic , []chronic  Heart Failure:  [] Diastolic (HFpEF), [] Systolic (HFrEF), []Combined (HFpEF and HFrEF), [] RHF, [] Pulm HTN, [] Other    [] DIONTE, [] ATN, [] AIN, [] other  [] CKD1, [] CKD2, [] CKD 3, [] CKD 4, [] CKD 5, []ESRD    Encephalopathy: [] Metabolic, [] Hepatic, [] toxic, [] Neurological, [] Other    Abnormal Nurtitional Status: [] malnurtition (see nutrition note), [ ]underweight: BMI < 19, [] morbid obesity: BMI >40, [] Cachexia    [] Sepsis  [] hypovolemic shock,[] cardiogenic shock, [] hemorrhagic shock, [] neuogenic shock  [] Acute Respiratory Failure  []Cerebral edema, [] Brain compression/ herniation,   [] Functional quadriplegia  [] Acute blood loss anemia

## 2023-01-11 NOTE — PROGRESS NOTE ADULT - SUBJECTIVE AND OBJECTIVE BOX
INTERVAL HPI/OVERNIGHT EVENTS:    Patient was seen and examined at bedside.  CT angio with acute PE overnight     CONSTITUTIONAL:  Negative fever or chills, feels well, good appetite  EYES:  Negative  blurry vision or double vision  CARDIOVASCULAR:  Negative for chest pain or palpitations  RESPIRATORY:  Negative for cough, wheezing, or SOB   GASTROINTESTINAL:  Negative for nausea, vomiting, diarrhea, constipation, or abdominal pain  GENITOURINARY:  Negative frequency, urgency or dysuria  NEUROLOGIC:  No headache, confusion, dizziness, lightheadedness      ANTIBIOTICS/RELEVANT:    MEDICATIONS  (STANDING):  amLODIPine   Tablet 10 milliGRAM(s) Oral daily  cefTRIAXone   IVPB 2000 milliGRAM(s) IV Intermittent every 12 hours  chlorhexidine 0.12% Liquid 15 milliLiter(s) Swish and Spit two times a day  chlorhexidine 2% Cloths 1 Application(s) Topical <User Schedule>  enoxaparin Injectable 120 milliGRAM(s) SubCutaneous every 12 hours  fluticasone propionate 50 MICROgram(s)/spray Nasal Spray 1 Spray(s) Both Nostrils every 12 hours  latanoprost 0.005% Ophthalmic Solution 1 Drop(s) Right EYE at bedtime  levETIRAcetam 1000 milliGRAM(s) Oral two times a day  metroNIDAZOLE    Tablet 750 milliGRAM(s) Oral every 8 hours  nystatin Cream 1 Application(s) Topical two times a day  senna 2 Tablet(s) Oral at bedtime  sodium chloride 0.65% Nasal 1 Spray(s) Both Nostrils four times a day  vancomycin  IVPB 1500 milliGRAM(s) IV Intermittent every 12 hours    MEDICATIONS  (PRN):  acetaminophen    Suspension .. 650 milliGRAM(s) Oral every 6 hours PRN Temp greater or equal to 38C (100.4F), Mild Pain (1 - 3)  ondansetron Injectable 4 milliGRAM(s) IV Push every 6 hours PRN Nausea and/or Vomiting  sodium chloride 0.9% lock flush 10 milliLiter(s) IV Push every 1 hour PRN Pre/post blood products, medications, blood draw, and to maintain line patency        Vital Signs Last 24 Hrs  T(C): 36.4 (11 Jan 2023 14:23), Max: 36.9 (10 Myles 2023 21:17)  T(F): 97.6 (11 Jan 2023 14:23), Max: 98.4 (10 Myles 2023 21:17)  HR: 96 (11 Jan 2023 12:25) (78 - 100)  BP: 131/61 (11 Jan 2023 12:25) (131/61 - 162/70)  BP(mean): 88 (11 Jan 2023 12:25) (88 - 100)  RR: 17 (11 Jan 2023 12:25) (16 - 18)  SpO2: 96% (11 Jan 2023 12:25) (96% - 100%)    Parameters below as of 11 Jan 2023 12:25  Patient On (Oxygen Delivery Method): room air        PHYSICAL EXAM:  Constitutional:Well-developed, well nourished  Eyes:VISHAL, EOMI  Ear/Nose/Throat: no oral lesion, no sinus tenderness on percussion	  Neck:  supple  Respiratory: CTA larry  Cardiovascular: S1S2 RRR, no murmurs  Gastrointestinal:soft, (+) BS, no HSM  Extremities:no e/e/c  Vascular: DP Pulse:	right normal; left normal      LABS:                        9.5    7.64  )-----------( 296      ( 11 Jan 2023 05:30 )             30.8     01-11    139  |  103  |  8   ----------------------------<  164<H>  3.8   |  24  |  0.59    Ca    8.6      11 Jan 2023 05:30  Phos  2.7     01-11  Mg     2.1     01-11      Vancomycin Level, Trough (01.10.23 @ 17:51)    Vancomycin Level, Trough: 9.6: Vancomycin trough levels should be rapidly reached and maintained at  15-20 ug/ml for life threatening MRSA  infections such as sepsis, endocarditis, osteomyelitis and pneumonia. A  first trough level should be drawn  before the 3rd or 4th dose.  Risk of renal toxicity is increased for levels >15 ug/ml, in patients on  other nephrotoxic drugs, who are  hemodynamically unstable, have unstable renal function, or are on  Vancomycin therapy for >14 days. Renal function with  creatinine levels should be monitored for those patients. ug/mL          MICROBIOLOGY:    Culture - Tissue with Gram Stain (01.05.23 @ 16:40)    -  Oxacillin: R >2    -  Rifampin: S <=1 Should not be used as monotherapy    -  Trimethoprim/Sulfamethoxazole: S <=0.5/9.5    -  Vancomycin: S 2    Gram Stain:   No organisms seen  Rare WBC's    -  Clindamycin: R >4    -  Erythromycin: R >4    -  Linezolid: S 2    Specimen Source: .Tissue #5 left sphenoid tissue    Culture Results:   Growth in fluid media only Staphylococcus epidermidis    Organism Identification: Staphylococcus epidermidis    Organism: Staphylococcus epidermidis    Method Type: JULIA        RADIOLOGY & ADDITIONAL STUDIES:

## 2023-01-11 NOTE — PROGRESS NOTE ADULT - PROBLEM SELECTOR PLAN 1
s/p L hemicraniectomy and washout of L subdural empyema 12/30/22  - repeat post op CT scans stable  - seizure ppx per primary team  - ENT following for pansinusitis, ID following for empyema see below  - s/p EEG, f/u read  - plan per primary team
s/p L hemicraniectomy and washout of L subdural empyema 12/30/22  - repeat post op CT scans stable  - seizure ppx per primary team  - ENT following for pansinusitis (s/p washout), ID following for empyema see below  - plan per primary team
s/p L hemicraniectomy and washout of L subdural empyema 12/30/22  - repeat post op CT scans stable  - seizure ppx per primary team  - ENT following for pansinusitis, ID following for empyema see below  - plan per primary team
s/p L hemicraniectomy and washout of L subdural empyema 12/30/22  - repeat post op CT scans stable  - seizure ppx per primary team  - ENT following for pansinusitis, ID following for empyema see below  - plan per primary team
s/p L hemicraniectomy and washout of L subdural empyema 12/30/22  - repeat post op CT scans stable  - seizure ppx per primary team  - ENT following for pansinusitis (s/p washout), ID following for empyema see below  - plan per primary team
s/p L hemicraniectomy and washout of L subdural empyema 12/30/22  - repeat post op CT scans stable  - seizure ppx per primary team  - ENT following for pansinusitis, ID following for empyema see below  - plan per primary team

## 2023-01-11 NOTE — PROGRESS NOTE ADULT - ASSESSMENT
Assessment and Plan:  JEMIMA DUFFY is a  71 y/o F w/ no known PMHx transferred from [place] c/o slurred speech and right sided weakness. CTH initially negative, repeat on 12/23 showed L sided SDH, pts mental status worsened and CTH on 12/28 significant for L frontoparietal collection, MRI completed showed concern for possible empyema. S/p L hemicraniectomy and washout of L subdural empyema 12/30/22. Imaging c/w a pansinusitis which persists despite neurosurgical intervention and IV abx and sphenoid sinuses likely source of intracranial complications. After discussion with neurosurgery and ID team, plan was recommended to proceed with sinus surgery to establish paranasal sinus patency, remove inflammatory necrotic debris and ultimately get source control.     PLAN:  - Please USE NASAL rinse twice a day ----  use saline water and kendra syringe   - Please continue care as per NSGY   - pending path   -Continue abx as per ID     [facility] at [phone] with any questions/concerns.    Connie Mosher PA-C  01-11-23 @ 08:32

## 2023-01-11 NOTE — PROGRESS NOTE ADULT - ASSESSMENT
73yo F w/ no known PMHx transferred from [place] c/o slurred speech and right sided weakness. CTH initially negative, repeat on 12/23 showed L sided SDH, pts mental status worsened and CTH on 12/28 significant for L frontoparietal collection, MRI completed showed concern for possible empyema. S/p L hemicraniectomy and washout of L subdural empyema 12/30/22. Now s/p sinus washout 1/5 and pending AR.  Incidentally found to have distal PEs during tachycardia workup without signs of R heart strain.

## 2023-01-11 NOTE — PROGRESS NOTE ADULT - PROBLEM SELECTOR PLAN 6
Collateral obtained from daughter, patient had seen cardiologist for cataract surgery preop and was cleared but was told borderline HTN    - amlodipine to 10mg  - Monitor for now, may add a 2nd agent if needed

## 2023-01-11 NOTE — PROGRESS NOTE ADULT - PROBLEM/PLAN-2
Initial Anesthesia Post-op Note    Patient: Vinicio Lacy  Procedure(s) Performed: RIGHT EXTRACTION, CATARACT, WITH IOL INSERTION - RIGHT  Anesthesia type: MAC    Vitals Value Taken Time   Temp 36.2 °C (97.1 °F) 12/01/22 1027   Pulse 64 12/01/22 1027   Resp 16 12/01/22 1027   SpO2 99 % 12/01/22 1027   /64 12/01/22 1027         Patient Location: Phase II  Post-op Vital Signs:stable  Level of Consciousness: participates in exam, alert, awake, return to baseline and follows commands  Respiratory Status: spontaneous ventilation and room air  Cardiovascular blood pressure returned to baseline and stable  Hydration: euvolemic  Pain Management: well controlled  Handoff: Handoff to receiving clinician was performed and questions were answered  Vomiting: none  Nausea: None  Airway Patency:patent  Post-op Assessment: awake, alert, appropriately conversant, or baseline, no complications, patient tolerated procedure well with no complications, no evidence of recall, dentition within defined limits, moving all extremities and No Corneal Abrasion      No complications documented.  
DISPLAY PLAN FREE TEXT

## 2023-01-11 NOTE — PROGRESS NOTE ADULT - SUBJECTIVE AND OBJECTIVE BOX
Patient is a 72y old  Female who presents with a chief complaint of Subdural collection (11 Jan 2023 08:32)      SUBJECTIVE:  Patient seen and examined at bedside.  Feels well, at her baseline. Continues to have expressive aphasia but showing improvement in communication (ie is able to state that she has a problem in her lungs when discussing her PEs)    ROS:  Denies fevers, chills, headache, vision changes, neck pain, cough, SOB, chest pain, Abdominal pain, N/V, dysuria or new rash.  All other ROS negative except as above    Vital Signs Last 12 Hrs  T(F): 97.5 (01-11-23 @ 09:01), Max: 97.9 (01-11-23 @ 04:50)  HR: 97 (01-11-23 @ 10:28) (78 - 100)  BP: 149/67 (01-11-23 @ 10:28) (134/61 - 162/70)  BP(mean): 96 (01-11-23 @ 10:28) (88 - 100)  RR: 16 (01-11-23 @ 10:28) (16 - 18)  SpO2: 99% (01-11-23 @ 10:28) (97% - 99%)  I&O's Summary    10 Myles 2023 07:01  -  11 Jan 2023 07:00  --------------------------------------------------------  IN: 600 mL / OUT: 1050 mL / NET: -450 mL    11 Jan 2023 07:01  -  11 Jan 2023 12:11  --------------------------------------------------------  IN: 480 mL / OUT: 400 mL / NET: 80 mL        PHYSICAL EXAM:  Constitutional: NAD, comfortable in bed, obese  HEENT: EOMI, MMM, L crani site c/d/i, some sinking in of area  Neck: Supple  Respiratory: CTA B/L. No w/r/r.   Cardiovascular: RRR, normal S1 and S2, no m/r/g.   Gastrointestinal: +BS, soft NTND  Extremities: wwp; no cyanosis, clubbing or edema. + LUE PICC   Vascular: Pulses equal and strong throughout.   Neurological: AAOx3, expressive aphasia, L side strength intact, RLE 4/5, RUE 3+/5 no CN deficits.   Skin: No gross skin abnormalities or rashes        LABS:                        9.5    7.64  )-----------( 296      ( 11 Jan 2023 05:30 )             30.8     01-11    139  |  103  |  8   ----------------------------<  164<H>  3.8   |  24  |  0.59    Ca    8.6      11 Jan 2023 05:30  Phos  2.7     01-11  Mg     2.1     01-11              RADIOLOGY & ADDITIONAL TESTS:  < from: TTE Echo Complete w/o Contrast w/ Doppler (01.11.23 @ 10:53) >  CONCLUSIONS:     1. Technically difficult study.   2. Normal left ventricular size and systolic function.   3. Normal right ventricular size and systolic function.   4. Normal atria.   5. No significant valvular disease.   6. No pericardial effusion.   7. No prior echo is available for comparison.    < end of copied text >      < from: CT Angio Chest PE Protocol w/ IV Cont (01.10.23 @ 14:08) >  IMPRESSION:    1. Since December 31, 2022, acute pulmonary embolism, involving right   distal lower lobe, few right upper and lower lobe segmental branches;   left interlobar, lingual and few lower lobe segmental branches. No right   heart strain. RV/LV ratio 1.0. This was discussed with neurosurgery OMER Ledbetter January 10, 2023 at 2:20 PM  2. Clear lungs.    < end of copied text >    MEDICATIONS  (STANDING):  amLODIPine   Tablet 10 milliGRAM(s) Oral daily  cefTRIAXone   IVPB 2000 milliGRAM(s) IV Intermittent every 12 hours  chlorhexidine 0.12% Liquid 15 milliLiter(s) Swish and Spit two times a day  chlorhexidine 2% Cloths 1 Application(s) Topical <User Schedule>  enoxaparin Injectable 120 milliGRAM(s) SubCutaneous every 12 hours  fluticasone propionate 50 MICROgram(s)/spray Nasal Spray 1 Spray(s) Both Nostrils every 12 hours  insulin lispro (ADMELOG) corrective regimen sliding scale   SubCutaneous every 6 hours  latanoprost 0.005% Ophthalmic Solution 1 Drop(s) Right EYE at bedtime  levETIRAcetam 1000 milliGRAM(s) Oral two times a day  metroNIDAZOLE    Tablet 750 milliGRAM(s) Oral every 8 hours  nystatin Cream 1 Application(s) Topical two times a day  senna 2 Tablet(s) Oral at bedtime  sodium chloride 0.65% Nasal 1 Spray(s) Both Nostrils four times a day  vancomycin  IVPB 1500 milliGRAM(s) IV Intermittent every 12 hours    MEDICATIONS  (PRN):  acetaminophen    Suspension .. 650 milliGRAM(s) Oral every 6 hours PRN Temp greater or equal to 38C (100.4F), Mild Pain (1 - 3)  ondansetron Injectable 4 milliGRAM(s) IV Push every 6 hours PRN Nausea and/or Vomiting  sodium chloride 0.9% lock flush 10 milliLiter(s) IV Push every 1 hour PRN Pre/post blood products, medications, blood draw, and to maintain line patency

## 2023-01-11 NOTE — PROGRESS NOTE ADULT - ASSESSMENT
IMPRESSION:   73 yo female p/w confusion, word-finding difficulty to OSH, found to have pansinusitis and L subdural empyema s/p L hemicraniotomy 12/30.  She is now s/p washout of sphenoid sinus, the likely source of the subdural empyema    Gram stain from the washout on 1/5/23 is showing gram positive cocci in pairs.  This was surprisingly staphylococcus epidermidis    Recommend:  1.  Continue Vancomycin 1500 mg IV q12.  Her vancomycin trough did not respond appropriately to the dose reduction from 1750 --> 1500 mg IV q12.  She is stable.  Recommend repeating the level 30-60 before this afternoon's dose.  2.  Continue Ceftriaxone 2 grams IV q12  3.  Continue Metronidazole. Ok to switch it to 750 mg PO q8hrs for easier dosing  4.  She will need 6 week of IV antibiotics (day 1 = 1/5/23; final day is 2/16/23)  5.  Needs weekly CBC, CMP, ESR, CRP, vanco trough     ID team 2 will follow

## 2023-01-11 NOTE — PROGRESS NOTE ADULT - PROBLEM SELECTOR PLAN 12
DVT ppx: Lovenox  Dispo: PT rec [facility]
DVT ppx: Lovenox    Discussed with primary team. Medicine service remains available to assist with any questions or concerns  Pending acute rehab placement
DVT ppx: Lovenox    Discussed with primary team. Medicine service remains available to assist with any questions or concerns  Pending acute rehab placement

## 2023-01-11 NOTE — CHART NOTE - NSCHARTNOTEFT_GEN_A_CORE
Patient neurologically stable today. Remains on full AC for mild bilateral PE discovered on CTPE protocol yesterday, TTE completed today to r/o heart strain - negative, Vanco trough came back 17.4 prior to evening dose, R midline removed today, left PICC in place, plan is [facility] 10:00am [facility]

## 2023-01-11 NOTE — PROGRESS NOTE ADULT - SUBJECTIVE AND OBJECTIVE BOX
OTOLARYNGOLOGY (ENT) PROGRESS NOTE    PATIENT: JEMIMA DUFFY  MRN: [id number]  : 52  [id number]  DATE OF SERVICE:  23  			             Subjective/ Interval:     : patient seen bedside and scoped, decided to bring her to OR  for sinus surgery   : After discussion with neurosurgery and ID team, plan was recommended to proceed with sinus surgery to establish paranasal sinus patency, remove inflammatory necrotic debris and ultimately get source control.   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 The right sphenoid mucosa was completely eroded with grossly exposed bone w deposits of granulation; small area of dura exposed around V2 prominence, with no CSF lerak. There was severe edema in both maxillaries and posterior ethmoids as well.  : No acute events overnight. NGt was removed by primary team because tolerating diet. Per notes, patient is more interactive. At bedside, appears frustrated by lack of communication, but awake/alert with no significant nasal drainage. Per nursing, patient is receiving nasal rinses.  : NAEON - Vanc started by primary team because cx growing staph epi. Minimal nasal drainage, on nasal rinses. Afebrile with downtrending WBC.  : Patient seen bedside, nasal drainage improving, continue nasal rinses, afebrile,   : Continue nasal rinses, pending final path, cotinue abx   ALLERGIES:  Allergy Status Unknown      MEDICATIONS:  Antiinfectives:   cefTRIAXone   IVPB 2000 milliGRAM(s) IV Intermittent every 12 hours  metroNIDAZOLE  IVPB 750 milliGRAM(s) IV Intermittent every 8 hours  vancomycin  IVPB 1500 milliGRAM(s) IV Intermittent every 12 hours    IV fluids:  potassium chloride    Tablet ER 20 milliEquivalent(s) Oral every 2 hours    Hematologic/Anticoagulation:  enoxaparin Injectable 120 milliGRAM(s) SubCutaneous every 12 hours    Pain medications/Neuro:  acetaminophen    Suspension .. 650 milliGRAM(s) Oral every 6 hours PRN  levETIRAcetam 1000 milliGRAM(s) Oral two times a day  ondansetron Injectable 4 milliGRAM(s) IV Push every 6 hours PRN    Endocrine Medications:   insulin lispro (ADMELOG) corrective regimen sliding scale   SubCutaneous every 6 hours    All other standing medications:   amLODIPine   Tablet 10 milliGRAM(s) Oral daily  chlorhexidine 0.12% Liquid 15 milliLiter(s) Swish and Spit two times a day  chlorhexidine 2% Cloths 1 Application(s) Topical <User Schedule>  fluticasone propionate 50 MICROgram(s)/spray Nasal Spray 1 Spray(s) Both Nostrils every 12 hours  latanoprost 0.005% Ophthalmic Solution 1 Drop(s) Right EYE at bedtime  nystatin Cream 1 Application(s) Topical two times a day  senna 2 Tablet(s) Oral at bedtime  sodium chloride 0.65% Nasal 1 Spray(s) Both Nostrils four times a day    All other PRN medications:    Vital Signs Last 24 Hrs  T(C): 36.6 (2023 04:50), Max: 36.9 (10 Myles 2023 21:17)  T(F): 97.9 (2023 04:50), Max: 98.4 (10 Myles 2023 21:17)  HR: 78 (2023 04:00) (78 - 114)  BP: 162/70 (2023 04:00) (133/61 - 175/80)  BP(mean): 100 (2023 04:00) (88 - 115)  RR: 18 (2023 04:00) (16 - 18)  SpO2: 99% (2023 04:00) (96% - 100%)    Parameters below as of 2023 04:00  Patient On (Oxygen Delivery Method): room air          01-10 @ 07:01  -   @ 07:00  --------------------------------------------------------  IN:    Oral Fluid: 600 mL  Total IN: 600 mL    OUT:    Voided (mL): 1050 mL  Total OUT: 1050 mL    Total NET: -450 mL        PHYSICAL EXAM:  General: NAD, pt is comfortably laying in bed, attempts to say yes and no to questions, A&O x 3 (person, place, time), pt said her name, on RA   HEENT: L hemicrani incision site C/D/I with staples, nares patent   Cardiovascular: RRR, normal S1 and S2   Respiratory: lungs CTAB, no wheezing, rhonchi, or crackles   GI:  abd soft, NTND   Neuro: + mixed aphasia      LABS                       8.7    7.43  )-----------( 320      ( 10 Myles 2023 05:30 )             26.9    01-10    140  |  104  |  8   ----------------------------<  119<H>  3.7   |  28  |  0.59    Ca    8.5      10 Myles 2023 05:30  Mg     1.8     01-10                 COVID-19 PCR: NotDetec (01-10-23 @ 10:25)    MICROBIOLOGY:  Culture - Tissue with Gram Stain (collected 23 @ 16:40)  Source: .Tissue #5 left sphenoid tissue  Gram Stain (23 @ 20:33):    No organisms seen    Rare WBC's  Final Report (23 @ 10:03):    Growth in fluid media only Staphylococcus epidermidis  Organism: Staphylococcus epidermidis (23 @ 10:03)  Organism: Staphylococcus epidermidis (23 @ 10:03)      -  Clindamycin: R >4      -  Erythromycin: R >4      -  Linezolid: S 2      -  Oxacillin: R >2      -  Rifampin: S <=1 Should not be used as monotherapy      -  Trimethoprim/Sulfamethoxazole: S <=0.5/9.5      -  Vancomycin: S 2      Method Type: JULIA    Culture - Surgical Swab (collected 23 @ 16:40)  Source: .Surgical Swab #4 left sphenoid #2  Gram Stain (23 @ 20:33):    Rare Gram positive cocci in pairs    Rare to few White blood cells  Final Report (23 @ 09:56):    Growth in fluid media only Staphylococcus epidermidis  Organism: Staphylococcus epidermidis (23 @ 09:56)  Organism: Staphylococcus epidermidis (23 @ 09:56)      -  Clindamycin: R >4      -  Erythromycin: R >4      -  Linezolid: S 1      -  Oxacillin: R >2      -  Rifampin: S <=1 Should not be used as monotherapy      -  Trimethoprim/Sulfamethoxazole: S <=0.5/9.5      -  Vancomycin: S 2      Method Type: JULIA    Culture - Surgical Swab (collected 23 @ 16:40)  Source: .Surgical Swab #3 left maxillary #2  Gram Stain (23 @ 20:30):    No organisms seen    Rare to few White blood cells  Final Report (23 @ 09:55):    Growth in fluid media only Staphylococcus epidermidis  Organism: Staphylococcus epidermidis (23 @ 09:55)  Organism: Staphylococcus epidermidis (23 @ 09:55)      -  Clindamycin: R >4      -  Erythromycin: R >4      -  Linezolid: S 1      -  Oxacillin: R >2      -  Rifampin: S <=1 Should not be used as monotherapy      -  Trimethoprim/Sulfamethoxazole: S <=0.5/9.5      -  Vancomycin: S 2      Method Type: JULIA    Culture - Surgical Swab (collected 23 @ 16:40)  Source: .Surgical Swab #2 left maxillary  Gram Stain (23 @ 20:31):    No organisms seen    Rare to few White blood cells  Final Report (23 @ 12:46):    Rare Staphylococcus epidermidis  Organism: Staphylococcus epidermidis (23 @ 12:46)  Organism: Staphylococcus epidermidis (23 @ 12:46)      -  Clindamycin: R >4      -  Erythromycin: R >4      -  Linezolid: S 1      -  Oxacillin: R >2      -  Rifampin: S <=1 Should not be used as monotherapy      -  Trimethoprim/Sulfamethoxazole: S <=0.5/9.5      -  Vancomycin: S 2      Method Type: JULIA    Culture - Surgical Swab (collected 23 @ 16:40)  Source: .Surgical Swab #1 left sphenoid  Gram Stain (23 @ 20:33):    Rare Gram positive cocci in pairs    Rare to few White blood cells  Final Report (23 @ 10:03):    Growth in fluid media only Staphylococcus epidermidis  Organism: Staphylococcus epidermidis (23 @ 10:03)  Organism: Staphylococcus epidermidis (23 @ 10:03)      -  Clindamycin: R >4      -  Erythromycin: R >4      -  Linezolid: S 1      -  Oxacillin: R >2      -  Rifampin: S <=1 Should not be used as monotherapy      -  Trimethoprim/Sulfamethoxazole: S <=0.5/9.5      -  Vancomycin: S 2      Method Type: JULIA      Culture Results:   Growth in fluid media only Staphylococcus epidermidis (23 @ 16:40)  Culture Results:   Rare Staphylococcus epidermidis (23 @ 16:40)  Culture Results:   Growth in fluid media only Staphylococcus epidermidis (23 @ 16:40)  Culture Results:   Growth in fluid media only Staphylococcus epidermidis (23 @ 16:40)  Culture Results:   Growth in fluid media only Staphylococcus epidermidis (23 @ 16:40)  Culture Results:   Normal Nose Yamile present  No Methicillin Resistant Staphylococcus aureus (23 @ 09:17)  Culture Results:   No growth at 5 days. (22 @ 02:42)  Culture Results:   No growth at 5 days. (22 @ 02:42)  Culture Results:   No growth to date (22 @ 21:00)  Culture Results:   No growth (22 @ 21:00)  Culture Results:   Rare Peptostreptococcus species ... Beta lactamase negative  Culture in progress (22 @ 21:00)  Culture Results:   No growth to date (22 @ 21:00)      Culture - Tissue with Gram Stain (collected 23 @ 16:40)  Source: .Tissue #5 left sphenoid tissue  Gram Stain (23 @ 20:33):    No organisms seen    Rare WBC's  Final Report (23 @ 10:03):    Growth in fluid media only Staphylococcus epidermidis  Organism: Staphylococcus epidermidis (23 @ 10:03)  Organism: Staphylococcus epidermidis (23 @ 10:03)      -  Clindamycin: R >4      -  Erythromycin: R >4      -  Linezolid: S 2      -  Oxacillin: R >2      -  Rifampin: S <=1 Should not be used as monotherapy      -  Trimethoprim/Sulfamethoxazole: S <=0.5/9.5      -  Vancomycin: S 2      Method Type: JULIA    Culture - Surgical Swab (collected 23 @ 16:40)  Source: .Surgical Swab #4 left sphenoid #2  Gram Stain (23 @ 20:33):    Rare Gram positive cocci in pairs    Rare to few White blood cells  Final Report (23 @ 09:56):    Growth in fluid media only Staphylococcus epidermidis  Organism: Staphylococcus epidermidis (23 @ 09:56)  Organism: Staphylococcus epidermidis (23 @ 09:56)      -  Clindamycin: R >4      -  Erythromycin: R >4      -  Linezolid: S 1      -  Oxacillin: R >2      -  Rifampin: S <=1 Should not be used as monotherapy      -  Trimethoprim/Sulfamethoxazole: S <=0.5/9.5      -  Vancomycin: S 2      Method Type: JULIA    Culture - Surgical Swab (collected 23 @ 16:40)  Source: .Surgical Swab #3 left maxillary #2  Gram Stain (23 @ 20:30):    No organisms seen    Rare to few White blood cells  Final Report (23 @ 09:55):    Growth in fluid media only Staphylococcus epidermidis  Organism: Staphylococcus epidermidis (23 @ 09:55)  Organism: Staphylococcus epidermidis (23 @ 09:55)      -  Clindamycin: R >4      -  Erythromycin: R >4      -  Linezolid: S 1      -  Oxacillin: R >2      -  Rifampin: S <=1 Should not be used as monotherapy      -  Trimethoprim/Sulfamethoxazole: S <=0.5/9.5      -  Vancomycin: S 2      Method Type: JULIA    Culture - Surgical Swab (collected 23 @ 16:40)  Source: .Surgical Swab #2 left maxillary  Gram Stain (23 @ 20:31):    No organisms seen    Rare to few White blood cells  Final Report (23 @ 12:46):    Rare Staphylococcus epidermidis  Organism: Staphylococcus epidermidis (23 @ 12:46)  Organism: Staphylococcus epidermidis (23 @ 12:46)      -  Clindamycin: R >4      -  Erythromycin: R >4      -  Linezolid: S 1      -  Oxacillin: R >2      -  Rifampin: S <=1 Should not be used as monotherapy      -  Trimethoprim/Sulfamethoxazole: S <=0.5/9.5      -  Vancomycin: S 2      Method Type: JULIA    Culture - Surgical Swab (collected 23 @ 16:40)  Source: .Surgical Swab #1 left sphenoid  Gram Stain (23 @ 20:33):    Rare Gram positive cocci in pairs    Rare to few White blood cells  Final Report (23 @ 10:03):    Growth in fluid media only Staphylococcus epidermidis  Organism: Staphylococcus epidermidis (23 @ 10:03)  Organism: Staphylococcus epidermidis (23 @ 10:03)      -  Clindamycin: R >4      -  Erythromycin: R >4      -  Linezolid: S 1      -  Oxacillin: R >2      -  Rifampin: S <=1 Should not be used as monotherapy      -  Trimethoprim/Sulfamethoxazole: S <=0.5/9.5      -  Vancomycin: S 2      Method Type: JULIA

## 2023-01-11 NOTE — PROGRESS NOTE ADULT - TIME BILLING
treatment of subdural empyema
treatment of subdural empyema and sinusitis
Management of CNS infection
treatment of subdural empyema
treatment of subdural empyema and sinusitis
Management of subdural empyema
treatment of subdural empyema
Lab, imaging and chart review  SDH  Subdural abscess  Pansinusitis  HTN    Discussion with primary team
Review of hospital course, labs, vitals, medical records.   Bedside exam and interview    Discussed plan of care with primary team   Documenting the encounter

## 2023-01-11 NOTE — PROGRESS NOTE ADULT - PROBLEM SELECTOR PLAN 2
On 1/10 [facility] physician requested screening CTA Chest to r/o PE due to mild tachycardia. Patient was not hypoxic. CTA Chest showed: "Since December 31, 2022, acute pulmonary embolism, involving right distal lower lobe, few right upper and lower lobe segmental branches; left interlobar, lingual and few lower lobe segmental branches. No right   heart strain. RV/LV ratio 1.0."    - Discussed with neurosurgery, if no contraindication would start full dose anticoagulation  - Monitor hemodynamics including hypotension and hypoxia  - TTE w/o any R heart strain  - Recent LE duplex appreciated below

## 2023-01-11 NOTE — PROGRESS NOTE ADULT - PROBLEM SELECTOR PLAN 4
- ID following appreciate recs  - Currently on CTX/Vanc/Metronidazole  - PICC placed 1/9. Midline in contralateral arm to be removed  - Abx until 2/16  - re dose Vanc based on trough. trough 1/10 was subtherapeutic

## 2023-01-11 NOTE — PROGRESS NOTE ADULT - PROBLEM SELECTOR PROBLEM 1
Subdural hemorrhage

## 2023-01-12 VITALS
SYSTOLIC BLOOD PRESSURE: 145 MMHG | RESPIRATION RATE: 19 BRPM | HEART RATE: 110 BPM | DIASTOLIC BLOOD PRESSURE: 69 MMHG | OXYGEN SATURATION: 99 %

## 2023-01-12 PROBLEM — Z00.00 ENCOUNTER FOR PREVENTIVE HEALTH EXAMINATION: Status: ACTIVE | Noted: 2023-01-12

## 2023-01-12 LAB
ANION GAP SERPL CALC-SCNC: 9 MMOL/L — SIGNIFICANT CHANGE UP (ref 5–17)
BUN SERPL-MCNC: 7 MG/DL — SIGNIFICANT CHANGE UP (ref 7–23)
CALCIUM SERPL-MCNC: 8.2 MG/DL — LOW (ref 8.4–10.5)
CHLORIDE SERPL-SCNC: 104 MMOL/L — SIGNIFICANT CHANGE UP (ref 96–108)
CO2 SERPL-SCNC: 26 MMOL/L — SIGNIFICANT CHANGE UP (ref 22–31)
CREAT SERPL-MCNC: 0.62 MG/DL — SIGNIFICANT CHANGE UP (ref 0.5–1.3)
EGFR: 95 ML/MIN/1.73M2 — SIGNIFICANT CHANGE UP
GLUCOSE BLDC GLUCOMTR-MCNC: 131 MG/DL — HIGH (ref 70–99)
GLUCOSE SERPL-MCNC: 119 MG/DL — HIGH (ref 70–99)
HCT VFR BLD CALC: 28.1 % — LOW (ref 34.5–45)
HGB BLD-MCNC: 9.1 G/DL — LOW (ref 11.5–15.5)
MAGNESIUM SERPL-MCNC: 2 MG/DL — SIGNIFICANT CHANGE UP (ref 1.6–2.6)
MCHC RBC-ENTMCNC: 29.7 PG — SIGNIFICANT CHANGE UP (ref 27–34)
MCHC RBC-ENTMCNC: 32.4 GM/DL — SIGNIFICANT CHANGE UP (ref 32–36)
MCV RBC AUTO: 91.8 FL — SIGNIFICANT CHANGE UP (ref 80–100)
NRBC # BLD: 0 /100 WBCS — SIGNIFICANT CHANGE UP (ref 0–0)
PHOSPHATE SERPL-MCNC: 3.3 MG/DL — SIGNIFICANT CHANGE UP (ref 2.5–4.5)
PLATELET # BLD AUTO: 238 K/UL — SIGNIFICANT CHANGE UP (ref 150–400)
POTASSIUM SERPL-MCNC: 3.8 MMOL/L — SIGNIFICANT CHANGE UP (ref 3.5–5.3)
POTASSIUM SERPL-SCNC: 3.8 MMOL/L — SIGNIFICANT CHANGE UP (ref 3.5–5.3)
RBC # BLD: 3.06 M/UL — LOW (ref 3.8–5.2)
RBC # FLD: 18.9 % — HIGH (ref 10.3–14.5)
SODIUM SERPL-SCNC: 139 MMOL/L — SIGNIFICANT CHANGE UP (ref 135–145)
VANCOMYCIN TROUGH SERPL-MCNC: 20.6 UG/ML — HIGH (ref 10–20)
WBC # BLD: 6.95 K/UL — SIGNIFICANT CHANGE UP (ref 3.8–10.5)
WBC # FLD AUTO: 6.95 K/UL — SIGNIFICANT CHANGE UP (ref 3.8–10.5)

## 2023-01-12 PROCEDURE — 87070 CULTURE OTHR SPECIMN AEROBIC: CPT

## 2023-01-12 PROCEDURE — C1889: CPT

## 2023-01-12 PROCEDURE — 87075 CULTR BACTERIA EXCEPT BLOOD: CPT

## 2023-01-12 PROCEDURE — 87186 SC STD MICRODIL/AGAR DIL: CPT

## 2023-01-12 PROCEDURE — 71260 CT THORAX DX C+: CPT

## 2023-01-12 PROCEDURE — 87635 SARS-COV-2 COVID-19 AMP PRB: CPT

## 2023-01-12 PROCEDURE — 86140 C-REACTIVE PROTEIN: CPT

## 2023-01-12 PROCEDURE — 97168 OT RE-EVAL EST PLAN CARE: CPT

## 2023-01-12 PROCEDURE — 82272 OCCULT BLD FECES 1-3 TESTS: CPT

## 2023-01-12 PROCEDURE — 83550 IRON BINDING TEST: CPT

## 2023-01-12 PROCEDURE — 93306 TTE W/DOPPLER COMPLETE: CPT

## 2023-01-12 PROCEDURE — 84132 ASSAY OF SERUM POTASSIUM: CPT

## 2023-01-12 PROCEDURE — 70486 CT MAXILLOFACIAL W/O DYE: CPT

## 2023-01-12 PROCEDURE — 70450 CT HEAD/BRAIN W/O DYE: CPT

## 2023-01-12 PROCEDURE — 88305 TISSUE EXAM BY PATHOLOGIST: CPT

## 2023-01-12 PROCEDURE — 97112 NEUROMUSCULAR REEDUCATION: CPT

## 2023-01-12 PROCEDURE — 88311 DECALCIFY TISSUE: CPT

## 2023-01-12 PROCEDURE — 95700 EEG CONT REC W/VID EEG TECH: CPT

## 2023-01-12 PROCEDURE — 82728 ASSAY OF FERRITIN: CPT

## 2023-01-12 PROCEDURE — 81001 URINALYSIS AUTO W/SCOPE: CPT

## 2023-01-12 PROCEDURE — 97116 GAIT TRAINING THERAPY: CPT

## 2023-01-12 PROCEDURE — 71045 X-RAY EXAM CHEST 1 VIEW: CPT

## 2023-01-12 PROCEDURE — 84466 ASSAY OF TRANSFERRIN: CPT

## 2023-01-12 PROCEDURE — U0003: CPT

## 2023-01-12 PROCEDURE — 82962 GLUCOSE BLOOD TEST: CPT

## 2023-01-12 PROCEDURE — 84100 ASSAY OF PHOSPHORUS: CPT

## 2023-01-12 PROCEDURE — 85730 THROMBOPLASTIN TIME PARTIAL: CPT

## 2023-01-12 PROCEDURE — 80048 BASIC METABOLIC PNL TOTAL CA: CPT

## 2023-01-12 PROCEDURE — 86803 HEPATITIS C AB TEST: CPT

## 2023-01-12 PROCEDURE — 97535 SELF CARE MNGMENT TRAINING: CPT

## 2023-01-12 PROCEDURE — 99024 POSTOP FOLLOW-UP VISIT: CPT

## 2023-01-12 PROCEDURE — 82947 ASSAY GLUCOSE BLOOD QUANT: CPT

## 2023-01-12 PROCEDURE — 88304 TISSUE EXAM BY PATHOLOGIST: CPT

## 2023-01-12 PROCEDURE — 85652 RBC SED RATE AUTOMATED: CPT

## 2023-01-12 PROCEDURE — 82607 VITAMIN B-12: CPT

## 2023-01-12 PROCEDURE — 88312 SPECIAL STAINS GROUP 1: CPT

## 2023-01-12 PROCEDURE — 86901 BLOOD TYPING SEROLOGIC RH(D): CPT

## 2023-01-12 PROCEDURE — 84295 ASSAY OF SERUM SODIUM: CPT

## 2023-01-12 PROCEDURE — 97161 PT EVAL LOW COMPLEX 20 MIN: CPT

## 2023-01-12 PROCEDURE — 36430 TRANSFUSION BLD/BLD COMPNT: CPT

## 2023-01-12 PROCEDURE — 92612 ENDOSCOPY SWALLOW (FEES) VID: CPT

## 2023-01-12 PROCEDURE — 92523 SPEECH SOUND LANG COMPREHEN: CPT

## 2023-01-12 PROCEDURE — 84484 ASSAY OF TROPONIN QUANT: CPT

## 2023-01-12 PROCEDURE — 86850 RBC ANTIBODY SCREEN: CPT

## 2023-01-12 PROCEDURE — 87040 BLOOD CULTURE FOR BACTERIA: CPT

## 2023-01-12 PROCEDURE — 74177 CT ABD & PELVIS W/CONTRAST: CPT

## 2023-01-12 PROCEDURE — 86923 COMPATIBILITY TEST ELECTRIC: CPT

## 2023-01-12 PROCEDURE — 92610 EVALUATE SWALLOWING FUNCTION: CPT

## 2023-01-12 PROCEDURE — 80053 COMPREHEN METABOLIC PANEL: CPT

## 2023-01-12 PROCEDURE — 85027 COMPLETE CBC AUTOMATED: CPT

## 2023-01-12 PROCEDURE — 82746 ASSAY OF FOLIC ACID SERUM: CPT

## 2023-01-12 PROCEDURE — 88300 SURGICAL PATH GROSS: CPT

## 2023-01-12 PROCEDURE — 92507 TX SP LANG VOICE COMM INDIV: CPT

## 2023-01-12 PROCEDURE — 88309 TISSUE EXAM BY PATHOLOGIST: CPT

## 2023-01-12 PROCEDURE — 85520 HEPARIN ASSAY: CPT

## 2023-01-12 PROCEDURE — 83735 ASSAY OF MAGNESIUM: CPT

## 2023-01-12 PROCEDURE — 86900 BLOOD TYPING SEROLOGIC ABO: CPT

## 2023-01-12 PROCEDURE — U0005: CPT

## 2023-01-12 PROCEDURE — 83540 ASSAY OF IRON: CPT

## 2023-01-12 PROCEDURE — 36415 COLL VENOUS BLD VENIPUNCTURE: CPT

## 2023-01-12 PROCEDURE — 95716 VEEG EA 12-26HR CONT MNTR: CPT

## 2023-01-12 PROCEDURE — 85610 PROTHROMBIN TIME: CPT

## 2023-01-12 PROCEDURE — 85018 HEMOGLOBIN: CPT

## 2023-01-12 PROCEDURE — 83036 HEMOGLOBIN GLYCOSYLATED A1C: CPT

## 2023-01-12 PROCEDURE — 94640 AIRWAY INHALATION TREATMENT: CPT

## 2023-01-12 PROCEDURE — 71275 CT ANGIOGRAPHY CHEST: CPT

## 2023-01-12 PROCEDURE — 82803 BLOOD GASES ANY COMBINATION: CPT

## 2023-01-12 PROCEDURE — 97110 THERAPEUTIC EXERCISES: CPT

## 2023-01-12 PROCEDURE — 93970 EXTREMITY STUDY: CPT

## 2023-01-12 PROCEDURE — 82330 ASSAY OF CALCIUM: CPT

## 2023-01-12 PROCEDURE — 85025 COMPLETE CBC W/AUTO DIFF WBC: CPT

## 2023-01-12 PROCEDURE — P9016: CPT

## 2023-01-12 PROCEDURE — 92526 ORAL FUNCTION THERAPY: CPT

## 2023-01-12 PROCEDURE — 80202 ASSAY OF VANCOMYCIN: CPT

## 2023-01-12 RX ORDER — POTASSIUM CHLORIDE 20 MEQ
20 PACKET (EA) ORAL ONCE
Refills: 0 | Status: COMPLETED | OUTPATIENT
Start: 2023-01-12 | End: 2023-01-12

## 2023-01-12 RX ORDER — METRONIDAZOLE 500 MG
750 TABLET ORAL
Qty: 0 | Refills: 0 | DISCHARGE

## 2023-01-12 RX ADMIN — AMLODIPINE BESYLATE 10 MILLIGRAM(S): 2.5 TABLET ORAL at 05:23

## 2023-01-12 RX ADMIN — NYSTATIN CREAM 1 APPLICATION(S): 100000 CREAM TOPICAL at 05:27

## 2023-01-12 RX ADMIN — Medication 1 SPRAY(S): at 05:27

## 2023-01-12 RX ADMIN — Medication 20 MILLIEQUIVALENT(S): at 07:29

## 2023-01-12 RX ADMIN — LEVETIRACETAM 1000 MILLIGRAM(S): 250 TABLET, FILM COATED ORAL at 05:23

## 2023-01-12 RX ADMIN — Medication 300 MILLIGRAM(S): at 05:24

## 2023-01-12 RX ADMIN — ENOXAPARIN SODIUM 120 MILLIGRAM(S): 100 INJECTION SUBCUTANEOUS at 09:31

## 2023-01-12 RX ADMIN — CHLORHEXIDINE GLUCONATE 1 APPLICATION(S): 213 SOLUTION TOPICAL at 05:00

## 2023-01-12 RX ADMIN — CHLORHEXIDINE GLUCONATE 15 MILLILITER(S): 213 SOLUTION TOPICAL at 05:24

## 2023-01-12 RX ADMIN — Medication 750 MILLIGRAM(S): at 05:22

## 2023-01-12 RX ADMIN — CEFTRIAXONE 100 MILLIGRAM(S): 500 INJECTION, POWDER, FOR SOLUTION INTRAMUSCULAR; INTRAVENOUS at 05:24

## 2023-01-12 NOTE — PROGRESS NOTE ADULT - THIS PATIENT HAS THE FOLLOWING CONDITION(S)/DIAGNOSES ON THIS ADMISSION:
Encephalopathy/Brain Compression / Herniation
None
None
Cerebral Edema/Brain Compression / Herniation
Encephalopathy
Encephalopathy
None
Cerebral Edema/Brain Compression / Herniation
Encephalopathy
None
Cerebral Edema/Brain Compression / Herniation

## 2023-01-12 NOTE — PROGRESS NOTE ADULT - PROVIDER SPECIALTY LIST ADULT
NSICU
Neurosurgery
ENT
Infectious Disease
Internal Medicine
NSICU
Neurosurgery
ENT
Infectious Disease
Neurosurgery
Neurosurgery
Hospitalist
Infectious Disease
NSICU
NSICU
Neurosurgery
NSICU
Hospitalist
Infectious Disease
NSICU
NSICU
Hospitalist

## 2023-01-12 NOTE — PROGRESS NOTE ADULT - REASON FOR ADMISSION
Subdural collection

## 2023-01-12 NOTE — PROGRESS NOTE ADULT - SUBJECTIVE AND OBJECTIVE BOX
HPI:  72F, txfered from [facility]. Presented to [facility] 7 days ago for AMS. CTH done 6 days ago showed spontaneous Left SD collection. MRI done today showed Left SD collection with diffusion restriction c/f empyema and infarct. Also showed Left sinus collection. Was also getting ceftriaxone for presumed UT at OSH. (30 Dec 2022 19:41)    OVERNIGHT EVENTS: DEEPALI overnight, pain controlled, no complaints at time of examination     HOSPITAL COURSE:  12/31: Admitted to NSICU, s/p Left hemicraniectomy and washout of L subdural empyema. Pancultured. Vanc/ceftriaxone/flagyl for empiric coverage. OR cultures demonstrated gram negative cocci in pairs. ENT and ID consulted, recommend cont vanc 2g, flagyl 750mg q8hrs, ceftriaxone 2g q12hrs. Extubated to room air, RUE/RLE strength improving. Pend CT sinus stealth protocol pend per ENT.   1/1: POD2 DEEPALI o/n, neuro stable, tolerating RA post extubation, pending CT sinus w/ stealth protocol per ENT. Blood consent obtained and pt transfused 1u prbc for HGB 7.9-> 6.8 -> 7.4. CHENCHO drain removed.  LE dopplers + B/L peroneal IM calf DVT, SQL increased to BID prophylactic dosing. Failed swallow eval, remains on tube feeds.   1/2: POD3, DEEPALI o/n. Neuro stable. TF increased goal rate to 65, tolerating feeds. S&S to re-eval in 48 hours. Pending final recs from ID. FOBT neg. Nate drain removed.   1/3: POD4. DEEPALI overnight, pt able to say "no" to questions, o/w neuro exam stable. EEG on, dc'd in afternoon, no seizure. F/u final OR cx, then will have PICC placed. Vanc trough pend 1/3 @9pm. Pend repeat dopplers 1/6. SDU status  1/4: POD 5. DEEPALI overnight. Neuro stable. FEES completed and patient cleared for pureed diet. Pending OR with ENT tomorrow. Vancomycin dc'd per ID.  1/5: POD 6. DEEPALI overnight. Neurostable. Repeat CT sinus complete overnight. POD0 b/l sinus surgery with judith purulence to L maxillary sinus and posterior bony erosiun to L sphenoid sinus. NGT replaced by ENT. CXR w/ NGT in distal esophagus, advanced and re-ordered CXR which showed correct placement of NGT.   1/6: POD 7/POD1. DEEPALI ovn, neurologically stable. SQL ppx resumed tonight. Tolerating pureed diet, NGT removed.   1/7: POD8/POD2. DEEPALI o/n neuro stable. Al cx are groin staph. epidermidis, Vancomycin started.  1/8: POD 9/3. DEEPALI o/n. Neuro stable. Pending AR.   1/9: POD 10/4. DEEPALI o/n. Neuro stable. Pending AR.   1/10: POD 11/5. DEEPALI o/n. Pending AR. CT PE protocol completed, will increase SQL dosing to full dose AC, pend repeat echo to evaluate for right heart strain. Vanc trough tonight at 5pm. Pend possible discharge to AR tomorrow. Midline leaking overnight, removed.  1/11: POD12/6, DEEPALI overnight, neuro stable, echo stable with normal RV/LV EF. cont vanco 1500 BID per ID.   1/12: POD13/7, DEEPALI overnight, neuro stable penidng rehab at 10AM today.       Vital Signs Last 24 Hrs  T(C): 36.4 (11 Jan 2023 22:00), Max: 36.7 (11 Jan 2023 12:25)  T(F): 97.6 (11 Jan 2023 22:00), Max: 98.1 (11 Jan 2023 12:25)  HR: 88 (11 Jan 2023 23:30) (78 - 100)  BP: 130/59 (11 Jan 2023 23:30) (124/60 - 162/70)  BP(mean): 85 (11 Jan 2023 23:30) (85 - 100)  RR: 17 (11 Jan 2023 23:30) (16 - 18)  SpO2: 94% (11 Jan 2023 23:30) (94% - 99%)    Parameters below as of 11 Jan 2023 23:30  Patient On (Oxygen Delivery Method): room air        I&O's Summary    10 Myles 2023 07:01  -  11 Jan 2023 07:00  --------------------------------------------------------  IN: 600 mL / OUT: 1050 mL / NET: -450 mL    11 Jan 2023 07:01  -  12 Jan 2023 00:19  --------------------------------------------------------  IN: 1380 mL / OUT: 1850 mL / NET: -470 mL        PHYSICAL EXAM:  General: NAD, pt is comfortably sitting up in bed, on room air  HEENT: CN II-XII grossly intact, Right pupil 3mm sluggishly reactive, Left pupil 4-4.5mm briskly reactive. EOMI b/l, face symmetric, tongue midline, neck FROM  Cardiovascular: RRR, normal S1 and S2   Respiratory: lungs CTAB, no wheezing, rhonchi, or crackles   GI: normoactive BS to auscultation, abd soft, NTND   Neuro: A&Ox3, expressive phasia with word finding difficulty. Speech clear, no dysmetria, no pronator drift. Follows commands.  BENDER x4 spontaneously, LUE/LLE 5/5 strength, RUE 4/5, RLE 4+/5. SILT throughout. +Flap soft and sunken.   Extremities: warm and well perfused. +LUE PICC   Wound/incision: +Left craniotomy incision with staples in place, C/D/I       TUBES/LINES:  [] Cannon  [] Lumbar Drain  [] Wound Drains  [x] Others - LUE PICC       DIET:  [] NPO  [x] Mechanical  [] Tube feeds      LABS:                        9.5    7.64  )-----------( 296      ( 11 Jan 2023 05:30 )             30.8     01-11    139  |  103  |  8   ----------------------------<  164<H>  3.8   |  24  |  0.59    Ca    8.6      11 Jan 2023 05:30  Phos  2.7     01-11  Mg     2.1     01-11              CAPILLARY BLOOD GLUCOSE      POCT Blood Glucose.: 111 mg/dL (11 Jan 2023 11:47)  POCT Blood Glucose.: 101 mg/dL (11 Jan 2023 06:20)      Drug Levels: [] N/A  Vancomycin Level, Trough: 17.4 ug/mL (01-11 @ 17:26)  Vancomycin Level, Trough: 9.6 ug/mL (01-10 @ 17:51)  Vancomycin Level, Trough: 22.6 ug/mL (01-09 @ 06:08)    CSF Analysis: [] N/A      Allergies    Allergy Status Unknown    Intolerances      MEDICATIONS:  Antibiotics:  cefTRIAXone   IVPB 2000 milliGRAM(s) IV Intermittent every 12 hours  metroNIDAZOLE    Tablet 750 milliGRAM(s) Oral every 8 hours  vancomycin  IVPB 1500 milliGRAM(s) IV Intermittent every 12 hours    Neuro:  acetaminophen    Suspension .. 650 milliGRAM(s) Oral every 6 hours PRN  levETIRAcetam 1000 milliGRAM(s) Oral two times a day  ondansetron Injectable 4 milliGRAM(s) IV Push every 6 hours PRN    Anticoagulation:  enoxaparin Injectable 120 milliGRAM(s) SubCutaneous every 12 hours    OTHER:  amLODIPine   Tablet 10 milliGRAM(s) Oral daily  chlorhexidine 0.12% Liquid 15 milliLiter(s) Swish and Spit two times a day  chlorhexidine 2% Cloths 1 Application(s) Topical <User Schedule>  fluticasone propionate 50 MICROgram(s)/spray Nasal Spray 1 Spray(s) Both Nostrils every 12 hours  latanoprost 0.005% Ophthalmic Solution 1 Drop(s) Right EYE at bedtime  nystatin Cream 1 Application(s) Topical two times a day  senna 2 Tablet(s) Oral at bedtime  sodium chloride 0.65% Nasal 1 Spray(s) Both Nostrils four times a day    IVF:    CULTURES:  Culture Results:   Growth in fluid media only Staphylococcus epidermidis (01-05 @ 16:40)  Culture Results:   Rare Staphylococcus epidermidis (01-05 @ 16:40)    RADIOLOGY & ADDITIONAL TESTS:  c< from: CT Angio Chest PE Protocol w/ IV Cont (01.10.23 @ 14:08) >  FINDINGS:  PULMONARY ARTERY: Bilateral pulmonary emboli, involving right distal   lower lobe, few right upper and lower lobe segmental branches; left   interlobar, lingual and few lower lobe segmental branches. No right heart   strain. RV/LV ratio 1.0.  LUNGS AND AIRWAYS: Patent central airways.  Lungs are clear.  PLEURA: No effusion  MEDIASTINUM AND MICHELLE: No adenopathy  HEART: Heart size is normal. No pericardial effusion.  CHEST WALL AND LOWER NECK: Left upper extremity PICC tip terminates   SVC/brachiocephalic junction.  Left upper back cutaneous/subcutaneous low-density lesion, probably   dermoid inclusion cyst.  VISUALIZED UPPER ABDOMEN: Distended gallbladder with sludge. No   pericholecystic inflammation. Colonicdiverticuli.  BONES: No suspicious lesion    IMPRESSION:    1. Since December 31, 2022, acute pulmonary embolism, involving right   distal lower lobe, few right upper and lower lobe segmental branches;   left interlobar, lingual and few lower lobe segmental branches. No right   heart strain. RV/LV ratio 1.0. This was discussed with neurosurgery OMER Ledbetter January 10, 2023 at 2:20 PM  2. Clear lungs.    < end of copied text >      ASSESSMENT:  73 y/o F w/ no known PMHx transferred from [place] c/o slurred speech and right sided weakness. CTH initially negative, repeat on 12/23 showed L sided SDH, pts mental status worsened and CTH on 12/28 significant for L frontoparietal collection, MRI completed showed concern for possible empyema. S/p L hemicraniectomy and washout of L subdural empyema 12/30/22. S/p sinus surgery with judith purulence to L maxillary sinus and posterior bony erosiun to L sphenoid sinus 1/5.     INTRACRANIALHEMORRIAGE    Nonintractable epilepsy without status epilepticus    Convulsions    Handoff    MEWS Score    Subdural empyema    Subdural empyema    Craniectomy or craniotomy, emergent    Subdural empyema    Subdural hemorrhage    Subdural empyema    Hypertension    Morbid obesity    Pansinusitis    Calf DVT (deep venous thrombosis)    Prediabetes    Prophylactic measure    Dysphagia    Anemia    Preoperative examination    Thrombocytosis    Pulmonary embolism    Craniectomy or craniotomy, emergent    FESS, with nasal septoplasty and submucosal resection    Hypertension    Morbid obesity    Pansinusitis    Calf DVT (deep venous thrombosis)    Prediabetes    SysAdmin_VstLnk        PLAN:  NEURO   - Neuro/vitals Q4   - Post-op CTH complete, repeat 1/2 stable  - ENT consulted: CT sinus completed 1/1 and 1/5-extensive opacification of the paranasal sinuses ; rec flonase and nasal saline  - Cont Keppra 1000 BID  - EEG dc'd 1/3, neg for seizure  - Helmet at bedside     PULM   - Maintain SpO2 > 92%     CARDIO   - TTE 1/11 normal EF and normal RV function  - Amlodipine 10 mg daily   - -160    GI   - Pureed diet, no NGT (if needed c/s ENT)   - Bowel regimen  - BM 1/11    RENAL  - Voiding    ENDO  - dc ISS  - A1c 6.1     HEME   - FOB neg 1/2   - s/p 1u prbc 1/1   - LE doppler 1/1 b/l calf and left peroneal DVTs; repeat 1/8 unchanged   - Anti xa 1/3 0.22  - b/l mild segmental/lobar PE on CT PE protocol 1/10  - started on  BID 1/10    ID   - ID recs appreciated: ceftriaxone/metronidazole/vancomycin for subdural/epidural empyema--> final abx end date 2/16   - PICC placed 1/9  - Hemicrani washout OR cultures, rare peptostreptococcus 1/6 + staph epi    Dispo:   - SDU status, Pending AR    Discussed with Dr. D'Amico    Assessment:  Present when checked    []  GCS  E   V  M     Heart Failure: []Acute, [] acute on chronic , []chronic  Heart Failure:  [] Diastolic (HFpEF), [] Systolic (HFrEF), []Combined (HFpEF and HFrEF), [] RHF, [] Pulm HTN, [] Other    [] DIONTE, [] ATN, [] AIN, [] other  [] CKD1, [] CKD2, [] CKD 3, [] CKD 4, [] CKD 5, []ESRD    Encephalopathy: [] Metabolic, [] Hepatic, [] toxic, [] Neurological, [] Other    Abnormal Nurtitional Status: [] malnurtition (see nutrition note), [ ]underweight: BMI < 19, [] morbid obesity: BMI >40, [] Cachexia    [] Sepsis  [] hypovolemic shock,[] cardiogenic shock, [] hemorrhagic shock, [] neuogenic shock  [] Acute Respiratory Failure  []Cerebral edema, [] Brain compression/ herniation,   [] Functional quadriplegia  [] Acute blood loss anemia

## 2023-01-13 LAB
CULTURE RESULTS: NO GROWTH — SIGNIFICANT CHANGE UP
CULTURE RESULTS: NO GROWTH — SIGNIFICANT CHANGE UP
CULTURE RESULTS: SIGNIFICANT CHANGE UP
SPECIMEN SOURCE: SIGNIFICANT CHANGE UP
SPECIMEN SOURCE: SIGNIFICANT CHANGE UP

## 2023-01-17 DIAGNOSIS — D63.8 ANEMIA IN OTHER CHRONIC DISEASES CLASSIFIED ELSEWHERE: ICD-10-CM

## 2023-01-17 DIAGNOSIS — G93.49 OTHER ENCEPHALOPATHY: ICD-10-CM

## 2023-01-17 DIAGNOSIS — G04.90 ENCEPHALITIS AND ENCEPHALOMYELITIS, UNSPECIFIED: ICD-10-CM

## 2023-01-17 DIAGNOSIS — N39.0 URINARY TRACT INFECTION, SITE NOT SPECIFIED: ICD-10-CM

## 2023-01-17 DIAGNOSIS — G93.6 CEREBRAL EDEMA: ICD-10-CM

## 2023-01-17 DIAGNOSIS — B96.89 OTHER SPECIFIED BACTERIAL AGENTS AS THE CAUSE OF DISEASES CLASSIFIED ELSEWHERE: ICD-10-CM

## 2023-01-17 DIAGNOSIS — I82.463 ACUTE EMBOLISM AND THROMBOSIS OF CALF MUSCULAR VEIN, BILATERAL: ICD-10-CM

## 2023-01-17 DIAGNOSIS — J32.4 CHRONIC PANSINUSITIS: ICD-10-CM

## 2023-01-17 DIAGNOSIS — G06.2 EXTRADURAL AND SUBDURAL ABSCESS, UNSPECIFIED: ICD-10-CM

## 2023-01-17 DIAGNOSIS — E66.01 MORBID (SEVERE) OBESITY DUE TO EXCESS CALORIES: ICD-10-CM

## 2023-01-17 DIAGNOSIS — J34.2 DEVIATED NASAL SEPTUM: ICD-10-CM

## 2023-01-17 DIAGNOSIS — E87.1 HYPO-OSMOLALITY AND HYPONATREMIA: ICD-10-CM

## 2023-01-17 DIAGNOSIS — R13.10 DYSPHAGIA, UNSPECIFIED: ICD-10-CM

## 2023-01-17 DIAGNOSIS — D75.839 THROMBOCYTOSIS, UNSPECIFIED: ICD-10-CM

## 2023-01-17 DIAGNOSIS — R73.03 PREDIABETES: ICD-10-CM

## 2023-01-17 DIAGNOSIS — G93.5 COMPRESSION OF BRAIN: ICD-10-CM

## 2023-01-17 DIAGNOSIS — B95.7 OTHER STAPHYLOCOCCUS AS THE CAUSE OF DISEASES CLASSIFIED ELSEWHERE: ICD-10-CM

## 2023-01-17 LAB — CULTURE RESULTS: SIGNIFICANT CHANGE UP

## 2023-01-30 ENCOUNTER — APPOINTMENT (OUTPATIENT)
Dept: INFECTIOUS DISEASE | Facility: CLINIC | Age: 73
End: 2023-01-30
Payer: MEDICARE

## 2023-01-30 PROCEDURE — 99212 OFFICE O/P EST SF 10 MIN: CPT | Mod: 95

## 2023-01-30 NOTE — REASON FOR VISIT
[Home] : at home, [unfilled] , at the time of the visit. [Medical Office: ([facility])___] : at the medical office located in  [Other:____] : [unfilled] [Patient] : the patient [Self] : self [Follow-Up: _____] : a [unfilled] follow-up visit

## 2023-01-31 LAB — SURGICAL PATHOLOGY STUDY: SIGNIFICANT CHANGE UP

## 2023-01-31 NOTE — HISTORY OF PRESENT ILLNESS
[FreeTextEntry1] : 71 yo female with presented to OSH with confusion and difficulty word-finding difficulty, found to have pansinusitis and L subdural empyema s/p L hemicraniotomy 12/30.  OR culture grew Peptostreptococcus.  She had washout of sphenoid sinus on 1/5.  Cultures with Staph epi. She is being treated with vancomycin, ceftriaxone and metronidazole. She is staying at [facility]. Anticoagulation was causing recurrent epistaxis. Now s/p IVC filter placement.

## 2023-02-01 ENCOUNTER — NON-APPOINTMENT (OUTPATIENT)
Age: 73
End: 2023-02-01

## 2023-02-07 ENCOUNTER — NON-APPOINTMENT (OUTPATIENT)
Age: 73
End: 2023-02-07

## 2023-02-08 ENCOUNTER — APPOINTMENT (OUTPATIENT)
Dept: NEUROSURGERY | Facility: CLINIC | Age: 73
End: 2023-02-08
Payer: MEDICARE

## 2023-02-08 ENCOUNTER — NON-APPOINTMENT (OUTPATIENT)
Age: 73
End: 2023-02-08

## 2023-02-08 PROCEDURE — 99024 POSTOP FOLLOW-UP VISIT: CPT

## 2023-02-08 NOTE — REVIEW OF SYSTEMS
[As Noted in HPI] : as noted in HPI [Fever] : no fever [Chills] : no chills [Eyesight Problems] : no eyesight problems [Chest Pain] : no chest pain [Palpitations] : no palpitations [Shortness Of Breath] : no shortness of breath

## 2023-02-08 NOTE — DATA REVIEWED
[de-identified] : \par \par \par ACC: [id number] EXAM: CT BRAIN\par \par PROCEDURE DATE: 01/02/2023\par \par \par \par INTERPRETATION: PROCEDURE: CT head without intravenous contrast\par \par INDICATION: Status post hemicraniectomy\par \par TECHNIQUE: Multiple axial images were obtained at 5 mm intervals from the skull base to the vertex. Sagittal and coronal reformatted images were obtained from the axial data set. The images were reviewed in brain and bone windows.\par \par COMPARISON: CT head 12/31/2022\par \par FINDINGS: The CT examination demonstrates the patient to be status post left hemicraniectomy. The anterior left-sided subgaleal drainage tube has been removed. A posterior left-sided subgaleal drainage catheter is still present with the tip in the left temporal region. There has been interval increase in the subgaleal fluid collection overlying the craniectomy defect which now measures approximately nearly 2 cm in greatest width (series 4 image 28). There has been interval progression of the hypodense fluid collection/hygroma extending involving the interhemispheric fissure and along the left tentorium. There are ill-defined areas of hypodensity within the posterior left frontal and parietal lobe which may represent edema. There is adjacent sulcal effacement. Evaluation for residual infection is limited without intravenous contrast. The ventricles are seen stable in size.\par \par This are absent secondary to prior cataract surgery. There is persistent mucosal thickening with a moderate-sized fluid level within the right maxillary sinus. There has been slight interval improvement in the aeration of the left frontal, ethmoid and maxillary sinus. The sphenoid sinus remains opacified. 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 There is soft tissue opacification of mastoid air cells bilaterally.\par \par IMPRESSION: Interval removal of anterior left-sided subgaleal drainage catheter with interval increase in subgaleal fluid. Interval progression of interhemispheric hypodense subdural fluid collection/hygroma extending into the left tentorium.\par \par --- End of Report ---\par \par

## 2023-02-08 NOTE — ASSESSMENT
[FreeTextEntry1] : 72-year-old female initially presented to [facility] with concern for a left-sided stroke that ultimately developed a left-sided subdural empyema over the course of 7 days.  Was transferred to [facility] where she underwent a left-sided hemicraniectomy and subsequent endonasal evacuation of infection.  Awaiting end of antibiotics course on February 16.  Patient continues to improve at rehab.  Will order MRI for 3 months with and without contrast and at that time will order a CT for cranioplasty planning.\par \par PLAN: \par - Continue f/u with ID Dr. Ayers and ENT Dr. Abarca\par - MRI w/wo 3 months post op\par - RTC when dc from rehab, will plan for CT gayatri and cranioplasty to discuss at next follow- up\par \par Patient verbalizes understanding of today’s discussion and next steps in treatment plan. \par \par  \par A total of 15 minutes was spent reviewing the labs, imaging and physical examination of the patient. We discussed the diagnosis, and the plan. The patient's questions were answered. The patient demonstrated an excellent understanding of the plan.

## 2023-02-08 NOTE — PHYSICAL EXAM
[General Appearance - Alert] : alert [General Appearance - In No Acute Distress] : in no acute distress [Sclera] : the sclera and conjunctiva were normal [Neck Appearance] : the appearance of the neck was normal [] : no respiratory distress [Skin Color & Pigmentation] : normal skin color and pigmentation

## 2023-02-08 NOTE — HISTORY OF PRESENT ILLNESS
[FreeTextEntry1] : 73yo Female with no known PMHx who presented to [facility] 12/22/ 22 with slurred speech and R sided weakness, stroke code called, imaging initially negative for stroke or hemorrhage, pt's neuro exam worsened and subsequent CTH the following day showed L sided SDH, admitted to ICU for further monitoring. \par Pt neuro declined on 12/25/22 with AMS requiring intubation, noted to have episodes of twitching concerning for seizures, started on keppra and EEG placed. No epileptiform activity noted. CTH on 12/28/22 significant for L \par frontoparietal collection, MRI completed showed concern for possible empyema. \par Daughter reported pt likely had a sinus infection recently, unsure of abx. At [facility] pt started on ceftriaxone for UTI that was switched to vancomycin. Pt transferred to [facility] 12/31/22 for further management. \par \par 12/30/22 s/p left hemicraniectomy and washout of left subdural empyema \par  OR culture grew Peptostreptococcus\par \par 1/5/23 s/p b/l sinus surgery with judith purulence to L maxillary sinus and posterior bony erosion to L sphenoid sinus with ENT Dr. Abarca\par Cultures with Staph epi. Follows with ID Dr. Ayers- currently on vancomycin, ceftriaxone, and metronidazole\par \par TODAY pt returns for post op follow- up:\par She remains are rehab. Her daughter Maggie is present for today's visit. \par She participates in therapies, some progress with physical therapy. \par Her speech has also improved, continues with speech therapy.

## 2023-02-08 NOTE — REASON FOR VISIT
[Other Location: e.g. School (Enter Location, City,State)___] : at [unfilled], at the time of the visit. [Medical Office: ([facility])___] : at the medical office located in  [Patient] : the patient [Family Member] : family member [de-identified] : left hemicraniectomy and washout of left subdural empyema  [de-identified] : 12/30/22

## 2023-02-09 ENCOUNTER — NON-APPOINTMENT (OUTPATIENT)
Age: 73
End: 2023-02-09

## 2023-02-10 ENCOUNTER — APPOINTMENT (OUTPATIENT)
Dept: OTOLARYNGOLOGY | Facility: CLINIC | Age: 73
End: 2023-02-10

## 2023-02-15 ENCOUNTER — NON-APPOINTMENT (OUTPATIENT)
Age: 73
End: 2023-02-15

## 2023-02-22 ENCOUNTER — INPATIENT (INPATIENT)
Facility: HOSPITAL | Age: 73
LOS: 11 days | Discharge: NURSING, REHAB OR LONG-TERM CARE | DRG: 463 | End: 2023-03-06
Attending: STUDENT IN AN ORGANIZED HEALTH CARE EDUCATION/TRAINING PROGRAM | Admitting: STUDENT IN AN ORGANIZED HEALTH CARE EDUCATION/TRAINING PROGRAM
Payer: MEDICARE

## 2023-02-22 ENCOUNTER — APPOINTMENT (OUTPATIENT)
Dept: NEUROSURGERY | Facility: CLINIC | Age: 73
End: 2023-02-22

## 2023-02-22 VITALS
HEIGHT: 64 IN | HEART RATE: 106 BPM | TEMPERATURE: 98 F | RESPIRATION RATE: 18 BRPM | OXYGEN SATURATION: 99 % | SYSTOLIC BLOOD PRESSURE: 129 MMHG | WEIGHT: 250 LBS | DIASTOLIC BLOOD PRESSURE: 80 MMHG

## 2023-02-22 DIAGNOSIS — I10 ESSENTIAL (PRIMARY) HYPERTENSION: ICD-10-CM

## 2023-02-22 DIAGNOSIS — Z98.890 OTHER SPECIFIED POSTPROCEDURAL STATES: ICD-10-CM

## 2023-02-22 DIAGNOSIS — Z98.890 OTHER SPECIFIED POSTPROCEDURAL STATES: Chronic | ICD-10-CM

## 2023-02-22 LAB
ALBUMIN SERPL ELPH-MCNC: 3.4 G/DL — SIGNIFICANT CHANGE UP (ref 3.3–5)
ALP SERPL-CCNC: 81 U/L — SIGNIFICANT CHANGE UP (ref 40–120)
ALT FLD-CCNC: 10 U/L — SIGNIFICANT CHANGE UP (ref 10–45)
ANION GAP SERPL CALC-SCNC: 14 MMOL/L — SIGNIFICANT CHANGE UP (ref 5–17)
AST SERPL-CCNC: 16 U/L — SIGNIFICANT CHANGE UP (ref 10–40)
BASOPHILS # BLD AUTO: 0.11 K/UL — SIGNIFICANT CHANGE UP (ref 0–0.2)
BASOPHILS NFR BLD AUTO: 1.5 % — SIGNIFICANT CHANGE UP (ref 0–2)
BILIRUB SERPL-MCNC: 0.2 MG/DL — SIGNIFICANT CHANGE UP (ref 0.2–1.2)
BUN SERPL-MCNC: 12 MG/DL — SIGNIFICANT CHANGE UP (ref 7–23)
CALCIUM SERPL-MCNC: 9.3 MG/DL — SIGNIFICANT CHANGE UP (ref 8.4–10.5)
CHLORIDE SERPL-SCNC: 103 MMOL/L — SIGNIFICANT CHANGE UP (ref 96–108)
CO2 SERPL-SCNC: 27 MMOL/L — SIGNIFICANT CHANGE UP (ref 22–31)
CREAT SERPL-MCNC: 1.07 MG/DL — SIGNIFICANT CHANGE UP (ref 0.5–1.3)
EGFR: 56 ML/MIN/1.73M2 — LOW
EOSINOPHIL # BLD AUTO: 0.01 K/UL — SIGNIFICANT CHANGE UP (ref 0–0.5)
EOSINOPHIL NFR BLD AUTO: 0.1 % — SIGNIFICANT CHANGE UP (ref 0–6)
GLUCOSE SERPL-MCNC: 122 MG/DL — HIGH (ref 70–99)
HCT VFR BLD CALC: 35.4 % — SIGNIFICANT CHANGE UP (ref 34.5–45)
HGB BLD-MCNC: 11.4 G/DL — LOW (ref 11.5–15.5)
IMM GRANULOCYTES NFR BLD AUTO: 0.3 % — SIGNIFICANT CHANGE UP (ref 0–0.9)
LYMPHOCYTES # BLD AUTO: 0.76 K/UL — LOW (ref 1–3.3)
LYMPHOCYTES # BLD AUTO: 10.3 % — LOW (ref 13–44)
MCHC RBC-ENTMCNC: 29.9 PG — SIGNIFICANT CHANGE UP (ref 27–34)
MCHC RBC-ENTMCNC: 32.2 GM/DL — SIGNIFICANT CHANGE UP (ref 32–36)
MCV RBC AUTO: 92.9 FL — SIGNIFICANT CHANGE UP (ref 80–100)
MONOCYTES # BLD AUTO: 0.55 K/UL — SIGNIFICANT CHANGE UP (ref 0–0.9)
MONOCYTES NFR BLD AUTO: 7.4 % — SIGNIFICANT CHANGE UP (ref 2–14)
NEUTROPHILS # BLD AUTO: 5.94 K/UL — SIGNIFICANT CHANGE UP (ref 1.8–7.4)
NEUTROPHILS NFR BLD AUTO: 80.4 % — HIGH (ref 43–77)
NRBC # BLD: 0 /100 WBCS — SIGNIFICANT CHANGE UP (ref 0–0)
PLATELET # BLD AUTO: 309 K/UL — SIGNIFICANT CHANGE UP (ref 150–400)
POTASSIUM SERPL-MCNC: 3.9 MMOL/L — SIGNIFICANT CHANGE UP (ref 3.5–5.3)
POTASSIUM SERPL-SCNC: 3.9 MMOL/L — SIGNIFICANT CHANGE UP (ref 3.5–5.3)
PROT SERPL-MCNC: 7.7 G/DL — SIGNIFICANT CHANGE UP (ref 6–8.3)
RBC # BLD: 3.81 M/UL — SIGNIFICANT CHANGE UP (ref 3.8–5.2)
RBC # FLD: 15.3 % — HIGH (ref 10.3–14.5)
SARS-COV-2 RNA SPEC QL NAA+PROBE: NEGATIVE — SIGNIFICANT CHANGE UP
SODIUM SERPL-SCNC: 144 MMOL/L — SIGNIFICANT CHANGE UP (ref 135–145)
WBC # BLD: 7.39 K/UL — SIGNIFICANT CHANGE UP (ref 3.8–10.5)
WBC # FLD AUTO: 7.39 K/UL — SIGNIFICANT CHANGE UP (ref 3.8–10.5)

## 2023-02-22 PROCEDURE — 70450 CT HEAD/BRAIN W/O DYE: CPT | Mod: 26,MC

## 2023-02-22 PROCEDURE — 72125 CT NECK SPINE W/O DYE: CPT | Mod: 26,MC

## 2023-02-22 PROCEDURE — 99285 EMERGENCY DEPT VISIT HI MDM: CPT

## 2023-02-22 PROCEDURE — 70553 MRI BRAIN STEM W/O & W/DYE: CPT | Mod: 26

## 2023-02-22 PROCEDURE — 93970 EXTREMITY STUDY: CPT | Mod: 26

## 2023-02-22 RX ORDER — LEVETIRACETAM 250 MG/1
1000 TABLET, FILM COATED ORAL
Refills: 0 | Status: DISCONTINUED | OUTPATIENT
Start: 2023-02-22 | End: 2023-03-01

## 2023-02-22 RX ORDER — ONDANSETRON 8 MG/1
4 TABLET, FILM COATED ORAL EVERY 6 HOURS
Refills: 0 | Status: DISCONTINUED | OUTPATIENT
Start: 2023-02-22 | End: 2023-03-01

## 2023-02-22 RX ORDER — LATANOPROST 0.05 MG/ML
1 SOLUTION/ DROPS OPHTHALMIC; TOPICAL AT BEDTIME
Refills: 0 | Status: DISCONTINUED | OUTPATIENT
Start: 2023-02-22 | End: 2023-03-01

## 2023-02-22 RX ORDER — AMLODIPINE BESYLATE 2.5 MG/1
10 TABLET ORAL DAILY
Refills: 0 | Status: DISCONTINUED | OUTPATIENT
Start: 2023-02-22 | End: 2023-03-01

## 2023-02-22 RX ORDER — SENNA PLUS 8.6 MG/1
2 TABLET ORAL AT BEDTIME
Refills: 0 | Status: DISCONTINUED | OUTPATIENT
Start: 2023-02-22 | End: 2023-03-01

## 2023-02-22 RX ORDER — ENOXAPARIN SODIUM 100 MG/ML
110 INJECTION SUBCUTANEOUS EVERY 12 HOURS
Refills: 0 | Status: DISCONTINUED | OUTPATIENT
Start: 2023-02-22 | End: 2023-02-28

## 2023-02-22 RX ORDER — METRONIDAZOLE 500 MG
750 TABLET ORAL
Qty: 0 | Refills: 0 | DISCHARGE

## 2023-02-22 RX ORDER — ACETAMINOPHEN 500 MG
650 TABLET ORAL EVERY 6 HOURS
Refills: 0 | Status: DISCONTINUED | OUTPATIENT
Start: 2023-02-22 | End: 2023-03-01

## 2023-02-22 RX ORDER — VANCOMYCIN HCL 1 G
1250 VIAL (EA) INTRAVENOUS
Qty: 0 | Refills: 0 | DISCHARGE

## 2023-02-22 RX ADMIN — LEVETIRACETAM 1000 MILLIGRAM(S): 250 TABLET, FILM COATED ORAL at 18:30

## 2023-02-22 RX ADMIN — AMLODIPINE BESYLATE 10 MILLIGRAM(S): 2.5 TABLET ORAL at 18:30

## 2023-02-22 RX ADMIN — ENOXAPARIN SODIUM 110 MILLIGRAM(S): 100 INJECTION SUBCUTANEOUS at 23:35

## 2023-02-22 NOTE — ED ADULT NURSE NOTE - AS PAIN REST
2022       Ludy Jansen PA-C  [address]  [facility]  Via Fax: [phone]      Patient: Angelic Desouza   YOB: 2012   Date of Visit: 2022       Dear Dr. Jansen:    I saw your patient, Angelic Desouza, for an evaluation. Below are my notes for this visit with her.    If you have questions, please do not hesitate to call me.      Sincerely,        Tatiana Martell MD        CC: No Recipients  Tatiana Martell MD  2022  2:50 PM  Signed  Subjective:    Reason for Consult (Chief Complaint):   Chief Complaint   Patient presents with   • Office Visit   • Mouth/Lip Problem       ERIKA Atwood is an 10 year old female who I was asked by Ludy Jansen PA-C to see in consultation today for evaluation of a tongue tie.    She was diagnosed a year ago. She is not able to stick her tongue out passed her lips. It is heart shaped. She has issues with S and R sounds. She gets cavities occasionally. There is no snoring, no nasal congestion. No ear infections.     Patient's medications, allergies, past medical, surgical, social and family histories were reviewed and updated as appropriate.    Birth History     Born full term, passed  hearing screen test       Past Medical History:   Diagnosis Date   • Tongue tie      Past Surgical History:   Procedure Laterality Date   • No past surgeries         Family History   Problem Relation Age of Onset   • Scoliosis Sister    • Kidney Transplant Maternal Grandmother        Social History     Socioeconomic History   • Marital status: Single     Spouse name: Not on file   • Number of children: Not on file   • Years of education: Not on file   • Highest education level: Not on file   Occupational History   • Not on file   Tobacco Use   • Smoking status: Not on file   • Smokeless tobacco: Not on file   Substance and Sexual Activity   • Alcohol use: Not on file   • Drug use: Not on file   • Sexual activity: Not on file   Other Topics  Concern   • Not on file   Social History Narrative   • Not on file     Social Determinants of Health     Financial Resource Strain: Not on file   Food Insecurity: Not on file   Transportation Needs: Not on file   Physical Activity: Not on file   Stress: Not on file   Social Connections: Not on file   Intimate Partner Violence: Not on file       No current outpatient medications on file.     No current facility-administered medications for this visit.       ALLERGIES:  No Known Allergies    Constitutional: negative for chills, fevers, sweats and weight loss  Eyes: negative for visual disturbance  Ears, nose, mouth, throat, and face: as per HPI  Respiratory: negative for cough, stridor and wheezing  Cardiovascular: negative for chest pain, irregular heart beat and palpitations  Gastrointestinal: negative for abdominal pain, change in bowel habits, diarrhea, nausea and vomiting  Genitourinary:negative for dysuria  Hematologic/lymphatic: negative for bleeding, easy bruising and lymphadenopathy  Musculoskeletal:negative for arthralgias, muscle weakness, neck pain and stiff joints  Neurological: negative for coordination problems, headaches, vertigo and weakness  Endocrine: negative for diabetic symptoms including blurry vision, increased fatigue, polydipsia, polyphagia and polyuria  Allergic/Immunologic: negative for anaphylaxis and angioedema    Objective:    Vital Signs:   Visit Vitals  /70   Pulse 76   Temp 97.9 °F (36.6 °C)   Ht 4' 10.98\" (1.498 m)   Wt (!) 53.1 kg (117 lb 1 oz)   BMI 23.66 kg/m²       Constitutional: well developed, well nourished, no acute distress, absence of stridor, absence of stertor  Head and Face: facial movement is normal and symmetrical, no craniofacial deformities, no scars, lesions or masses, salivary glands were normal, no dysmorphic features  Eyes: PERRL, EOM's intact and symmetric, no nystagmus  Ears: normal appearing and symmetric pinnae, mastoids non-tender, external canals  patent with normal amount of cerumen, tympanic membranes intact bilaterally without erythema and without bulging, right tympanic membrane without  middle ear effusion, left tympanic membrane without  middle ear effusion  Nose: moving air, no nasal bone tenderness, external nose showed no deformities, rhinorrhea absent, mucosa normal, septum not deviated  Oral Cavity/Oropharynx: no intraoral lesions, floor of mouth soft, no pharyngeal swelling, no pharyngeal erythema, palate intact and symmetrical, tongue tie present to tip of tongue with heart shaping and with inability to elevate the tongue but good lateral mobility, tonsils 1+  Voice quality: within normal limits  Neck/glands: soft and supple with full range of motion, trachea midline, no masses  Lymphatic: normal lymph nodes of head and neck  Respiratory: symmetric chest excursion, no retractions and easy work of breathing  CV: strong peripheral pulses and warm extremities  Neuro: CN II-XII grossly intact and symmetric bilaterally  Skin: skin color, texture, turgor normal, no rashes or lesions  Psychiatric: appropriate, interactive and cooperative         Assessment:    Angelic Desouza is a 10 year old female with a history of tongue tie.      Diagnoses:    (Q38.1) Tongue tie  (primary encounter diagnosis)        Plan:    I would recommend seeing a speech/myofacial therapist and seeing me again in 6 months. If she is ready for surgery then, we will move ahead with a frenectomy, but she does need a preoperative assessment, exercises and therapy prior to any procedure.    The risks and benefits of my recommendations as well as other treatment options were discussed with the mother. Questions were answered.        Tatiana Martell MD     A copy of my note has been sent to the referring physician.    Disclaimer: This note was generated using voice recognition technology. Efforts are made to proofread the final product, however minor errors in transcription may be  present. Please contact my office should any questions regarding content arise.      0 (no pain/absence of nonverbal indicators of pain)

## 2023-02-22 NOTE — ED PROVIDER NOTE - CLINICAL SUMMARY MEDICAL DECISION MAKING FREE TEXT BOX
73 y/o F with significant PMHx left craniotomy 2/2 subdural abscess/hematoma presenting to ED with her daughter from home for fall out of chair without head injury. Upon provider evaluation, neurosurgery is also at bedside requesting CT head and CT c-spine. Will obtain basic labs to r/o any medical cause of intermittent worsening of right sided weakness. 73 y/o F with significant PMHx left craniotomy 2/2 subdural abscess/hematoma presenting to ED with her daughter from home for fall out of chair without head injury. Upon provider evaluation, neurosurgery is also at bedside requesting CT head and CT c-spine. Will obtain basic labs to r/o any medical cause of intermittent worsening of right sided weakness.    As per NS team - requests admission to Dr. D'Amico's service for further management.

## 2023-02-22 NOTE — H&P ADULT - NSHPSOCIALHISTORY_GEN_ALL_CORE
..[address] [facility]  [address] [place], [place] [address]Bay m[place]on [facility] [address],[facility], [address] [facility]] [address] - Phone: [phone]  Fax: [phone]  DISCHARGE NOTE  Patient Name: Anjali Carpio : 1988   Treatment/Medical Diagnosis: Neck pain [M54.2]   Referral Source: Jenelle Stallings MD     Date of Initial Visit: 20 Attended Visits: 16 Missed Visits: See below     SUMMARY OF TREATMENT  Pt was seen for IE and 15 f/u visits with treatment consisting of therapeutic exercises for cervical/thoracic mobility, manual therapy (prom/mobs/stm) and modalities prn. In addition pt was instructed on hep/activity modification. CURRENT STATUS  Pt has made excellent progress with pt. Rates pain 2/10 at worst with prolonged sitting     Goal/Measure of Progress Goal Met? 1. Restore full c/s rotation wnl to A with adls such as driving   Status at last Eval: l rot 66, rr 75 Current Status: L rot 75, Rr 80 yes   2. Pt will note daily max pain </=3/10 in order to increase participation in adls   Status at last Eval: 4/10 Current Status: 2/10 yes   3. Pt will demonstrate 4+/5 shoulder strength in order to improve ability to reach/lift/sit   Status at last Eval: 4-/5 Current Status: 5/5 yes     RECOMMENDATIONS  Achieved all goals appropriate for d/c   If you have any questions/comments please contact us directly at [phone]. Thank you for allowing us to assist in the care of your patient.     Therapist Signature: Vero Dejesus PT Date: 3/23/2021     Time: 6:18 PM   NOTE TO PHYSICIAN:  PLEASE COMPLETE THE ORDERS BELOW AND FAX TO   [facility]: [phone]  If you are unable to process this request in 24 hours please contact our office: [phone]    ___ I have read the above report and request that my patient continue as recommended.   ___ I have read the above report and request that my patient continue therapy with the following changes/special instructions:_________________________________________________________   ___ I have read the above report and request that my patient be discharged from therapy.      Physician Signature:        Date:       Time: no tobacco  no etoh  lives alone

## 2023-02-22 NOTE — ED PROVIDER NOTE - OBJECTIVE STATEMENT
Prior dc summary 12/30-1/12: 71yo F w/ no known PMHx. on 12/22 presented to [facility] c/o slurred speech and R sided weakness, stroke code called, imaging initially negative for stroke or hemorrhage, pt's neuro exam worsened and subsequent CTH the following day showed L sided SDH, admitted to ICU for further monitoring. Pt neuro declined on 12/25 with AMS requiring intubation, noted to have episodes of twitching concerning for seizures, started on keppra and EEG placed. No epileptiform activity noted. CTH on 12/28 significant for L frontoparietal collection, MRI completed showed concern for possible empyema. Daughter reported pt likely had a sinus infection recently, unsure of abx. At [facility] pt started on ceftriaxone for UTI that was switched to vancomycin this am. Pt transferred to [facility] for furhte rmanagement and possible L hemicraniectomy and washout of L subdural empyema.     Pt presents to ED today after mechanical fall.

## 2023-02-22 NOTE — ED ADULT TRIAGE NOTE - SPO2 (%)
99 Pt called upset stating she had an Appendectomy with Dr Brandon Norman - she states [facility] called her days later and told her she had \"many parasites\"and wants to know why she was not told when she was in the hospital - I think she is referring to the urine culture which shows e-coli - She asked me my name and title and requested to speak to someone \"in a higher position\" - will you please call her    Cc: Dr Jessica Robertson

## 2023-02-22 NOTE — ED ADULT NURSE NOTE - NSIMPLEMENTINTERV_GEN_ALL_ED
Implemented All Universal Safety Interventions:  [place] to call system. Call bell, personal items and telephone within reach. Instruct patient to call for assistance. Room bathroom lighting operational. Non-slip footwear when patient is off stretcher. Physically safe environment: no spills, clutter or unnecessary equipment. Stretcher in lowest position, wheels locked, appropriate side rails in place. Specific interventions were implemented:

## 2023-02-22 NOTE — ED PROVIDER NOTE - NEUROLOGICAL, MLM
Alert and oriented x2 (at baseline). RUE strength 1/5. RLE strength 3/5. Left sided strength is 5/5. Slowed speech noted (at baseline).

## 2023-02-22 NOTE — PATIENT PROFILE ADULT - FUNCTIONAL ASSESSMENT - BASIC MOBILITY 6.
2-calculated by average/Not able to assess (calculate score using [facility] averaging method)

## 2023-02-22 NOTE — ED ADULT TRIAGE NOTE - CHIEF COMPLAINT QUOTE
Pt BIBA s/p fall. Per pt's sister pt was transferring to chair and "slid to the ground" assisted by sister. Pt's sister denies head injury, LOC. Pt with recent hemorrhagic stroke and craniotomy in December, with residual right sided weakness and garbled speech, A&Ox4.

## 2023-02-22 NOTE — ED PROVIDER NOTE - CONSTITUTIONAL, MLM
normal... Pt is in helmet 2/2 left side hemicraniotomy with no temporal bone currently. Well appearing, awake, alert, oriented to person, place, time/situation and in no apparent distress.

## 2023-02-22 NOTE — ED PROVIDER NOTE - PROGRESS NOTE DETAILS
Pt was admitted from 12/30-1/12 for subdural abscess and hematoma. She had a washout on 12/30 and was discharged to rehab on 1/12. Pt was discharged home from rehab on 2/19. Now presenting to ED with daughter for fall out of a chair today. According to her daughter, pt has residual right sided weakness that has been intermittently worsening. Daughter reports the weakness worsens but then pt returns back to baseline. Today, pt slipped out of her chair and landed on her buttock. No head or neck strike. Pt is not currently c/o anything. Daughter called Dr. D'Amico (neurosurgery) who advised pt come to ED. Denies fevers, infectious symptoms, or urinary symptoms. Pt has been eating well with normal BMs. She is taking keppra for seizure prophylaxis and amlodipine.

## 2023-02-22 NOTE — H&P ADULT - NSHPLABSRESULTS_GEN_ALL_CORE
11.4   7.39  )-----------( 309      ( 22 Feb 2023 13:34 )             35.4   02-22    144  |  103  |  12  ----------------------------<  122<H>  3.9   |  27  |  1.07    Ca    9.3      22 Feb 2023 13:34    TPro  7.7  /  Alb  3.4  /  TBili  0.2  /  DBili  x   /  AST  16  /  ALT  10  /  AlkPhos  81  02-22  CT head:   FINDINGS:  VENTRICLES AND SULCI: Left frontal and parietal sulcal effacement is   increased with further flap sinking. Midline shift to the right is minor,   3-4 mm at most. Ventricles are unchanged in size from 02/07/2023 without   hydrocephalus.  INTRA-AXIAL: No acute intracranial hemorrhage. The gray-white matter   differentiation is preserved aside from stable small sites of frontal and   parietal encephalomalacia on the left. No acute infarct.  EXTRA-AXIAL: No extra-axial fluid collection is present.  VISUALIZED SINUSES: Patient is status post endoscopic surgery. There is   persistent and complete opacification of the left maxillary sinus,   increased from 02/07/2023 whereas sinusitis elsewhere is similar   including totally opacified sphenoidsinuses.  VISUALIZED MASTOIDS:  Mostly clear.  CALVARIUM:  Again status post post left hemicraniectomy. No acute   fracture.  MISCELLANEOUS:  Replacement of the native lenses bilaterally.    IMPRESSION:    1.  No acute intracranial hemorrhage or calvarial fracture.  2.  Status post left hemicraniectomy with findings of sunken flap   syndrome, progressed from 02/07/2023 with minor midline shift to the   right.  3.  Interval worsening of left maxillary sinus opacification and similar   other inflammatory sinus disease from 02/07/2023.

## 2023-02-22 NOTE — H&P ADULT - NSHPPHYSICALEXAM_GEN_ALL_CORE
exam: Well developed female, in apparent distress. Helmet in place.  Head: left hemicraniectomy is sunken in , soft , non tender, no erythema, Helmet in place.  neuro: Expressed aphagia - word finding difficulty.  oriented to self, place and time with choices.  PERRL, EOMI.  No obvious CN deficit from II to XII.  Lung: Good air entry, clear lung sound  Heart: S1S2 regular  Abd: Obese, soft, non tender, +BS.  Ext: RUE: deltoid 2/5, biceps 2/5 triceps 1/5, hand grip0/5  LUE: 5/5 throughout.  RLE: hip flex 4/5, knee 4/5 planter flex 1/5 dorsiflex 2/5

## 2023-02-22 NOTE — ED PROVIDER NOTE - CONSTITUTIONAL NEGATIVE STATEMENT, MLM
Normal vision: sees adequately in most situations; can see medication labels, newsprint
no fever and no chills.

## 2023-02-22 NOTE — PATIENT PROFILE ADULT - HOME ACCESSIBILITY CONCERNS
Chief Complaint   Patient presents with     Establish Care     Hypertension     B/P average 136/81  
none

## 2023-02-22 NOTE — CONSULT NOTE ADULT - SUBJECTIVE AND OBJECTIVE BOX
73 yo female brought to ER by her daughter after she had a fall secondary to her baseline right side weakness.  Below  is a synopsis of patients last admission with us.:  73 y/o F w/ no known PMHx transferred from [place] c/o slurred speech and right sided weakness. CTH initially negative, repeat on 12/23 showed L sided SDH, pts mental status worsened and CTH on 12/28 significant for L frontoparietal collection, MRI completed showed concern for possible empyema. S/p L hemicraniectomy and washout of L subdural empyema 12/30/22. S/p sinus surgery with judith purulence to L maxillary sinus and posterior bony erosiun to L sphenoid sinus 1/5.   On she was  discharged to [facility] rehab which plan to return for cranioplasty in 3 months post hemicraniectomy.  Per daughter, patient spent 4 weeks at the rehab. She was sent home 1 week ago with 4 hrs home aid and home  PT.  Her right side weakness was progressively improving and her expressive aphagia was minimally improved. Daughter fells that patient had more right side weakness the last hrs.  Today when patient  togetup from her wheelchair her right knee bucked and patient fell on her buttocks. She was wearing her helmet; did not hit her head. NO LOC. Patient remained alert and awake throughout.  She was sent here for a head CT by Dr. D'Amico.    PHM: HTN  PSH: craniectomy sinus surgery as above.    Home meds: Keppra, Amlodipine.    exam: Well developed female, in apparent distress. Helmet in place.  Head: left hemicraniectomy is sunken in , soft , non tender, no erythema, Helmet in place.  neuro: Expressed aphagia - word finding difficulty.  oriented to self, place and time with choices.  PERRL, EOMI.  No obvious CN deficit from II to XII.  Lung: Good air entry, clear lung sound  Heart: S1S2 regular  Abd: Obese, soft, non tender, +BS.  Ext: RUE: deltoid 2/5, biceps 2/5 triceps 1/5, hand grip0/5  LUE: 5/5 throughout.  RLE: hip flex 4/5, knee 4/5 planter flex 1/5 dorsiflex 2/5     73 yo female brought to ER by her daughter after she had a fall secondary to her baseline right side weakness.  Below  is a synopsis of patients last admission with us.:  71 y/o F w/ no known PMHx transferred from [place] c/o slurred speech and right sided weakness. CTH initially negative, repeat on 12/23 showed L sided SDH, pts mental status worsened and CTH on 12/28 significant for L frontoparietal collection, MRI completed showed concern for possible empyema. S/p L hemicraniectomy and washout of L subdural empyema 12/30/22. S/p sinus surgery with judith purulence to L maxillary sinus and posterior bony erosiun to L sphenoid sinus 1/5.   On she was  discharged to [facility] rehab which plan to return for cranioplasty in 3 months post hemicraniectomy.  Per daughter, patient spent 4 weeks at the rehab. She was sent home 1 week ago with 4 hrs home aid and home  PT.  Her right side weakness was progressively improving and her expressive aphagia was minimally improved. Daughter fells that patient had more right side weakness the last hrs.  Today when patient  togetup from her wheelchair her right knee bucked and patient fell on her buttocks. She was wearing her helmet; did not hit her head. NO LOC. Patient remained alert and awake throughout.  She was sent here for a head CT by Dr. D'Amico.    PHM: HTN  PSH: craniectomy sinus surgery as above.    Home meds: Keppra, Amlodipine.    exam: Well developed female, in apparent distress. Helmet in place.  Head: left hemicraniectomy is sunken in , soft , non tender, no erythema, Helmet in place.  neuro: Expressed aphagia - word finding difficulty.  oriented to self, place and time with choices.  PERRL, EOMI.  No obvious CN deficit from II to XII.  Lung: Good air entry, clear lung sound  Heart: S1S2 regular  Abd: Obese, soft, non tender, +BS.  Ext: RUE: deltoid 2/5, biceps 2/5 triceps 1/5, hand grip0/5  LUE: 5/5 throughout.  RLE: hip flex 4/5, knee 4/5 planter flex 1/5 dorsiflex 2/5    Plan: Patient is being admitted for possible cranioplasty on this admission.  - See our H&P for further plans and assessment.

## 2023-02-22 NOTE — H&P ADULT - HISTORY OF PRESENT ILLNESS
73 yo female brought to ER by her daughter after she had a fall secondary to her baseline right side weakness.  Below  is a synopsis of patients last admission with us.:  73 y/o F w/ no known PMHx transferred from [place] c/o slurred speech and right sided weakness. CTH initially negative, repeat on 12/23 showed L sided SDH, pts mental status worsened and CTH on 12/28 significant for L frontoparietal collection, MRI completed showed concern for possible empyema. S/p L hemicraniectomy and washout of L subdural empyema 12/30/22. S/p sinus surgery with judith purulence to L maxillary sinus and posterior bony erosiun to L sphenoid sinus 1/5.   On she was  discharged to [facility] rehab which plan to return for cranioplasty in 3 months post hemicraniectomy.  Per daughter, patient spent 4 weeks at the rehab. She was sent home 1 week ago with 4 hrs home aid and home  PT.  Her right side weakness was progressively improving and her expressive aphagia was minimally improved. Daughter fells that patient had more right side weakness the last hrs.  Today when patient  togetup from her wheelchair her right knee bucked and patient fell on her buttocks. She was wearing her helmet; did not hit her head. NO LOC. Patient remained alert and awake throughout.  She was sent here for a head CT by Dr. D'Amico.

## 2023-02-23 DIAGNOSIS — I10 ESSENTIAL (PRIMARY) HYPERTENSION: ICD-10-CM

## 2023-02-23 DIAGNOSIS — Z01.818 ENCOUNTER FOR OTHER PREPROCEDURAL EXAMINATION: ICD-10-CM

## 2023-02-23 DIAGNOSIS — M95.2 OTHER ACQUIRED DEFORMITY OF HEAD: ICD-10-CM

## 2023-02-23 LAB
A1C WITH ESTIMATED AVERAGE GLUCOSE RESULT: 4.7 % — SIGNIFICANT CHANGE UP (ref 4–5.6)
ALBUMIN SERPL ELPH-MCNC: 3.4 G/DL — SIGNIFICANT CHANGE UP (ref 3.3–5)
ALP SERPL-CCNC: 77 U/L — SIGNIFICANT CHANGE UP (ref 40–120)
ALT FLD-CCNC: 11 U/L — SIGNIFICANT CHANGE UP (ref 10–45)
ANION GAP SERPL CALC-SCNC: 13 MMOL/L — SIGNIFICANT CHANGE UP (ref 5–17)
APTT BLD: 36.7 SEC — HIGH (ref 27.5–35.5)
AST SERPL-CCNC: 18 U/L — SIGNIFICANT CHANGE UP (ref 10–40)
BILIRUB SERPL-MCNC: 0.2 MG/DL — SIGNIFICANT CHANGE UP (ref 0.2–1.2)
BUN SERPL-MCNC: 12 MG/DL — SIGNIFICANT CHANGE UP (ref 7–23)
CALCIUM SERPL-MCNC: 9.4 MG/DL — SIGNIFICANT CHANGE UP (ref 8.4–10.5)
CHLORIDE SERPL-SCNC: 102 MMOL/L — SIGNIFICANT CHANGE UP (ref 96–108)
CO2 SERPL-SCNC: 26 MMOL/L — SIGNIFICANT CHANGE UP (ref 22–31)
CREAT SERPL-MCNC: 1.04 MG/DL — SIGNIFICANT CHANGE UP (ref 0.5–1.3)
EGFR: 57 ML/MIN/1.73M2 — LOW
ESTIMATED AVERAGE GLUCOSE: 88 MG/DL — SIGNIFICANT CHANGE UP (ref 68–114)
GLUCOSE SERPL-MCNC: 113 MG/DL — HIGH (ref 70–99)
HCT VFR BLD CALC: 34.1 % — LOW (ref 34.5–45)
HGB BLD-MCNC: 11 G/DL — LOW (ref 11.5–15.5)
INR BLD: 1.05 — SIGNIFICANT CHANGE UP (ref 0.88–1.16)
MAGNESIUM SERPL-MCNC: 2 MG/DL — SIGNIFICANT CHANGE UP (ref 1.6–2.6)
MCHC RBC-ENTMCNC: 30 PG — SIGNIFICANT CHANGE UP (ref 27–34)
MCHC RBC-ENTMCNC: 32.3 GM/DL — SIGNIFICANT CHANGE UP (ref 32–36)
MCV RBC AUTO: 92.9 FL — SIGNIFICANT CHANGE UP (ref 80–100)
NRBC # BLD: 0 /100 WBCS — SIGNIFICANT CHANGE UP (ref 0–0)
PHOSPHATE SERPL-MCNC: 3.6 MG/DL — SIGNIFICANT CHANGE UP (ref 2.5–4.5)
PLATELET # BLD AUTO: 295 K/UL — SIGNIFICANT CHANGE UP (ref 150–400)
POTASSIUM SERPL-MCNC: 3.6 MMOL/L — SIGNIFICANT CHANGE UP (ref 3.5–5.3)
POTASSIUM SERPL-SCNC: 3.6 MMOL/L — SIGNIFICANT CHANGE UP (ref 3.5–5.3)
PROT SERPL-MCNC: 7 G/DL — SIGNIFICANT CHANGE UP (ref 6–8.3)
PROTHROM AB SERPL-ACNC: 12.5 SEC — SIGNIFICANT CHANGE UP (ref 10.5–13.4)
RBC # BLD: 3.67 M/UL — LOW (ref 3.8–5.2)
RBC # FLD: 15.3 % — HIGH (ref 10.3–14.5)
SODIUM SERPL-SCNC: 141 MMOL/L — SIGNIFICANT CHANGE UP (ref 135–145)
WBC # BLD: 6.74 K/UL — SIGNIFICANT CHANGE UP (ref 3.8–10.5)
WBC # FLD AUTO: 6.74 K/UL — SIGNIFICANT CHANGE UP (ref 3.8–10.5)

## 2023-02-23 PROCEDURE — 99222 1ST HOSP IP/OBS MODERATE 55: CPT

## 2023-02-23 PROCEDURE — 70450 CT HEAD/BRAIN W/O DYE: CPT | Mod: 26

## 2023-02-23 PROCEDURE — 99232 SBSQ HOSP IP/OBS MODERATE 35: CPT

## 2023-02-23 RX ADMIN — ENOXAPARIN SODIUM 110 MILLIGRAM(S): 100 INJECTION SUBCUTANEOUS at 22:43

## 2023-02-23 RX ADMIN — AMLODIPINE BESYLATE 10 MILLIGRAM(S): 2.5 TABLET ORAL at 06:12

## 2023-02-23 RX ADMIN — LATANOPROST 1 DROP(S): 0.05 SOLUTION/ DROPS OPHTHALMIC; TOPICAL at 00:57

## 2023-02-23 RX ADMIN — Medication 1 TABLET(S): at 11:03

## 2023-02-23 RX ADMIN — ENOXAPARIN SODIUM 110 MILLIGRAM(S): 100 INJECTION SUBCUTANEOUS at 11:03

## 2023-02-23 RX ADMIN — LEVETIRACETAM 1000 MILLIGRAM(S): 250 TABLET, FILM COATED ORAL at 06:12

## 2023-02-23 RX ADMIN — LEVETIRACETAM 1000 MILLIGRAM(S): 250 TABLET, FILM COATED ORAL at 17:02

## 2023-02-23 RX ADMIN — SENNA PLUS 2 TABLET(S): 8.6 TABLET ORAL at 21:18

## 2023-02-23 RX ADMIN — LATANOPROST 1 DROP(S): 0.05 SOLUTION/ DROPS OPHTHALMIC; TOPICAL at 21:19

## 2023-02-23 NOTE — CONSULT NOTE ADULT - ASSESSMENT
Assessment/Plan:  72y Female PMH L hemicraniectomy and washout of L subdural empyema 12/30/22, s/p FESS 1/5 with Dr. Abarca (septoplasty, maxillary antrostomies, judith pus in L maxillary sinus and L sphenoid sinus with bony erosion). She was discharged to [facility] rehab facility and missed her follow up appointments for debridement. She presented to [facility] yesterday and was admitted to NSGY for worsening R sided weakness. Patient able to follow simple commands and answer simple questions, she is alert. She denies any anterior or posterior nasal dripping, epistaxis, metallic taste in mouth, frontal HA, or blurry vision at this time. No fevers or chills reported. CTH showing interval worsening of L maxillary sinus opacification, extensive crusting and mucous present 2/2 missed appointments for post-surgical debridement.    - Likely sinus debridement in OR when NSGY plans for RTOR    Plan discussed with Dr. Abarca.    [facility] at [phone] with any questions/concerns.
73 y/o female with recent hospitalization at which time found to have pansinusitis and L subdural empyema s/p L hemicraniotomy 12/30 and washout of sphenoid sinus, the likely source of the subdural empyema, was in rehab 1/12-2/20  where she completed 6 week course of Vancomycin, Ceftriaxone, and Metronidazole which finished on 2/16/23 now presenting after witnessed fall at in setting of right-sided weakness.   MRI 2/22 without evidence of active infection but showing inflammatory paranasal sinus disease is present.  Patient remains afebrile without leukocytosis or any other evidence of active infection.  Pending possible sinus debridement with ENT.  From an infectious disease perspective there is no contraindication for any ENT or neurosurgical procedures.      Team 1 will sign off at this time.  Please call or page us with any questions or concerns

## 2023-02-23 NOTE — PROGRESS NOTE ADULT - SUBJECTIVE AND OBJECTIVE BOX
HOSPITALIST INITIAL CONSULT NOTE    CHIEF COMPLAINT:      HPI:  71 yo female brought to ER by her daughter after she had a fall secondary to her baseline right side weakness.  Below  is a synopsis of patients last admission with us.:  73 y/o F w/ no known PMHx transferred from [place] c/o slurred speech and right sided weakness. CTH initially negative, repeat on 12/23 showed L sided SDH, pts mental status worsened and CTH on 12/28 significant for L frontoparietal collection, MRI completed showed concern for possible empyema. S/p L hemicraniectomy and washout of L subdural empyema 12/30/22. S/p sinus surgery with judith purulence to L maxillary sinus and posterior bony erosiun to L sphenoid sinus 1/5.   On she was  discharged to [facility] [place], operated by [facility] rehab which plan to return for cranioplasty in 3 months post hemicraniectomy.  Per daughter, patient spent 4 weeks at the rehab. She was sent home 1 week ago with 4 hrs home aid and home  PT.  Her right side weakness was progressively improving and her expressive aphagia was minimally improved. Daughter fells that patient had more right side weakness the last hrs.  Today when patient  togetup from her wheelchair her right knee bucked and patient fell on her buttocks. She was wearing her helmet; did not hit her head. NO LOC. Patient remained alert and awake throughout.  She was sent here for a head CT by Dr. D'Amico.   (22 Feb 2023 17:26)      PAST MEDICAL & SURGICAL HISTORY:  HTN (hypertension)      S/P craniotomy          REVIEW OF SYSTEMS:  As per HPI, otherwise negative for Constitutional, Eyes, Ears/Nose/Mouth/Throat, Neck, Cardiovascular, Respiratory, Gastrointestinal, Genitourinary, Skin, Endocrine, Musculoskeletal, Psychiatric, and Hematologic/Lymphatic.    MEDICATIONS  (STANDING):  amLODIPine   Tablet 10 milliGRAM(s) Oral daily  enoxaparin Injectable 110 milliGRAM(s) SubCutaneous every 12 hours  latanoprost 0.005% Ophthalmic Solution 1 Drop(s) Both EYES at bedtime  levETIRAcetam 1000 milliGRAM(s) Oral two times a day  multivitamin 1 Tablet(s) Oral daily  senna 2 Tablet(s) Oral at bedtime    MEDICATIONS  (PRN):  acetaminophen     Tablet .. 650 milliGRAM(s) Oral every 6 hours PRN Temp greater or equal to 38C (100.4F), Mild Pain (1 - 3)  bisacodyl 5 milliGRAM(s) Oral daily PRN Constipation  ondansetron Injectable 4 milliGRAM(s) IV Push every 6 hours PRN Nausea and/or Vomiting      Allergies    No Known Allergies    Intolerances        FAMILY HISTORY:      Social History:  no tobacco  no etoh  lives alone (22 Feb 2023 17:26)      VITALS  Vital Signs Last 24 Hrs  T(C): 36.7 (23 Feb 2023 12:58), Max: 37.2 (22 Feb 2023 19:35)  T(F): 98 (23 Feb 2023 12:58), Max: 98.9 (22 Feb 2023 19:35)  HR: 96 (23 Feb 2023 12:58) (72 - 102)  BP: 123/83 (23 Feb 2023 12:58) (100/68 - 131/81)  BP(mean): --  RR: 17 (23 Feb 2023 12:58) (16 - 18)  SpO2: 97% (23 Feb 2023 12:58) (95% - 98%)    Parameters below as of 23 Feb 2023 12:58  Patient On (Oxygen Delivery Method): room air        I&O's Summary    22 Feb 2023 07:01  -  23 Feb 2023 07:00  --------------------------------------------------------  IN: 0 mL / OUT: 50 mL / NET: -50 mL    23 Feb 2023 07:01  -  23 Feb 2023 13:52  --------------------------------------------------------  IN: 600 mL / OUT: 0 mL / NET: 600 mL        CAPILLARY BLOOD GLUCOSE          PHYSICAL EXAM  General: A&Ox3; NAD; word finding difficulty  Head: Left head caved in, PERRL; EOMI; anicteric sclera  Neck: Supple; no JVD  Respiratory: CTA B/L; no wheezes/crackles/rales auscultated w/ good air movement  Cardiovascular: Regular rhythm/rate; S1/S2; no gallops or murmurs auscultated  Gastrointestinal: Soft; NTND w/out rebound tenderness or guarding; bowel sounds normal  Extremities: WWP; no edema or cyanosis; radial/pedal pulses palpable  Neurological:  CNII-XII grossly intact; R sided weakness  Skin: No rashes noted  Vasc: +2 DP/PT pulses b/l   Psych: Appropriate Affect    LABS:                        11.0   6.74  )-----------( 295      ( 23 Feb 2023 10:08 )             34.1     02-23    141  |  102  |  12  ----------------------------<  113<H>  3.6   |  26  |  1.04    Ca    9.4      23 Feb 2023 10:08  Phos  3.6     02-23  Mg     2.0     02-23    TPro  7.0  /  Alb  3.4  /  TBili  0.2  /  DBili  x   /  AST  18  /  ALT  11  /  AlkPhos  77  02-23    PT/INR - ( 23 Feb 2023 10:08 )   PT: 12.5 sec;   INR: 1.05          PTT - ( 23 Feb 2023 10:08 )  PTT:36.7 sec      RADIOLOGY & ADDITIONAL STUDIES:  reviewed

## 2023-02-23 NOTE — CONSULT NOTE ADULT - SUBJECTIVE AND OBJECTIVE BOX
HPI:  73 y/o female with recent stroke and  brought to ER by her daughter after she had a fall secondary to her baseline right side weakness.  Patient was recently hospitalized initially presented with confusion, slurred speech and right sided weakness, was found to have pansinusitis and L subdural empyema s/p L hemicraniotomy 12/30 and washout of sphenoid sinus, the likely source of the subdural empyema. CTH initially negative, repeat on 12/23 showed L sided SDH, course was complicated by worsening mental status, CTH on 12/28 significant for L frontoparietal collection, MRI showed empyema. S/p L hemicraniectomy and washout of L subdural empyema 12/30/22. S/p sinus surgery with judith purulence to L maxillary sinus and posterior bony erosiun to L sphenoid sinus 1/5. On 1/12 patient was discharged to [facility] rehab which plan to return for cranioplasty in 3 months post hemicraniectomy.  She completed 6 week course of Vancomycin, Ceftriaxone, and Metronidazole which finished on 2/16/23 while patient was in rehab.  Per daughter, patient spent 4 weeks at the rehab. She was sent home 1 week ago with 4 hrs home aid and home  PT.  Her right side weakness was progressively improving and her expressive aphagia was minimally improved. Daughter fells that patient had more right side weakness.  She fell while trying to ge tout of wheelchair.  She was wearing her helmet; did not hit her head. NO LOC. Patient remained alert and awake throughout.    REVIEW OF SYSTEMS:   Otherwise negative except as specified in HPI    PAST MEDICAL HISTORY:     PAST SURGICAL HISTORY:    FAMILY HISTORY:    SOCIAL HISTORY:  Tobacco use:  EtOH use:  Illicit drug use:    MEDICATIONS:  MEDICATIONS  (STANDING):  amLODIPine   Tablet 10 milliGRAM(s) Oral daily  enoxaparin Injectable 110 milliGRAM(s) SubCutaneous every 12 hours  latanoprost 0.005% Ophthalmic Solution 1 Drop(s) Both EYES at bedtime  levETIRAcetam 1000 milliGRAM(s) Oral two times a day  multivitamin 1 Tablet(s) Oral daily  senna 2 Tablet(s) Oral at bedtime    MEDICATIONS  (PRN):  acetaminophen     Tablet .. 650 milliGRAM(s) Oral every 6 hours PRN Temp greater or equal to 38C (100.4F), Mild Pain (1 - 3)  bisacodyl 5 milliGRAM(s) Oral daily PRN Constipation  ondansetron Injectable 4 milliGRAM(s) IV Push every 6 hours PRN Nausea and/or Vomiting      ALLERGIES:  Allergies    No Known Allergies    Intolerances        VITAL SIGNS:  Vital Signs Last 24 Hrs  T(C): 36.7 (23 Feb 2023 12:58), Max: 37.2 (22 Feb 2023 19:35)  T(F): 98 (23 Feb 2023 12:58), Max: 98.9 (22 Feb 2023 19:35)  HR: 96 (23 Feb 2023 12:58) (72 - 102)  BP: 123/83 (23 Feb 2023 12:58) (100/68 - 131/81)  BP(mean): --  RR: 17 (23 Feb 2023 12:58) (16 - 18)  SpO2: 97% (23 Feb 2023 12:58) (95% - 98%)    Parameters below as of 23 Feb 2023 12:58  Patient On (Oxygen Delivery Method): room air        02-22-23 @ 07:01  - 02-23-23 @ 07:00  --------------------------------------------------------  IN:  Total IN: 0 mL    OUT:    Voided (mL): 50 mL  Total OUT: 50 mL    Total NET: -50 mL      02-23-23 @ 07:01  -  02-23-23 @ 13:40  --------------------------------------------------------  IN:    Oral Fluid: 600 mL  Total IN: 600 mL    OUT:  Total OUT: 0 mL    Total NET: 600 mL          PHYSICAL EXAM:  Constitutional: WDWN resting comfortably in bed; NAD  Head: NC/AT  Eyes: PERRL, EOMI, anicteric sclera  ENT: no nasal discharge; uvula midline, no oropharyngeal erythema or exudates; MMM  Neck: supple; no JVD or thyromegaly  Respiratory: CTA B/L; no W/R/R, no retractions  Cardiac: +S1/S2; RRR; no M/R/G; PMI non-displaced  Gastrointestinal: abdomen soft, NT/ND; no rebound or guarding; +BSx4  Genitourinary: normal external genitalia  Back: spine midline, no bony tenderness or step-offs; no CVAT B/L  Extremities: WWP, no clubbing or cyanosis; no peripheral edema  Musculoskeletal: NROM x4; no joint swelling, tenderness or erythema  Vascular: 2+ radial, femoral, DP/PT pulses B/L  Dermatologic: skin warm, dry and intact; no rashes, wounds, or scars  Lymphatic: no submandibular or cervical LAD  Neurologic: AAOx3; CNII-XII grossly intact; no focal deficits  Psychiatric: affect and characteristics of appearance, verbalizations, behaviors are appropriate    LABS:                        11.0   6.74  )-----------( 295      ( 23 Feb 2023 10:08 )             34.1     02-23    141  |  102  |  12  ----------------------------<  113<H>  3.6   |  26  |  1.04    Ca    9.4      23 Feb 2023 10:08  Phos  3.6     02-23  Mg     2.0     02-23    TPro  7.0  /  Alb  3.4  /  TBili  0.2  /  DBili  x   /  AST  18  /  ALT  11  /  AlkPhos  77  02-23    PT/INR - ( 23 Feb 2023 10:08 )   PT: 12.5 sec;   INR: 1.05          PTT - ( 23 Feb 2023 10:08 )  PTT:36.7 sec        CAPILLARY BLOOD GLUCOSE      RADIOLOGY & ADDITIONAL TESTS: Reviewed.  `< from: CT Head No Cont (02.23.23 @ 11:48) >  FINDINGS:    There is no interval change in findings of left sunken flap syndrome as   detailed on the recent CT head no change in ventricular size,   hydrocephalus or worsening of midline shift. No downward herniation. No   acute hemorrhage. Paranasal sinus opacification as unchanged.      IMPRESSION:    Navigational/3D imaging obtained prior to cranioplasty in this patient   with left sunken flap.    < end of copied text >  < from: MR Head w/wo IV Cont (02.22.23 @ 21:20) >  IMPRESSION:    No right brain infarct or focal finding to explain left weakness. 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    Status post left hemicraniectomy with sunken flap appearance.    < end of copied text >   HPI:  71 y/o female with recent stroke and  brought to ER by her daughter after she had a fall secondary to her baseline right side weakness.  Patient was recently hospitalized initially presented with confusion, slurred speech and right sided weakness, was found to have pansinusitis and L subdural empyema s/p L hemicraniotomy 12/30 and washout of sphenoid sinus, the likely source of the subdural empyema. CTH initially negative, repeat on 12/23 showed L sided SDH, course was complicated by worsening mental status, CTH on 12/28 significant for L frontoparietal collection, MRI showed empyema. S/p L hemicraniectomy and washout of L subdural empyema 12/30/22. S/p sinus surgery with judith purulence to L maxillary sinus and posterior bony erosiun to L sphenoid sinus 1/5. On 1/12 patient was discharged to [facility] rehab which plan to return for cranioplasty in 3 months post hemicraniectomy.  She completed 6 week course of Vancomycin, Ceftriaxone, and Metronidazole which finished on 2/16/23 while patient was in rehab.  Per daughter, patient spent 4 weeks at the rehab. She was sent home 1 week ago with 4 hrs home aid and home  PT.  Her right side weakness was progressively improving and her expressive aphagia was minimally improved. Daughter fells that patient had more right side weakness.  She fell while trying to ge tout of wheelchair.  She was wearing her helmet; did not hit her head. NO LOC. Patient remained alert and awake throughout.    REVIEW OF SYSTEMS:   Otherwise negative except as specified in HPI    PAST MEDICAL HISTORY:     PAST SURGICAL HISTORY:    FAMILY HISTORY:    SOCIAL HISTORY:  Tobacco use:  EtOH use:  Illicit drug use:    MEDICATIONS:  MEDICATIONS  (STANDING):  amLODIPine   Tablet 10 milliGRAM(s) Oral daily  enoxaparin Injectable 110 milliGRAM(s) SubCutaneous every 12 hours  latanoprost 0.005% Ophthalmic Solution 1 Drop(s) Both EYES at bedtime  levETIRAcetam 1000 milliGRAM(s) Oral two times a day  multivitamin 1 Tablet(s) Oral daily  senna 2 Tablet(s) Oral at bedtime    MEDICATIONS  (PRN):  acetaminophen     Tablet .. 650 milliGRAM(s) Oral every 6 hours PRN Temp greater or equal to 38C (100.4F), Mild Pain (1 - 3)  bisacodyl 5 milliGRAM(s) Oral daily PRN Constipation  ondansetron Injectable 4 milliGRAM(s) IV Push every 6 hours PRN Nausea and/or Vomiting      ALLERGIES:  Allergies    No Known Allergies    Intolerances        VITAL SIGNS:  Vital Signs Last 24 Hrs  T(C): 36.7 (23 Feb 2023 12:58), Max: 37.2 (22 Feb 2023 19:35)  T(F): 98 (23 Feb 2023 12:58), Max: 98.9 (22 Feb 2023 19:35)  HR: 96 (23 Feb 2023 12:58) (72 - 102)  BP: 123/83 (23 Feb 2023 12:58) (100/68 - 131/81)  BP(mean): --  RR: 17 (23 Feb 2023 12:58) (16 - 18)  SpO2: 97% (23 Feb 2023 12:58) (95% - 98%)    Parameters below as of 23 Feb 2023 12:58  Patient On (Oxygen Delivery Method): room air        02-22-23 @ 07:01  - 02-23-23 @ 07:00  --------------------------------------------------------  IN:  Total IN: 0 mL    OUT:    Voided (mL): 50 mL  Total OUT: 50 mL    Total NET: -50 mL      02-23-23 @ 07:01  -  02-23-23 @ 13:40  --------------------------------------------------------  IN:    Oral Fluid: 600 mL  Total IN: 600 mL    OUT:  Total OUT: 0 mL    Total NET: 600 mL    PHYSICAL EXAM:  Constitutional: resting comfortably, no acute distress, awake alert following simple commands  Head: left side sunken flap appearance  Eyes: PERRL, EOMI, anicteric sclera  ENT: no nasal discharge  Neck: supple; no JVD or thyromegaly  Respiratory: CTA B/L; no W/R/R, no retractions  Cardiac: +S1/S2; RRR; no M/R/G; PMI non-displaced  Gastrointestinal: abdomen soft, NT/ND; no rebound or guarding; +BSx4  Extremities: WWP, no clubbing or cyanosis; no peripheral edema  Dermatologic: skin warm, dry and intact; no rashes, wounds, or scars    LABS:                        11.0   6.74  )-----------( 295      ( 23 Feb 2023 10:08 )             34.1     02-23    141  |  102  |  12  ----------------------------<  113<H>  3.6   |  26  |  1.04    Ca    9.4      23 Feb 2023 10:08  Phos  3.6     02-23  Mg     2.0     02-23    TPro  7.0  /  Alb  3.4  /  TBili  0.2  /  DBili  x   /  AST  18  /  ALT  11  /  AlkPhos  77  02-23    PT/INR - ( 23 Feb 2023 10:08 )   PT: 12.5 sec;   INR: 1.05          PTT - ( 23 Feb 2023 10:08 )  PTT:36.7 sec        CAPILLARY BLOOD GLUCOSE      RADIOLOGY & ADDITIONAL TESTS: Reviewed.  `< from: CT Head No Cont (02.23.23 @ 11:48) >  FINDINGS:    There is no interval change in findings of left sunken flap syndrome as   detailed on the recent CT head no change in ventricular size,   hydrocephalus or worsening of midline shift. No downward herniation. No   acute hemorrhage. Paranasal sinus opacification as unchanged.      IMPRESSION:    Navigational/3D imaging obtained prior to cranioplasty in this patient   with left sunken flap.    < end of copied text >  < from: MR Head w/wo IV Cont (02.22.23 @ 21:20) >  IMPRESSION:    No right brain infarct or focal finding to explain left weakness. 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    Status post left hemicraniectomy with sunken flap appearance.    < end of copied text >

## 2023-02-23 NOTE — PROGRESS NOTE ADULT - SUBJECTIVE AND OBJECTIVE BOX
OTOLARYNGOLOGY (ENT) PROGRESS NOTE    PATIENT: JEMIMA DUFFY  MRN: [id number]  : 52  [id number]  DATE OF SERVICE:  23  			         ID: JEMIMA DUFFY is a 72F     Subjective/ Interval:   : Patient seen and examined at bedside. Flexible laryngoscopy performed at bedside which reveals:        ALLERGIES:  No Known Allergies    MEDICATIONS:  Antiinfectives:     IV fluids:  multivitamin 1 Tablet(s) Oral daily    Hematologic/Anticoagulation:  enoxaparin Injectable 110 milliGRAM(s) SubCutaneous every 12 hours    Pain medications/Neuro:  acetaminophen     Tablet .. 650 milliGRAM(s) Oral every 6 hours PRN  levETIRAcetam 1000 milliGRAM(s) Oral two times a day  ondansetron Injectable 4 milliGRAM(s) IV Push every 6 hours PRN    Endocrine Medications:     All other standing medications:   amLODIPine   Tablet 10 milliGRAM(s) Oral daily  latanoprost 0.005% Ophthalmic Solution 1 Drop(s) Both EYES at bedtime  senna 2 Tablet(s) Oral at bedtime    All other PRN medications:  bisacodyl 5 milliGRAM(s) Oral daily PRN    Vital Signs Last 24 Hrs  T(C): 36.8 (2023 05:18), Max: 37.2 (2023 19:35)  T(F): 98.3 (2023 05:18), Max: 98.9 (2023 19:35)  HR: 94 (2023 05:18) (72 - 106)  BP: 114/68 (2023 05:18) (100/68 - 131/81)  BP(mean): --  RR: 18 (2023 05:18) (16 - 18)  SpO2: 95% (2023 05:18) (95% - 99%)    Parameters below as of 2023 05:18  Patient On (Oxygen Delivery Method): room air     @ 07:01  -   @ 07:00  --------------------------------------------------------  IN:  Total IN: 0 mL    OUT:    Voided (mL): 50 mL  Total OUT: 50 mL    Total NET: -50 mL    PHYSICAL EXAM:  General: appears comfortable, no apparent distress  Head: left hemicraniectomy site is sunken and soft, helmet in place  Ears: pinna normal bilaterally  Nose: nares patent, scope exam as documented above  Pulm: unlabored respirations, no stridor  CV: regular rate  Abd: nondistended  Ext: moving all four extremities      LABS                       11.4   7.39  )-----------( 309      ( 2023 13:34 )             35.4        144  |  103  |  12  ----------------------------<  122<H>  3.9   |  27  |  1.07    Ca    9.3      2023 13:34    TPro  7.7  /  Alb  3.4  /  TBili  0.2  /  DBili  x   /  AST  16  /  ALT  10  /  AlkPhos  81      Imaging:  < from: CT Head No Cont (23 @ 15:31) >  INTERPRETATION:  PROCEDURE: CT head without intravenous contrast    INDICATIONS: Fall. History of left craniotomy and chronic right-sided   weakness.    TECHNIQUE:  Serial axial images were obtained from the skull base to the   vertex without the use of intravenous contrast. Coronal and sagittal   reformatted images were obtained.    COMPARISON EXAMINATION: Outside CT head 2023    FINDINGS:  VENTRICLES AND SULCI: Left frontal and parietal sulcal effacement is   increased with further flap sinking. Midline shift to the right is minor,   3-4 mm at most. Ventricles are unchanged in size from 2023 without   hydrocephalus.  INTRA-AXIAL: No acute intracranial hemorrhage. The gray-white matter   differentiation is preserved aside from stable small sites of frontal and   parietal encephalomalacia on the left. No acute infarct.  EXTRA-AXIAL: No extra-axial fluid collection is present.  VISUALIZED SINUSES: Patient is status post endoscopic surgery. There is   persistent and complete opacification of the left maxillary sinus,   increased from 2023 whereas sinusitis elsewhere is similar   including totally opacified sphenoidsinuses.  VISUALIZED MASTOIDS:  Mostly clear.  CALVARIUM:  Again status post post left hemicraniectomy. No acute   fracture.  MISCELLANEOUS:  Replacement of the native lenses bilaterally.    IMPRESSION:    1.  No acute intracranial hemorrhage or calvarial fracture.  2.  Status post left hemicraniectomy with findings of sunken flap   syndrome, progressed from 2023 with minor midline shift to the   right.  3.  Interval worsening of left maxillary sinus opacification and similar   other inflammatory sinus disease from 2023.    --- End of Report ---    < end of copied text >

## 2023-02-23 NOTE — CHART NOTE - NSCHARTNOTEFT_GEN_A_CORE
DEEPALI overnight. MRI brain performed. Neuro exam stable. improvement in expressive aphasia noted. MRI completed, no infarct or focal findings, pending neuroplastic CT, ID consulted, follow up recommendations, ENT scope today. Plan for return to OR next week. DEEPALI overnight. MRI brain performed. Neuro exam stable. improvement in expressive aphasia noted. MRI completed, no infarct or focal findings, CT neuroplastic protocol completed. ID consulted, follow up recommendations. ENT scope today. Plan for return to OR next week.

## 2023-02-23 NOTE — CONSULT NOTE ADULT - CONSULT REASON
sinus opacification on CT
post craniectomy - fall at home.
Evaluation for infection prior to cranioplasty

## 2023-02-23 NOTE — PROGRESS NOTE ADULT - SUBJECTIVE AND OBJECTIVE BOX
HPI:  71 yo female brought to ER by her daughter after she had a fall secondary to her baseline right side weakness.  Below  is a synopsis of patients last admission with us.:  71 y/o F w/ no known PMHx transferred from [facility] c/o slurred speech and right sided weakness. CTH initially negative, repeat on 12/23 showed L sided SDH, pts mental status worsened and CTH on 12/28 significant for L frontoparietal collection, MRI completed showed concern for possible empyema. S/p L hemicraniectomy and washout of L subdural empyema 12/30/22. S/p sinus surgery with judith purulence to L maxillary sinus and posterior bony erosiun to L sphenoid sinus 1/5.   On she was  discharged to [facility] rehab which plan to return for cranioplasty in 3 months post hemicraniectomy.  Per daughter, patient spent 4 weeks at the rehab. She was sent home 1 week ago with 4 hrs home aid and home  PT.  Her right side weakness was progressively improving and her expressive aphagia was minimally improved. Daughter fells that patient had more right side weakness the last hrs.  Today when patient  togetup from her wheelchair her right knee bucked and patient fell on her buttocks. She was wearing her helmet; did not hit her head. NO LOC. Patient remained alert and awake throughout.  She was sent here for a head CT by Dr. D'Amico.   (22 Feb 2023 17:26)    Hospital Course:  2/22: admitted w/worsening right UE/LE weakness  2/23: DEEPALI overnight. MRI brain performed. Neuro exam stable.    Vital Signs Last 24 Hrs  T(C): 36.6 (22 Feb 2023 22:13), Max: 37.2 (22 Feb 2023 19:35)  T(F): 97.8 (22 Feb 2023 22:13), Max: 98.9 (22 Feb 2023 19:35)  HR: 98 (22 Feb 2023 22:13) (72 - 106)  BP: 131/81 (22 Feb 2023 22:13) (100/68 - 131/81)  BP(mean): --  RR: 17 (22 Feb 2023 22:13) (16 - 18)  SpO2: 97% (22 Feb 2023 22:13) (95% - 99%)    Parameters below as of 22 Feb 2023 22:13  Patient On (Oxygen Delivery Method): room air        I&O's Summary      PHYSICAL EXAM:  General: Resting comfortably, in NAD  Head: left hemicraniectomy site is sunken and soft, non tender, no erythema, helmet in place.  Neuro: AAOx3 with choices, expressive aphasia, follows simple commands  CNII-XII: PERRL, EOMI, face symmetric, tongue midline  Motor: Right HG 1/5, R delt 2/5, R grace/tri 3/5, RLE HF/KE/KF 4/5, DF/PF 1/5, LUE/LLE 5/5   Pulm: CTA b/l  Cardiac: S1/S2, RRR  Abd: Soft, NT/ND +BS.    TUBES/LINES:  [] Cannon  [] Lumbar Drain  [] Wound Drains  [] Others    DIET:  [] NPO  [x] Mechanical  [] Tube feeds    LABS:                        11.4   7.39  )-----------( 309      ( 22 Feb 2023 13:34 )             35.4     02-22    144  |  103  |  12  ----------------------------<  122<H>  3.9   |  27  |  1.07    Ca    9.3      22 Feb 2023 13:34    TPro  7.7  /  Alb  3.4  /  TBili  0.2  /  DBili  x   /  AST  16  /  ALT  10  /  AlkPhos  81  02-22            CAPILLARY BLOOD GLUCOSE          Drug Levels: [] N/A    CSF Analysis: [] N/A      Allergies    No Known Allergies    Intolerances      MEDICATIONS:  Antibiotics:    Neuro:  acetaminophen     Tablet .. 650 milliGRAM(s) Oral every 6 hours PRN  levETIRAcetam 1000 milliGRAM(s) Oral two times a day  ondansetron Injectable 4 milliGRAM(s) IV Push every 6 hours PRN    Anticoagulation:  enoxaparin Injectable 110 milliGRAM(s) SubCutaneous every 12 hours    OTHER:  amLODIPine   Tablet 10 milliGRAM(s) Oral daily  bisacodyl 5 milliGRAM(s) Oral daily PRN  latanoprost 0.005% Ophthalmic Solution 1 Drop(s) Both EYES at bedtime  senna 2 Tablet(s) Oral at bedtime    IVF:  multivitamin 1 Tablet(s) Oral daily    CULTURES:    RADIOLOGY & ADDITIONAL TESTS:      ASSESSMENT:  71 y/o F w/ h/o L hemicraniectomy and washout of L subdural empyema 12/30/22 (at time presented to [facility] c/o slurred speech and right sided weakness), s/p sinus surgery with judith purulence to L maxillary sinus and posterior bony erosion to L sphenoid sinus 1/5 w/ENT, PEs. Pt was discharged to [facility] rehab on 1/12/23 and plan was to return for cranioplasty in 3 months. Per daughter, patient spent 4 weeks at the rehab. She was sent home 1 week ago with 4 hrs home aid and home  PT. Her right side weakness was progressively improving and her expressive aphagia was minimally improved. Per daughter, Pt now with worsening right side weakness the last few hours. Today when patient got up from her wheelchair her right knee bucked and patient fell on her buttocks. She was wearing her helmet; did not hit her head. NO LOC. Patient remained alert and awake throughout.    H/O CRANITOMY    Handoff    MEWS Score    HTN (hypertension)    H/O craniotomy    Status post craniectomy    HTN (hypertension)    S/P craniotomy    FALL    41    [email or web address]      PLAN:  Neuro:  -neuro/vitals q4h  -pending MRI brain w/wo  -pain control  -helmet in place    Cardio"  -continue home norvasc  -SBP goal normotensive    Pulm:  -room air    Renal:  -IVL  -voiding    GI:  -regular diet  -bowel regimen    Heme:  - NO SCDs (b/l LE DVTs), SQL 110mg BID (previously on 120mg bid)  - prior LE doppler 1/8/23 b/l calf and L peroneal DVTs, repeat LE doppler 2/22 R posterior tibial vein DVT, persistent R IM calf vein DVT, resolution of L IM calf DVT  - h/o b/l mild segmental/lobar PE on CT PE protocol 1/10/23    ID  -consult ID, finished course of ceftriaxone/metronidazole/vancomycin for subdural/epidural empyema (rare peptostreptococcus 1/6 + staph epi) end date 2/16     Dispo: regional status, full code, pendng MRI brain  D/w Dr. D'Amico

## 2023-02-23 NOTE — PROGRESS NOTE ADULT - ASSESSMENT
73 y/o F w/ h/o L hemicraniectomy and washout of L subdural empyema 12/30/22 (at time presented to [facility] c/o slurred speech and right sided weakness), s/p sinus surgery with judith purulence to L maxillary sinus and posterior bony erosion to L sphenoid sinus 1/5 w/ENT, PEs. Pt was discharged to [facility] rehab on 1/12/23 and plan was to return for cranioplasty in 3 months. Per daughter, patient spent 4 weeks at the rehab. She was sent home 1 week ago with 4 hrs home aid and home  PT. Her right side weakness was progressively improving and her expressive aphagia was minimally improved.

## 2023-02-23 NOTE — CONSULT NOTE ADULT - ATTENDING COMMENTS
As above.  Discussed with Drs. D'Amico and Rima - if no purulence is seen in the sinuses during OR exam, no contraindication to cranioplasty from an ID perspective.  Please recall if concern for infection in the OR or if additional ID input is desired - team 1.

## 2023-02-23 NOTE — CONSULT NOTE ADULT - SUBJECTIVE AND OBJECTIVE BOX
HPI: 72y Female PMH L hemicraniectomy and washout of L subdural empyema 12/30/22, s/p FESS 1/5 with Dr. Abarca (septoplasty, maxillary antrostomies, judith pus in L maxillary sinus and L sphenoid sinus with bony erosion). She was discharged to [facility] rehab facility and missed her follow up appointments for debridement. She presented to [facility] yesterday and was admitted to [facility] for worsening R sided weakness. Patient able to follow simple commands and answer simple questions, she is alert. She denies any anterior or posterior nasal dripping, epistaxis, metallic taste in mouth, frontal HA, or blurry vision at this time. No fevers or chills reported.    Allergies    No Known Allergies    Intolerances    PAST MEDICAL & SURGICAL HISTORY:  HTN (hypertension)  S/P craniotomy    Hematologic/Anticoagulation:  enoxaparin Injectable 110 milliGRAM(s) SubCutaneous every 12 hours    Pain medications/Neuro:  acetaminophen     Tablet .. 650 milliGRAM(s) Oral every 6 hours PRN  levETIRAcetam 1000 milliGRAM(s) Oral two times a day  ondansetron Injectable 4 milliGRAM(s) IV Push every 6 hours PRN    All other standing medications:   amLODIPine   Tablet 10 milliGRAM(s) Oral daily  latanoprost 0.005% Ophthalmic Solution 1 Drop(s) Both EYES at bedtime  senna 2 Tablet(s) Oral at bedtime      All other PRN medications:  bisacodyl 5 milliGRAM(s) Oral daily PRN      Vital Signs Last 24 Hrs  T(C): 36.9 (23 Feb 2023 16:46), Max: 37.2 (22 Feb 2023 19:35)  T(F): 98.4 (23 Feb 2023 16:46), Max: 98.9 (22 Feb 2023 19:35)  HR: 99 (23 Feb 2023 16:46) (72 - 102)  BP: 114/72 (23 Feb 2023 16:46) (100/68 - 131/81)  BP(mean): --  RR: 18 (23 Feb 2023 16:46) (16 - 18)  SpO2: 94% (23 Feb 2023 16:46) (94% - 97%)    Parameters below as of 23 Feb 2023 12:58  Patient On (Oxygen Delivery Method): room air    LABS:  CBC-    02-23    141  |  102  |  12  ----------------------------<  113<H>  3.6   |  26  |  1.04    Ca    9.4      23 Feb 2023 10:08  Phos  3.6     02-23  Mg     2.0     02-23    TPro  7.0  /  Alb  3.4  /  TBili  0.2  /  DBili  x   /  AST  18  /  ALT  11  /  AlkPhos  77  02-23    Coagulation Studies-  PT/INR - ( 23 Feb 2023 10:08 )   PT: 12.5 sec;   INR: 1.05          PTT - ( 23 Feb 2023 10:08 )  PTT:36.7 sec  Endocrine Panel-  --  --  9.4 mg/dL  --  --  9.3 mg/dL    PHYSICAL EXAM:  General: Resting comfortably, in NAD  Head: left hemicraniectomy site is sunken and soft, non tender, no erythema, helmet in place  Neuro: AAOx3, able to follow commands  Ears: external ears normal  Nose: nares patent, as documented below  Pulm: unlabored respirations  Cardiac: regular rate  Abd: soft, nondistended    Procedure: Flexible laryngoscopy  Pre-procedure diagnosis/Indication for procedure: To evaluate nasopharynx    Description:  A flexible endoscope was used to examine the left and right nasal cavities. The nasal valve areas were examined for abnormalities or collapse. The inferior and middle turbinates were evaluated. The middle and superior meatuses, the sphenoethmoid recesses, and the nasopharynx were examined and inspected for mucopurulence, polyps, and nasal masses.    Extensive crusting present throughout nasal cavity and inferior turbinates, polyps visualized  No masses or lesions visualized in bilateral maxillary sinuses or sphenoid. Visualization of sphenoid sinus limited  No active leak or bleed visualized  Some evidence of dark streaking from sphenoid    Imaging:  < from: MR Head w/wo IV Cont (02.22.23 @ 21:20) >  IMPRESSION:    No right brain infarct or focal finding to explain left weakness. 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    Status post left hemicraniectomy with sunken flap appearance.    --- End of Report ---    < end of copied text >  < from: CT Head No Cont (02.22.23 @ 15:31) >  INTERPRETATION:  PROCEDURE: CT head without intravenous contrast    INDICATIONS: Fall. History of left craniotomy and chronic right-sided   weakness.    TECHNIQUE:  Serial axial images were obtained from the skull base to the   vertex without the use of intravenous contrast. Coronal and sagittal   reformatted images were obtained.    COMPARISON EXAMINATION: Outside CT head 02/07/2023    FINDINGS:  VENTRICLES AND SULCI: Left frontal and parietal sulcal effacement is   increased with further flap sinking. Midline shift to the right is minor,   3-4 mm at most. Ventricles are unchanged in size from 02/07/2023 without   hydrocephalus.  INTRA-AXIAL: No acute intracranial hemorrhage. The gray-white matter   differentiation is preserved aside from stable small sites of frontal and   parietal encephalomalacia on the left. No acute infarct.  EXTRA-AXIAL: No extra-axial fluid collection is present.  VISUALIZED SINUSES: Patient is status post endoscopic surgery. There is   persistent and complete opacification of the left maxillary sinus,   increased from 02/07/2023 whereas sinusitis elsewhere is similar   including totally opacified sphenoidsinuses.  VISUALIZED MASTOIDS:  Mostly clear.  CALVARIUM:  Again status post post left hemicraniectomy. No acute   fracture.  MISCELLANEOUS:  Replacement of the native lenses bilaterally.    IMPRESSION:    1.  No acute intracranial hemorrhage or calvarial fracture.  2.  Status post left hemicraniectomy with findings of sunken flap   syndrome, progressed from 02/07/2023 with minor midline shift to the   right.  3.  Interval worsening of left maxillary sinus opacification and similar   other inflammatory sinus disease from 02/07/2023.    --- End of Report ---    < end of copied text >             HPI: 72y Female PMH L hemicraniectomy and washout of L subdural empyema 12/30/22, s/p FESS 1/5 with Dr. Abarca (septoplasty, maxillary antrostomies, judith pus in L maxillary sinus and L sphenoid sinus with bony erosion). She was discharged to [facility] rehab facility and missed her follow up appointments for debridement. She presented to [facility] yesterday and was admitted to [facility] for worsening R sided weakness. Patient able to follow simple commands and answer simple questions, she is alert. She denies any anterior or posterior nasal dripping, epistaxis, metallic taste in mouth, frontal HA, or blurry vision at this time. No fevers or chills reported.    Allergies    No Known Allergies    Intolerances    PAST MEDICAL & SURGICAL HISTORY:  HTN (hypertension)  S/P craniotomy    Hematologic/Anticoagulation:  enoxaparin Injectable 110 milliGRAM(s) SubCutaneous every 12 hours    Pain medications/Neuro:  acetaminophen     Tablet .. 650 milliGRAM(s) Oral every 6 hours PRN  levETIRAcetam 1000 milliGRAM(s) Oral two times a day  ondansetron Injectable 4 milliGRAM(s) IV Push every 6 hours PRN    All other standing medications:   amLODIPine   Tablet 10 milliGRAM(s) Oral daily  latanoprost 0.005% Ophthalmic Solution 1 Drop(s) Both EYES at bedtime  senna 2 Tablet(s) Oral at bedtime      All other PRN medications:  bisacodyl 5 milliGRAM(s) Oral daily PRN      Vital Signs Last 24 Hrs  T(C): 36.9 (23 Feb 2023 16:46), Max: 37.2 (22 Feb 2023 19:35)  T(F): 98.4 (23 Feb 2023 16:46), Max: 98.9 (22 Feb 2023 19:35)  HR: 99 (23 Feb 2023 16:46) (72 - 102)  BP: 114/72 (23 Feb 2023 16:46) (100/68 - 131/81)  BP(mean): --  RR: 18 (23 Feb 2023 16:46) (16 - 18)  SpO2: 94% (23 Feb 2023 16:46) (94% - 97%)    Parameters below as of 23 Feb 2023 12:58  Patient On (Oxygen Delivery Method): room air    LABS:  CBC-    02-23    141  |  102  |  12  ----------------------------<  113<H>  3.6   |  26  |  1.04    Ca    9.4      23 Feb 2023 10:08  Phos  3.6     02-23  Mg     2.0     02-23    TPro  7.0  /  Alb  3.4  /  TBili  0.2  /  DBili  x   /  AST  18  /  ALT  11  /  AlkPhos  77  02-23    Coagulation Studies-  PT/INR - ( 23 Feb 2023 10:08 )   PT: 12.5 sec;   INR: 1.05          PTT - ( 23 Feb 2023 10:08 )  PTT:36.7 sec  Endocrine Panel-  --  --  9.4 mg/dL  --  --  9.3 mg/dL    PHYSICAL EXAM:  General: Resting comfortably, in NAD  Head: left hemicraniectomy site is sunken and soft, non tender, no erythema, helmet in place  Neuro: AAOx3, able to follow commands  Ears: external ears normal  Nose: nares patent, as documented below  Pulm: unlabored respirations  Cardiac: regular rate  Abd: soft, nondistended    Procedure: Flexible nasopharyngoscopy  Pre-procedure diagnosis/Indication for procedure: To evaluate nasopharynx    Description:  A flexible endoscope was used to examine the left and right nasal cavities. The nasal valve areas were examined for abnormalities or collapse. The inferior and middle turbinates were evaluated. The middle and superior meatuses, the sphenoethmoid recesses, and the nasopharynx were examined and inspected for mucopurulence, polyps, and nasal masses.    Extensive crusting present throughout nasal cavity and inferior turbinates, polyps visualized  No masses or lesions visualized in bilateral maxillary sinuses or sphenoid. Visualization of sphenoid sinus limited  No active leak or bleed visualized  Some evidence of dark streaking from sphenoid    Imaging:  < from: MR Head w/wo IV Cont (02.22.23 @ 21:20) >  IMPRESSION:    No right brain infarct or focal finding to explain left weakness. 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    Status post left hemicraniectomy with sunken flap appearance.    --- End of Report ---    < end of copied text >  < from: CT Head No Cont (02.22.23 @ 15:31) >  INTERPRETATION:  PROCEDURE: CT head without intravenous contrast    INDICATIONS: Fall. History of left craniotomy and chronic right-sided   weakness.    TECHNIQUE:  Serial axial images were obtained from the skull base to the   vertex without the use of intravenous contrast. Coronal and sagittal   reformatted images were obtained.    COMPARISON EXAMINATION: Outside CT head 02/07/2023    FINDINGS:  VENTRICLES AND SULCI: Left frontal and parietal sulcal effacement is   increased with further flap sinking. Midline shift to the right is minor,   3-4 mm at most. Ventricles are unchanged in size from 02/07/2023 without   hydrocephalus.  INTRA-AXIAL: No acute intracranial hemorrhage. The gray-white matter   differentiation is preserved aside from stable small sites of frontal and   parietal encephalomalacia on the left. No acute infarct.  EXTRA-AXIAL: No extra-axial fluid collection is present.  VISUALIZED SINUSES: Patient is status post endoscopic surgery. There is   persistent and complete opacification of the left maxillary sinus,   increased from 02/07/2023 whereas sinusitis elsewhere is similar   including totally opacified sphenoidsinuses.  VISUALIZED MASTOIDS:  Mostly clear.  CALVARIUM:  Again status post post left hemicraniectomy. No acute   fracture.  MISCELLANEOUS:  Replacement of the native lenses bilaterally.    IMPRESSION:    1.  No acute intracranial hemorrhage or calvarial fracture.  2.  Status post left hemicraniectomy with findings of sunken flap   syndrome, progressed from 02/07/2023 with minor midline shift to the   right.  3.  Interval worsening of left maxillary sinus opacification and similar   other inflammatory sinus disease from 02/07/2023.    --- End of Report ---    < end of copied text >

## 2023-02-24 PROBLEM — I10 ESSENTIAL (PRIMARY) HYPERTENSION: Chronic | Status: ACTIVE | Noted: 2023-02-22

## 2023-02-24 LAB
ANION GAP SERPL CALC-SCNC: 11 MMOL/L — SIGNIFICANT CHANGE UP (ref 5–17)
BUN SERPL-MCNC: 10 MG/DL — SIGNIFICANT CHANGE UP (ref 7–23)
CALCIUM SERPL-MCNC: 9.2 MG/DL — SIGNIFICANT CHANGE UP (ref 8.4–10.5)
CHLORIDE SERPL-SCNC: 102 MMOL/L — SIGNIFICANT CHANGE UP (ref 96–108)
CO2 SERPL-SCNC: 26 MMOL/L — SIGNIFICANT CHANGE UP (ref 22–31)
CREAT SERPL-MCNC: 1.08 MG/DL — SIGNIFICANT CHANGE UP (ref 0.5–1.3)
EGFR: 55 ML/MIN/1.73M2 — LOW
GLUCOSE SERPL-MCNC: 103 MG/DL — HIGH (ref 70–99)
HCT VFR BLD CALC: 33.6 % — LOW (ref 34.5–45)
HGB BLD-MCNC: 10.5 G/DL — LOW (ref 11.5–15.5)
LMWH PPP CHRO-ACNC: 1.02 IU/ML — SIGNIFICANT CHANGE UP (ref 0.5–1.1)
MAGNESIUM SERPL-MCNC: 1.9 MG/DL — SIGNIFICANT CHANGE UP (ref 1.6–2.6)
MCHC RBC-ENTMCNC: 29.2 PG — SIGNIFICANT CHANGE UP (ref 27–34)
MCHC RBC-ENTMCNC: 31.3 GM/DL — LOW (ref 32–36)
MCV RBC AUTO: 93.6 FL — SIGNIFICANT CHANGE UP (ref 80–100)
NRBC # BLD: 0 /100 WBCS — SIGNIFICANT CHANGE UP (ref 0–0)
PHOSPHATE SERPL-MCNC: 4.5 MG/DL — SIGNIFICANT CHANGE UP (ref 2.5–4.5)
PLATELET # BLD AUTO: 280 K/UL — SIGNIFICANT CHANGE UP (ref 150–400)
POTASSIUM SERPL-MCNC: 3.3 MMOL/L — LOW (ref 3.5–5.3)
POTASSIUM SERPL-SCNC: 3.3 MMOL/L — LOW (ref 3.5–5.3)
RBC # BLD: 3.59 M/UL — LOW (ref 3.8–5.2)
RBC # FLD: 15.1 % — HIGH (ref 10.3–14.5)
SODIUM SERPL-SCNC: 139 MMOL/L — SIGNIFICANT CHANGE UP (ref 135–145)
WBC # BLD: 5.18 K/UL — SIGNIFICANT CHANGE UP (ref 3.8–10.5)
WBC # FLD AUTO: 5.18 K/UL — SIGNIFICANT CHANGE UP (ref 3.8–10.5)

## 2023-02-24 PROCEDURE — 99232 SBSQ HOSP IP/OBS MODERATE 35: CPT

## 2023-02-24 PROCEDURE — 99233 SBSQ HOSP IP/OBS HIGH 50: CPT

## 2023-02-24 RX ORDER — POTASSIUM CHLORIDE 20 MEQ
40 PACKET (EA) ORAL
Refills: 0 | Status: COMPLETED | OUTPATIENT
Start: 2023-02-24 | End: 2023-02-24

## 2023-02-24 RX ADMIN — LEVETIRACETAM 1000 MILLIGRAM(S): 250 TABLET, FILM COATED ORAL at 05:45

## 2023-02-24 RX ADMIN — Medication 650 MILLIGRAM(S): at 02:36

## 2023-02-24 RX ADMIN — SENNA PLUS 2 TABLET(S): 8.6 TABLET ORAL at 22:17

## 2023-02-24 RX ADMIN — ENOXAPARIN SODIUM 110 MILLIGRAM(S): 100 INJECTION SUBCUTANEOUS at 23:06

## 2023-02-24 RX ADMIN — LEVETIRACETAM 1000 MILLIGRAM(S): 250 TABLET, FILM COATED ORAL at 17:38

## 2023-02-24 RX ADMIN — Medication 40 MILLIEQUIVALENT(S): at 15:06

## 2023-02-24 RX ADMIN — Medication 40 MILLIEQUIVALENT(S): at 11:42

## 2023-02-24 RX ADMIN — LATANOPROST 1 DROP(S): 0.05 SOLUTION/ DROPS OPHTHALMIC; TOPICAL at 22:17

## 2023-02-24 RX ADMIN — AMLODIPINE BESYLATE 10 MILLIGRAM(S): 2.5 TABLET ORAL at 05:45

## 2023-02-24 RX ADMIN — ENOXAPARIN SODIUM 110 MILLIGRAM(S): 100 INJECTION SUBCUTANEOUS at 11:43

## 2023-02-24 RX ADMIN — Medication 650 MILLIGRAM(S): at 03:36

## 2023-02-24 RX ADMIN — Medication 1 TABLET(S): at 11:43

## 2023-02-24 NOTE — PHYSICAL THERAPY INITIAL EVALUATION ADULT - PERTINENT HX OF CURRENT PROBLEM, REHAB EVAL
71 yo female brought to ER by her daughter after she had a fall secondary to her baseline right side weakness. Below is a synopsis of patients last admission with us: 71 y/o F w/ no known PMHx transferred from [place] c/o slurred speech and right sided weakness. CTH initially negative, repeat on 12/23 showed L sided SDH, pts mental status worsened and CTH on 12/28 significant for L frontoparietal collection, MRI completed showed concern for possible empyema. S/p L hemicraniectomy and washout of L subdural empyema 12/30/22. S/p sinus surgery with judith purulence to L maxillary sinus and posterior bony erosiun to L sphenoid sinus 1/5. Please refer to H&P on [place] for remaining.

## 2023-02-24 NOTE — OCCUPATIONAL THERAPY INITIAL EVALUATION ADULT - BED MOBILITY LIMITATIONS, REHAB EVAL
Patient sat at EOB for 10 mins initially with CGA/Min A x 1 and then Mod A x 1 as patient fatigued. Patient engaged in formal Cranial nerve assessment and MMT- patient noted with R sided lean however able to self correct/decreased ability to use arms for pushing/pulling/decreased ability to use legs for bridging/pushing

## 2023-02-24 NOTE — PHYSICAL THERAPY INITIAL EVALUATION ADULT - IMPAIRMENTS FOUND, PT EVAL
endurance/decreased midline orientation/fine motor/gait, locomotion, and balance/gross motor/muscle strength/posture/ROM

## 2023-02-24 NOTE — OCCUPATIONAL THERAPY INITIAL EVALUATION ADULT - LIGHT TOUCH SENSATION, RUE, REHAB EVAL
decreased sensation to thumb and wrist as per patient's daughter- unable to formally assess today 2/2 expressive aphasia/mild impairment

## 2023-02-24 NOTE — PHYSICAL THERAPY INITIAL EVALUATION ADULT - ADDITIONAL COMMENTS
Patient was previously at [facility] ~4 weeks although per daughter (Barb) at bedside: patient was discharged home for a few days and new (R)sided weakness developed along with worsening aphasia. Upon discharge home, patient has four daughters who assisted with all ADLs/IADLs as needed and 4 hours of HHA were started.

## 2023-02-24 NOTE — PROGRESS NOTE ADULT - SUBJECTIVE AND OBJECTIVE BOX
HPI:  71 yo female brought to ER by her daughter after she had a fall secondary to her baseline right side weakness.  Below  is a synopsis of patients last admission with us.:  73 y/o F w/ no known PMHx transferred from [place] c/o slurred speech and right sided weakness. CTH initially negative, repeat on 12/23 showed L sided SDH, pts mental status worsened and CTH on 12/28 significant for L frontoparietal collection, MRI completed showed concern for possible empyema. S/p L hemicraniectomy and washout of L subdural empyema 12/30/22. S/p sinus surgery with judith purulence to L maxillary sinus and posterior bony erosiun to L sphenoid sinus 1/5.   On she was  discharged to [facility] rehab which plan to return for cranioplasty in 3 months post hemicraniectomy.  Per daughter, patient spent 4 weeks at the rehab. She was sent home 1 week ago with 4 hrs home aid and home  PT.  Her right side weakness was progressively improving and her expressive aphagia was minimally improved. Daughter fells that patient had more right side weakness the last hrs.  Today when patient  togetup from her wheelchair her right knee bucked and patient fell on her buttocks. She was wearing her helmet; did not hit her head. NO LOC. Patient remained alert and awake throughout.  She was sent here for a head CT by Dr. D'Amico.   (22 Feb 2023 17:26)      Hospital course:   2/22: admitted w/worsening right UE/LE weakness  2/23: DEEPALI overnight. MRI brain performed. Neuro exam stable.improved expressive aphasia. MRI no infarct or focal findings, pending neurplastic CT, pending ID consult, ENT scope today  2/24: DEEPALI overnight, neuro stable. Pending OR plans for cranioplasty (also to include ENT sinus debridement). Pending anti xa level today at 2pm      Vital Signs Last 24 Hrs  T(C): 36.8 (23 Feb 2023 20:47), Max: 36.9 (23 Feb 2023 16:46)  T(F): 98.2 (23 Feb 2023 20:47), Max: 98.4 (23 Feb 2023 16:46)  HR: 94 (23 Feb 2023 20:47) (94 - 99)  BP: 145/90 (23 Feb 2023 20:47) (114/68 - 145/90)  BP(mean): --  RR: 19 (23 Feb 2023 20:47) (17 - 19)  SpO2: 94% (23 Feb 2023 20:47) (94% - 97%)    Parameters below as of 23 Feb 2023 20:47  Patient On (Oxygen Delivery Method): room air        I&O's Summary    22 Feb 2023 07:01  -  23 Feb 2023 07:00  --------------------------------------------------------  IN: 0 mL / OUT: 50 mL / NET: -50 mL    23 Feb 2023 07:01  -  24 Feb 2023 00:44  --------------------------------------------------------  IN: 600 mL / OUT: 0 mL / NET: 600 mL        PHYSICAL EXAM:  General: Resting comfortably, in NAD  Head: left hemicraniectomy site is sunken and soft, non tender, no erythema, helmet in place.  Neuro: AAOx3 with choices, expressive aphasia, follows simple commands  CNII-XII: PERRL, EOMI, face symmetric, tongue midline  Motor: Right HG 1/5, R delt 2/5, R grace/tri 3/5, RLE HF/KE/KF 4/5, DF/PF 1/5, LUE/LLE 5/5   Pulm: CTA b/l  Cardiac: S1/S2, RRR  Abd: Soft, NT/ND +BS.      TUBES/LINES:  [] Cannon  [] A-line  [] Lumbar Drain  [] Wound Drains  [] NGT   [] EVD   [] CVC  [] Other      DIET:  [] NPO  [x] Mechanical  [] Tube feeds    LABS:                        11.0   6.74  )-----------( 295      ( 23 Feb 2023 10:08 )             34.1     02-23    141  |  102  |  12  ----------------------------<  113<H>  3.6   |  26  |  1.04    Ca    9.4      23 Feb 2023 10:08  Phos  3.6     02-23  Mg     2.0     02-23    TPro  7.0  /  Alb  3.4  /  TBili  0.2  /  DBili  x   /  AST  18  /  ALT  11  /  AlkPhos  77  02-23    PT/INR - ( 23 Feb 2023 10:08 )   PT: 12.5 sec;   INR: 1.05          PTT - ( 23 Feb 2023 10:08 )  PTT:36.7 sec        CAPILLARY BLOOD GLUCOSE          Drug Levels: [] N/A    CSF Analysis: [] N/A      Allergies    No Known Allergies    Intolerances        Home Medications:  acetaminophen 160 mg/5 mL oral suspension: 650 milligram(s) orally every 6 hours, As Needed - 3) (10 Myles 2023 12:51)  amLODIPine 10 mg oral tablet: 1 tab(s) orally once a day (10 Myles 2023 12:45)  fluticasone 50 mcg/inh nasal spray: 1 spray(s) nasal every 12 hours (10 Myles 2023 12:51)  levETIRAcetam 1000 mg oral tablet: 1 tab(s) orally 2 times a day (10 Myles 2023 12:45)  nystatin 100,000 units/g topical cream: 1 application topically 2 times a day (10 Myles 2023 12:49)  senna leaf extract oral tablet: 2 tab(s) orally once a day (at bedtime) (10 Myles 2023 12:51)  travoprost 0.004% ophthalmic solution: 1 drop(s) to both eyes once a day (in the evening) (02 Jan 2023 14:23)      MEDICATIONS:  MEDICATIONS  (STANDING):  amLODIPine   Tablet 10 milliGRAM(s) Oral daily  enoxaparin Injectable 110 milliGRAM(s) SubCutaneous every 12 hours  latanoprost 0.005% Ophthalmic Solution 1 Drop(s) Both EYES at bedtime  levETIRAcetam 1000 milliGRAM(s) Oral two times a day  multivitamin 1 Tablet(s) Oral daily  senna 2 Tablet(s) Oral at bedtime    MEDICATIONS  (PRN):  acetaminophen     Tablet .. 650 milliGRAM(s) Oral every 6 hours PRN Temp greater or equal to 38C (100.4F), Mild Pain (1 - 3)  bisacodyl 5 milliGRAM(s) Oral daily PRN Constipation  ondansetron Injectable 4 milliGRAM(s) IV Push every 6 hours PRN Nausea and/or Vomiting      CULTURES:      RADIOLOGY & ADDITIONAL TESTS:      ASSESSMENT:  73 y/o F w/ h/o L hemicraniectomy and washout of L subdural empyema 12/30/22 (at time presented to [facility] c/o slurred speech and right sided weakness), s/p sinus surgery with judith purulence to L maxillary sinus and posterior bony erosiun to L sphenoid sinus 1/5 w/ENT, PEs. Pt was discharged to [facility] rehab on 1/12/23 and plan was to return for cranioplasty in 3 months. Per daughter, patient spent 4 weeks at the rehab. She was sent home 1 week ago with 4 hrs home aid and home  PT. Her right side weakness was progressively improving and her expressive aphagia was minimally improved. Per daughter, Pt now with worsening right side weakness the last few hours. Today when patient got up from her wheelchair her right knee bucked and patient fell on her buttocks. She was wearing her helmet; did not hit her head. NO LOC. Patient remained alert and awake throughout.    PLAN:  Neuro:  -neuro/vitals q4h  -pending MRI brain w/wo  -pain control  -helmet in place    Cardio"  -continue home norvasc  -SBP goal normotensive    Pulm:  -room air    Renal:  -IVL  -voiding    GI:  -regular diet  -bowel regimen    Heme:  - NO SCDs (b/l LE DVTs), SQL 110mg BID (previously on 120mg bid)  - prior LE doppler 1/8/23 b/l calf and L peroneal DVTs, repeat LE doppler 2/22 R posterior tibial vein DVT, persistent R IM calf vein DVT, resolution of L IM calf DVT  - h/o b/l mild segmental/lobar PE on CT PE protocol 1/10/23    ID  -consult ID, finished course of ceftriaxone/metronidazole/vancomycin for subdural/epidural empyema (rare peptostreptococcus 1/6 + staph epi) end date 2/16     Dispo: regional status, full code, pendng MRI brain  D/w Dr. D'Amico      Assessment: present when checked     [] GCS   E   V   M     Heart Failure: [] Acute, [] acute on chronic, [] chronic   Heart Failure: [] Diastolic (HFpEF), [] Systolic (HRrEF), [] Combined (HFpEF and HFrEF), [] RHF, [] Pulm HTN, [] Other     [] DIONTE, [] ATN, [] AIN, [] other   [] CKD1, [] CKD2, [] CKD3, [] CKD4, [] CKD5, [] ESRD     Encephalopathy: [] Metabolic, [] Hepatic, [] Toxic, [] Neurological, [] Other     Abnormal Nutritional Status: [] malnutrition (see nutrition note), []underweight: BMI <19, [] morbid obesity: BMI >40, [] Cachexia     [] Sepsis   [] Hypovolemic shock, [] Cardiogenic shock, [] Hemorrhagic shock, [] Neurogenic shock   [] Acute respiratory failure   [] Cerebral edema, [] Brain compression / herniation   [] Functional quadriplegia   [] Acute blood loss anemia

## 2023-02-24 NOTE — PHYSICAL THERAPY INITIAL EVALUATION ADULT - PHYSICAL ASSIST/NONPHYSICAL ASSIST: SUPINE/SIT, REHAB EVAL
*increased assist for trunk mobility and mobilizing (R)LE to EOB/verbal cues/nonverbal cues (demo/gestures)/2 person assist

## 2023-02-24 NOTE — OCCUPATIONAL THERAPY INITIAL EVALUATION ADULT - ORIENTATION, REHAB EVAL
Expressive aphasia- able to point to correct options when provided on piece of paper/oriented to person, place, time and situation

## 2023-02-24 NOTE — OCCUPATIONAL THERAPY INITIAL EVALUATION ADULT - PERTINENT HX OF CURRENT PROBLEM, REHAB EVAL
71 yo female brought to ER by her daughter after she had a fall secondary to her baseline right side weakness. Below is a synopsis of patients last admission with us. 71 y/o F w/ no known PMHx transferred from [place] c/o slurred speech and right sided weakness. CTH initially negative, repeat on 12/23 showed L sided SDH, pts mental status worsened and CTH on 12/28 significant for L frontoparietal collection, MRI completed showed concern for possible empyema. S/p L hemicraniectomy and washout of L subdural empyema 12/30/22. S/p sinus surgery with judith purulence to L maxillary sinus and posterior bony erosiun to L sphenoid sinus 1/5. On she was  discharged to [facility] rehab which plan to return for cranioplasty in 3 months post hemicraniectomy. Per daughter, patient spent 4 weeks at the rehab. She was sent home 1 week ago with 4 hrs home aid and home  PT. Her right side weakness was progressively improving and her expressive aphagia was minimally improved. Daughter fells that patient had more right side weakness the last hrs. Today when patient  togetup from her wheelchair her right knee bucked and patient fell on her buttocks. She was wearing her helmet; did not hit her head. NO LOC. Patient remained alert and awake throughout.

## 2023-02-24 NOTE — OCCUPATIONAL THERAPY INITIAL EVALUATION ADULT - ADDITIONAL COMMENTS
As per last admission OT initial evaluation, prior to L hemicraniectomy, patient was living alone in an 1st floor walk up apartment building with no LYLE. Patient was independent with all ADL's, IADL's and functional mobility with no AD. Patient is R hand dominant, has a bathtub shower and does not wear glasses. Since initial d/c patient was at [facility] for 4 weeks prior to being d/c back home for a couple days. As per daughter Barb, patient aphasia and R sided weakness was improving until she had the fall. When patient was home, daughters were present to assist and for supervision until 4 hour HHA was initiated.

## 2023-02-24 NOTE — PROGRESS NOTE ADULT - SUBJECTIVE AND OBJECTIVE BOX
O/N Events: DEEPALI  Subjective/ROS: No complaints at this time. Denies HA, CP, SOB, n/v, changes in bowel/urinary habits.  12pt ROS otherwise negative.    VITALS  Vital Signs Last 24 Hrs  T(C): 36.8 (24 Feb 2023 16:07), Max: 36.9 (23 Feb 2023 16:46)  T(F): 98.2 (24 Feb 2023 16:07), Max: 98.4 (23 Feb 2023 16:46)  HR: 102 (24 Feb 2023 16:07) (91 - 102)  BP: 117/63 (24 Feb 2023 16:07) (114/72 - 145/90)  BP(mean): --  RR: 17 (24 Feb 2023 16:07) (16 - 19)  SpO2: 100% (24 Feb 2023 16:07) (94% - 100%)    Parameters below as of 24 Feb 2023 16:07  Patient On (Oxygen Delivery Method): room air        I&O's Summary    23 Feb 2023 07:01  -  24 Feb 2023 07:00  --------------------------------------------------------  IN: 800 mL / OUT: 700 mL / NET: 100 mL    24 Feb 2023 07:01  -  24 Feb 2023 16:37  --------------------------------------------------------  IN: 3930 mL / OUT: 600 mL / NET: 3330 mL        CAPILLARY BLOOD GLUCOSE          PHYSICAL EXAM  General: A&Ox3; NAD; word finding difficulty  Head: Left head caved in, PERRL; EOMI; anicteric sclera  Neck: Supple; no JVD  Respiratory: CTA B/L; no wheezes/crackles/rales auscultated w/ good air movement  Cardiovascular: Regular rhythm/rate; S1/S2; no gallops or murmurs auscultated  Gastrointestinal: Soft; NTND w/out rebound tenderness or guarding; bowel sounds normal  Extremities: WWP; no edema or cyanosis; radial/pedal pulses palpable  Neurological:  CNII-XII grossly intact; R sided weakness  Skin: No rashes noted  Vasc: +2 DP/PT pulses b/l   Psych: Appropriate Affect    MEDICATIONS  (STANDING):  amLODIPine   Tablet 10 milliGRAM(s) Oral daily  enoxaparin Injectable 110 milliGRAM(s) SubCutaneous every 12 hours  latanoprost 0.005% Ophthalmic Solution 1 Drop(s) Both EYES at bedtime  levETIRAcetam 1000 milliGRAM(s) Oral two times a day  multivitamin 1 Tablet(s) Oral daily  senna 2 Tablet(s) Oral at bedtime    MEDICATIONS  (PRN):  acetaminophen     Tablet .. 650 milliGRAM(s) Oral every 6 hours PRN Temp greater or equal to 38C (100.4F), Mild Pain (1 - 3)  bisacodyl 5 milliGRAM(s) Oral daily PRN Constipation  ondansetron Injectable 4 milliGRAM(s) IV Push every 6 hours PRN Nausea and/or Vomiting      No Known Allergies      LABS                        10.5   5.18  )-----------( 280      ( 24 Feb 2023 08:28 )             33.6     02-24    139  |  102  |  10  ----------------------------<  103<H>  3.3<L>   |  26  |  1.08    Ca    9.2      24 Feb 2023 08:28  Phos  4.5     02-24  Mg     1.9     02-24    TPro  7.0  /  Alb  3.4  /  TBili  0.2  /  DBili  x   /  AST  18  /  ALT  11  /  AlkPhos  77  02-23    PT/INR - ( 23 Feb 2023 10:08 )   PT: 12.5 sec;   INR: 1.05          PTT - ( 23 Feb 2023 10:08 )  PTT:36.7 sec          IMAGING/EKG/ETC: reviewed

## 2023-02-24 NOTE — PHYSICAL THERAPY INITIAL EVALUATION ADULT - SENSORY TESTS
(R) hand dominant; (L) hand  5/5, (R) hand  1/5. CN Testing: B/L Frontalis intact; B/L buccinator intact; smile symmetrical; tongue protrusion at midline; B/L eyes open/close intact; Shoulder elevation: intact bilaterally; Vision H-Test: bilateral tracking and smooth pursuit intact; Convergence/Divergence: intact; Vision Quadrant Test: intact bilaterally. Rapid alternating movements: N/T

## 2023-02-24 NOTE — PHYSICAL THERAPY INITIAL EVALUATION ADULT - GENERAL OBSERVATIONS, REHAB EVAL
PT IE completed. Chart reviewed. Patient without complaints of pain at rest, agreeable to PT. Patient received semi-supine, NAD, NO (L)CRANIAL BONE, +IV hep lock, +PRIMA fit, daughter (Barb) at bedside, AVANI Looney cleared patient for treatment.

## 2023-02-24 NOTE — PHYSICAL THERAPY INITIAL EVALUATION ADULT - PHYSICAL ASSIST/NONPHYSICAL ASSIST: SIT/SUPINE, REHAB EVAL
initiates with (L)UE and (L)LE; total assist for (R)sided extremities/verbal cues/nonverbal cues (demo/gestures)/2 person assist

## 2023-02-24 NOTE — OCCUPATIONAL THERAPY INITIAL EVALUATION ADULT - RANGE OF MOTION EXAMINATION, LOWER EXTREMITY
Left LE Active ROM was WFL (within functional limits)/Right LE Passive ROM was WNL (within normal limits)

## 2023-02-24 NOTE — PROGRESS NOTE ADULT - ASSESSMENT
73 y/o F w/ h/o L hemicraniectomy and washout of L subdural empyema 12/30/22 (at time presented to [facility] c/o slurred speech and right sided weakness), s/p sinus surgery with judith purulence to L maxillary sinus and posterior bony erosion to L sphenoid sinus 1/5 w/ENT, PEs. Pt was discharged to [facility] rehab on 1/12/23 and plan was to return for cranioplasty in 3 months. Per daughter, patient spent 4 weeks at the rehab. She was sent home 1 week ago with 4 hrs home aid and home  PT. Her right side weakness was progressively improving and her expressive aphagia was minimally improved.

## 2023-02-24 NOTE — PHYSICAL THERAPY INITIAL EVALUATION ADULT - MANUAL MUSCLE TESTING RESULTS, REHAB EVAL
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

## 2023-02-25 LAB
ANION GAP SERPL CALC-SCNC: 11 MMOL/L — SIGNIFICANT CHANGE UP (ref 5–17)
BUN SERPL-MCNC: 11 MG/DL — SIGNIFICANT CHANGE UP (ref 7–23)
CALCIUM SERPL-MCNC: 9.3 MG/DL — SIGNIFICANT CHANGE UP (ref 8.4–10.5)
CHLORIDE SERPL-SCNC: 104 MMOL/L — SIGNIFICANT CHANGE UP (ref 96–108)
CO2 SERPL-SCNC: 25 MMOL/L — SIGNIFICANT CHANGE UP (ref 22–31)
CREAT SERPL-MCNC: 1.12 MG/DL — SIGNIFICANT CHANGE UP (ref 0.5–1.3)
EGFR: 53 ML/MIN/1.73M2 — LOW
GLUCOSE SERPL-MCNC: 106 MG/DL — HIGH (ref 70–99)
HCT VFR BLD CALC: 33.6 % — LOW (ref 34.5–45)
HGB BLD-MCNC: 10.7 G/DL — LOW (ref 11.5–15.5)
MAGNESIUM SERPL-MCNC: 1.9 MG/DL — SIGNIFICANT CHANGE UP (ref 1.6–2.6)
MCHC RBC-ENTMCNC: 29.8 PG — SIGNIFICANT CHANGE UP (ref 27–34)
MCHC RBC-ENTMCNC: 31.8 GM/DL — LOW (ref 32–36)
MCV RBC AUTO: 93.6 FL — SIGNIFICANT CHANGE UP (ref 80–100)
NRBC # BLD: 0 /100 WBCS — SIGNIFICANT CHANGE UP (ref 0–0)
PHOSPHATE SERPL-MCNC: 4.1 MG/DL — SIGNIFICANT CHANGE UP (ref 2.5–4.5)
PLATELET # BLD AUTO: 286 K/UL — SIGNIFICANT CHANGE UP (ref 150–400)
POTASSIUM SERPL-MCNC: 4.2 MMOL/L — SIGNIFICANT CHANGE UP (ref 3.5–5.3)
POTASSIUM SERPL-SCNC: 4.2 MMOL/L — SIGNIFICANT CHANGE UP (ref 3.5–5.3)
RBC # BLD: 3.59 M/UL — LOW (ref 3.8–5.2)
RBC # FLD: 15.3 % — HIGH (ref 10.3–14.5)
SODIUM SERPL-SCNC: 140 MMOL/L — SIGNIFICANT CHANGE UP (ref 135–145)
WBC # BLD: 5.83 K/UL — SIGNIFICANT CHANGE UP (ref 3.8–10.5)
WBC # FLD AUTO: 5.83 K/UL — SIGNIFICANT CHANGE UP (ref 3.8–10.5)

## 2023-02-25 PROCEDURE — 99232 SBSQ HOSP IP/OBS MODERATE 35: CPT

## 2023-02-25 PROCEDURE — 99231 SBSQ HOSP IP/OBS SF/LOW 25: CPT

## 2023-02-25 RX ADMIN — LEVETIRACETAM 1000 MILLIGRAM(S): 250 TABLET, FILM COATED ORAL at 17:29

## 2023-02-25 RX ADMIN — Medication 1 TABLET(S): at 11:50

## 2023-02-25 RX ADMIN — ENOXAPARIN SODIUM 110 MILLIGRAM(S): 100 INJECTION SUBCUTANEOUS at 11:51

## 2023-02-25 RX ADMIN — ENOXAPARIN SODIUM 110 MILLIGRAM(S): 100 INJECTION SUBCUTANEOUS at 23:16

## 2023-02-25 RX ADMIN — LATANOPROST 1 DROP(S): 0.05 SOLUTION/ DROPS OPHTHALMIC; TOPICAL at 23:16

## 2023-02-25 RX ADMIN — SENNA PLUS 2 TABLET(S): 8.6 TABLET ORAL at 23:15

## 2023-02-25 NOTE — PROGRESS NOTE ADULT - ASSESSMENT
73 y/o F w/ h/o L hemicraniectomy and washout of L subdural empyema 12/30/22 (at time presented to [facility] c/o slurred speech and right sided weakness), s/p sinus surgery with judith purulence to L maxillary sinus and posterior bony erosion to L sphenoid sinus 1/5 w/ENT, PEs. Pt was discharged to [facility] rehab on 1/12/23 and plan was to return for cranioplasty in 3 months. Per daughter, patient spent 4 weeks at the rehab. She was sent home 1 week ago with 4 hrs home aid and home  PT. Her right side weakness was progressively improving and her expressive aphagia was minimally improved. Pending OR for cranioplasty

## 2023-02-25 NOTE — PROGRESS NOTE ADULT - SUBJECTIVE AND OBJECTIVE BOX
PROGRESS NOTE:   Authoted by Dr. Lorenzo Braun MD, ANGIE  Available via [employer] Teams     Patient is a 72y old  Female who presents with a chief complaint of R.side weakness (25 Feb 2023 00:39)      SUBJECTIVE / OVERNIGHT EVENTS:    MEDICATIONS  (STANDING):  amLODIPine   Tablet 10 milliGRAM(s) Oral daily  enoxaparin Injectable 110 milliGRAM(s) SubCutaneous every 12 hours  latanoprost 0.005% Ophthalmic Solution 1 Drop(s) Both EYES at bedtime  levETIRAcetam 1000 milliGRAM(s) Oral two times a day  multivitamin 1 Tablet(s) Oral daily  senna 2 Tablet(s) Oral at bedtime    MEDICATIONS  (PRN):  acetaminophen     Tablet .. 650 milliGRAM(s) Oral every 6 hours PRN Temp greater or equal to 38C (100.4F), Mild Pain (1 - 3)  bisacodyl 5 milliGRAM(s) Oral daily PRN Constipation  ondansetron Injectable 4 milliGRAM(s) IV Push every 6 hours PRN Nausea and/or Vomiting      OBJECTIVE:  Vital Signs Last 24 Hrs  T(C): 36.6 (25 Feb 2023 09:04), Max: 36.8 (24 Feb 2023 16:07)  T(F): 97.8 (25 Feb 2023 09:04), Max: 98.2 (24 Feb 2023 16:07)  HR: 84 (25 Feb 2023 09:04) (84 - 102)  BP: 136/86 (25 Feb 2023 09:04) (104/66 - 136/86)  BP(mean): 103 (25 Feb 2023 09:04) (103 - 103)  RR: 18 (25 Feb 2023 09:04) (16 - 18)  SpO2: 97% (25 Feb 2023 09:04) (97% - 100%)    Parameters below as of 25 Feb 2023 09:04  Patient On (Oxygen Delivery Method): room air      I&O's Summary    24 Feb 2023 07:01  -  25 Feb 2023 07:00  --------------------------------------------------------  IN: 4170 mL / OUT: 1200 mL / NET: 2970 mL    25 Feb 2023 07:01  -  25 Feb 2023 10:55  --------------------------------------------------------  IN: 0 mL / OUT: 0 mL / NET: 0 mL        CONSTITUTIONAL: NAD, well-developed  HEAD:  Atraumatic, Normocephalic  EYES: EOMI, conjunctiva and sclera clear  ENMT: No tonsillar erythema, exudates, or enlargement; Moist mucous membranes  NECK: Supple, No JVD  NERVOUS SYSTEM: AOX3, motor and sensation grossly intact in b/l UE and b/l LE  PSYCHIATRIC: Appropriate affect and mood  CHEST/LUNG: Clear to auscultation bilaterally; No rales, rhonchi, wheezing, or rubs  HEART: Regular rate and rhythm; No murmurs, rubs, or gallops. No LE edema  ABDOMEN: Soft, Nontender, Nondistended; Bowel sounds present  EXTREMITIES:  2+ Peripheral Pulses, No clubbing, cyanosis  SKIN: No rashes or lesions    LABS:                        10.7   5.83  )-----------( 286      ( 25 Feb 2023 07:20 )             33.6     02-25    140  |  104  |  11  ----------------------------<  106<H>  4.2   |  25  |  1.12    Ca    9.3      25 Feb 2023 07:20  Phos  4.1     02-25  Mg     1.9     02-25                CAPILLARY BLOOD GLUCOSE          Tele Reviewed:    RADIOLOGY & ADDITIONAL TESTS:     PROGRESS NOTE:   Authoted by Dr. Lorenzo Braun MD, ANGIE  Available via [employer] Teams     Patient is a 72y old  Female who presents with a chief complaint of R.side weakness (25 Feb 2023 00:39)    SUBJECTIVE / OVERNIGHT EVENTS:  No acute events overnight   No subjective complaints     MEDICATIONS  (STANDING):  amLODIPine   Tablet 10 milliGRAM(s) Oral daily  enoxaparin Injectable 110 milliGRAM(s) SubCutaneous every 12 hours  latanoprost 0.005% Ophthalmic Solution 1 Drop(s) Both EYES at bedtime  levETIRAcetam 1000 milliGRAM(s) Oral two times a day  multivitamin 1 Tablet(s) Oral daily  senna 2 Tablet(s) Oral at bedtime    MEDICATIONS  (PRN):  acetaminophen     Tablet .. 650 milliGRAM(s) Oral every 6 hours PRN Temp greater or equal to 38C (100.4F), Mild Pain (1 - 3)  bisacodyl 5 milliGRAM(s) Oral daily PRN Constipation  ondansetron Injectable 4 milliGRAM(s) IV Push every 6 hours PRN Nausea and/or Vomiting      OBJECTIVE:  Vital Signs Last 24 Hrs  T(C): 36.6 (25 Feb 2023 09:04), Max: 36.8 (24 Feb 2023 16:07)  T(F): 97.8 (25 Feb 2023 09:04), Max: 98.2 (24 Feb 2023 16:07)  HR: 84 (25 Feb 2023 09:04) (84 - 102)  BP: 136/86 (25 Feb 2023 09:04) (104/66 - 136/86)  BP(mean): 103 (25 Feb 2023 09:04) (103 - 103)  RR: 18 (25 Feb 2023 09:04) (16 - 18)  SpO2: 97% (25 Feb 2023 09:04) (97% - 100%)    Parameters below as of 25 Feb 2023 09:04  Patient On (Oxygen Delivery Method): room air      I&O's Summary    24 Feb 2023 07:01  -  25 Feb 2023 07:00  --------------------------------------------------------  IN: 4170 mL / OUT: 1200 mL / NET: 2970 mL    25 Feb 2023 07:01  -  25 Feb 2023 10:55  --------------------------------------------------------  IN: 0 mL / OUT: 0 mL / NET: 0 mL        CONSTITUTIONAL: NAD  HEAD:  sunken s/p Left hemicraniectomy   EYES: EOMI, conjunctiva and sclera clear  ENMT: No tonsillar erythema, exudates, or enlargement; Moist mucous membranes  NECK: Supple, No JVD  NERVOUS SYSTEM: AOX3 (yes/no), expressive aphasia, follows simple commands, Upper and lower extremity R sided weakness   PSYCHIATRIC: Appropriate affect and mood  CHEST/LUNG: Clear to auscultation bilaterally; No rales, rhonchi, wheezing, or rubs  HEART: Regular rate and rhythm; No murmurs, rubs, or gallops. No LE edema  ABDOMEN: Soft, Nontender, Nondistended; Bowel sounds present  EXTREMITIES:  2+ Peripheral Pulses, No clubbing, cyanosis  SKIN: No rashes or lesions    LABS:                        10.7   5.83  )-----------( 286      ( 25 Feb 2023 07:20 )             33.6     02-25    140  |  104  |  11  ----------------------------<  106<H>  4.2   |  25  |  1.12    Ca    9.3      25 Feb 2023 07:20  Phos  4.1     02-25  Mg     1.9     02-25                CAPILLARY BLOOD GLUCOSE          Tele Reviewed:    RADIOLOGY & ADDITIONAL TESTS:

## 2023-02-25 NOTE — SPEECH LANGUAGE PATHOLOGY EVALUATION - OPEN ENDED QUESTIONS
Patient responded to simple open ended questions accurately in 75% of opps. Unable to respond to more complex/abstract questions.

## 2023-02-25 NOTE — SPEECH LANGUAGE PATHOLOGY EVALUATION - SLP PERTINENT HISTORY OF CURRENT PROBLEM
73 yo female brought to ER by her daughter after she had a fall secondary to her baseline right side weakness.

## 2023-02-25 NOTE — SPEECH LANGUAGE PATHOLOGY EVALUATION - SLP DIAGNOSIS
Patient presents with moderate non fluent aphasia burak by delayed word finding, occasional phonemic paraphasias, reduced use of syntax, and reduced comprehension of complex or abstract language. This is a significant improvement compared to initial eval 1/23. Recommend to use simple language with patient, repeat question/direction, provide increased wait time for response, and encourage use of yes/no or binary choice questions. Provided communication board with ~20 target phrases to increase ability to request wants/needs.

## 2023-02-25 NOTE — SPEECH LANGUAGE PATHOLOGY EVALUATION - COMMENTS
Speech filled with fillers including "umm" and "yeah". When asked a question, pt frequently stated "umm" prior to expressing her response. Out of a field of 20, patient able to point to/identify target item on communication board with pictures and labels. Speech/lang eval completed 1/3/23 revealed:  Pt presents with severe non-fluent aphasia. She is grossly non-verbal with the exception of few opportunities where she was able to produce reflexive voicing in imitation. Pt did not benefit from max. multi-modal cues to elicit purposeful voicing. Comprehension for very simple syntax, especially of personal information or of immediate environment appears relatively intact. However, accuracy did decline with more abstract and complex information. Patient able to read at the sentence level, however reduced comprehension.

## 2023-02-25 NOTE — SPEECH LANGUAGE PATHOLOGY EVALUATION - YES/NO QUESTIONS: COMPLEX
4/5 accuracy, with use of verbal communication and yes/no board. Noted to be occasionally inconsistent during informal conversation, as patient would correct herself/change her answer

## 2023-02-25 NOTE — SPEECH LANGUAGE PATHOLOGY EVALUATION - SLP CONVERSATIONAL SPEECH
Patient with limited verbal expression at the sentence level, frequently responding with single words. When tasked to describe a picture, sentence length increased, however imperfect syntax noted, omitting the subject. Ex. "Children are falling off the chair". "[place] off the floor".

## 2023-02-25 NOTE — PROGRESS NOTE ADULT - SUBJECTIVE AND OBJECTIVE BOX
HPI:  73 yo female brought to ER by her daughter after she had a fall secondary to her baseline right side weakness.  Below  is a synopsis of patients last admission with us.:  73 y/o F w/ no known PMHx transferred from [place] [facility] c/o slurred speech and right sided weakness. CTH initially negative, repeat on 12/23 showed L sided SDH, pts mental status worsened and CTH on 12/28 significant for L frontoparietal collection, MRI completed showed concern for possible empyema. S/p L hemicraniectomy and washout of L subdural empyema 12/30/22. S/p sinus surgery with judith purulence to L maxillary sinus and posterior bony erosiun to L sphenoid sinus 1/5.   On she was  discharged to [facility] rehab which plan to return for cranioplasty in 3 months post hemicraniectomy.  Per daughter, patient spent 4 weeks at the rehab. She was sent home 1 week ago with 4 hrs home aid and home  PT.  Her right side weakness was progressively improving and her expressive aphagia was minimally improved. Daughter fells that patient had more right side weakness the last hrs.  Today when patient  togetup from her wheelchair her right knee bucked and patient fell on her buttocks. She was wearing her helmet; did not hit her head. NO LOC. Patient remained alert and awake throughout.  She was sent here for a head CT by Dr. D'Amico.   (22 Feb 2023 17:26)      Hospital course:   2/22: admitted w/worsening right UE/LE weakness  2/23: DEEPALI overnight. MRI brain performed. Neuro exam stable.improved expressive aphasia. MRI no infarct or focal findings, pending neurplastic CT, pending ID consult, ENT scope today  2/24: DEEPALI overnight, neuro stable. Pending OR plans for cranioplasty (also to include ENT sinus debridement). Pending anti xa level today at 2pm  2/25: DEEPALI overnight, neuro stable. OR plan tentatively scheduled for Wednesday, ID cleared.      Vital Signs Last 24 Hrs  T(C): 36.5 (24 Feb 2023 20:30), Max: 36.8 (24 Feb 2023 08:40)  T(F): 97.7 (24 Feb 2023 20:30), Max: 98.2 (24 Feb 2023 08:40)  HR: 98 (24 Feb 2023 20:30) (91 - 102)  BP: 104/66 (24 Feb 2023 20:30) (104/66 - 125/76)  BP(mean): --  RR: 17 (24 Feb 2023 20:30) (16 - 18)  SpO2: 97% (24 Feb 2023 20:30) (96% - 100%)    Parameters below as of 24 Feb 2023 20:30  Patient On (Oxygen Delivery Method): room air        I&O's Summary    23 Feb 2023 07:01  -  24 Feb 2023 07:00  --------------------------------------------------------  IN: 800 mL / OUT: 700 mL / NET: 100 mL    24 Feb 2023 07:01  -  25 Feb 2023 00:39  --------------------------------------------------------  IN: 4170 mL / OUT: 600 mL / NET: 3570 mL        PHYSICAL EXAM:  General: Resting comfortably, in NAD  Head: left hemicraniectomy site is sunken and soft, non tender, no erythema, helmet in place.  Neuro: AAOx3 with choices, expressive aphasia, follows simple commands  CNII-XII: PERRL, EOMI, face symmetric, tongue midline  Motor: Right HG 1/5, R delt 2/5, R grace/tri 3/5, RLE HF/KE/KF 4/5, DF/PF 1/5, LUE/LLE 5/5   Pulm: CTA b/l  Cardiac: S1/S2, RRR  Abd: Soft, NT/ND +BS      TUBES/LINES:  [] Cannon  [] A-line  [] Lumbar Drain  [] Wound Drains  [] NGT   [] EVD   [] CVC  [] Other      DIET:  [] NPO  [x] Mechanical  [] Tube feeds    LABS:                        10.5   5.18  )-----------( 280      ( 24 Feb 2023 08:28 )             33.6     02-24    139  |  102  |  10  ----------------------------<  103<H>  3.3<L>   |  26  |  1.08    Ca    9.2      24 Feb 2023 08:28  Phos  4.5     02-24  Mg     1.9     02-24    TPro  7.0  /  Alb  3.4  /  TBili  0.2  /  DBili  x   /  AST  18  /  ALT  11  /  AlkPhos  77  02-23    PT/INR - ( 23 Feb 2023 10:08 )   PT: 12.5 sec;   INR: 1.05          PTT - ( 23 Feb 2023 10:08 )  PTT:36.7 sec        CAPILLARY BLOOD GLUCOSE          Drug Levels: [] N/A    CSF Analysis: [] N/A      Allergies    No Known Allergies    Intolerances        Home Medications:  acetaminophen 160 mg/5 mL oral suspension: 650 milligram(s) orally every 6 hours, As Needed - 3) (10 Myles 2023 12:51)  amLODIPine 10 mg oral tablet: 1 tab(s) orally once a day (10 Myles 2023 12:45)  fluticasone 50 mcg/inh nasal spray: 1 spray(s) nasal every 12 hours (10 Myles 2023 12:51)  levETIRAcetam 1000 mg oral tablet: 1 tab(s) orally 2 times a day (10 Myles 2023 12:45)  nystatin 100,000 units/g topical cream: 1 application topically 2 times a day (10 Myles 2023 12:49)  senna leaf extract oral tablet: 2 tab(s) orally once a day (at bedtime) (10 Myles 2023 12:51)  travoprost 0.004% ophthalmic solution: 1 drop(s) to both eyes once a day (in the evening) (02 Jan 2023 14:23)      MEDICATIONS:  MEDICATIONS  (STANDING):  amLODIPine   Tablet 10 milliGRAM(s) Oral daily  enoxaparin Injectable 110 milliGRAM(s) SubCutaneous every 12 hours  latanoprost 0.005% Ophthalmic Solution 1 Drop(s) Both EYES at bedtime  levETIRAcetam 1000 milliGRAM(s) Oral two times a day  multivitamin 1 Tablet(s) Oral daily  senna 2 Tablet(s) Oral at bedtime    MEDICATIONS  (PRN):  acetaminophen     Tablet .. 650 milliGRAM(s) Oral every 6 hours PRN Temp greater or equal to 38C (100.4F), Mild Pain (1 - 3)  bisacodyl 5 milliGRAM(s) Oral daily PRN Constipation  ondansetron Injectable 4 milliGRAM(s) IV Push every 6 hours PRN Nausea and/or Vomiting      CULTURES:      RADIOLOGY & ADDITIONAL TESTS:      ASSESSMENT:  73 y/o F w/ h/o L hemicraniectomy and washout of L subdural empyema 12/30/22 (at time presented to [place] c/o slurred speech and right sided weakness), s/p sinus surgery with judith purulence to L maxillary sinus and posterior bony erosiun to L sphenoid sinus 1/5 w/ENT, PEs. Pt was discharged to [facility] rehab on 1/12/23 and plan was to return for cranioplasty in 3 months. Per daughter, patient spent 4 weeks at the rehab. She was sent home 1 week ago with 4 hrs home aid and home  PT. Her right side weakness was progressively improving and her expressive aphagia was minimally improved. Per daughter, Pt now with worsening right side weakness the last few hours. Today when patient got up from her wheelchair her right knee bucked and patient fell on her buttocks. She was wearing her helmet; did not hit her head. NO LOC. Patient remained alert and awake throughout.    PLAN:  Neuro:  -neuro/vitals q4h  -pain control  -helmet at bedside    Cardio:  -continue home norvasc  -SBP goal normotensive    Pulm:  -room air    Renal:  -IVL  -voiding    GI:  -regular diet  -bowel regimen    Heme:  - NO SCDs (b/l LE DVTs), SQL 110mg BID (previously on 120mg bid)  - prior LE doppler 1/8/23 b/l calf and L peroneal DVTs, repeat LE doppler 2/22 R posterior tibial vein DVT, persistent R IM calf vein DVT, resolution of L IM calf DVT  - h/o b/l mild segmental/lobar PE on CT PE protocol 1/10/23    ID  -consulted ID, finished course of ceftriaxone/metronidazole/vancomycin for subdural/epidural empyema (rare peptostreptococcus 1/6 + staph epi) end date 2/16     Dispo: regional status, full code, pendng MRI brain  D/w Dr. D'Amico      Assessment: present when checked     [] GCS   E   V   M     Heart Failure: [] Acute, [] acute on chronic, [] chronic   Heart Failure: [] Diastolic (HFpEF), [] Systolic (HRrEF), [] Combined (HFpEF and HFrEF), [] RHF, [] Pulm HTN, [] Other     [] DIONTE, [] ATN, [] AIN, [] other   [] CKD1, [] CKD2, [] CKD3, [] CKD4, [] CKD5, [] ESRD     Encephalopathy: [] Metabolic, [] Hepatic, [] Toxic, [] Neurological, [] Other     Abnormal Nutritional Status: [] malnutrition (see nutrition note), []underweight: BMI <19, [] morbid obesity: BMI >40, [] Cachexia     [] Sepsis   [] Hypovolemic shock, [] Cardiogenic shock, [] Hemorrhagic shock, [] Neurogenic shock   [] Acute respiratory failure   [] Cerebral edema, [] Brain compression / herniation   [] Functional quadriplegia   [] Acute blood loss anemia

## 2023-02-26 PROCEDURE — 70450 CT HEAD/BRAIN W/O DYE: CPT | Mod: 26

## 2023-02-26 PROCEDURE — 99232 SBSQ HOSP IP/OBS MODERATE 35: CPT

## 2023-02-26 RX ORDER — SODIUM CHLORIDE 9 MG/ML
1000 INJECTION INTRAMUSCULAR; INTRAVENOUS; SUBCUTANEOUS
Refills: 0 | Status: DISCONTINUED | OUTPATIENT
Start: 2023-02-26 | End: 2023-03-01

## 2023-02-26 RX ADMIN — SENNA PLUS 2 TABLET(S): 8.6 TABLET ORAL at 22:02

## 2023-02-26 RX ADMIN — AMLODIPINE BESYLATE 10 MILLIGRAM(S): 2.5 TABLET ORAL at 06:26

## 2023-02-26 RX ADMIN — LEVETIRACETAM 1000 MILLIGRAM(S): 250 TABLET, FILM COATED ORAL at 18:38

## 2023-02-26 RX ADMIN — ENOXAPARIN SODIUM 110 MILLIGRAM(S): 100 INJECTION SUBCUTANEOUS at 22:01

## 2023-02-26 RX ADMIN — LATANOPROST 1 DROP(S): 0.05 SOLUTION/ DROPS OPHTHALMIC; TOPICAL at 22:02

## 2023-02-26 RX ADMIN — LEVETIRACETAM 1000 MILLIGRAM(S): 250 TABLET, FILM COATED ORAL at 06:26

## 2023-02-26 RX ADMIN — ENOXAPARIN SODIUM 110 MILLIGRAM(S): 100 INJECTION SUBCUTANEOUS at 12:54

## 2023-02-26 NOTE — PROGRESS NOTE ADULT - SUBJECTIVE AND OBJECTIVE BOX
HPI:  73 yo female brought to ER by her daughter after she had a fall secondary to her baseline right side weakness.  Below  is a synopsis of patients last admission with us.:  73 y/o F w/ no known PMHx transferred from [place] c/o slurred speech and right sided weakness. CTH initially negative, repeat on 12/23 showed L sided SDH, pts mental status worsened and CTH on 12/28 significant for L frontoparietal collection, MRI completed showed concern for possible empyema. S/p L hemicraniectomy and washout of L subdural empyema 12/30/22. S/p sinus surgery with judith purulence to L maxillary sinus and posterior bony erosiun to L sphenoid sinus 1/5.   On she was  discharged to [facility] rehab which plan to return for cranioplasty in 3 months post hemicraniectomy.  Per daughter, patient spent 4 weeks at the rehab. She was sent home 1 week ago with 4 hrs home aid and home  PT.  Her right side weakness was progressively improving and her expressive aphagia was minimally improved. Daughter fells that patient had more right side weakness the last hrs.  Today when patient  togetup from her wheelchair her right knee bucked and patient fell on her buttocks. She was wearing her helmet; did not hit her head. NO LOC. Patient remained alert and awake throughout.  She was sent here for a head CT by Dr. D'Amico.   (22 Feb 2023 17:26)      Hospital course:   2/22: admitted w/worsening right UE/LE weakness  2/23: DEEPALI overnight. MRI brain performed. Neuro exam stable.improved expressive aphasia. MRI no infarct or focal findings, pending neurplastic CT, pending ID consult, ENT scope today  2/24: DEEPALI overnight, neuro stable. Pending OR plans for cranioplasty (also to include ENT sinus debridement). Pending anti xa level today at 2pm  2/25: DEEPALI overnight, neuro stable. OR plan tentatively scheduled for Wednesday, ID cleared.  2/26: DEEPALI overnight, pending OR Wednesday      Vital Signs Last 24 Hrs  T(C): 36.7 (25 Feb 2023 20:31), Max: 36.7 (25 Feb 2023 14:01)  T(F): 98.1 (25 Feb 2023 20:31), Max: 98.1 (25 Feb 2023 20:31)  HR: 99 (25 Feb 2023 20:31) (84 - 99)  BP: 132/78 (25 Feb 2023 20:31) (114/69 - 136/86)  BP(mean): 103 (25 Feb 2023 09:04) (103 - 103)  RR: 16 (25 Feb 2023 20:31) (16 - 18)  SpO2: 95% (25 Feb 2023 20:31) (95% - 100%)    Parameters below as of 25 Feb 2023 20:31  Patient On (Oxygen Delivery Method): room air        I&O's Summary    24 Feb 2023 07:01  -  25 Feb 2023 07:00  --------------------------------------------------------  IN: 4170 mL / OUT: 1200 mL / NET: 2970 mL    25 Feb 2023 07:01  -  26 Feb 2023 04:56  --------------------------------------------------------  IN: 740 mL / OUT: 250 mL / NET: 490 mL        PHYSICAL EXAM:  General: Resting comfortably, in NAD  Head: left hemicraniectomy site is sunken and soft, non tender, no erythema, helmet in place.  Neuro: AAOx3 with choices, expressive aphasia, follows simple commands  CNII-XII: PERRL, EOMI, face symmetric, tongue midline  Motor: Right HG 1/5, R delt 2/5, R grace/tri 3/5, RLE HF/KE/KF 4/5, DF/PF 1/5, LUE/LLE 5/5   Pulm: CTA b/l  Cardiac: S1/S2, RRR  Abd: Soft, NT/ND +BS      TUBES/LINES:  [] Cannon  [] A-line  [] Lumbar Drain  [] Wound Drains  [] NGT   [] EVD   [] CVC  [] Other      DIET:  [] NPO  [x] Mechanical  [] Tube feeds    LABS:                        10.7   5.83  )-----------( 286      ( 25 Feb 2023 07:20 )             33.6     02-25    140  |  104  |  11  ----------------------------<  106<H>  4.2   |  25  |  1.12    Ca    9.3      25 Feb 2023 07:20  Phos  4.1     02-25  Mg     1.9     02-25              CAPILLARY BLOOD GLUCOSE          Drug Levels: [] N/A    CSF Analysis: [] N/A      Allergies    No Known Allergies    Intolerances        Home Medications:  acetaminophen 160 mg/5 mL oral suspension: 650 milligram(s) orally every 6 hours, As Needed - 3) (10 Myles 2023 12:51)  amLODIPine 10 mg oral tablet: 1 tab(s) orally once a day (10 Myles 2023 12:45)  fluticasone 50 mcg/inh nasal spray: 1 spray(s) nasal every 12 hours (10 Myles 2023 12:51)  levETIRAcetam 1000 mg oral tablet: 1 tab(s) orally 2 times a day (10 Myles 2023 12:45)  nystatin 100,000 units/g topical cream: 1 application topically 2 times a day (10 Myles 2023 12:49)  senna leaf extract oral tablet: 2 tab(s) orally once a day (at bedtime) (10 Myles 2023 12:51)  travoprost 0.004% ophthalmic solution: 1 drop(s) to both eyes once a day (in the evening) (02 Jan 2023 14:23)      MEDICATIONS:  MEDICATIONS  (STANDING):  amLODIPine   Tablet 10 milliGRAM(s) Oral daily  enoxaparin Injectable 110 milliGRAM(s) SubCutaneous every 12 hours  latanoprost 0.005% Ophthalmic Solution 1 Drop(s) Both EYES at bedtime  levETIRAcetam 1000 milliGRAM(s) Oral two times a day  multivitamin 1 Tablet(s) Oral daily  senna 2 Tablet(s) Oral at bedtime    MEDICATIONS  (PRN):  acetaminophen     Tablet .. 650 milliGRAM(s) Oral every 6 hours PRN Temp greater or equal to 38C (100.4F), Mild Pain (1 - 3)  bisacodyl 5 milliGRAM(s) Oral daily PRN Constipation  ondansetron Injectable 4 milliGRAM(s) IV Push every 6 hours PRN Nausea and/or Vomiting      CULTURES:      RADIOLOGY & ADDITIONAL TESTS:      ASSESSMENT:  73 y/o F w/ h/o L hemicraniectomy and washout of L subdural empyema 12/30/22 (at time presented to [facility] c/o slurred speech and right sided weakness), s/p sinus surgery with judith purulence to L maxillary sinus and posterior bony erosiun to L sphenoid sinus 1/5 w/ENT, PEs. Pt was discharged to [facility] rehab on 1/12/23 and plan was to return for cranioplasty in 3 months. Per daughter, patient spent 4 weeks at the rehab. She was sent home 1 week ago with 4 hrs home aid and home  PT. Her right side weakness was progressively improving and her expressive aphagia was minimally improved. Per daughter, Pt now with worsening right side weakness the last few hours. Today when patient got up from her wheelchair her right knee bucked and patient fell on her buttocks. She was wearing her helmet; did not hit her head. NO LOC. Patient remained alert and awake throughout.    PLAN:  Neuro:  -neuro/vitals q4h  -pain control  -helmet at bedside    Cardio:  -continue home norvasc  -SBP goal normotensive    Pulm:  -room air    Renal:  -IVL  -voiding    GI:  -regular diet  -bowel regimen    Heme:  - NO SCDs (b/l LE DVTs), SQL 110mg BID (previously on 120mg bid)  - prior LE doppler 1/8/23 b/l calf and L peroneal DVTs, repeat LE doppler 2/22 R posterior tibial vein DVT, persistent R IM calf vein DVT, resolution of L IM calf DVT  - h/o b/l mild segmental/lobar PE on CT PE protocol 1/10/23    ID  -consulted ID, finished course of ceftriaxone/metronidazole/vancomycin for subdural/epidural empyema (rare peptostreptococcus 1/6 + staph epi) end date 2/16     Dispo: regional status, full code, pendng MRI brain  D/w Dr. D'Amico      Assessment: present when checked     [] GCS   E   V   M     Heart Failure: [] Acute, [] acute on chronic, [] chronic   Heart Failure: [] Diastolic (HFpEF), [] Systolic (HRrEF), [] Combined (HFpEF and HFrEF), [] RHF, [] Pulm HTN, [] Other     [] DIONTE, [] ATN, [] AIN, [] other   [] CKD1, [] CKD2, [] CKD3, [] CKD4, [] CKD5, [] ESRD     Encephalopathy: [] Metabolic, [] Hepatic, [] Toxic, [] Neurological, [] Other     Abnormal Nutritional Status: [] malnutrition (see nutrition note), []underweight: BMI <19, [] morbid obesity: BMI >40, [] Cachexia     [] Sepsis   [] Hypovolemic shock, [] Cardiogenic shock, [] Hemorrhagic shock, [] Neurogenic shock   [] Acute respiratory failure   [] Cerebral edema, [] Brain compression / herniation   [] Functional quadriplegia   [] Acute blood loss anemia

## 2023-02-27 LAB
ANION GAP SERPL CALC-SCNC: 11 MMOL/L — SIGNIFICANT CHANGE UP (ref 5–17)
BUN SERPL-MCNC: 10 MG/DL — SIGNIFICANT CHANGE UP (ref 7–23)
CALCIUM SERPL-MCNC: 9.1 MG/DL — SIGNIFICANT CHANGE UP (ref 8.4–10.5)
CHLORIDE SERPL-SCNC: 105 MMOL/L — SIGNIFICANT CHANGE UP (ref 96–108)
CO2 SERPL-SCNC: 23 MMOL/L — SIGNIFICANT CHANGE UP (ref 22–31)
CREAT SERPL-MCNC: 0.74 MG/DL — SIGNIFICANT CHANGE UP (ref 0.5–1.3)
EGFR: 86 ML/MIN/1.73M2 — SIGNIFICANT CHANGE UP
GLUCOSE SERPL-MCNC: 102 MG/DL — HIGH (ref 70–99)
HCT VFR BLD CALC: 34.9 % — SIGNIFICANT CHANGE UP (ref 34.5–45)
HGB BLD-MCNC: 10.8 G/DL — LOW (ref 11.5–15.5)
MAGNESIUM SERPL-MCNC: 1.7 MG/DL — SIGNIFICANT CHANGE UP (ref 1.6–2.6)
MCHC RBC-ENTMCNC: 29.8 PG — SIGNIFICANT CHANGE UP (ref 27–34)
MCHC RBC-ENTMCNC: 30.9 GM/DL — LOW (ref 32–36)
MCV RBC AUTO: 96.1 FL — SIGNIFICANT CHANGE UP (ref 80–100)
NRBC # BLD: 0 /100 WBCS — SIGNIFICANT CHANGE UP (ref 0–0)
PHOSPHATE SERPL-MCNC: 3.9 MG/DL — SIGNIFICANT CHANGE UP (ref 2.5–4.5)
PLATELET # BLD AUTO: 245 K/UL — SIGNIFICANT CHANGE UP (ref 150–400)
POTASSIUM SERPL-MCNC: 3.7 MMOL/L — SIGNIFICANT CHANGE UP (ref 3.5–5.3)
POTASSIUM SERPL-SCNC: 3.7 MMOL/L — SIGNIFICANT CHANGE UP (ref 3.5–5.3)
RBC # BLD: 3.63 M/UL — LOW (ref 3.8–5.2)
RBC # FLD: 15 % — HIGH (ref 10.3–14.5)
SODIUM SERPL-SCNC: 139 MMOL/L — SIGNIFICANT CHANGE UP (ref 135–145)
WBC # BLD: 5.62 K/UL — SIGNIFICANT CHANGE UP (ref 3.8–10.5)
WBC # FLD AUTO: 5.62 K/UL — SIGNIFICANT CHANGE UP (ref 3.8–10.5)

## 2023-02-27 PROCEDURE — 99232 SBSQ HOSP IP/OBS MODERATE 35: CPT

## 2023-02-27 PROCEDURE — 99233 SBSQ HOSP IP/OBS HIGH 50: CPT

## 2023-02-27 RX ORDER — MAGNESIUM SULFATE 500 MG/ML
1 VIAL (ML) INJECTION ONCE
Refills: 0 | Status: COMPLETED | OUTPATIENT
Start: 2023-02-27 | End: 2023-02-27

## 2023-02-27 RX ORDER — MAGNESIUM SULFATE 500 MG/ML
1 VIAL (ML) INJECTION ONCE
Refills: 0 | Status: DISCONTINUED | OUTPATIENT
Start: 2023-02-27 | End: 2023-02-27

## 2023-02-27 RX ORDER — SODIUM,POTASSIUM PHOSPHATES 278-250MG
1 POWDER IN PACKET (EA) ORAL ONCE
Refills: 0 | Status: COMPLETED | OUTPATIENT
Start: 2023-02-27 | End: 2023-02-27

## 2023-02-27 RX ADMIN — AMLODIPINE BESYLATE 10 MILLIGRAM(S): 2.5 TABLET ORAL at 05:34

## 2023-02-27 RX ADMIN — Medication 100 GRAM(S): at 18:20

## 2023-02-27 RX ADMIN — SENNA PLUS 2 TABLET(S): 8.6 TABLET ORAL at 22:02

## 2023-02-27 RX ADMIN — LATANOPROST 1 DROP(S): 0.05 SOLUTION/ DROPS OPHTHALMIC; TOPICAL at 22:02

## 2023-02-27 RX ADMIN — LEVETIRACETAM 1000 MILLIGRAM(S): 250 TABLET, FILM COATED ORAL at 05:34

## 2023-02-27 RX ADMIN — Medication 1 TABLET(S): at 12:30

## 2023-02-27 RX ADMIN — LEVETIRACETAM 1000 MILLIGRAM(S): 250 TABLET, FILM COATED ORAL at 18:20

## 2023-02-27 RX ADMIN — ENOXAPARIN SODIUM 110 MILLIGRAM(S): 100 INJECTION SUBCUTANEOUS at 12:29

## 2023-02-27 RX ADMIN — SODIUM CHLORIDE 110 MILLILITER(S): 9 INJECTION INTRAMUSCULAR; INTRAVENOUS; SUBCUTANEOUS at 12:30

## 2023-02-27 RX ADMIN — Medication 1 PACKET(S): at 18:20

## 2023-02-27 NOTE — CHART NOTE - NSCHARTNOTEFT_GEN_A_CORE
DEEPALI overnight. Neuro exam stable. Continue to lay flat/intermittent Trendelenburg. OR plan for Wednesday.

## 2023-02-27 NOTE — PROGRESS NOTE ADULT - SUBJECTIVE AND OBJECTIVE BOX
HPI:  73 yo female brought to ER by her daughter after she had a fall secondary to her baseline right side weakness.  Below  is a synopsis of patients last admission with us.:  71 y/o F w/ no known PMHx transferred from [place] c/o slurred speech and right sided weakness. CTH initially negative, repeat on 12/23 showed L sided SDH, pts mental status worsened and CTH on 12/28 significant for L frontoparietal collection, MRI completed showed concern for possible empyema. S/p L hemicraniectomy and washout of L subdural empyema 12/30/22. S/p sinus surgery with judith purulence to L maxillary sinus and posterior bony erosiun to L sphenoid sinus 1/5.   On she was  discharged to [facility] rehab which plan to return for cranioplasty in 3 months post hemicraniectomy.  Per daughter, patient spent 4 weeks at the rehab. She was sent home 1 week ago with 4 hrs home aid and home  PT.  Her right side weakness was progressively improving and her expressive aphagia was minimally improved. Daughter fells that patient had more right side weakness the last hrs.  Today when patient  togetup from her wheelchair her right knee bucked and patient fell on her buttocks. She was wearing her helmet; did not hit her head. NO LOC. Patient remained alert and awake throughout.  She was sent here for a head CT by Dr. D'Amico.   (22 Feb 2023 17:26)  2/22: admitted w/worsening right UE/LE weakness  2/23: DEEPALI overnight. MRI brain performed. Neuro exam stable.improved expressive aphasia. MRI no infarct or focal findings, pending neurplastic CT, pending ID consult, ENT scope today  2/24: DEEPALI overnight, neuro stable. Pending OR plans for cranioplasty (also to include ENT sinus debridement). Pending anti xa level today at 2pm  2/25: DEEPALI overnight, neuro stable. OR plan tentatively scheduled for Wednesday, ID cleared.  2/26: DEEPALI overnight, pending OR Wednesday. New anisicoria, R pupil 2mm and fixed, left 4mm brisk, RUE trace withdrawal, RLE brisk withdrawal, CTH showing MLS. Started on IVF.Upgraded to tele for closer monitoring. Intermittent reverse trendelenberg, exam improving.  2/27: DEEPALI overnight     OVERNIGHT EVENTS: no acute events, neuro stable   Vital Signs Last 24 Hrs  T(C): 36.9 (26 Feb 2023 22:21), Max: 37.1 (26 Feb 2023 18:58)  T(F): 98.4 (26 Feb 2023 22:21), Max: 98.8 (26 Feb 2023 18:58)  HR: 98 (26 Feb 2023 23:53) (88 - 108)  BP: 146/65 (26 Feb 2023 23:53) (134/81 - 153/79)  BP(mean): 93 (26 Feb 2023 23:53) (93 - 102)  RR: 18 (26 Feb 2023 23:53) (16 - 18)  SpO2: 94% (26 Feb 2023 23:53) (94% - 100%)    Parameters below as of 26 Feb 2023 23:53  Patient On (Oxygen Delivery Method): room air        I&O's Summary    25 Feb 2023 07:01  -  26 Feb 2023 07:00  --------------------------------------------------------  IN: 740 mL / OUT: 325 mL / NET: 415 mL    26 Feb 2023 07:01  -  27 Feb 2023 00:46  --------------------------------------------------------  IN: 990 mL / OUT: 700 mL / NET: 290 mL        PHYSICAL EXAM:  General: Resting comfortably, in NAD  Head: left hemicraniectomy site is sunken and soft, non tender, no erythema, helmet in place.  Neuro: AAOx3 with choices, expressive aphasia, follows simple commands  CNII-XII: PERRL, EOMI, face symmetric, tongue midline  Motor: Right HG 1/5, R delt 2/5, R grace/tri 3/5, RLE HF/KE/KF 4-/5, DF/PF 1/5, LUE/LLE 5/5   Pulm: CTA b/l  Cardiac: S1/S2, RRR  Abd: Soft, NT/ND +BS    LABS:                        10.7   5.83  )-----------( 286      ( 25 Feb 2023 07:20 )             33.6     02-25    140  |  104  |  11  ----------------------------<  106<H>  4.2   |  25  |  1.12    Ca    9.3      25 Feb 2023 07:20  Phos  4.1     02-25  Mg     1.9           Allergies    No Known Allergies    Intolerances      MEDICATIONS:  Antibiotics:    Neuro:  acetaminophen     Tablet .. 650 milliGRAM(s) Oral every 6 hours PRN  levETIRAcetam 1000 milliGRAM(s) Oral two times a day  ondansetron Injectable 4 milliGRAM(s) IV Push every 6 hours PRN    Anticoagulation:  enoxaparin Injectable 110 milliGRAM(s) SubCutaneous every 12 hours    OTHER:  amLODIPine   Tablet 10 milliGRAM(s) Oral daily  bisacodyl 5 milliGRAM(s) Oral daily PRN  latanoprost 0.005% Ophthalmic Solution 1 Drop(s) Both EYES at bedtime  senna 2 Tablet(s) Oral at bedtime    IVF:  multivitamin 1 Tablet(s) Oral daily  sodium chloride 0.9%. 1000 milliLiter(s) IV Continuous <Continuous>      71 y/o F w/ h/o L hemicraniectomy and washout of L subdural empyema 12/30/22 (at time presented to [facility] c/o slurred speech and right sided weakness), s/p sinus surgery with judith purulence to L maxillary sinus and posterior bony erosiun to L sphenoid sinus 1/5 w/ENT, PEs. Pt was discharged to [facility] rehab on 1/12/23 and plan was to return for cranioplasty in 3 months. Per daughter, patient spent 4 weeks at the rehab. She was sent home 1 week ago with 4 hrs home aid and home  PT. Her right side weakness was progressively improving and her expressive aphagia was minimally improved. Per daughter, Pt now with worsening right side weakness the last few hours. Today when patient got up from her wheelchair her right knee bucked and patient fell on her buttocks. She was wearing her helmet; did not hit her head. NO LOC. Patient remained alert and awake throughout. Admitted to neurosurgery with plan for cranioplasty.     PLAN:  Neuro:  -neuro/vitals q4h  -pain control  -helmet at bedside  - CTH 2/26: MLS    Cardio:  -continue home norvasc  -SBP goal normotensive    Pulm:  -room air    Renal:  - NS @110cc/hr for increased MLS   - voiding    GI:  -regular diet  -bowel regimen    Heme:  - NO SCDs (b/l LE DVTs), SQL 110mg BID (previously on 120mg bid)  - prior LE doppler 1/8/23 b/l calf and L peroneal DVTs, repeat LE doppler 2/22 R posterior tibial vein DVT, persistent R IM calf vein DVT, resolution of L IM calf DVT  - h/o b/l mild segmental/lobar PE on CT PE protocol 1/10/23    ID  -consulted ID, finished course of ceftriaxone/metronidazole/vancomycin for subdural/epidural empyema (rare peptostreptococcus 1/6 + staph epi) end date 2/16     Dispo: regional status, full code, pendng MRI brain  D/w Dr. D'Amico

## 2023-02-27 NOTE — PROGRESS NOTE ADULT - SUBJECTIVE AND OBJECTIVE BOX
O/N Events: Stepped up to Telemetry due to change in mental status  Subjective/ROS: No complaints, much improved speech today. Denies HA, CP, SOB, n/v, changes in bowel/urinary habits.  12pt ROS otherwise negative.    VITALS  Vital Signs Last 24 Hrs  T(C): 36.7 (27 Feb 2023 10:14), Max: 37.1 (26 Feb 2023 18:58)  T(F): 98 (27 Feb 2023 10:14), Max: 98.8 (26 Feb 2023 18:58)  HR: 94 (27 Feb 2023 12:29) (84 - 108)  BP: 145/67 (27 Feb 2023 12:29) (129/61 - 153/79)  BP(mean): 96 (27 Feb 2023 12:29) (88 - 102)  RR: 17 (27 Feb 2023 12:29) (16 - 18)  SpO2: 100% (27 Feb 2023 12:29) (94% - 100%)    Parameters below as of 27 Feb 2023 12:29  Patient On (Oxygen Delivery Method): room air        I&O's Summary    26 Feb 2023 07:01  -  27 Feb 2023 07:00  --------------------------------------------------------  IN: 1540 mL / OUT: 1150 mL / NET: 390 mL    27 Feb 2023 07:01  -  27 Feb 2023 13:51  --------------------------------------------------------  IN: 1140 mL / OUT: 550 mL / NET: 590 mL        CAPILLARY BLOOD GLUCOSE          PHYSICAL EXAM  General: A&Ox3; NAD; word finding difficulty but much faster than when seen friday  Head: Left head caved in less so than Friday, PERRL; EOMI; anicteric sclera  Neck: Supple; no JVD  Respiratory: CTA B/L; no wheezes/crackles/rales auscultated w/ good air movement  Cardiovascular: Regular rhythm/rate; S1/S2; no gallops or murmurs auscultated  Gastrointestinal: Soft; NTND w/out rebound tenderness or guarding; bowel sounds normal  Extremities: WWP; no edema or cyanosis; radial/pedal pulses palpable  Neurological:  CNII-XII grossly intact; R sided weakness  Skin: No rashes noted  Vasc: +2 DP/PT pulses b/l   Psych: Appropriate Affect    MEDICATIONS  (STANDING):  amLODIPine   Tablet 10 milliGRAM(s) Oral daily  enoxaparin Injectable 110 milliGRAM(s) SubCutaneous every 12 hours  latanoprost 0.005% Ophthalmic Solution 1 Drop(s) Both EYES at bedtime  levETIRAcetam 1000 milliGRAM(s) Oral two times a day  multivitamin 1 Tablet(s) Oral daily  senna 2 Tablet(s) Oral at bedtime  sodium chloride 0.9%. 1000 milliLiter(s) (110 mL/Hr) IV Continuous <Continuous>    MEDICATIONS  (PRN):  acetaminophen     Tablet .. 650 milliGRAM(s) Oral every 6 hours PRN Temp greater or equal to 38C (100.4F), Mild Pain (1 - 3)  bisacodyl 5 milliGRAM(s) Oral daily PRN Constipation  ondansetron Injectable 4 milliGRAM(s) IV Push every 6 hours PRN Nausea and/or Vomiting      No Known Allergies      LABS                        10.8   5.62  )-----------( 245      ( 27 Feb 2023 12:52 )             34.9     02-27    139  |  105  |  10  ----------------------------<  102<H>  3.7   |  23  |  0.74    Ca    9.1      27 Feb 2023 12:52  Phos  3.9     02-27  Mg     1.7     02-27                IMAGING/EKG/ETC: reviewed

## 2023-02-27 NOTE — PROGRESS NOTE ADULT - ASSESSMENT
73 y/o F w/ h/o L hemicraniectomy and washout of L subdural empyema 12/30/22 (at time presented to [facility] c/o slurred speech and right sided weakness), s/p sinus surgery with judith purulence to L maxillary sinus and posterior bony erosion to L sphenoid sinus 1/5 w/ENT, PEs. Pt was discharged to [facility] rehab on 1/12/23 and plan was to return for cranioplasty in 3 months. Per daughter, patient spent 4 weeks at the rehab. She was sent home 1 week ago with 4 hrs home aid and home  PT. Her right side weakness was progressively improving and her expressive aphagia was minimally improved. Pending OR for cranioplasty

## 2023-02-28 ENCOUNTER — TRANSCRIPTION ENCOUNTER (OUTPATIENT)
Age: 73
End: 2023-02-28

## 2023-02-28 DIAGNOSIS — E87.6 HYPOKALEMIA: ICD-10-CM

## 2023-02-28 DIAGNOSIS — G93.5 COMPRESSION OF BRAIN: ICD-10-CM

## 2023-02-28 DIAGNOSIS — I82.411 ACUTE EMBOLISM AND THROMBOSIS OF RIGHT FEMORAL VEIN: ICD-10-CM

## 2023-02-28 DIAGNOSIS — I82.541 CHRONIC EMBOLISM AND THROMBOSIS OF RIGHT TIBIAL VEIN: ICD-10-CM

## 2023-02-28 LAB
ANION GAP SERPL CALC-SCNC: 9 MMOL/L — SIGNIFICANT CHANGE UP (ref 5–17)
BLD GP AB SCN SERPL QL: NEGATIVE — SIGNIFICANT CHANGE UP
BUN SERPL-MCNC: 7 MG/DL — SIGNIFICANT CHANGE UP (ref 7–23)
CALCIUM SERPL-MCNC: 9.1 MG/DL — SIGNIFICANT CHANGE UP (ref 8.4–10.5)
CHLORIDE SERPL-SCNC: 105 MMOL/L — SIGNIFICANT CHANGE UP (ref 96–108)
CO2 SERPL-SCNC: 25 MMOL/L — SIGNIFICANT CHANGE UP (ref 22–31)
CREAT SERPL-MCNC: 0.74 MG/DL — SIGNIFICANT CHANGE UP (ref 0.5–1.3)
EGFR: 86 ML/MIN/1.73M2 — SIGNIFICANT CHANGE UP
GLUCOSE SERPL-MCNC: 99 MG/DL — SIGNIFICANT CHANGE UP (ref 70–99)
HCT VFR BLD CALC: 32.3 % — LOW (ref 34.5–45)
HGB BLD-MCNC: 10.6 G/DL — LOW (ref 11.5–15.5)
MAGNESIUM SERPL-MCNC: 2 MG/DL — SIGNIFICANT CHANGE UP (ref 1.6–2.6)
MCHC RBC-ENTMCNC: 30.3 PG — SIGNIFICANT CHANGE UP (ref 27–34)
MCHC RBC-ENTMCNC: 32.8 GM/DL — SIGNIFICANT CHANGE UP (ref 32–36)
MCV RBC AUTO: 92.3 FL — SIGNIFICANT CHANGE UP (ref 80–100)
NRBC # BLD: 0 /100 WBCS — SIGNIFICANT CHANGE UP (ref 0–0)
PHOSPHATE SERPL-MCNC: 4.1 MG/DL — SIGNIFICANT CHANGE UP (ref 2.5–4.5)
PLATELET # BLD AUTO: 244 K/UL — SIGNIFICANT CHANGE UP (ref 150–400)
POTASSIUM SERPL-MCNC: 3.4 MMOL/L — LOW (ref 3.5–5.3)
POTASSIUM SERPL-SCNC: 3.4 MMOL/L — LOW (ref 3.5–5.3)
RBC # BLD: 3.5 M/UL — LOW (ref 3.8–5.2)
RBC # FLD: 14.7 % — HIGH (ref 10.3–14.5)
RH IG SCN BLD-IMP: POSITIVE — SIGNIFICANT CHANGE UP
SARS-COV-2 RNA SPEC QL NAA+PROBE: SIGNIFICANT CHANGE UP
SODIUM SERPL-SCNC: 139 MMOL/L — SIGNIFICANT CHANGE UP (ref 135–145)
WBC # BLD: 6.02 K/UL — SIGNIFICANT CHANGE UP (ref 3.8–10.5)
WBC # FLD AUTO: 6.02 K/UL — SIGNIFICANT CHANGE UP (ref 3.8–10.5)

## 2023-02-28 PROCEDURE — 71045 X-RAY EXAM CHEST 1 VIEW: CPT | Mod: 26

## 2023-02-28 PROCEDURE — 99232 SBSQ HOSP IP/OBS MODERATE 35: CPT

## 2023-02-28 PROCEDURE — 31237 NSL/SINS NDSC SURG BX POLYPC: CPT | Mod: 50,79

## 2023-02-28 RX ORDER — LIDOCAINE 4 G/100G
1 CREAM TOPICAL ONCE
Refills: 0 | Status: COMPLETED | OUTPATIENT
Start: 2023-02-28 | End: 2023-02-28

## 2023-02-28 RX ORDER — CHLORHEXIDINE GLUCONATE 213 G/1000ML
1 SOLUTION TOPICAL EVERY 12 HOURS
Refills: 0 | Status: DISCONTINUED | OUTPATIENT
Start: 2023-02-28 | End: 2023-03-01

## 2023-02-28 RX ORDER — OXYMETAZOLINE HYDROCHLORIDE 0.5 MG/ML
1 SPRAY NASAL ONCE
Refills: 0 | Status: COMPLETED | OUTPATIENT
Start: 2023-02-28 | End: 2023-02-28

## 2023-02-28 RX ORDER — POVIDONE-IODINE 5 %
1 AEROSOL (ML) TOPICAL ONCE
Refills: 0 | Status: DISCONTINUED | OUTPATIENT
Start: 2023-02-28 | End: 2023-03-01

## 2023-02-28 RX ORDER — POTASSIUM CHLORIDE 20 MEQ
20 PACKET (EA) ORAL ONCE
Refills: 0 | Status: COMPLETED | OUTPATIENT
Start: 2023-02-28 | End: 2023-02-28

## 2023-02-28 RX ADMIN — SENNA PLUS 2 TABLET(S): 8.6 TABLET ORAL at 22:24

## 2023-02-28 RX ADMIN — Medication 1 TABLET(S): at 11:06

## 2023-02-28 RX ADMIN — Medication 650 MILLIGRAM(S): at 22:24

## 2023-02-28 RX ADMIN — AMLODIPINE BESYLATE 10 MILLIGRAM(S): 2.5 TABLET ORAL at 06:06

## 2023-02-28 RX ADMIN — LEVETIRACETAM 1000 MILLIGRAM(S): 250 TABLET, FILM COATED ORAL at 06:06

## 2023-02-28 RX ADMIN — SODIUM CHLORIDE 110 MILLILITER(S): 9 INJECTION INTRAMUSCULAR; INTRAVENOUS; SUBCUTANEOUS at 22:29

## 2023-02-28 RX ADMIN — ENOXAPARIN SODIUM 110 MILLIGRAM(S): 100 INJECTION SUBCUTANEOUS at 00:24

## 2023-02-28 RX ADMIN — Medication 20 MILLIEQUIVALENT(S): at 11:07

## 2023-02-28 RX ADMIN — LATANOPROST 1 DROP(S): 0.05 SOLUTION/ DROPS OPHTHALMIC; TOPICAL at 22:24

## 2023-02-28 NOTE — PROGRESS NOTE ADULT - ASSESSMENT
Assessment and Plan:   72y Female PMH L hemicraniectomy and washout of L subdural empyema 12/30/22, s/p FESS 1/5 with Dr. Abarca (septoplasty, maxillary antrostomies, judith pus in L maxillary sinus and L sphenoid sinus with bony erosion). She was discharged to [facility] rehab facility and missed her follow up appointments for debridement. She presented to [facility] yesterday and was admitted to [facility] for worsening R sided weakness. Patient able to follow simple commands and answer simple questions, she is alert. She denies any anterior or posterior nasal dripping, epistaxis, metallic taste in mouth, frontal HA, or blurry vision at this time. No fevers or chills reported. CTH showing interval worsening of L maxillary sinus opacification, extensive crusting and mucous present 2/2 missed appointments for post-surgical debridement. Patient was debrided bedside today- no evidence of pus or infection.     PLAN:  - Continue with OR tomorrow with NS  - Recommend nasal rinses BID with budesonide ( please order from pharmacy     [facility] at [phone] with any questions/concerns.    Connie Mosher PA-C  02-28-23 @ 14:10

## 2023-02-28 NOTE — PROGRESS NOTE ADULT - NSPROGADDITIONALINFOA_GEN_ALL_CORE
ENT Attending    Pt well known to me.  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    Nasal Endoscopy shows crusting and debris which was debrided; nasal airways and middle meati are widely patent. There are some synechiae, but no necrotic tissue. There is mucosal edema in the right sphenoid, but it is patent. The left sphenoid is occluded w fibrinous debris, some of which debrided. There is no gross mucopus.

## 2023-02-28 NOTE — PROGRESS NOTE ADULT - ASSESSMENT
73 y/o F w/ h/o L hemicraniectomy and washout of L subdural empyema 12/30/22 (at time presented to [facility] c/o slurred speech and right sided weakness), s/p sinus surgery with judith purulence to L maxillary sinus and posterior bony erosion to L sphenoid sinus 1/5 w/ENT, PEs. Pt was discharged to [facility] rehab on 1/12/23 and plan was to return for cranioplasty in 3 months. Per daughter, patient spent 4 weeks at the rehab. She was sent home 1 week ago with 4 hrs home aid and home  PT. Her right side weakness was progressively improving and her expressive aphagia was minimally improved. Pending OR for cranioplasty

## 2023-02-28 NOTE — PROGRESS NOTE ADULT - SUBJECTIVE AND OBJECTIVE BOX
O/N Events: DEEPALI  Subjective/ROS: No complaints. Denies HA, CP, SOB, n/v, changes in bowel/urinary habits.  12pt ROS otherwise negative.    VITALS  Vital Signs Last 24 Hrs  T(C): 36.9 (28 Feb 2023 04:49), Max: 36.9 (27 Feb 2023 17:18)  T(F): 98.5 (28 Feb 2023 04:49), Max: 98.5 (28 Feb 2023 04:49)  HR: 78 (28 Feb 2023 03:50) (78 - 94)  BP: 131/59 (28 Feb 2023 03:50) (131/59 - 152/66)  BP(mean): 85 (28 Feb 2023 03:50) (85 - 96)  RR: 17 (28 Feb 2023 03:50) (17 - 18)  SpO2: 96% (28 Feb 2023 03:50) (96% - 100%)    Parameters below as of 28 Feb 2023 03:50  Patient On (Oxygen Delivery Method): room air    I&O's Summary    27 Feb 2023 07:01  -  28 Feb 2023 07:00  --------------------------------------------------------  IN: 3360 mL / OUT: 2400 mL / NET: 960 mL    CAPILLARY BLOOD GLUCOSE    PHYSICAL EXAM  General: A&Ox3; NAD; word finding difficulty  Head: Left head caved in less so than Friday, PERRL; EOMI; anicteric sclera  Neck: Supple; no JVD  Respiratory: CTA B/L; no wheezes/crackles/rales auscultated w/ good air movement  Cardiovascular: Regular rhythm/rate; S1/S2; no gallops or murmurs auscultated  Gastrointestinal: Soft; NTND w/out rebound tenderness or guarding; bowel sounds normal  Extremities: WWP; no edema or cyanosis; radial/pedal pulses palpable  Neurological:  CNII-XII grossly intact; R sided weakness  Skin: No rashes noted  Vasc: +2 DP/PT pulses b/l   Psych: Appropriate Affect    MEDICATIONS  (STANDING):  amLODIPine   Tablet 10 milliGRAM(s) Oral daily  latanoprost 0.005% Ophthalmic Solution 1 Drop(s) Both EYES at bedtime  levETIRAcetam 1000 milliGRAM(s) Oral two times a day  multivitamin 1 Tablet(s) Oral daily  potassium chloride    Tablet ER 20 milliEquivalent(s) Oral once  senna 2 Tablet(s) Oral at bedtime  sodium chloride 0.9%. 1000 milliLiter(s) (110 mL/Hr) IV Continuous <Continuous>    MEDICATIONS  (PRN):  acetaminophen     Tablet .. 650 milliGRAM(s) Oral every 6 hours PRN Temp greater or equal to 38C (100.4F), Mild Pain (1 - 3)  bisacodyl 5 milliGRAM(s) Oral daily PRN Constipation  ondansetron Injectable 4 milliGRAM(s) IV Push every 6 hours PRN Nausea and/or Vomiting      No Known Allergies      LABS                        10.6   6.02  )-----------( 244      ( 28 Feb 2023 05:30 )             32.3     02-28    139  |  105  |  7   ----------------------------<  99  3.4<L>   |  25  |  0.74    Ca    9.1      28 Feb 2023 05:30  Phos  4.1     02-28  Mg     2.0     02-28                IMAGING/EKG/ETC: reviewed

## 2023-02-28 NOTE — CHART NOTE - NSCHARTNOTEFT_GEN_A_CORE
DEEPALI overnight. Neuro exam stable with improved RUE/RLE strength. Bedside debridement with ENT. Start BID Budesonide nasal washes. Pending OR tomorrow with ENT. Holding SQL for OR.

## 2023-02-28 NOTE — PROGRESS NOTE ADULT - SUBJECTIVE AND OBJECTIVE BOX
OTOLARYNGOLOGY (ENT) PROGRESS NOTE    PATIENT: JEMIMA DFUFY  MRN: [id number]  : 52  [id number]  DATE OF SERVICE:  23  			         Subjective/ Interval: : patient did well overnight, bedside debridement with Dr. Abarca, plan for OR with NSGY tomorrow.     ALLERGIES:  No Known Allergies      MEDICATIONS:  Antiinfectives:     IV fluids:  multivitamin 1 Tablet(s) Oral daily  sodium chloride 0.9%. 1000 milliLiter(s) IV Continuous <Continuous>    Hematologic/Anticoagulation:    Pain medications/Neuro:  acetaminophen     Tablet .. 650 milliGRAM(s) Oral every 6 hours PRN  levETIRAcetam 1000 milliGRAM(s) Oral two times a day  ondansetron Injectable 4 milliGRAM(s) IV Push every 6 hours PRN    Endocrine Medications:     All other standing medications:   amLODIPine   Tablet 10 milliGRAM(s) Oral daily  latanoprost 0.005% Ophthalmic Solution 1 Drop(s) Both EYES at bedtime  senna 2 Tablet(s) Oral at bedtime    All other PRN medications:  bisacodyl 5 milliGRAM(s) Oral daily PRN    Vital Signs Last 24 Hrs  T(C): 36.9 (2023 09:00), Max: 36.9 (2023 17:18)  T(F): 98.4 (2023 09:00), Max: 98.5 (2023 04:49)  HR: 84 (2023 08:19) (78 - 94)  BP: 140/62 (2023 08:19) (131/59 - 152/66)  BP(mean): 89 (2023 08:19) (85 - 95)  RR: 18 (2023 08:19) (17 - 18)  SpO2: 99% (2023 08:19) (96% - 99%)    Parameters below as of 2023 08:19  Patient On (Oxygen Delivery Method): room air           @ 07:01  -   @ 07:00  --------------------------------------------------------  IN:    Oral Fluid: 720 mL    sodium chloride 0.9%: 2640 mL  Total IN: 3360 mL    OUT:    Voided (mL): 2400 mL  Total OUT: 2400 mL    Total NET: 960 mL       @ 07:01  -   @ 14:10  --------------------------------------------------------  IN:    sodium chloride 0.9%: 330 mL  Total IN: 330 mL    OUT:  Total OUT: 0 mL    Total NET: 330 mL      PHYSICAL EXAM:  General: Resting comfortably, in NAD  Head: left hemicraniectomy site is sunken and soft, non tender, no erythema  Neuro: AAOx3, able to follow commands  Ears: external ears normal  Nose: nares patent, as documented below  Pulm: unlabored respirations  Cardiac: regular rate  Abd: soft, nondistended      LABS                       10.6   6.02  )-----------( 244      ( 2023 05:30 )             32.3        139  |  105  |  7   ----------------------------<  99  3.4<L>   |  25  |  0.74    Ca    9.1      2023 05:30  Phos  4.1       Mg     2.0           Endocrine Panel-  Calcium, Total Serum: 9.1 mg/dL ( @ 05:30)      PROCEDURE NOTE:    Procedure: Flexible nasopharyngoscopy  Pre-procedure diagnosis/Indication for procedure: nasal debridement     Description:  A flexible endoscope was used to examine the left and right nasal cavities. The nasal valve areas were examined for abnormalities or collapse. The inferior and middle turbinates were evaluated. The middle and superior meatuses, the sphenoethmoid recesses, and the nasopharynx were examined and inspected for mucopurulence, polyps, and nasal masses.    Findings:   Extensive crusting present throughout nasal cavity and inferior turbinates, polyps visualized  sphenoid visualized b/l with granulation tissue, suctioned nasal crusting b/l , no evidence of pus or infection

## 2023-02-28 NOTE — PROGRESS NOTE ADULT - SUBJECTIVE AND OBJECTIVE BOX
HPI:  73 yo female brought to ER by her daughter after she had a fall secondary to her baseline right side weakness.  Below  is a synopsis of patients last admission with us.:  73 y/o F w/ no known PMHx transferred from [facility] c/o slurred speech and right sided weakness. CTH initially negative, repeat on 12/23 showed L sided SDH, pts mental status worsened and CTH on 12/28 significant for L frontoparietal collection, MRI completed showed concern for possible empyema. S/p L hemicraniectomy and washout of L subdural empyema 12/30/22. S/p sinus surgery with judith purulence to L maxillary sinus and posterior bony erosiun to L sphenoid sinus 1/5.   On she was  discharged to [facility] rehab which plan to return for cranioplasty in 3 months post hemicraniectomy.  Per daughter, patient spent 4 weeks at the rehab. She was sent home 1 week ago with 4 hrs home aid and home  PT.  Her right side weakness was progressively improving and her expressive aphagia was minimally improved. Daughter fells that patient had more right side weakness the last hrs.  Today when patient  togetup from her wheelchair her right knee bucked and patient fell on her buttocks. She was wearing her helmet; did not hit her head. NO LOC. Patient remained alert and awake throughout.  She was sent here for a head CT by Dr. D'Amico.   (22 Feb 2023 17:26)    Hospital Course:  2/22: admitted w/worsening right UE/LE weakness  2/23: DEEPALI overnight. MRI brain performed. Neuro exam stable.improved expressive aphasia. MRI no infarct or focal findings, pending neurplastic CT, pending ID consult, ENT scope today  2/24: DEEPALI overnight, neuro stable. Pending OR plans for cranioplasty (also to include ENT sinus debridement). Pending anti xa level today at 2pm  2/25: DEEPALI overnight, neuro stable. OR plan tentatively scheduled for Wednesday, ID cleared.  2/26: DEEPALI overnight, pending OR Wednesday. New anisicoria, R pupil 2mm and fixed, left 4mm brisk, RUE trace withdrawal, RLE brisk withdrawal, CTH showing MLS. Started on IVF. Upgraded to tele for closer monitoring. Intermittent trendelenberg, exam improving.  2/27: DEEPALI overnight. Neuro exam stable. Continue to lay flat/intermittent trendelenberg. OR plan for Wednesday.      OVERNIGHT EVENTS: DEEPALI overnight. Neuro stable. Pending OR tomorrow.     Vital Signs Last 24 Hrs  T(C): 36.4 (27 Feb 2023 21:02), Max: 36.9 (27 Feb 2023 17:18)  T(F): 97.5 (27 Feb 2023 21:02), Max: 98.4 (27 Feb 2023 17:18)  HR: 94 (28 Feb 2023 00:26) (84 - 94)  BP: 152/66 (28 Feb 2023 00:26) (129/61 - 152/66)  BP(mean): 95 (28 Feb 2023 00:26) (88 - 96)  RR: 18 (28 Feb 2023 00:26) (17 - 18)  SpO2: 99% (28 Feb 2023 00:26) (96% - 100%)    Parameters below as of 28 Feb 2023 00:26  Patient On (Oxygen Delivery Method): room air        I&O's Summary    26 Feb 2023 07:01  -  27 Feb 2023 07:00  --------------------------------------------------------  IN: 1540 mL / OUT: 1150 mL / NET: 390 mL    27 Feb 2023 07:01  -  28 Feb 2023 00:41  --------------------------------------------------------  IN: 2040 mL / OUT: 1100 mL / NET: 940 mL        PHYSICAL EXAM:  General: Patient resting comfortably on stretcher, in no acute distress.   Head: left hemicraniectomy site is sunken and soft, non tender, no erythema, helmet in place.  Neuro: AAOx3 with choices, expressive aphasia, follows simple commands  CNII-XII: PERRL, EOMI, face symmetric, tongue midline  Motor: RUE 3/5, RLE 4-/5, LUE/LLE 5/5   Pulm: CTA b/l  Cardiac: S1/S2, RRR  Abd: Soft, NT/ND +BS  Extremities: warm, well perfused, no lower extremity edema.     LABS:                        10.8   5.62  )-----------( 245      ( 27 Feb 2023 12:52 )             34.9     02-27    139  |  105  |  10  ----------------------------<  102<H>  3.7   |  23  |  0.74    Ca    9.1      27 Feb 2023 12:52  Phos  3.9     02-27  Mg     1.7     02-27              CAPILLARY BLOOD GLUCOSE          Drug Levels: [] N/A    CSF Analysis: [] N/A      Allergies    No Known Allergies    Intolerances      MEDICATIONS:  Antibiotics:    Neuro:  acetaminophen     Tablet .. 650 milliGRAM(s) Oral every 6 hours PRN  levETIRAcetam 1000 milliGRAM(s) Oral two times a day  ondansetron Injectable 4 milliGRAM(s) IV Push every 6 hours PRN    Anticoagulation:  enoxaparin Injectable 110 milliGRAM(s) SubCutaneous every 12 hours    OTHER:  amLODIPine   Tablet 10 milliGRAM(s) Oral daily  bisacodyl 5 milliGRAM(s) Oral daily PRN  latanoprost 0.005% Ophthalmic Solution 1 Drop(s) Both EYES at bedtime  senna 2 Tablet(s) Oral at bedtime    IVF:  multivitamin 1 Tablet(s) Oral daily  sodium chloride 0.9%. 1000 milliLiter(s) IV Continuous <Continuous>    CULTURES:    RADIOLOGY & ADDITIONAL TESTS:      ASSESSMENT:  73 y/o F w/ h/o L hemicraniectomy and washout of L subdural empyema 12/30/22 (at time presented to [facility] c/o slurred speech and right sided weakness), s/p sinus surgery with judith purulence to L maxillary sinus and posterior bony erosiun to L sphenoid sinus 1/5 w/ENT, PEs. Pt was discharged to [facility] rehab on 1/12/23 and plan was to return for cranioplasty in 3 months. Per daughter, patient spent 4 weeks at the rehab. She was sent home 1 week ago with 4 hrs home aid and home  PT. Her right side weakness was progressively improving and her expressive aphagia was minimally improved. Per daughter, Pt now with worsening right side weakness the last few hours. Today when patient got up from her wheelchair her right knee bucked and patient fell on her buttocks. She was wearing her helmet; did not hit her head. NO LOC. Patient remained alert and awake throughout. Admitted to neurosurgery with plan for cranioplasty.     PLAN:  Neuro:  -neuro/vitals q4h  - keppra 1000mg BID   -pain control  -helmet at bedside  -med clearance obtained   - [facility] 2/26: MLS  - OR for cranioplasty tentatively Wednesday 2/29    Cardio:  -continue home norvasc  -SBP goal normotensive    Pulm:  -room air    Renal:  - NS @110cc/hr for increased MLS   - voiding  - electrolyte repletion PRN     GI:  -regular diet  -bowel regimen  - last  2/26     Endo:  - monitor am fingersticks   - a1c 4.7     Heme:  - NO SCDs (b/l LE DVTs), SQL 110mg BID (previously on 120mg bid)  - prior LE doppler 1/8/23 b/l calf and L peroneal DVTs, repeat LE doppler 2/22 R posterior tibial vein DVT, persistent R IM calf vein DVT, resolution of L IM calf DVT  - h/o b/l mild segmental/lobar PE on CT PE protocol 1/10/23    ID  - Afebrile   -consulted ID, finished course of ceftriaxone/metronidazole/vancomycin for subdural/epidural empyema (rare peptostreptococcus 1/6 + staph epi) end date 2/16     Dispo: regional status, full code, pendng MRI brain  D/w Dr. D'Amico

## 2023-02-28 NOTE — PROGRESS NOTE ADULT - SUBJECTIVE AND OBJECTIVE BOX
Surgery: Left Cranioplasty, Complex neuroplastic closure.  Consent: Signed by patient vs HCP                   NAME/NUMBER of HCP: Signed by daughter. Kathya.    No Known Allergies      OVERNIGHT EVENTS: no significant events overnight    T(C): 36.9 (23 @ 09:00), Max: 36.9 (23 @ 17:18)  HR: 84 (23 @ 08:19) (78 - 94)  BP: 140/62 (23 @ 08:19) (131/59 - 152/66)  RR: 18 (23 @ 08:19) (17 - 18)  SpO2: 99% (23 @ 08:19) (96% - 99%)  Wt(kg): --    EXAM:  General: Patient resting comfortably on stretcher, in no acute distress.   Head: left hemicraniectomy site is sunken and soft, non tender, no erythema, helmet in place.  Neuro: AAOx3 with choices, expressive aphasia, follows simple commands  CNII-XII: PERRL, EOMI, face symmetric, tongue midline  Motor: RUE 3/5, RLE 4-/5, LUE/LLE 5/5   Pulm: CTA b/l  Cardiac: S1/S2, RRR  Abd: Soft, NT/ND +BS  Extremities: warm, well perfused, no lower extremity edema.           139  |  105  |  7   ----------------------------<  99  3.4<L>   |  25  |  0.74    Ca    9.1      2023 05:30  Phos  4.1       Mg     2.0           CBC Full  -  ( 2023 05:30 )  WBC Count : 6.02 K/uL  RBC Count : 3.50 M/uL  Hemoglobin : 10.6 g/dL  Hematocrit : 32.3 %  Platelet Count - Automated : 244 K/uL  Mean Cell Volume : 92.3 fl  Mean Cell Hemoglobin : 30.3 pg  Mean Cell Hemoglobin Concentration : 32.8 gm/dL  Auto Neutrophil # : x  Auto Lymphocyte # : x  Auto Monocyte # : x  Auto Eosinophil # : x  Auto Basophil # : x  Auto Neutrophil % : x  Auto Lymphocyte % : x  Auto Monocyte % : x  Auto Eosinophil % : x  Auto Basophil % : x        Pregnancy test:  Type & Screen (in past 72hrs):    CXR: NAD  EKG: SR in chart  ECHO:  Medical Clearances: in chart  Other Clearances:  Cleared by ID, Cleared by ENT.    Last dose of antiplatelet/anticoagulation dru/27    Implanted Devices (pacemaker, drug pump...etc):  []YES   [] NO                  If yes --> EPS consulted to interrogate/adjust device:                 Assessment:  :  73 y/o F w/ h/o L hemicraniectomy and washout of L subdural empyema 22 (at time presented to [facility] c/o slurred speech and right sided weakness), s/p sinus surgery with judith purulence to L maxillary sinus and posterior bony erosiun to L sphenoid sinus  w/ENT, PEs. Pt was discharged to [facility] rehab on 23 and plan was to return for cranioplasty in 3 months. Per daughter, patient spent 4 weeks at the rehab. She was sent home 1 week ago with 4 hrs home aid and home  PT. Her right side weakness was progressively improving and her expressive aphagia was minimally improved. Per daughter, Pt now with worsening right side weakness the last few hours. Today when patient got up from her wheelchair her right knee bucked and patient fell on her buttocks. She was wearing her helmet; did not hit her head. NO LOC. Patient remained alert and awake throughout. Admitted to neurosurgery with plan for cranioplasty.     Plan: NPO after midnight, except meds.  - wash hair / head prior to OR  - Send to OR on call

## 2023-02-28 NOTE — CHART NOTE - NSCHARTNOTEFT_GEN_A_CORE
Admitting Diagnosis:   Patient is a 72y old  Female who presents with a chief complaint of R.side weakness (28 Feb 2023 09:07)    PAST MEDICAL & SURGICAL HISTORY:  HTN (hypertension)  S/P craniotomy    Current Nutrition Order:  Soft and bite sized diet      PO Intake: Good (%) [x   ]  Fair (50-75%) [   ] Poor (<25%) [   ]    GI Issues:   WDL, last  2/25  No n/v/d/c  No abd distention or discomfort noted    Pain:  No pain noted at this time    Skin Integrity:  WDL, jeanna score 14  No edema noted  No pressure ulcers noted    Labs:   02-28    139  |  105  |  7   ----------------------------<  99  3.4<L>   |  25  |  0.74    Ca    9.1      28 Feb 2023 05:30  Phos  4.1     02-28  Mg     2.0     02-28    Medications:  MEDICATIONS  (STANDING):  amLODIPine   Tablet 10 milliGRAM(s) Oral daily  latanoprost 0.005% Ophthalmic Solution 1 Drop(s) Both EYES at bedtime  levETIRAcetam 1000 milliGRAM(s) Oral two times a day  multivitamin 1 Tablet(s) Oral daily  senna 2 Tablet(s) Oral at bedtime  sodium chloride 0.9%. 1000 milliLiter(s) (110 mL/Hr) IV Continuous <Continuous>    MEDICATIONS  (PRN):  acetaminophen     Tablet .. 650 milliGRAM(s) Oral every 6 hours PRN Temp greater or equal to 38C (100.4F), Mild Pain (1 - 3)  bisacodyl 5 milliGRAM(s) Oral daily PRN Constipation  ondansetron Injectable 4 milliGRAM(s) IV Push every 6 hours PRN Nausea and/or Vomiting    Admitting Anthropometrics:  Drug Dosing Weight  Height (cm): 162.6 (22 Feb 2023 12:05)/ 64"  Weight (kg): 113.4 (22 Feb 2023 12:05)/ 250lbs  BMI (kg/m2): 42.9 (22 Feb 2023 12:05)  IBW: 120lbs/ 54k.5g  %IBW: 208%    Weight:  Previous admit weight; 278lbs (12/30)  This admit weight; 250lbs (2/22)- question accuracy. Please obtain new weight when feasible    Weight Change:   Please obtain new weight when medically feasible. Cont to trend weight biweekly.     Nutrition Focused Physical Exam: Completed [   ]  Not Pertinent [ x  ]    Estimated energy needs:   IBW used for calculations as pt >120% of IBW (208%). Needs adjusted for morbid obesity, pre/post op nutrition optimization, advanced age  Kcal (25-30 kcal/kg): 5571-5676 kcal  Protein (1.2-1.4 g/kg pro): 66-77g pro  Fluids (30-35 ml/kg): 0400-8481 ml    Subjective:   71 y/o F w/ h/o L hemicraniectomy and washout of L subdural empyema 12/30/22 (at time presented to [facility] c/o slurred speech and right sided weakness), s/p sinus surgery with judith purulence to L maxillary sinus and posterior bony erosiun to L sphenoid sinus 1/5 w/ENT, PEs. Pt was discharged to [facility] rehab on 1/12/23 and plan was to return for cranioplasty in 3 months. Per daughter, patient spent 4 weeks at the rehab. She was sent home 1 week ago with 4 hrs home aid and home  PT. Her right side weakness was progressively improving and her expressive aphagia was minimally improved. Per daughter, Pt now with worsening right side weakness the last few hours. Today when patient got up from her wheelchair her right knee bucked and patient fell on her buttocks. She was wearing her helmet; did not hit her head. NO LOC. Patient remained alert and awake throughout. Admitted to neurosurgery with plan for cranioplasty tentatively Wednesday 2/29.     On assessment, pt resting in bed this am comfortably. Pt with some noted expressive aphasia, but otherwise able to participate in majority of assessment. Currently on soft and bite sized tolerating PO well. Pt consuming >50% fo meals. No n/v/d/c. No abd distention or discomfort noted. Last BM 2/25. Pt reported good appetite PTA. Follows regular diet without noted restriction. NKFA. No notable changes to UBW per pt. Suspect current admit weight is incorrect- please update admit weight when feasible. Education provided on importance of adequate PO intake as tolerated with emphasis on lean protein to support wound healing. RD to follow.     Nutrition Diagnosis: Increased kcal, pro needs RT pre/post op nutrition optimization AEB plan for cranioplasty     Active [ x  ]  Resolved [   ]    Goal:  Consistently meet >75% est needs    Recommendations:  1. Cont with soft and bite sized diet  2. Pain and bowel regimen per team   3. Cont to monitor lytes and replete prn   4. RD diet edu prn    Education:   Education provided on importance of adequate PO intake as tolerated with emphasis on lean protein to support wound healing    Risk Level: High [   ] Moderate [ x  ] Low [   ]

## 2023-03-01 ENCOUNTER — APPOINTMENT (OUTPATIENT)
Dept: NEUROSURGERY | Facility: HOSPITAL | Age: 73
End: 2023-03-01

## 2023-03-01 ENCOUNTER — TRANSCRIPTION ENCOUNTER (OUTPATIENT)
Age: 73
End: 2023-03-01

## 2023-03-01 ENCOUNTER — APPOINTMENT (OUTPATIENT)
Dept: INFECTIOUS DISEASE | Facility: CLINIC | Age: 73
End: 2023-03-01

## 2023-03-01 LAB
ANION GAP SERPL CALC-SCNC: 13 MMOL/L — SIGNIFICANT CHANGE UP (ref 5–17)
APTT BLD: 28.1 SEC — SIGNIFICANT CHANGE UP (ref 27.5–35.5)
BUN SERPL-MCNC: 9 MG/DL — SIGNIFICANT CHANGE UP (ref 7–23)
CALCIUM SERPL-MCNC: 9.3 MG/DL — SIGNIFICANT CHANGE UP (ref 8.4–10.5)
CHLORIDE SERPL-SCNC: 104 MMOL/L — SIGNIFICANT CHANGE UP (ref 96–108)
CO2 SERPL-SCNC: 22 MMOL/L — SIGNIFICANT CHANGE UP (ref 22–31)
CREAT SERPL-MCNC: 0.78 MG/DL — SIGNIFICANT CHANGE UP (ref 0.5–1.3)
EGFR: 81 ML/MIN/1.73M2 — SIGNIFICANT CHANGE UP
GLUCOSE BLDC GLUCOMTR-MCNC: 145 MG/DL — HIGH (ref 70–99)
GLUCOSE BLDC GLUCOMTR-MCNC: 173 MG/DL — HIGH (ref 70–99)
GLUCOSE SERPL-MCNC: 117 MG/DL — HIGH (ref 70–99)
HCT VFR BLD CALC: 33.1 % — LOW (ref 34.5–45)
HCT VFR BLD CALC: 35.4 % — SIGNIFICANT CHANGE UP (ref 34.5–45)
HGB BLD-MCNC: 10.8 G/DL — LOW (ref 11.5–15.5)
HGB BLD-MCNC: 11.6 G/DL — SIGNIFICANT CHANGE UP (ref 11.5–15.5)
INR BLD: 1.01 — SIGNIFICANT CHANGE UP (ref 0.88–1.16)
MAGNESIUM SERPL-MCNC: 1.6 MG/DL — SIGNIFICANT CHANGE UP (ref 1.6–2.6)
MAGNESIUM SERPL-MCNC: 1.8 MG/DL — SIGNIFICANT CHANGE UP (ref 1.6–2.6)
MCHC RBC-ENTMCNC: 29.8 PG — SIGNIFICANT CHANGE UP (ref 27–34)
MCHC RBC-ENTMCNC: 30.1 PG — SIGNIFICANT CHANGE UP (ref 27–34)
MCHC RBC-ENTMCNC: 32.6 GM/DL — SIGNIFICANT CHANGE UP (ref 32–36)
MCHC RBC-ENTMCNC: 32.8 GM/DL — SIGNIFICANT CHANGE UP (ref 32–36)
MCV RBC AUTO: 91 FL — SIGNIFICANT CHANGE UP (ref 80–100)
MCV RBC AUTO: 92.2 FL — SIGNIFICANT CHANGE UP (ref 80–100)
NRBC # BLD: 0 /100 WBCS — SIGNIFICANT CHANGE UP (ref 0–0)
NRBC # BLD: 0 /100 WBCS — SIGNIFICANT CHANGE UP (ref 0–0)
PHOSPHATE SERPL-MCNC: 4.3 MG/DL — SIGNIFICANT CHANGE UP (ref 2.5–4.5)
PHOSPHATE SERPL-MCNC: 5 MG/DL — HIGH (ref 2.5–4.5)
PLATELET # BLD AUTO: 213 K/UL — SIGNIFICANT CHANGE UP (ref 150–400)
PLATELET # BLD AUTO: 287 K/UL — SIGNIFICANT CHANGE UP (ref 150–400)
POTASSIUM SERPL-MCNC: 3.7 MMOL/L — SIGNIFICANT CHANGE UP (ref 3.5–5.3)
POTASSIUM SERPL-SCNC: 3.7 MMOL/L — SIGNIFICANT CHANGE UP (ref 3.5–5.3)
PROTHROM AB SERPL-ACNC: 12 SEC — SIGNIFICANT CHANGE UP (ref 10.5–13.4)
RBC # BLD: 3.59 M/UL — LOW (ref 3.8–5.2)
RBC # BLD: 3.89 M/UL — SIGNIFICANT CHANGE UP (ref 3.8–5.2)
RBC # FLD: 14.6 % — HIGH (ref 10.3–14.5)
RBC # FLD: 14.6 % — HIGH (ref 10.3–14.5)
SODIUM SERPL-SCNC: 139 MMOL/L — SIGNIFICANT CHANGE UP (ref 135–145)
WBC # BLD: 10.97 K/UL — HIGH (ref 3.8–10.5)
WBC # BLD: 8.25 K/UL — SIGNIFICANT CHANGE UP (ref 3.8–10.5)
WBC # FLD AUTO: 10.97 K/UL — HIGH (ref 3.8–10.5)
WBC # FLD AUTO: 8.25 K/UL — SIGNIFICANT CHANGE UP (ref 3.8–10.5)

## 2023-03-01 PROCEDURE — 70450 CT HEAD/BRAIN W/O DYE: CPT | Mod: 26

## 2023-03-01 PROCEDURE — 99292 CRITICAL CARE ADDL 30 MIN: CPT

## 2023-03-01 PROCEDURE — 99291 CRITICAL CARE FIRST HOUR: CPT | Mod: 24

## 2023-03-01 PROCEDURE — 14021 TIS TRNFR S/A/L 10.1-30 SQCM: CPT | Mod: 58

## 2023-03-01 PROCEDURE — 61619 REPAIR DURA: CPT | Mod: 58

## 2023-03-01 PROCEDURE — 62141 CRNOP SKULL DEFECT>5 CM DIAM: CPT | Mod: 58,22

## 2023-03-01 PROCEDURE — 99232 SBSQ HOSP IP/OBS MODERATE 35: CPT

## 2023-03-01 DEVICE — SURGICEL 4 X 8": Type: IMPLANTABLE DEVICE | Site: LEFT | Status: FUNCTIONAL

## 2023-03-01 DEVICE — SURGIFOAM PAD 8CM X 12.5CM X 10MM (100): Type: IMPLANTABLE DEVICE | Site: LEFT | Status: FUNCTIONAL

## 2023-03-01 DEVICE — SCREW 1.5X5MM: Type: IMPLANTABLE DEVICE | Site: LEFT | Status: FUNCTIONAL

## 2023-03-01 DEVICE — SURGIFLO HEMOSTATIC MATRIX KIT: Type: IMPLANTABLE DEVICE | Site: LEFT | Status: FUNCTIONAL

## 2023-03-01 DEVICE — MESH STRYKER GOLD 3D PREFORM STANDARD XL 190X140MM: Type: IMPLANTABLE DEVICE | Site: LEFT | Status: FUNCTIONAL

## 2023-03-01 DEVICE — SCREW UN3 AXS SELF DRILL 1.5X4MM: Type: IMPLANTABLE DEVICE | Site: LEFT | Status: FUNCTIONAL

## 2023-03-01 RX ORDER — SODIUM CHLORIDE 9 MG/ML
1000 INJECTION, SOLUTION INTRAVENOUS
Refills: 0 | Status: DISCONTINUED | OUTPATIENT
Start: 2023-03-01 | End: 2023-03-01

## 2023-03-01 RX ORDER — POLYETHYLENE GLYCOL 3350 17 G/17G
17 POWDER, FOR SOLUTION ORAL DAILY
Refills: 0 | Status: DISCONTINUED | OUTPATIENT
Start: 2023-03-01 | End: 2023-03-02

## 2023-03-01 RX ORDER — BUDESONIDE, MICRONIZED 100 %
0.25 POWDER (GRAM) MISCELLANEOUS
Refills: 0 | Status: DISCONTINUED | OUTPATIENT
Start: 2023-03-01 | End: 2023-03-06

## 2023-03-01 RX ORDER — LATANOPROST 0.05 MG/ML
1 SOLUTION/ DROPS OPHTHALMIC; TOPICAL AT BEDTIME
Refills: 0 | Status: DISCONTINUED | OUTPATIENT
Start: 2023-03-01 | End: 2023-03-06

## 2023-03-01 RX ORDER — DEXTROSE 50 % IN WATER 50 %
25 SYRINGE (ML) INTRAVENOUS ONCE
Refills: 0 | Status: DISCONTINUED | OUTPATIENT
Start: 2023-03-01 | End: 2023-03-01

## 2023-03-01 RX ORDER — ACETAMINOPHEN 500 MG
650 TABLET ORAL EVERY 6 HOURS
Refills: 0 | Status: DISCONTINUED | OUTPATIENT
Start: 2023-03-01 | End: 2023-03-06

## 2023-03-01 RX ORDER — FLUTICASONE PROPIONATE 50 MCG
1 SPRAY, SUSPENSION NASAL
Refills: 0 | Status: DISCONTINUED | OUTPATIENT
Start: 2023-03-01 | End: 2023-03-06

## 2023-03-01 RX ORDER — APREPITANT 80 MG/1
40 CAPSULE ORAL ONCE
Refills: 0 | Status: COMPLETED | OUTPATIENT
Start: 2023-03-01 | End: 2023-03-01

## 2023-03-01 RX ORDER — DEXTROSE 50 % IN WATER 50 %
12.5 SYRINGE (ML) INTRAVENOUS ONCE
Refills: 0 | Status: DISCONTINUED | OUTPATIENT
Start: 2023-03-01 | End: 2023-03-01

## 2023-03-01 RX ORDER — CEFAZOLIN SODIUM 1 G
2000 VIAL (EA) INJECTION EVERY 8 HOURS
Refills: 0 | Status: COMPLETED | OUTPATIENT
Start: 2023-03-01 | End: 2023-03-04

## 2023-03-01 RX ORDER — AMLODIPINE BESYLATE 2.5 MG/1
10 TABLET ORAL DAILY
Refills: 0 | Status: DISCONTINUED | OUTPATIENT
Start: 2023-03-02 | End: 2023-03-06

## 2023-03-01 RX ORDER — OXYCODONE HYDROCHLORIDE 5 MG/1
5 TABLET ORAL EVERY 4 HOURS
Refills: 0 | Status: DISCONTINUED | OUTPATIENT
Start: 2023-03-01 | End: 2023-03-06

## 2023-03-01 RX ORDER — LEVETIRACETAM 250 MG/1
1000 TABLET, FILM COATED ORAL
Refills: 0 | Status: DISCONTINUED | OUTPATIENT
Start: 2023-03-01 | End: 2023-03-06

## 2023-03-01 RX ORDER — GLUCAGON INJECTION, SOLUTION 0.5 MG/.1ML
1 INJECTION, SOLUTION SUBCUTANEOUS ONCE
Refills: 0 | Status: DISCONTINUED | OUTPATIENT
Start: 2023-03-01 | End: 2023-03-01

## 2023-03-01 RX ORDER — INSULIN LISPRO 100/ML
VIAL (ML) SUBCUTANEOUS
Refills: 0 | Status: DISCONTINUED | OUTPATIENT
Start: 2023-03-01 | End: 2023-03-02

## 2023-03-01 RX ORDER — DEXTROSE 50 % IN WATER 50 %
15 SYRINGE (ML) INTRAVENOUS ONCE
Refills: 0 | Status: DISCONTINUED | OUTPATIENT
Start: 2023-03-01 | End: 2023-03-01

## 2023-03-01 RX ORDER — SENNA PLUS 8.6 MG/1
2 TABLET ORAL AT BEDTIME
Refills: 0 | Status: DISCONTINUED | OUTPATIENT
Start: 2023-03-01 | End: 2023-03-06

## 2023-03-01 RX ORDER — ONDANSETRON 8 MG/1
4 TABLET, FILM COATED ORAL EVERY 6 HOURS
Refills: 0 | Status: DISCONTINUED | OUTPATIENT
Start: 2023-03-01 | End: 2023-03-06

## 2023-03-01 RX ORDER — ACETAMINOPHEN 500 MG
1000 TABLET ORAL ONCE
Refills: 0 | Status: COMPLETED | OUTPATIENT
Start: 2023-03-01 | End: 2023-03-01

## 2023-03-01 RX ORDER — ACETAMINOPHEN 500 MG
1000 TABLET ORAL ONCE
Refills: 0 | Status: DISCONTINUED | OUTPATIENT
Start: 2023-03-01 | End: 2023-03-02

## 2023-03-01 RX ORDER — SODIUM CHLORIDE 9 MG/ML
1000 INJECTION INTRAMUSCULAR; INTRAVENOUS; SUBCUTANEOUS
Refills: 0 | Status: DISCONTINUED | OUTPATIENT
Start: 2023-03-01 | End: 2023-03-02

## 2023-03-01 RX ADMIN — SODIUM CHLORIDE 110 MILLILITER(S): 9 INJECTION INTRAMUSCULAR; INTRAVENOUS; SUBCUTANEOUS at 13:15

## 2023-03-01 RX ADMIN — CHLORHEXIDINE GLUCONATE 1 APPLICATION(S): 213 SOLUTION TOPICAL at 06:58

## 2023-03-01 RX ADMIN — AMLODIPINE BESYLATE 10 MILLIGRAM(S): 2.5 TABLET ORAL at 06:21

## 2023-03-01 RX ADMIN — SENNA PLUS 2 TABLET(S): 8.6 TABLET ORAL at 22:00

## 2023-03-01 RX ADMIN — Medication 100 MILLIGRAM(S): at 16:31

## 2023-03-01 RX ADMIN — Medication 650 MILLIGRAM(S): at 01:30

## 2023-03-01 RX ADMIN — LEVETIRACETAM 1000 MILLIGRAM(S): 250 TABLET, FILM COATED ORAL at 06:21

## 2023-03-01 RX ADMIN — LEVETIRACETAM 1000 MILLIGRAM(S): 250 TABLET, FILM COATED ORAL at 17:43

## 2023-03-01 RX ADMIN — Medication 2: at 16:29

## 2023-03-01 RX ADMIN — Medication 1000 MILLIGRAM(S): at 07:33

## 2023-03-01 RX ADMIN — LATANOPROST 1 DROP(S): 0.05 SOLUTION/ DROPS OPHTHALMIC; TOPICAL at 22:00

## 2023-03-01 RX ADMIN — APREPITANT 40 MILLIGRAM(S): 80 CAPSULE ORAL at 07:34

## 2023-03-01 RX ADMIN — Medication 100 MILLIGRAM(S): at 23:12

## 2023-03-01 NOTE — PROGRESS NOTE ADULT - SUBJECTIVE AND OBJECTIVE BOX
NEUROSURGERY POST OP NOTE:    POD# 0 S/P Left cranioplasty with [place] mesh, local tissue rearrangement, complex neuroplastics closure.    S:   71 y/o F with h/o L hemicraniectomy and washout of L subdural empyema 12/30/22 (at time presented to [facility] c/o slurred speech and right sided weakness), s/p sinus surgery with judith purulence to L maxillary sinus and posterior bony erosion to L sphenoid sinus w/ENT 1/5/23, PEs.  B/L DVT. Pt was discharged to [facility] on 1/12/23 with plan to return for cranioplasty in 3 months. Per daughter, patient spent 4 weeks at the rehab, was home x1 week with 4 hrs home aid and home  PT. Her right side weakness was progressively improving and her expressive aphagia was minimally improved. Brought to ED on 2/22/23, per daughter, Pt right side weakness worsened within few hours, got up from her wheelchair her right knee bucked and patient fell on her buttocks. She was wearing her helmet; did not hit her head. NO LOC. Patient remained alert and awake throughout. Admitted to neurosurgery with plan for cranioplasty.     Hospital course:   2/22: admitted w/worsening right UE/LE weakness  2/23: DEEPALI overnight. MRI brain performed. Neuro exam stable.improved expressive aphasia. MRI no infarct or focal findings, pending neurplastic CT, pending ID consult, ENT scope today  2/24: DEEPALI overnight, neuro stable. Pending OR plans for cranioplasty (also to include ENT sinus debridement). Pending anti xa level today at 2pm  2/25: DEEPALI overnight, neuro stable. OR plan tentatively scheduled for Wednesday, ID cleared.  2/26: DEEPALI overnight, pending OR Wednesday. New anisicoria, R pupil 2mm and fixed, left 4mm brisk, RUE trace withdrawal, RLE brisk withdrawal, CTH showing MLS. Started on IVF. Upgraded to tele for closer monitoring. Intermittent trendelenberg, exam improving.  2/27: DEEPALI overnight. Neuro exam stable. Continue to lay flat/intermittent trendelenberg. OR plan for Wednesday.    2/28: DEEPALI overnight. Neuro stable. Bedside debridement with ENT. Start BID Budesonide nasal washes. Pending OR tomorrow with ENT. Holding SQL for OR. ENT did naslphrayngealoscopy, debrieded and recommending budesonide nasal rinse however unavailable, normal saline wash 4x daily ordered instead per ENT. Preop for cranioplasty tomorrow.  3/1: DEEPALI overnight. Neuro stable.       T(C): 35.6 (03-01-23 @ 13:00), Max: 37.7 (02-28-23 @ 17:00)  HR: 77 (03-01-23 @ 14:15) (58 - 85)  BP: 131/61 (03-01-23 @ 14:15) (119/67 - 156/70)  RR: 12 (03-01-23 @ 14:15) (12 - 17)  SpO2: 98% (03-01-23 @ 14:15) (95% - 100%)      02-28-23 @ 07:01  -  03-01-23 @ 07:00  --------------------------------------------------------  IN: 1980 mL / OUT: 2150 mL / NET: -170 mL    03-01-23 @ 07:01  -  03-01-23 @ 14:30  --------------------------------------------------------  IN: 220 mL / OUT: 150 mL / NET: 70 mL        acetaminophen     Tablet .. 650 milliGRAM(s) Oral every 6 hours PRN  acetaminophen   IVPB .. 1000 milliGRAM(s) IV Intermittent once PRN  buDESOnide    Inhalation Suspension 0.25 milliGRAM(s) Inhalation two times a day  ceFAZolin   IVPB 2000 milliGRAM(s) IV Intermittent every 8 hours  dextrose 5%. 1000 milliLiter(s) IV Continuous <Continuous>  dextrose 5%. 1000 milliLiter(s) IV Continuous <Continuous>  dextrose 50% Injectable 25 Gram(s) IV Push once  dextrose 50% Injectable 12.5 Gram(s) IV Push once  dextrose 50% Injectable 25 Gram(s) IV Push once  dextrose Oral Gel 15 Gram(s) Oral once PRN  glucagon  Injectable 1 milliGRAM(s) IntraMuscular once  insulin lispro (ADMELOG) corrective regimen sliding scale   SubCutaneous Before meals and at bedtime  latanoprost 0.005% Ophthalmic Solution 1 Drop(s) Both EYES at bedtime  levETIRAcetam 1000 milliGRAM(s) Oral two times a day  ondansetron Injectable 4 milliGRAM(s) IV Push every 6 hours PRN  senna 2 Tablet(s) Oral at bedtime  sodium chloride 0.9%. 1000 milliLiter(s) IV Continuous <Continuous>      Exam:  General: Sleeping in bed in semifowler position. NAD.   HEENT: B/L pupils sluggish reaction to light; L pupil 3mm, R pupil 2mm. Facial symmetry ***  Neuro: AOx __****. ___ pronator drift.**** B/L hand  0/5, R upper extremity 1/5, L upper extremity and B/L lower extremity 5/5, R dorsiflexion 2/5, B/L plantar flexion 0/5.   Cardio: Normal rate/rhythm.  Pulm: Normal respiration rate. Normal chest wall rise.   Abd: Soft, nondistended, nontender.   Extremities: No edema. Lower extremities nontender to palpation.   Wound: Unable to visualize head incision site due to head wrap; C/D/I.    DRAINS:    DEVICES:       Assessment:   71 y/o F with h/o L hemicraniectomy and washout of L subdural empyema 12/30/22 (at time presented to [facility] c/o slurred speech and right sided weakness), s/p sinus surgery with judith purulence to L maxillary sinus and posterior bony erosion to L sphenoid sinus w/ENT 1/5/23, PEs, B/L DVT. Pt was discharged to [facility] rehab on 1/12/23 with plan to return for cranioplasty in 3 months. Per daughter, patient spent 4 weeks at the rehab, was home x1 week with 4 hrs home aid and home  PT. Her right side weakness was progressively improving and her expressive aphagia was minimally improved. Brought to ED on 2/22/23, per daughter, Pt right side weakness worsened within few hours, got up from her wheelchair her right knee bucked and patient fell on her buttocks. She was wearing her helmet; did not hit her head. NO LOC; Patient remained alert and awake throughout. Admitted to neurosurgery with plan for cranioplasty.     PLAN:  Neuro:  -neuro/vitals q4h  - keppra 1000mg BID   -pain control  -helmet at bedside  -med clearance obtained   - [facility] 2/26: MLS  - OR for cranioplasty tentatively Wednesday 2/29  - normal saline nasal rinse 4x daily per ENT    Cardio:  -continue home norvasc  -SBP goal normotensive    Pulm:  -room air    Renal:  - NS @110cc/hr for increased MLS   - voiding  - electrolyte repletion PRN     GI:  -NPO after midnight for OR  -bowel regimen  - last BM 2/26     Endo:  - monitor am fingersticks   - a1c 4.7     Heme:  - NO SCDs (b/l LE DVTs), SQL 110mg BID (previously on 120mg bid) on hold for OR wednesday  - prior LE doppler 1/8/23 b/l calf and L peroneal DVTs, repeat LE doppler 2/22 R posterior tibial vein DVT, persistent R IM calf vein DVT, resolution of L IM calf DVT  - h/o b/l mild segmental/lobar PE on CT PE protocol 1/10/23    ID  - Afebrile   -consulted ID, finished course of ceftriaxone/metronidazole/vancomycin for subdural/epidural empyema (rare peptostreptococcus 1/6 + staph epi) end date 2/16     Dispo: regional status, full code, pendng MRI brain  D/w Dr. D'Amico     NEUROSURGERY POST OP NOTE:    POD# 0 S/P Left cranioplasty with [place] mesh, local tissue rearrangement, complex neuroplastics closure.    S:   73 y/o F with h/o L hemicraniectomy and washout of L subdural empyema 12/30/22 (at time presented to [facility] c/o slurred speech and right sided weakness), s/p sinus surgery with judith purulence to L maxillary sinus and posterior bony erosion to L sphenoid sinus w/ENT 1/5/23, PEs.  B/L DVT, B/L cataract surgery. Pt was discharged to [facility] [place] rehab on 1/12/23 with plan to return for cranioplasty in 3 months. Per daughter, patient spent 4 weeks at the rehab, was home x1 week with 4 hrs home aid and home  PT. Her right side weakness was progressively improving and her expressive aphagia was minimally improved. Brought to ED on 2/22/23, per daughter, Pt right side weakness worsened within few hours, got up from her wheelchair her right knee bucked and patient fell on her buttocks. She was wearing her helmet; did not hit her head. NO LOC. Patient remained alert and awake throughout. Admitted to neurosurgery with plan for cranioplasty.     Hospital course:   2/22: admitted w/worsening right UE/LE weakness  2/23: DEEPALI overnight. MRI brain performed. Neuro exam stable.improved expressive aphasia. MRI no infarct or focal findings, pending neurplastic CT, pending ID consult, ENT scope today  2/24: DEEPALI overnight, neuro stable. Pending OR plans for cranioplasty (also to include ENT sinus debridement). Pending anti xa level today at 2pm  2/25: DEEPALI overnight, neuro stable. OR plan tentatively scheduled for Wednesday, ID cleared.  2/26: DEEPALI overnight, pending OR Wednesday. New anisicoria, R pupil 2mm and fixed, left 4mm brisk, RUE trace withdrawal, RLE brisk withdrawal, CTH showing MLS. Started on IVF. Upgraded to tele for closer monitoring. Intermittent trendelenberg, exam improving.  2/27: DEEPALI overnight. Neuro exam stable. Continue to lay flat/intermittent trendelenberg. OR plan for Wednesday.    2/28: DEEPALI overnight. Neuro stable. Bedside debridement with ENT. Start BID Budesonide nasal washes. Pending OR tomorrow with ENT. Holding SQL for OR. ENT did naslphrayngealoscopy, debrieded and recommending budesonide nasal rinse however unavailable, normal saline wash 4x daily ordered instead per ENT. Preop for cranioplasty tomorrow.  3/1: DEEPALI overnight. Neuro stable.       T(C): 35.6 (03-01-23 @ 13:00), Max: 37.7 (02-28-23 @ 17:00)  HR: 77 (03-01-23 @ 14:15) (58 - 85)  BP: 131/61 (03-01-23 @ 14:15) (119/67 - 156/70)  RR: 12 (03-01-23 @ 14:15) (12 - 17)  SpO2: 98% (03-01-23 @ 14:15) (95% - 100%)      02-28-23 @ 07:01  -  03-01-23 @ 07:00  --------------------------------------------------------  IN: 1980 mL / OUT: 2150 mL / NET: -170 mL    03-01-23 @ 07:01  -  03-01-23 @ 14:30  --------------------------------------------------------  IN: 220 mL / OUT: 150 mL / NET: 70 mL        acetaminophen     Tablet .. 650 milliGRAM(s) Oral every 6 hours PRN  acetaminophen   IVPB .. 1000 milliGRAM(s) IV Intermittent once PRN  buDESOnide    Inhalation Suspension 0.25 milliGRAM(s) Inhalation two times a day  ceFAZolin   IVPB 2000 milliGRAM(s) IV Intermittent every 8 hours  dextrose 5%. 1000 milliLiter(s) IV Continuous <Continuous>  dextrose 5%. 1000 milliLiter(s) IV Continuous <Continuous>  dextrose 50% Injectable 25 Gram(s) IV Push once  dextrose 50% Injectable 12.5 Gram(s) IV Push once  dextrose 50% Injectable 25 Gram(s) IV Push once  dextrose Oral Gel 15 Gram(s) Oral once PRN  glucagon  Injectable 1 milliGRAM(s) IntraMuscular once  insulin lispro (ADMELOG) corrective regimen sliding scale   SubCutaneous Before meals and at bedtime  latanoprost 0.005% Ophthalmic Solution 1 Drop(s) Both EYES at bedtime  levETIRAcetam 1000 milliGRAM(s) Oral two times a day  ondansetron Injectable 4 milliGRAM(s) IV Push every 6 hours PRN  senna 2 Tablet(s) Oral at bedtime  sodium chloride 0.9%. 1000 milliLiter(s) IV Continuous <Continuous>      Exam:  General: Lying in bed in semifowler position. NAD.   HEENT: R>L pupils sluggish reaction to light; L pupil 3mm, R pupil 2mm. Mild R nasolabial flattening.   Neuro: AOx3. +R pronator drift. R upper and lower extremity 1/5, R dorsiflexion 1/5. L upper extremity 4/5, L lower extremity 5/5, L dorsiflexion 4/5, B/L plantar flexion 1/5.   Cardio: Normal rate/rhythm. S1, S2. No murmurs noted.   Pulm: B/L CTA. No wheezing, rales, or rhonchi.   Abd: Soft, nondistended, nontender.   Extremities: No edema. Lower extremities nontender to palpation. Dorsalis pedis: left 2+,  right 1+. Posterior tibialis: left 1+, right 2+.  Wound: Unable to visualize head incision site due to head wrap; C/D/I.    DRAINS: 2 SG CHENCHO drains with head wrapobed.       Assessment:   73 y/o F with h/o L hemicraniectomy and washout of L subdural empyema 12/30/22 (at time presented to [facility] c/o slurred speech and right sided weakness), s/p sinus surgery with judith purulence to L maxillary sinus and posterior bony erosion to L sphenoid sinus w/ENT 1/5/23, PEs, B/L DVT. Pt was discharged to [facility] [place] rehab on 1/12/23 with plan to return for cranioplasty in 3 months. Per daughter, patient spent 4 weeks at the rehab, was home x1 week with 4 hrs home aid and home  PT. Her right side weakness was progressively improving and her expressive aphagia was minimally improved. Brought to ED on 2/22/23, per daughter, Pt right side weakness worsened within few hours, got up from her wheelchair her right knee bucked and patient fell on her buttocks. She was wearing her helmet; did not hit her head. NO LOC; Patient remained alert and awake throughout. Admitted to neurosurgery with plan for cranioplasty.     PLAN:  Neuro:  -neuro/vitals q4h  - keppra 1000mg BID   -pain control  -helmet at bedside  -med clearance obtained   - [facility] 2/26: MLS  - OR for cranioplasty tentatively Wednesday 2/29  - normal saline nasal rinse 4x daily per ENT    Cardio:  -continue home norvasc  -SBP goal normotensive    Pulm:  -room air    Renal:  - NS @110cc/hr for increased MLS   - voiding  - electrolyte repletion PRN     GI:  -NPO after midnight for OR  -bowel regimen  - last BM 2/26     Endo:  - monitor am fingersticks   - a1c 4.7     Heme:  - NO SCDs (b/l LE DVTs), SQL 110mg BID (previously on 120mg bid) on hold for OR wednesday  - prior LE doppler 1/8/23 b/l calf and L peroneal DVTs, repeat LE doppler 2/22 R posterior tibial vein DVT, persistent R IM calf vein DVT, resolution of L IM calf DVT  - h/o b/l mild segmental/lobar PE on CT PE protocol 1/10/23    ID  - Afebrile   -consulted ID, finished course of ceftriaxone/metronidazole/vancomycin for subdural/epidural empyema (rare peptostreptococcus 1/6 + staph epi) end date 2/16     Dispo: regional status, full code, pendng MRI brain  D/w Dr. D'Amico     NEUROSURGERY POST OP NOTE:    POD# 0 S/P Left cranioplasty with [place] mesh, local tissue rearrangement, complex neuroplastics closure.    S:   Patient seen and examined at bedside post-op in NSICU. Denies any complaints at this time, denies pain, nausea, discomfort.       T(C): 35.6 (03-01-23 @ 13:00), Max: 37.7 (02-28-23 @ 17:00)  HR: 77 (03-01-23 @ 14:15) (58 - 85)  BP: 131/61 (03-01-23 @ 14:15) (119/67 - 156/70)  RR: 12 (03-01-23 @ 14:15) (12 - 17)  SpO2: 98% (03-01-23 @ 14:15) (95% - 100%)      02-28-23 @ 07:01  -  03-01-23 @ 07:00  --------------------------------------------------------  IN: 1980 mL / OUT: 2150 mL / NET: -170 mL    03-01-23 @ 07:01  -  03-01-23 @ 14:30  --------------------------------------------------------  IN: 220 mL / OUT: 150 mL / NET: 70 mL        acetaminophen     Tablet .. 650 milliGRAM(s) Oral every 6 hours PRN  acetaminophen   IVPB .. 1000 milliGRAM(s) IV Intermittent once PRN  buDESOnide    Inhalation Suspension 0.25 milliGRAM(s) Inhalation two times a day  ceFAZolin   IVPB 2000 milliGRAM(s) IV Intermittent every 8 hours  dextrose 5%. 1000 milliLiter(s) IV Continuous <Continuous>  dextrose 5%. 1000 milliLiter(s) IV Continuous <Continuous>  dextrose 50% Injectable 25 Gram(s) IV Push once  dextrose 50% Injectable 12.5 Gram(s) IV Push once  dextrose 50% Injectable 25 Gram(s) IV Push once  dextrose Oral Gel 15 Gram(s) Oral once PRN  glucagon  Injectable 1 milliGRAM(s) IntraMuscular once  insulin lispro (ADMELOG) corrective regimen sliding scale   SubCutaneous Before meals and at bedtime  latanoprost 0.005% Ophthalmic Solution 1 Drop(s) Both EYES at bedtime  levETIRAcetam 1000 milliGRAM(s) Oral two times a day  ondansetron Injectable 4 milliGRAM(s) IV Push every 6 hours PRN  senna 2 Tablet(s) Oral at bedtime  sodium chloride 0.9%. 1000 milliLiter(s) IV Continuous <Continuous>      Exam:  General: Lying in bed in semifowler position. NAD, on RA   HEENT: L pupil 2mm sluggishly reactive, R pupil 3mm sluggishly reactive. Mild R nasolabial flattening, +R tongue deviation   Neuro: AOx3. +R pronator drift. R upper extremity strength proximally 1/5, forearm 2/5, R HG 0/5. R lower extremity proximally 2/5, distally 3/5, dorsiflexion/plantarflexion 0/5. L upper extremity 4/5 throughout, L lower extremity 5/5, L dorsiflexion 4/5, L plantar flexion 5/5.   Cardio: Normal rate/rhythm. S1, S2. No murmurs noted.   Pulm: B/L lungs CTA. No wheezing, rales, or rhonchi.   Abd: Soft, nondistended, nontender.   Extremities: No edema. Lower extremities nontender to palpation. Dorsalis pedis: left 2+,  right 1+. Posterior tibialis: left 1+, right 2+.  Wound: Unable to visualize head incision site due to head wrap; C/D/I.  DRAINS: 2 SG CHENCHO drains with head wrapobed.       Assessment:   71 y/o F with h/o L hemicraniectomy and washout of L subdural empyema 12/30/22 (at time presented to [facility] c/o slurred speech and right sided weakness), s/p sinus surgery with judith purulence to L maxillary sinus and posterior bony erosion to L sphenoid sinus w/ENT 1/5/23, PEs, B/L DVT. Pt was discharged to [facility] rehab on 1/12/23 with plan to return for cranioplasty in 3 months. Per daughter, patient spent 4 weeks at the rehab, was home x1 week with 4 hrs home aid and home  PT. Her right side weakness was progressively improving and her expressive aphagia was minimally improved. Brought to ED on 2/22/23, per daughter, Pt right side weakness worsened within few hours, got up from her wheelchair her right knee bucked and patient fell on her buttocks. She was wearing her helmet; did not hit her head. NO LOC; Patient remained alert and awake throughout. Admitted to neurosurgery with plan for cranioplasty. Now s/p Left cranioplasty with [place] mesh, local tissue rearrangement, complex neuroplastics closure (3/1).     PLAN:  Neuro:  - neuro/vitals q1h  - keppra 1000mg BID   - pain control tylenol PRN,   - helmet at bedside, no longer needed s/p cranioplasty   - post-op [facility] neuroplastics protocol completed   - normal saline nasal rinse 4x daily per ENT  - subgaleal CHENCHO drain x2 in-place with headwrap, likely remove first CHENCHO drain on POD#2, then possibly removed 2nd CHENCHO drain on POD#3     Cardio:  - continue home norvasc  - SBP goal <140     Pulm:  - room air  - O2 sat goal > 92%     Renal:  - NS @110cc/hr while NPO   - voiding   - electrolyte repletion PRN     GI:  - advance diet as tolerated, soft and bite sized   - bowel regimen   - last BM 2/26     Endo:  - monitor am fingersticks   - a1c 4.7     Heme:  - NO SCDs (b/l LE DVTs), SQL 110mg BID (previously on 120mg bid) on hold post-op   - prior LE doppler 1/8/23 b/l calf and L peroneal DVTs, repeat LE doppler 2/22 R posterior tibial vein DVT, persistent R IM calf vein DVT, resolution of L IM calf DVT  - h/o b/l mild segmental/lobar PE on CT PE protocol 1/10/23    ID  - Afebrile   - consulted ID, finished course of ceftriaxone/metronidazole/vancomycin for subdural/epidural empyema (rare peptostreptococcus 1/6 + staph epi) end date 2/16     Dispo: regional status, full code   D/w Dr. D'Amico and Dr. Castorena

## 2023-03-01 NOTE — PROGRESS NOTE ADULT - SUBJECTIVE AND OBJECTIVE BOX
INTERVAL HISTORY: HPI:  73 yo female brought to ER by her daughter after she had a fall secondary to her baseline right side weakness.  Below  is a synopsis of patients last admission with us.:  71 y/o F w/ no known PMHx transferred from [place] c/o slurred speech and right sided weakness. CTH initially negative, repeat on 12/23 showed L sided SDH, pts mental status worsened and CTH on 12/28 significant for L frontoparietal collection, MRI completed showed concern for possible empyema. S/p L hemicraniectomy and washout of L subdural empyema 12/30/22. S/p sinus surgery with judith purulence to L maxillary sinus and posterior bony erosiun to L sphenoid sinus 1/5.   On she was  discharged to [facility] rehab which plan to return for cranioplasty in 3 months post hemicraniectomy.  Per daughter, patient spent 4 weeks at the rehab. She was sent home 1 week ago with 4 hrs home aid and home  PT.  Her right side weakness was progressively improving and her expressive aphagia was minimally improved. Daughter fells that patient had more right side weakness the last hrs.  Today when patient  togetup from her wheelchair her right knee bucked and patient fell on her buttocks. She was wearing her helmet; did not hit her head. NO LOC. Patient remained alert and awake throughout.  She was sent here for a head CT by Dr. D'Amico.   (22 Feb 2023 17:26)      MEDICATIONS  (STANDING):  buDESOnide    Inhalation Suspension 0.25 milliGRAM(s) Inhalation two times a day  ceFAZolin   IVPB 2000 milliGRAM(s) IV Intermittent every 8 hours  dextrose 5%. 1000 milliLiter(s) (50 mL/Hr) IV Continuous <Continuous>  dextrose 5%. 1000 milliLiter(s) (100 mL/Hr) IV Continuous <Continuous>  dextrose 50% Injectable 25 Gram(s) IV Push once  dextrose 50% Injectable 12.5 Gram(s) IV Push once  dextrose 50% Injectable 25 Gram(s) IV Push once  glucagon  Injectable 1 milliGRAM(s) IntraMuscular once  insulin lispro (ADMELOG) corrective regimen sliding scale   SubCutaneous Before meals and at bedtime  latanoprost 0.005% Ophthalmic Solution 1 Drop(s) Both EYES at bedtime  levETIRAcetam 1000 milliGRAM(s) Oral two times a day  senna 2 Tablet(s) Oral at bedtime  sodium chloride 0.9%. 1000 milliLiter(s) (110 mL/Hr) IV Continuous <Continuous>    MEDICATIONS  (PRN):  acetaminophen     Tablet .. 650 milliGRAM(s) Oral every 6 hours PRN Temp greater or equal to 38C (100.4F), Mild Pain (1 - 3)  acetaminophen   IVPB .. 1000 milliGRAM(s) IV Intermittent once PRN Mild Pain (1 - 3)  dextrose Oral Gel 15 Gram(s) Oral once PRN Blood Glucose LESS THAN 70 milliGRAM(s)/deciliter  ondansetron Injectable 4 milliGRAM(s) IV Push every 6 hours PRN Nausea and/or Vomiting      Drug Dosing Weight  Height (cm): 162.6 (01 Mar 2023 07:18)  Weight (kg): 113.4 (01 Mar 2023 07:18)  BMI (kg/m2): 42.9 (01 Mar 2023 07:18)  BSA (m2): 2.15 (01 Mar 2023 07:18)    PAST MEDICAL & SURGICAL HISTORY:  HTN (hypertension)      S/P craniotomy          REVIEW OF SYSTEMS: [ ] Unable to Assess due to neurologic exam   [ ] All ROS addressed below are non-contributory, except:  Neuro: [ ] Headache [ ] Back pain [ ] Numbness [ ] Weakness [ ] Ataxia [ ] Dizziness [ ] Aphasia [ ] Dysarthria [ ] Visual disturbance  Resp: [ ] Shortness of breath/dyspnea, [ ] Orthopnea [ ] Cough  CV: [ ] Chest pain [ ] Palpitation [ ] Lightheadedness [ ] Syncope  Renal: [ ] Thirst [ ] Edema  GI: [ ] Nausea [ ] Emesis [ ] Abdominal pain [ ] Constipation [ ] Diarrhea  Hem: [ ] Hematemesis [ ] bright red blood per rectum  ID: [ ] Fever [ ] Chills [ ] Dysuria  ENT: [ ] Rhinorrhea    PHYSICAL EXAM:    General: No Acute Distress, obese,  CHENCHO drain in place    Neurological: eyes open to voice, follows commands, R pupils 4mm sluggish, L 3mm sluggish R facial, RUE distally AG, HG 0/5  has proximal tone, LUE 5/5 AG, LLE 5/5 AG, RLE 3/5 KE w/ assistance wiggles toes.   Pulmonary: Clear to Auscultation, No Rales, No Rhonchi, No Wheezes   Cardiovascular: S1, S2, Regular Rate and Rhythm   Gastrointestinal: Soft, Nontender, Nondistended   Extremities: No calf tenderness   Incision: CDI    ICU Vital Signs Last 24 Hrs  T(C): 35.6 (01 Mar 2023 13:00), Max: 37.7 (28 Feb 2023 17:00)  T(F): 96 (01 Mar 2023 13:00), Max: 99.9 (28 Feb 2023 17:00)  HR: 64 (01 Mar 2023 14:45) (58 - 85)  BP: 131/61 (01 Mar 2023 14:15) (119/67 - 156/70)  BP(mean): 88 (01 Mar 2023 14:15) (87 - 101)  ABP: 128/52 (01 Mar 2023 14:45) (122/56 - 136/63)  ABP(mean): 77 (01 Mar 2023 14:45) (77 - 91)  RR: 11 (01 Mar 2023 14:45) (11 - 17)  SpO2: 97% (01 Mar 2023 14:45) (95% - 100%)    O2 Parameters below as of 01 Mar 2023 14:45  Patient On (Oxygen Delivery Method): room air            I&O's Detail    28 Feb 2023 07:01  -  01 Mar 2023 07:00  --------------------------------------------------------  IN:    sodium chloride 0.9%: 1980 mL  Total IN: 1980 mL    OUT:    Voided (mL): 2150 mL  Total OUT: 2150 mL    Total NET: -170 mL      01 Mar 2023 07:01  -  01 Mar 2023 14:49  --------------------------------------------------------  IN:    sodium chloride 0.9%: 220 mL  Total IN: 220 mL    OUT:    Indwelling Catheter - Urethral (mL): 150 mL  Total OUT: 150 mL    Total NET: 70 mL              LABS:  CBC Full  -  ( 01 Mar 2023 13:02 )  WBC Count : 10.97 K/uL  RBC Count : 3.59 M/uL  Hemoglobin : 10.8 g/dL  Hematocrit : 33.1 %  Platelet Count - Automated : 287 K/uL  Mean Cell Volume : 92.2 fl  Mean Cell Hemoglobin : 30.1 pg  Mean Cell Hemoglobin Concentration : 32.6 gm/dL  Auto Neutrophil # : x  Auto Lymphocyte # : x  Auto Monocyte # : x  Auto Eosinophil # : x  Auto Basophil # : x  Auto Neutrophil % : x  Auto Lymphocyte % : x  Auto Monocyte % : x  Auto Eosinophil % : x  Auto Basophil % : x    03-01    139  |  104  |  9   ----------------------------<  117<H>  3.7   |  22  |  0.78    Ca    9.3      01 Mar 2023 05:30  Phos  5.0     03-01  Mg     1.6     03-01      PT/INR - ( 01 Mar 2023 13:02 )   PT: 12.0 sec;   INR: 1.01          PTT - ( 01 Mar 2023 13:02 )  PTT:28.1 sec      RADIOLOGY & ADDITIONAL STUDIES:

## 2023-03-01 NOTE — PROGRESS NOTE ADULT - SUBJECTIVE AND OBJECTIVE BOX
=================================  NEUROCRITICAL CARE ATTENDING NOTE  =================================    JEMIMA DUFFY   MRN-[id number]  Summary:  72y/F from [facility]. Presented 7 days ago with AMS. CTH done 6 days ago showed spontaneous Left SD collection. MRI done today showed Left SD collection with diffusion restriction c/f empyema and infarct. Also showed Left sinus collection. Was also getting ceftriaxone for presumed UT at OSH. (30 Dec 2022 19:41)    HPI:  73 yo female brought to ER by her daughter after she had a fall secondary to her baseline right side weakness. Below  is a synopsis of patients last admission with us.: 71 y/o F w/ no known PMHx transferred from [place] c/o slurred speech and right sided weakness. CTH initially negative, repeat on  showed L sided SDH, pts mental status worsened and CTH on  significant for L frontoparietal collection, MRI completed showed concern for possible empyema. S/p L hemicraniectomy and washout of L subdural empyema 22. S/p sinus surgery with judith purulence to L maxillary sinus and posterior bony erosiun to L sphenoid sinus .  On she was  discharged to [facility] rehab which plan to return for cranioplasty in 3 months post hemicraniectomy.  Per daughter, patient spent 4 weeks at the rehab. She was sent home 1 week ago with 4 hrs home aid and home  PT. Her right side weakness was progressively improving and her expressive aphagia was minimally improved. Daughter fells that patient had more right side weakness the last hrs. Today when patient  togetup from her wheelchair her right knee bucked and patient fell on her buttocks. She was wearing her helmet; did not hit her head. NO LOC. Patient remained alert and awake throughout. She was sent here for a head CT by Dr. D'Amico.  (2023 17:26)    COURSE IN THE HOSPITAL:   Admitted to [facility], s/p OR   remained intubated overnight; extubated     :   1 unit pRBC overnight, PT, dangle legs        Past Medical History:   Allergies:  Allergy Status Unknown  Home meds:     PHYSICAL EXAMINATION  T(C): 36.5 ( @ 17:40), Max: 37.4 ( @ 21:30) HR: 79 ( @ 19:00) (58 - 89) BP: 137/54 ( @ 19:00) (119/67 - 156/70) RR: 12 ( @ 19:00) (10 - 17) SpO2: 100% ( @ 19:00) (95% - 100%)   NEUROLOGIC EXAMINATION:  Patient is  awake, eyes open to voice, following commands, L 4mm brisk R 4mm sluggish, L UE/LE 5/5, R UE 3/5, R LE 4/5  GENERAL:  not intubated, not in respiratory distress  EENT:  anicteric  CARDIOVASCULAR: (+) S1 S2, bradycardic rate and regular rhythm  PULMONARY: clear to auscultation bilaterally  ABDOMEN: soft, nontender with normoactive bowel sounds  EXTREMITIES: no edema  SKIN: no rash    LABS:   CAPILLARY BLOOD GLUCOSE 173    (8.25)  10.8 (11.6)  10.97 )-----------( 287      ( 01 Mar 2023 13:02 )             33.1     139  |  104  |  9   ----------------------------<  117<H>  3.7   |  22  |  0.78    Ca    9.3      01 Mar 2023 05:30  Phos  5.0       Mg     1.6      @ 07:01  -   @ 07:00  IN: 1980 mL / OUT: 2150 mL / NET: -170 mL      Bacteriology:  UA NEG   Blood CS NG1D x2   surgical swab few GPCIPairs x1, nGTD   surgical swab NGTD    CSF studies:  EEG:  Neuroimagin/31 CT head: post-surgical changes, patchy cerebral edema high L parietal and frontal lobes, pansinus mucosal disease (near complete opacification of bilateral ethmoid and sphenoid sinuses), mod mucosal thickening R maxillary sinuses  Other imaging:    MEDICATIONS:     ·	ceFAZolin   IVPB 2000 IV Intermittent every 8 hours  ·	levETIRAcetam 1000 Oral two times a day  ·	buDESOnide    Inhalation Suspension 0.25 Inhalation two times a day  ·	senna 2 Oral at bedtime  ·	insulin lispro (ADMELOG) corrective regimen sliding scale  SubCutaneous Before meals and at bedtime  ·	latanoprost 0.005% Ophthalmic Solution 1 Both EYES at bedtime  ·	acetaminophen     Tablet .. 650 Oral every 6 hours PRN  ·	acetaminophen   IVPB .. 1000 IV Intermittent once PRN  ·	ondansetron Injectable 4 IV Push every 6 hours PRN  ·	oxyCODONE    IR 5 Oral every 4 hours PRN  ·	polyethylene glycol 3350 17 Oral daily PRN    IV FLUIDS: IVL  DRIPS:   DIET: Jevity @50cc/hr  Lines: R radial roz, midline  Drains:  Cannon     Bulb (mL): 0 mL    Bulb (mL): 135 mL  Wounds:    CODE STATUS:  Full Code                       GOALS OF CARE:  aggressive                      DISPOSITION:  ICU =================================  NEUROCRITICAL CARE ATTENDING NOTE  =================================    JEMIMA DUFFY   MRN-[id number]  Summary:  72y/F from [facility]. Presented 7 days ago with AMS. CTH done 6 days ago showed spontaneous Left SD collection. MRI done today showed Left SD collection with diffusion restriction c/f empyema and infarct. Also showed Left sinus collection. Was also getting ceftriaxone for presumed UT at OSH. (30 Dec 2022 19:41)    HPI:  73 yo female brought to ER by her daughter after she had a fall secondary to her baseline right side weakness. Below  is a synopsis of patients last admission with us.: 71 y/o F w/ no known PMHx transferred from [place] c/o slurred speech and right sided weakness. CTH initially negative, repeat on  showed L sided SDH, pts mental status worsened and CTH on  significant for L frontoparietal collection, MRI completed showed concern for possible empyema. S/p L hemicraniectomy and washout of L subdural empyema 22. S/p sinus surgery with judith purulence to L maxillary sinus and posterior bony erosiun to L sphenoid sinus .  On she was  discharged to [facility] rehab which plan to return for cranioplasty in 3 months post hemicraniectomy.  Per daughter, patient spent 4 weeks at the rehab. She was sent home 1 week ago with 4 hrs home aid and home  PT. Her right side weakness was progressively improving and her expressive aphagia was minimally improved. Daughter fells that patient had more right side weakness the last hrs. Today when patient  togetup from her wheelchair her right knee bucked and patient fell on her buttocks. She was wearing her helmet; did not hit her head. NO LOC. Patient remained alert and awake throughout. She was sent here for a head CT by Dr. D'Amico.  (2023 17:26)    HPI:  73 yo female brought to ER by her daughter after she had a fall secondary to her baseline right side weakness.  Below  is a synopsis of patients last admission with us.:  71 y/o F w/ no known PMHx transferred from [place] c/o slurred speech and right sided weakness. CTH initially negative, repeat on  showed L sided SDH, pts mental status worsened and CTH on  significant for L frontoparietal collection, MRI completed showed concern for possible empyema. S/p L hemicraniectomy and washout of L subdural empyema 22. S/p sinus surgery with judith purulence to L maxillary sinus and posterior bony erosiun to L sphenoid sinus .   On she was  discharged to [facility] rehab which plan to return for cranioplasty in 3 months post hemicraniectomy.  Per daughter, patient spent 4 weeks at the rehab. She was sent home 1 week ago with 4 hrs home aid and home  PT.  Her right side weakness was progressively improving and her expressive aphagia was minimally improved. Daughter fells that patient had more right side weakness the last hrs.  Today when patient  togetup from her wheelchair her right knee bucked and patient fell on her buttocks. She was wearing her helmet; did not hit her head. NO LOC. Patient remained alert and awake throughout.  She was sent here for a head CT by Dr. D'Amico.   (2023 17:26)      COURSE IN THE HOSPITAL:   Admitted to [facility], s/p OR   remained intubated overnight; extubated     :   1 unit pRBC overnight, PT, dangle legs        Past Medical History:   Allergies:  Allergy Status Unknown  Home meds:     PHYSICAL EXAMINATION  T(C): 36.5 ( @ 17:40), Max: 37.4 ( @ 21:30) HR: 79 ( @ 19:00) (58 - 89) BP: 137/54 ( @ 19:00) (119/67 - 156/70) RR: 12 ( @ 19:00) (10 - 17) SpO2: 100% ( @ 19:00) (95% - 100%)   NEUROLOGIC EXAMINATION:  Patient is  awake, expressive aphasia, oriented x3, VISHAL, following commands, LUE/LE 5/5 R UE 2/5 proximal 3/5 distally hand  2/5; R LE 4/5  GENERAL:  not intubated, not in respiratory distress  EENT:  anicteric  CARDIOVASCULAR: (+) S1 S2, normal rate and regular rhythm  PULMONARY: clear to auscultation bilaterally  ABDOMEN: soft, nontender with normoactive bowel sounds  EXTREMITIES: no edema  SKIN: no rash    LABS:   CAPILLARY BLOOD GLUCOSE 173    (8.25)  10.8 (11.6)  10.97 )-----------( 287      ( 01 Mar 2023 13:02 )             33.1     139  |  104  |  9   ----------------------------<  117<H>  3.7   |  22  |  0.78    Ca    9.3      01 Mar 2023 05:30  Phos  5.0       Mg     1.6      @ 07:01  -   @ 07:00  IN: 1980 mL / OUT: 2150 mL / NET: -170 mL      Bacteriology:  UA NEG   Blood CS NG1D x2   surgical swab few GPCIPairs x1, nGTD   surgical swab NGTD    CSF studies:  EEG:  Neuroimagin/31 CT head: post-surgical changes, patchy cerebral edema high L parietal and frontal lobes, pansinus mucosal disease (near complete opacification of bilateral ethmoid and sphenoid sinuses), mod mucosal thickening R maxillary sinuses  Other imaging:    MEDICATIONS:     ·	ceFAZolin   IVPB 2000 IV Intermittent every 8 hours  ·	levETIRAcetam 1000 Oral two times a day  ·	buDESOnide    Inhalation Suspension 0.25 Inhalation two times a day  ·	senna 2 Oral at bedtime  ·	insulin lispro (ADMELOG) corrective regimen sliding scale  SubCutaneous Before meals and at bedtime  ·	latanoprost 0.005% Ophthalmic Solution 1 Both EYES at bedtime  ·	acetaminophen     Tablet .. 650 Oral every 6 hours PRN  ·	acetaminophen   IVPB .. 1000 IV Intermittent once PRN  ·	ondansetron Injectable 4 IV Push every 6 hours PRN  ·	oxyCODONE    IR 5 Oral every 4 hours PRN  ·	polyethylene glycol 3350 17 Oral daily PRN    IV FLUIDS: NS@110cc/hr  DRIPS:   DIET: full liquid  Lines: [place]   Drains:  Cannon   2 CHENCHO drain      CODE STATUS:  Full Code                       GOALS OF CARE:  aggressive                      DISPOSITION:  ICU

## 2023-03-01 NOTE — PRE-ANESTHESIA EVALUATION ADULT - NSANTHPMHFT_GEN_ALL_CORE
htn, dvt, skull defect s/p crani, sinus infections. prolonged hospitalization 71 yo w cranial defect. original insult  present w SDH s/p crani post op complicated by empyema and washout, possible ENT source. treated and recovering well as outpatient awaiting cranioplasty when noted fall and new neurologic symptoms. otherwise previously active w htn, obesity, dvt, skull defect s/p crani, sinus infections. prolonged hospitalization

## 2023-03-01 NOTE — BRIEF OPERATIVE NOTE - OPERATION/FINDINGS
Left cranioplasty Left cranioplasty with Neena mesh. Local tissue rearrangement, complex Neuroplastics closure

## 2023-03-01 NOTE — PRE-ANESTHESIA EVALUATION ADULT - NSANTHOSAYNRD_GEN_A_CORE
Unable to assess, pt obtunded/No. EDWARD screening performed.  STOP BANG Legend: 0-2 = LOW Risk; 3-4 = INTERMEDIATE Risk; 5-8 = HIGH Risk

## 2023-03-01 NOTE — PROGRESS NOTE ADULT - ASSESSMENT
72y/F with  subdural empyema s/p L hemicraniectomy, evacuation, brain compression, cerebral edema  anemia  hyponatremia  prediabetes  bilateral DVT (present on admission)    PLAN:   NEURO: neurochecks q2h, VSq1h, PRN pain meds with acetaminophen  seizure treatment: continue levetiracetam 1G PO BID  ENT consult, CT sinuses, fluticasone nasal spray  1 CHENCHO drain, monitor output  REHAB:  physical therapy evaluation and management    EARLY MOB:  HOB up, bedrest    PULM:  room air  CARDIO:  SBP goal 100-160mm Hg, PRN antihypertensives  ENDO:  Blood sugar goals 140-180 mg/dL, continue insulin sliding scale  GI:  bowel regimen   DIET: Jevity at goal   RENAL:   IVF, Na goal 135-145   HEM/ONC: Hb low - given 1 unit, get post-tranfusion CBC  VTE Prophylaxis: SCDs, SQL BID, bilateral DVT  ID: afebrile, leukocytosis improving , continue triple ABx, - Vanc ceftriaxone MNZ; ID consulted,  f/u microbiologic work-up; f/u vanc trough. ESR CRP - every 5 days  Social: daughter updated yesterday     Active issues:  What's keeping patient in the ICU? close neurochecks, aspiration risk  What is this patient's dispo plan? early stepdown tomorrow    ATTENDING ATTESTATION:  I was physically present for the key portions of the evaluation and management (E/M) service provided.  I agree with the above history, physical and plan, which I have reviewed and edited where appropriate.    Patient at high risk for neurological deterioration or death due to:  ICU delirium, aspiration PNA, DVT / PE.  Critical care time:  I have personally provided 30 minutes of critical care time, excluding time spent on separate procedures.      Plan discussed with RN, house staff. 72y/F with  subdural empyema s/p L hemicraniectomy, evacuation, brain compression, cerebral edema  anemia  hyponatremia  prediabetes  bilateral DVT (present on admission)    PLAN:   NEURO: neurochecks q1h, PRN pain meds with acetaminophen, opiates  seizure treatment: continue levetiracetam 1G PO BID  2 CHENCHO drain, monitor output  REHAB:  physical therapy evaluation and management    EARLY MOB:  OOB to chair    PULM:  PRN O2 support to keep sats >/=92%, incentive spirometry, cont budesonide, start Flonase  CARDIO:  SBP goal 100-140mm Hg, PRN antihypertensives  ENDO:  Blood sugar goals 140-180 mg/dL, continue insulin sliding scale  GI:  bowel regimen   DIET: advance diet as tolerated  RENAL:   keep IVF until eating well, Na goal 135-145   HEM/ONC: Hb stable  VTE Prophylaxis: SCDs only on L, no DVT chemoprophylaxis for now as patient is high risk for bleed (fresh post-op): R post tibial R IM calf DVT - surveillance dopplers  ID: afebrile, no leukocytosis periop ancef then d/c  Social: update daughter    Active issues:  What's keeping patient in the ICU? fresh post-op  What is this patient's dispo plan? early stepdown tomorrow    ATTENDING ATTESTATION:  I was physically present for the key portions of the evaluation and management (E/M) service provided.  I agree with the above history, physical and plan, which I have reviewed and edited where appropriate.    Patient at high risk for neurological deterioration or death due to:  ICU delirium, aspiration PNA, DVT / PE.  Critical care time:  I have personally provided 45 minutes of critical care time, excluding time spent on separate procedures.      Plan discussed with RN, house staff. 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 with  syndrome of the trephined, s/p cranioplasty  h/o subdural empyema s/p L hemicraniectomy, evacuation  anemia  prediabetes  bilateral DVT (present on admission)    PLAN:   NEURO: neurochecks q1h, PRN pain meds with acetaminophen, opiates  seizure treatment: continue levetiracetam 1G PO BID  2 CHENCHO drain, monitor output  REHAB:  physical therapy evaluation and management    EARLY MOB:  OOB to chair    PULM:  PRN O2 support to keep sats >/=92%, incentive spirometry, cont budesonide, start Flonase  CARDIO:  SBP goal 100-140mm Hg, PRN antihypertensives  ENDO:  Blood sugar goals 140-180 mg/dL, continue insulin sliding scale  GI:  bowel regimen   DIET: advance diet as tolerated  RENAL:   keep IVF until eating well, Na goal 135-145   HEM/ONC: Hb stable  VTE Prophylaxis: SCDs only on L, no DVT chemoprophylaxis for now as patient is high risk for bleed (fresh post-op): R post tibial R IM calf DVT - surveillance dopplers  ID: afebrile, no leukocytosis periop ancef then d/c  Social: update daughter    Active issues:  What's keeping patient in the ICU? fresh post-op  What is this patient's dispo plan? early stepdown tomorrow    ATTENDING ATTESTATION:  I was physically present for the key portions of the evaluation and management (E/M) service provided.  I agree with the above history, physical and plan, which I have reviewed and edited where appropriate.    Patient at high risk for neurological deterioration or death due to:  ICU delirium, aspiration PNA, DVT / PE.  Critical care time:  I have personally provided 45 minutes of critical care time, excluding time spent on separate procedures.      Plan discussed with RN, house staff.

## 2023-03-01 NOTE — PROGRESS NOTE ADULT - ASSESSMENT
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   ensure BMs w/ Miralax & senna  Daily stool count, LBM prior to arrival    RENAL/:  -check BMP qd  -strict i/o's ; c/w Cannon d/c in AM initiate TOV   -Na goal 135-145    ENDOCRINE:  A1c- 4.7    HEME/ONC:  DVT ppx: will hold chemical dvt ppx in setting of recent operation.  b/l SCDs  Obtain b/l LE screening dopplers    INFECTIOUS:   Monitor for fevers   post operative antibiotic w/ ancef while drains are in, per neuroplastics     Patient is critically ill, requiring critical care services.     I have personally and independently provided [ 35 ] minutes of critical care services.  This excludes any time spent on separate procedures or teaching.

## 2023-03-02 LAB
ANION GAP SERPL CALC-SCNC: 9 MMOL/L — SIGNIFICANT CHANGE UP (ref 5–17)
BUN SERPL-MCNC: 10 MG/DL — SIGNIFICANT CHANGE UP (ref 7–23)
CALCIUM SERPL-MCNC: 9.1 MG/DL — SIGNIFICANT CHANGE UP (ref 8.4–10.5)
CHLORIDE SERPL-SCNC: 106 MMOL/L — SIGNIFICANT CHANGE UP (ref 96–108)
CO2 SERPL-SCNC: 24 MMOL/L — SIGNIFICANT CHANGE UP (ref 22–31)
CREAT SERPL-MCNC: 0.75 MG/DL — SIGNIFICANT CHANGE UP (ref 0.5–1.3)
EGFR: 85 ML/MIN/1.73M2 — SIGNIFICANT CHANGE UP
GLUCOSE BLDC GLUCOMTR-MCNC: 125 MG/DL — HIGH (ref 70–99)
GLUCOSE SERPL-MCNC: 140 MG/DL — HIGH (ref 70–99)
HCT VFR BLD CALC: 28.2 % — LOW (ref 34.5–45)
HGB BLD-MCNC: 9 G/DL — LOW (ref 11.5–15.5)
MAGNESIUM SERPL-MCNC: 1.7 MG/DL — SIGNIFICANT CHANGE UP (ref 1.6–2.6)
MCHC RBC-ENTMCNC: 28.9 PG — SIGNIFICANT CHANGE UP (ref 27–34)
MCHC RBC-ENTMCNC: 31.9 GM/DL — LOW (ref 32–36)
MCV RBC AUTO: 90.7 FL — SIGNIFICANT CHANGE UP (ref 80–100)
NRBC # BLD: 0 /100 WBCS — SIGNIFICANT CHANGE UP (ref 0–0)
PHOSPHATE SERPL-MCNC: 4.3 MG/DL — SIGNIFICANT CHANGE UP (ref 2.5–4.5)
PLATELET # BLD AUTO: 251 K/UL — SIGNIFICANT CHANGE UP (ref 150–400)
POTASSIUM SERPL-MCNC: 3.6 MMOL/L — SIGNIFICANT CHANGE UP (ref 3.5–5.3)
POTASSIUM SERPL-SCNC: 3.6 MMOL/L — SIGNIFICANT CHANGE UP (ref 3.5–5.3)
RBC # BLD: 3.11 M/UL — LOW (ref 3.8–5.2)
RBC # FLD: 14.3 % — SIGNIFICANT CHANGE UP (ref 10.3–14.5)
SODIUM SERPL-SCNC: 139 MMOL/L — SIGNIFICANT CHANGE UP (ref 135–145)
WBC # BLD: 9.13 K/UL — SIGNIFICANT CHANGE UP (ref 3.8–10.5)
WBC # FLD AUTO: 9.13 K/UL — SIGNIFICANT CHANGE UP (ref 3.8–10.5)

## 2023-03-02 PROCEDURE — 99024 POSTOP FOLLOW-UP VISIT: CPT

## 2023-03-02 RX ORDER — MAGNESIUM SULFATE 500 MG/ML
2 VIAL (ML) INJECTION ONCE
Refills: 0 | Status: COMPLETED | OUTPATIENT
Start: 2023-03-02 | End: 2023-03-02

## 2023-03-02 RX ORDER — ENOXAPARIN SODIUM 100 MG/ML
50 INJECTION SUBCUTANEOUS EVERY 24 HOURS
Refills: 0 | Status: DISCONTINUED | OUTPATIENT
Start: 2023-03-03 | End: 2023-03-06

## 2023-03-02 RX ORDER — POLYETHYLENE GLYCOL 3350 17 G/17G
17 POWDER, FOR SOLUTION ORAL
Refills: 0 | Status: DISCONTINUED | OUTPATIENT
Start: 2023-03-02 | End: 2023-03-06

## 2023-03-02 RX ORDER — POTASSIUM CHLORIDE 20 MEQ
40 PACKET (EA) ORAL ONCE
Refills: 0 | Status: COMPLETED | OUTPATIENT
Start: 2023-03-02 | End: 2023-03-02

## 2023-03-02 RX ORDER — LACTULOSE 10 G/15ML
15 SOLUTION ORAL ONCE
Refills: 0 | Status: DISCONTINUED | OUTPATIENT
Start: 2023-03-02 | End: 2023-03-02

## 2023-03-02 RX ORDER — CHLORHEXIDINE GLUCONATE 213 G/1000ML
1 SOLUTION TOPICAL
Refills: 0 | Status: DISCONTINUED | OUTPATIENT
Start: 2023-03-02 | End: 2023-03-02

## 2023-03-02 RX ADMIN — Medication 1 SPRAY(S): at 17:43

## 2023-03-02 RX ADMIN — Medication 5 MILLIGRAM(S): at 06:29

## 2023-03-02 RX ADMIN — Medication 1 SPRAY(S): at 06:29

## 2023-03-02 RX ADMIN — LEVETIRACETAM 1000 MILLIGRAM(S): 250 TABLET, FILM COATED ORAL at 06:29

## 2023-03-02 RX ADMIN — Medication 100 MILLIGRAM(S): at 17:43

## 2023-03-02 RX ADMIN — POLYETHYLENE GLYCOL 3350 17 GRAM(S): 17 POWDER, FOR SOLUTION ORAL at 17:42

## 2023-03-02 RX ADMIN — POLYETHYLENE GLYCOL 3350 17 GRAM(S): 17 POWDER, FOR SOLUTION ORAL at 06:29

## 2023-03-02 RX ADMIN — LEVETIRACETAM 1000 MILLIGRAM(S): 250 TABLET, FILM COATED ORAL at 17:42

## 2023-03-02 RX ADMIN — Medication 0.25 MILLIGRAM(S): at 17:42

## 2023-03-02 RX ADMIN — AMLODIPINE BESYLATE 10 MILLIGRAM(S): 2.5 TABLET ORAL at 06:29

## 2023-03-02 RX ADMIN — Medication 25 GRAM(S): at 07:16

## 2023-03-02 RX ADMIN — Medication 40 MILLIEQUIVALENT(S): at 07:16

## 2023-03-02 RX ADMIN — Medication 100 MILLIGRAM(S): at 07:14

## 2023-03-02 RX ADMIN — Medication 5 MILLIGRAM(S): at 17:42

## 2023-03-02 NOTE — PROGRESS NOTE ADULT - SUBJECTIVE AND OBJECTIVE BOX
INTERVAL HISTORY: HPI:  71 yo female brought to ER by her daughter after she had a fall secondary to her baseline right side weakness.  Below  is a synopsis of patients last admission with us.:  73 y/o F w/ no known PMHx transferred from [place] c/o slurred speech and right sided weakness. CTH initially negative, repeat on 12/23 showed L sided SDH, pts mental status worsened and CTH on 12/28 significant for L frontoparietal collection, MRI completed showed concern for possible empyema. S/p L hemicraniectomy and washout of L subdural empyema 12/30/22. S/p sinus surgery with judith purulence to L maxillary sinus and posterior bony erosiun to L sphenoid sinus 1/5.   On she was  discharged to [facility] rehab which plan to return for cranioplasty in 3 months post hemicraniectomy.  Per daughter, patient spent 4 weeks at the rehab. She was sent home 1 week ago with 4 hrs home aid and home  PT.  Her right side weakness was progressively improving and her expressive aphagia was minimally improved. Daughter fells that patient had more right side weakness the last hrs.  Today when patient  togetup from her wheelchair her right knee bucked and patient fell on her buttocks. She was wearing her helmet; did not hit her head. NO LOC. Patient remained alert and awake throughout.  She was sent here for a head CT by Dr. D'Amico.   (22 Feb 2023 17:26)      PAST MEDICAL & SURGICAL HISTORY:  HTN (hypertension)  S/P craniotomy      REVIEW OF SYSTEMS: [ ] Unable to Assess due to neurologic exam   [x] All ROS addressed below are non-contributory, except:  Neuro: [ ] Headache [ ] Back pain [ ] Numbness [ ] Weakness [ ] Ataxia [ ] Dizziness [ ] Aphasia [ ] Dysarthria [ ] Visual disturbance  Resp: [ ] Shortness of breath/dyspnea, [ ] Orthopnea [ ] Cough  CV: [ ] Chest pain [ ] Palpitation [ ] Lightheadedness [ ] Syncope  Renal: [ ] Thirst [ ] Edema  GI: [ ] Nausea [ ] Emesis [ ] Abdominal pain [ ] Constipation [ ] Diarrhea  Hem: [ ] Hematemesis [ ] bright red blood per rectum  ID: [ ] Fever [ ] Chills [ ] Dysuria  ENT: [ ] Rhinorrhea    PHYSICAL EXAM:    General: No Acute Distress, obese,  CHENCHO drain in place    Neurological: eyes open to voice, follows commands, R pupils 4mm sluggish, L 3mm sluggish R facial, RUE bicep 4+/5, tricep 3/5, deltoid 3/5, HG 2/5  has proximal tone, LUE 5/5 AG, LLE 5/5 AG, RLE 4-/5    Pulmonary: Clear to Auscultation, No Rales, No Rhonchi, No Wheezes   Cardiovascular: S1, S2, Regular Rate and Rhythm   Gastrointestinal: Soft, Nontender, Nondistended   Extremities: No calf tenderness   Incision: CDI            ICU Vital Signs Last 24 Hrs  T(C): 36.4 (02 Mar 2023 09:17), Max: 36.8 (01 Mar 2023 21:38)  T(F): 97.6 (02 Mar 2023 09:17), Max: 98.3 (02 Mar 2023 05:35)  HR: 78 (02 Mar 2023 09:00) (57 - 93)  BP: 116/56 (02 Mar 2023 09:00) (116/56 - 137/54)  BP(mean): 80 (02 Mar 2023 09:00) (80 - 100)  ABP: 149/58 (02 Mar 2023 09:00) (93/63 - 159/59)  ABP(mean): 89 (02 Mar 2023 09:00) (76 - 94)  RR: 13 (02 Mar 2023 09:00) (10 - 25)  SpO2: 97% (02 Mar 2023 09:00) (92% - 100%)      03-01-23 @ 07:01  -  03-02-23 @ 07:00  --------------------------------------------------------  IN: 2030 mL / OUT: 1624 mL / NET: 406 mL    03-02-23 @ 07:01  -  03-02-23 @ 10:54  --------------------------------------------------------  IN: 330 mL / OUT: 575 mL / NET: -245 mL            acetaminophen     Tablet .. 650 milliGRAM(s) Oral every 6 hours PRN  amLODIPine   Tablet 10 milliGRAM(s) Oral daily  bisacodyl 5 milliGRAM(s) Oral every 12 hours  buDESOnide    Inhalation Suspension 0.25 milliGRAM(s) Inhalation two times a day  ceFAZolin   IVPB 2000 milliGRAM(s) IV Intermittent every 8 hours  fluticasone propionate 50 MICROgram(s)/spray Nasal Spray 1 Spray(s) Both Nostrils two times a day  latanoprost 0.005% Ophthalmic Solution 1 Drop(s) Both EYES at bedtime  levETIRAcetam 1000 milliGRAM(s) Oral two times a day  ondansetron Injectable 4 milliGRAM(s) IV Push every 6 hours PRN  oxyCODONE    IR 5 milliGRAM(s) Oral every 4 hours PRN  polyethylene glycol 3350 17 Gram(s) Oral two times a day  senna 2 Tablet(s) Oral at bedtime      LABS:  Na: 139 (03-02 @ 05:22), 139 (03-01 @ 05:30), 139 (02-28 @ 05:30), 139 (02-27 @ 12:52)  K: 3.6 (03-02 @ 05:22), 3.7 (03-01 @ 05:30), 3.4 (02-28 @ 05:30), 3.7 (02-27 @ 12:52)  Cl: 106 (03-02 @ 05:22), 104 (03-01 @ 05:30), 105 (02-28 @ 05:30), 105 (02-27 @ 12:52)  CO2: 24 (03-02 @ 05:22), 22 (03-01 @ 05:30), 25 (02-28 @ 05:30), 23 (02-27 @ 12:52)  BUN: 10 (03-02 @ 05:22), 9 (03-01 @ 05:30), 7 (02-28 @ 05:30), 10 (02-27 @ 12:52)  Cr: 0.75 (03-02 @ 05:22), 0.78 (03-01 @ 05:30), 0.74 (02-28 @ 05:30), 0.74 (02-27 @ 12:52)  Glu: 140(03-02 @ 05:22), 117(03-01 @ 05:30), 99(02-28 @ 05:30), 102(02-27 @ 12:52)    Hgb: 9.0 (03-02 @ 05:22), 10.8 (03-01 @ 13:02), 11.6 (03-01 @ 05:30), 10.6 (02-28 @ 05:30), 10.8 (02-27 @ 12:52)  Hct: 28.2 (03-02 @ 05:22), 33.1 (03-01 @ 13:02), 35.4 (03-01 @ 05:30), 32.3 (02-28 @ 05:30), 34.9 (02-27 @ 12:52)  WBC: 9.13 (03-02 @ 05:22), 10.97 (03-01 @ 13:02), 8.25 (03-01 @ 05:30), 6.02 (02-28 @ 05:30), 5.62 (02-27 @ 12:52)  Plt: 251 (03-02 @ 05:22), 287 (03-01 @ 13:02), 213 (03-01 @ 05:30), 244 (02-28 @ 05:30), 245 (02-27 @ 12:52)    INR: 1.01 03-01-23 @ 13:02  PTT: 28.1 03-01-23 @ 13:02

## 2023-03-02 NOTE — PROGRESS NOTE ADULT - ASSESSMENT
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        NEURO:  -neuro check q4  -pain management w/ Tylenol & oxycodone  -CT head plastics protocol   -Monitor CHENCHO Drain output   -PT/OT evaluation  Seizure disorder  -c/w keppra 1g BID    PULMONARY:  saturating well on RA,   -continue to monitor on pulse o2   -incentive spirometry 10q/hr when awake     CARDIOVASCULAR:  monitor on telemetry   vitals q1  sbp goal 100-140  HTN- c/w amlodipine  TTE: 1/11: difficult study; normal RV & LV function    GASTROENTEROLOGY:  regular diet  ensure BMs w/ Miralax & senna  Daily stool count, LBM prior to arrival    RENAL/:  -check BMP qd  -strict i/o's ; TOV  -Na goal 135-145    ENDOCRINE:  A1c- 4.7    HEME/ONC:  DVT ppx: SQL on POD 2m given obesity start 50mg qd check anti-10a level 3 hrs after 3rd dose goal 0.1-0.5  b/l SCDs      INFECTIOUS:   Monitor for fevers   post operative antibiotic w/ ancef while drains are in, per neuroplastics     LATERAL TRANSFER CHECKLIST    I have reviewed the patient’s history, performed a clinical examination and assessed the patient to be stable for transfer to a non-neurosurgical intensive care unit.      Specifically, the patient:  [X] does not have cerebrovascular insufficiency or require hemodynamic augmentation  [X] does not have vasospasm or delayed cerebral ischemia with subarachnoid hemorrhage  [X] does not require active titration of meds for uncontrolled sympathetic storming  [X] does not have an external ventricular drain (except lateral transfers to [facility])  [X] does not have a lumbar drain  [X] did not have a complex skull base surgery or complex intracranial tumor in the immediate post-operative period

## 2023-03-02 NOTE — PROGRESS NOTE ADULT - SUBJECTIVE AND OBJECTIVE BOX
SUBJECTIVE: Pt has no complaints. Denies pain anywhere, dizziness, vision changes, nausea, vomiting, new focal symptoms, chest pain, dyspnea.    HOSPITAL COURSE:    2/22: admitted w/worsening right UE/LE weakness  2/23: DEEPALI overnight. MRI brain performed. Neuro exam stable.improved expressive aphasia. MRI no infarct or focal findings, pending neurplastic CT, pending ID consult, ENT scope today  2/24: DEEPALI overnight, neuro stable. Pending OR plans for cranioplasty (also to include ENT sinus debridement). Pending anti xa level today at 2pm  2/25: DEEPALI overnight, neuro stable. OR plan tentatively scheduled for Wednesday, ID cleared.  2/26: DEEPALI overnight, pending OR Wednesday. New anisicoria, R pupil 2mm and fixed, left 4mm brisk, RUE trace withdrawal, RLE brisk withdrawal, CTH showing MLS. Started on IVF. Upgraded to tele for closer monitoring. Intermittent trendelenberg, exam improving.  2/27: DEEPALI overnight. Neuro exam stable. Continue to lay flat/intermittent trendelenberg. OR plan for Wednesday.    2/28: DEEPALI overnight. Neuro stable. Bedside debridement with ENT. Start BID Budesonide nasal washes. Pending OR tomorrow with ENT. Holding SQL for OR. ENT did naslphrayngealoscopy, debrieded and recommending budesonide nasal rinse however unavailable, normal saline wash 4x daily ordered instead per ENT. Preop for cranioplasty tomorrow.  3/1: DEEPALI overnight. Neuro stable.       Vital Signs Last 24 Hrs  T(C): 36.8 (01 Mar 2023 21:38), Max: 36.8 (01 Mar 2023 21:38)  T(F): 98.2 (01 Mar 2023 21:38), Max: 98.2 (01 Mar 2023 21:38)  HR: 68 (02 Mar 2023 00:00) (58 - 89)  BP: 137/54 (01 Mar 2023 19:00) (119/67 - 152/64)  BP(mean): 84 (01 Mar 2023 19:00) (84 - 100)  RR: 15 (02 Mar 2023 00:00) (10 - 17)  SpO2: 98% (02 Mar 2023 00:00) (92% - 100%)    Parameters below as of 02 Mar 2023 00:00  Patient On (Oxygen Delivery Method): room air        I&O's Summary    28 Feb 2023 07:01  -  01 Mar 2023 07:00  --------------------------------------------------------  IN: 1980 mL / OUT: 2150 mL / NET: -170 mL    01 Mar 2023 07:01  -  02 Mar 2023 00:34  --------------------------------------------------------  IN: 1370 mL / OUT: 789 mL / NET: 581 mL        PHYSICAL EXAM:    General: awake, alert, no acute distress  Neuro: A/O x 3, expressive aphasia, following commands, LUE/LLE strength 5/5 throughout, RUE strength 2/5 proximal, 3/5 distally, hand  2/5, RLE 4/5  Eyes: PERRL, EOMI  Cardio: regular rate and rhythm  Pulm: nonlabored breathing, normal chest rise  Abdomen: nondistended  Extremities: well perfused  Skin: warm, dry  Wound: head wrap in place, clean, dry, and intact        LABS:                        10.8   10.97 )-----------( 287      ( 01 Mar 2023 13:02 )             33.1     03-01    139  |  104  |  9   ----------------------------<  117<H>  3.7   |  22  |  0.78    Ca    9.3      01 Mar 2023 05:30  Phos  5.0     03-01  Mg     1.6     03-01      PT/INR - ( 01 Mar 2023 13:02 )   PT: 12.0 sec;   INR: 1.01          PTT - ( 01 Mar 2023 13:02 )  PTT:28.1 sec        CAPILLARY BLOOD GLUCOSE      POCT Blood Glucose.: 145 mg/dL (01 Mar 2023 21:07)  POCT Blood Glucose.: 173 mg/dL (01 Mar 2023 16:21)      Drug Levels: [] N/A    CSF Analysis: [] N/A      Allergies    No Known Allergies    Intolerances      MEDICATIONS:  Antibiotics:  ceFAZolin   IVPB 2000 milliGRAM(s) IV Intermittent every 8 hours    Neuro:  acetaminophen     Tablet .. 650 milliGRAM(s) Oral every 6 hours PRN  acetaminophen   IVPB .. 1000 milliGRAM(s) IV Intermittent once PRN  levETIRAcetam 1000 milliGRAM(s) Oral two times a day  ondansetron Injectable 4 milliGRAM(s) IV Push every 6 hours PRN  oxyCODONE    IR 5 milliGRAM(s) Oral every 4 hours PRN    Anticoagulation:    OTHER:  amLODIPine   Tablet 10 milliGRAM(s) Oral daily  buDESOnide    Inhalation Suspension 0.25 milliGRAM(s) Inhalation two times a day  fluticasone propionate 50 MICROgram(s)/spray Nasal Spray 1 Spray(s) Both Nostrils two times a day  insulin lispro (ADMELOG) corrective regimen sliding scale   SubCutaneous Before meals and at bedtime  latanoprost 0.005% Ophthalmic Solution 1 Drop(s) Both EYES at bedtime  polyethylene glycol 3350 17 Gram(s) Oral daily PRN  senna 2 Tablet(s) Oral at bedtime    IVF:  sodium chloride 0.9%. 1000 milliLiter(s) IV Continuous <Continuous>    CULTURES:    RADIOLOGY & ADDITIONAL TESTS:      ASSESSMENT:    73 y/o F with h/o L hemicraniectomy and washout of L subdural empyema 12/30/22 (at time presented to [facility] c/o slurred speech and right sided weakness), s/p sinus surgery with judith purulence to L maxillary sinus and posterior bony erosion to L sphenoid sinus w/ENT 1/5/23, PEs. B/L DVT, B/L cataract surgery. Admitted to neurosurgery with plan for cranioplasty. Now s/p Left cranioplasty with Neena mesh, local tissue rearrangement, complex neuroplastics closure (3/1).      PLAN:  Neuro:  - neuro/vitals q1h  - keppra 1000 mg BID   - pain control tylenol PRN,   - helmet at bedside, no longer needed s/p cranioplasty   - post-op [facility] neuroplastics protocol completed   - normal saline nasal rinse 4x daily per ENT  - subgaleal CHENCHO drain x2 in-place with headwrap, likely remove first CHENCHO drain on POD#2, then possibly removed 2nd CHENCHO drain on POD#3     Cardio:  - continue home norvasc  - SBP goal <140     Pulm:  - room air  - O2 sat goal > 92%     Renal:  - NS @110cc/hr while NPO   - voiding   - electrolyte repletion PRN     GI:  - advance diet as tolerated, soft and bite sized   - bowel regimen   - last BM 2/26     Endo:  - monitor am fingersticks   - a1c 4.7   - ISS    Heme:  - NO SCDs (b/l LE DVTs), SQL 110mg BID (previously on 120mg bid) on hold post-op   - prior LE doppler 1/8/23 b/l calf and L peroneal DVTs, repeat LE doppler 2/22 R posterior tibial vein DVT, persistent R IM calf vein DVT, resolution of L IM calf DVT  - h/o b/l mild segmental/lobar PE on CT PE protocol 1/10/23    ID  - Afebrile   - consulted ID, finished course of ceftriaxone/metronidazole/vancomycin for subdural/epidural empyema (rare peptostreptococcus 1/6 + staph epi) end date 2/16     Dispo: regional status, full code   D/w Dr. D'Amico and Dr. Castorena

## 2023-03-03 LAB
ANION GAP SERPL CALC-SCNC: 9 MMOL/L — SIGNIFICANT CHANGE UP (ref 5–17)
BUN SERPL-MCNC: 15 MG/DL — SIGNIFICANT CHANGE UP (ref 7–23)
CALCIUM SERPL-MCNC: 8.6 MG/DL — SIGNIFICANT CHANGE UP (ref 8.4–10.5)
CHLORIDE SERPL-SCNC: 103 MMOL/L — SIGNIFICANT CHANGE UP (ref 96–108)
CO2 SERPL-SCNC: 27 MMOL/L — SIGNIFICANT CHANGE UP (ref 22–31)
CREAT SERPL-MCNC: 0.82 MG/DL — SIGNIFICANT CHANGE UP (ref 0.5–1.3)
EGFR: 76 ML/MIN/1.73M2 — SIGNIFICANT CHANGE UP
GLUCOSE SERPL-MCNC: 113 MG/DL — HIGH (ref 70–99)
HCT VFR BLD CALC: 27.9 % — LOW (ref 34.5–45)
HGB BLD-MCNC: 9 G/DL — LOW (ref 11.5–15.5)
MAGNESIUM SERPL-MCNC: 2.2 MG/DL — SIGNIFICANT CHANGE UP (ref 1.6–2.6)
MCHC RBC-ENTMCNC: 30.5 PG — SIGNIFICANT CHANGE UP (ref 27–34)
MCHC RBC-ENTMCNC: 32.3 GM/DL — SIGNIFICANT CHANGE UP (ref 32–36)
MCV RBC AUTO: 94.6 FL — SIGNIFICANT CHANGE UP (ref 80–100)
NRBC # BLD: 0 /100 WBCS — SIGNIFICANT CHANGE UP (ref 0–0)
PHOSPHATE SERPL-MCNC: 3.6 MG/DL — SIGNIFICANT CHANGE UP (ref 2.5–4.5)
PLATELET # BLD AUTO: 238 K/UL — SIGNIFICANT CHANGE UP (ref 150–400)
POTASSIUM SERPL-MCNC: 3.1 MMOL/L — LOW (ref 3.5–5.3)
POTASSIUM SERPL-SCNC: 3.1 MMOL/L — LOW (ref 3.5–5.3)
RBC # BLD: 2.95 M/UL — LOW (ref 3.8–5.2)
RBC # FLD: 15.2 % — HIGH (ref 10.3–14.5)
SODIUM SERPL-SCNC: 139 MMOL/L — SIGNIFICANT CHANGE UP (ref 135–145)
WBC # BLD: 7.8 K/UL — SIGNIFICANT CHANGE UP (ref 3.8–10.5)
WBC # FLD AUTO: 7.8 K/UL — SIGNIFICANT CHANGE UP (ref 3.8–10.5)

## 2023-03-03 PROCEDURE — 93970 EXTREMITY STUDY: CPT | Mod: 26

## 2023-03-03 PROCEDURE — 99233 SBSQ HOSP IP/OBS HIGH 50: CPT

## 2023-03-03 RX ORDER — POTASSIUM CHLORIDE 20 MEQ
20 PACKET (EA) ORAL ONCE
Refills: 0 | Status: COMPLETED | OUTPATIENT
Start: 2023-03-03 | End: 2023-03-03

## 2023-03-03 RX ORDER — LACTULOSE 10 G/15ML
15 SOLUTION ORAL DAILY
Refills: 0 | Status: DISCONTINUED | OUTPATIENT
Start: 2023-03-03 | End: 2023-03-06

## 2023-03-03 RX ADMIN — Medication 0.25 MILLIGRAM(S): at 17:43

## 2023-03-03 RX ADMIN — Medication 20 MILLIEQUIVALENT(S): at 10:30

## 2023-03-03 RX ADMIN — LATANOPROST 1 DROP(S): 0.05 SOLUTION/ DROPS OPHTHALMIC; TOPICAL at 00:17

## 2023-03-03 RX ADMIN — Medication 50 MILLIEQUIVALENT(S): at 10:28

## 2023-03-03 RX ADMIN — POLYETHYLENE GLYCOL 3350 17 GRAM(S): 17 POWDER, FOR SOLUTION ORAL at 06:04

## 2023-03-03 RX ADMIN — Medication 100 MILLIGRAM(S): at 07:34

## 2023-03-03 RX ADMIN — Medication 0.25 MILLIGRAM(S): at 06:11

## 2023-03-03 RX ADMIN — LEVETIRACETAM 1000 MILLIGRAM(S): 250 TABLET, FILM COATED ORAL at 17:03

## 2023-03-03 RX ADMIN — Medication 5 MILLIGRAM(S): at 06:04

## 2023-03-03 RX ADMIN — LATANOPROST 1 DROP(S): 0.05 SOLUTION/ DROPS OPHTHALMIC; TOPICAL at 22:38

## 2023-03-03 RX ADMIN — LEVETIRACETAM 1000 MILLIGRAM(S): 250 TABLET, FILM COATED ORAL at 06:04

## 2023-03-03 RX ADMIN — Medication 650 MILLIGRAM(S): at 11:23

## 2023-03-03 RX ADMIN — ENOXAPARIN SODIUM 50 MILLIGRAM(S): 100 INJECTION SUBCUTANEOUS at 22:14

## 2023-03-03 RX ADMIN — SENNA PLUS 2 TABLET(S): 8.6 TABLET ORAL at 00:10

## 2023-03-03 RX ADMIN — Medication 100 MILLIGRAM(S): at 00:36

## 2023-03-03 RX ADMIN — Medication 100 MILLIGRAM(S): at 17:00

## 2023-03-03 NOTE — PROGRESS NOTE ADULT - ASSESSMENT
71 y/o F w/ h/o L hemicraniectomy and washout of L subdural empyema 12/30/22 (at time presented to [facility] c/o slurred speech and right sided weakness), s/p sinus surgery with judith purulence to L maxillary sinus and posterior bony erosion to L sphenoid sinus 1/5 w/ENT, PEs. Pt was discharged to [facility] rehab on 1/12/23 and plan was to return for cranioplasty in 3 months. Per daughter, patient spent 4 weeks at the rehab. She was sent home 1 week ago with 4 hrs home aid and home  PT. Her right side weakness was progressively improving and her expressive aphagia was minimally improved. Pending OR for cranioplasty

## 2023-03-03 NOTE — PROGRESS NOTE ADULT - SUBJECTIVE AND OBJECTIVE BOX
HPI:  71 yo female brought to ER by her daughter after she had a fall secondary to her baseline right side weakness.  Below  is a synopsis of patients last admission with us.:  71 y/o F w/ no known PMHx transferred from [place] c/o slurred speech and right sided weakness. CTH initially negative, repeat on 12/23 showed L sided SDH, pts mental status worsened and CTH on 12/28 significant for L frontoparietal collection, MRI completed showed concern for possible empyema. S/p L hemicraniectomy and washout of L subdural empyema 12/30/22. S/p sinus surgery with judith purulence to L maxillary sinus and posterior bony erosiun to L sphenoid sinus 1/5.   On she was  discharged to [facility] rehab which plan to return for cranioplasty in 3 months post hemicraniectomy.  Per daughter, patient spent 4 weeks at the rehab. She was sent home 1 week ago with 4 hrs home aid and home  PT.  Her right side weakness was progressively improving and her expressive aphagia was minimally improved. Daughter fells that patient had more right side weakness the last hrs.  Today when patient  togetup from her wheelchair her right knee bucked and patient fell on her buttocks. She was wearing her helmet; did not hit her head. NO LOC. Patient remained alert and awake throughout.  She was sent here for a head CT by Dr. D'Amico.   (22 Feb 2023 17:26)    INTERVAL EVENTS:  failed TOV, required straight cath x 2; denies HA    HOSPITAL COURSE:  2/22: admitted w/worsening right UE/LE weakness  2/23: DEEPALI overnight. MRI brain performed. Neuro exam stable.improved expressive aphasia. MRI no infarct or focal findings, pending neurplastic CT, pending ID consult, ENT scope today  2/24: DEEPALI overnight, neuro stable. Pending OR plans for cranioplasty (also to include ENT sinus debridement). Pending anti xa level today at 2pm  2/25: DEEPALI overnight, neuro stable. OR plan tentatively scheduled for Wednesday, ID cleared.  2/26: DEEPALI overnight, pending OR Wednesday. New anisicoria, R pupil 2mm and fixed, left 4mm brisk, RUE trace withdrawal, RLE brisk withdrawal, CTH showing MLS. Started on IVF. Upgraded to tele for closer monitoring. Intermittent trendelenberg, exam improving.  2/27: DEEPALI overnight. Neuro exam stable. Continue to lay flat/intermittent trendelenberg. OR plan for Wednesday.    2/28: DEEPALI overnight. Neuro stable. Bedside debridement with ENT. Start BID Budesonide nasal washes. Pending OR tomorrow with ENT. Holding SQL for OR. ENT did naslphrayngealoscopy, debrieded and recommending budesonide nasal rinse however unavailable, normal saline wash 4x daily ordered instead per ENT. Preop for cranioplasty tomorrow.  3/1: OR for left cranioplasty.   3/2: POD 1, DEEPALI overnight. Neuro stable. Stepdown status today. Kassandra removed this am f/u TOV. Cont JPs at this time, cont Ancef until drains out. Pend BM.   3/3: POD 2. 2 JPs in place.     Vital Signs Last 24 Hrs  T(C): 36.4 (02 Mar 2023 21:02), Max: 36.9 (02 Mar 2023 14:12)  T(F): 97.6 (02 Mar 2023 21:02), Max: 98.4 (02 Mar 2023 14:12)  HR: 89 (02 Mar 2023 21:02) (62 - 101)  BP: 113/66 (02 Mar 2023 21:02) (113/66 - 152/62)  BP(mean): 88 (02 Mar 2023 16:25) (80 - 98)  RR: 18 (02 Mar 2023 21:02) (12 - 25)  SpO2: 96% (02 Mar 2023 21:02) (93% - 99%)    Parameters below as of 02 Mar 2023 21:02  Patient On (Oxygen Delivery Method): room air        I&O's Summary    01 Mar 2023 07:01  -  02 Mar 2023 07:00  --------------------------------------------------------  IN: 2030 mL / OUT: 1624 mL / NET: 406 mL    02 Mar 2023 07:01  -  03 Mar 2023 01:00  --------------------------------------------------------  IN: 450 mL / OUT: 1360 mL / NET: -910 mL        General: awake, alert, no acute distress  Neuro: A/O x 3, expressive aphasia, following commands, LUE/LLE strength 5/5 throughout, RUE strength delt/bicep/tricep 3/5, hand  2/5, +RUE drift, RLE 4/5  Eyes: PERRL, EOMI  Cardio: regular rate and rhythm  Pulm: nonlabored breathing, normal chest rise  Abdomen: nondistended  Extremities: well perfused  Skin: warm, dry  Wound: head wrap in place, clean, dry, and intact; +SG CHENCHO x 2    LABS:                        9.0    9.13  )-----------( 251      ( 02 Mar 2023 05:22 )             28.2     03-02    139  |  106  |  10  ----------------------------<  140<H>  3.6   |  24  |  0.75    Ca    9.1      02 Mar 2023 05:22  Phos  4.3     03-02  Mg     1.7     03-02      PT/INR - ( 01 Mar 2023 13:02 )   PT: 12.0 sec;   INR: 1.01          PTT - ( 01 Mar 2023 13:02 )  PTT:28.1 sec        CAPILLARY BLOOD GLUCOSE      POCT Blood Glucose.: 125 mg/dL (02 Mar 2023 05:39)      Drug Levels: [] N/A    CSF Analysis: [] N/A      Allergies    No Known Allergies    Intolerances      MEDICATIONS:  Antibiotics:  ceFAZolin   IVPB 2000 milliGRAM(s) IV Intermittent every 8 hours    Neuro:  acetaminophen     Tablet .. 650 milliGRAM(s) Oral every 6 hours PRN  levETIRAcetam 1000 milliGRAM(s) Oral two times a day  ondansetron Injectable 4 milliGRAM(s) IV Push every 6 hours PRN  oxyCODONE    IR 5 milliGRAM(s) Oral every 4 hours PRN    Anticoagulation:  enoxaparin Injectable 50 milliGRAM(s) SubCutaneous every 24 hours    OTHER:  amLODIPine   Tablet 10 milliGRAM(s) Oral daily  bisacodyl 5 milliGRAM(s) Oral every 12 hours  bisacodyl Suppository 10 milliGRAM(s) Rectal daily PRN  buDESOnide    Inhalation Suspension 0.25 milliGRAM(s) Inhalation two times a day  fluticasone propionate 50 MICROgram(s)/spray Nasal Spray 1 Spray(s) Both Nostrils two times a day  latanoprost 0.005% Ophthalmic Solution 1 Drop(s) Both EYES at bedtime  polyethylene glycol 3350 17 Gram(s) Oral two times a day  senna 2 Tablet(s) Oral at bedtime    IVF:    CULTURES:    RADIOLOGY & ADDITIONAL TESTS:      ASSESSMENT:  71 y/o F with h/o L hemicraniectomy and washout of L subdural empyema 12/30/22 (at time presented to [facility] c/o slurred speech and right sided weakness), s/p sinus surgery with judith purulence to L maxillary sinus and posterior bony erosion to L sphenoid sinus w/ENT 1/5/23, PEs. B/L DVT, B/L cataract surgery. Admitted to neurosurgery with plan for cranioplasty. Now s/p Left cranioplasty with Doyle mesh, local tissue rearrangement, complex neuroplastics closure (3/1).      PLAN:  Neuro:  - Neuro/vitals q4h  - Keppra 1000 mg BID    - Pain control tylenol, oxycodone   - Helmet at bedside, no longer needed s/p cranioplasty   - Post-op [facility] neuroplastics protocol completed   - Normal saline nasal rinse 4x daily per ENT  - Subgaleal CHENCHO drain x2 in-place with headwrap, likely remove first CHENCHO drain on POD#2, then possibly removed 2nd CHENCHO drain on POD#3     Cardio:  - Continue home Norvasc  - SBP goal <160     Pulm:  - room air  - O2 sat goal > 92%     Renal:  - IVL   - Cannon removed, f/u TOV     GI:  - Advance diet as tolerated, soft and bite sized   - Bowel regimen   - Last BM 2/26     Endo:  - a1c 4.7   - ISS    Heme:  - SQL 3/3  - NO SCDs (b/l LE DVTs), SQL 110mg BID (previously on 120mg bid) on hold post-op   - prior LE doppler 1/8/23 b/l calf and L peroneal DVTs, repeat LE doppler 2/22 R posterior tibial vein DVT, persistent R IM calf vein DVT, resolution of L IM calf DVT  - h/o b/l mild segmental/lobar PE on CT PE protocol 1/10/23    ID  - Afebrile   - Consulted ID, finished course of ceftriaxone/metronidazole/vancomycin for subdural/epidural empyema (rare peptostreptococcus 1/6 + staph epi) end date 2/16     Dispo:   - Regional status, full code   - PT/OT     D/w Dr. D'Amico

## 2023-03-04 LAB
ANION GAP SERPL CALC-SCNC: 8 MMOL/L — SIGNIFICANT CHANGE UP (ref 5–17)
BUN SERPL-MCNC: 10 MG/DL — SIGNIFICANT CHANGE UP (ref 7–23)
CALCIUM SERPL-MCNC: 8.6 MG/DL — SIGNIFICANT CHANGE UP (ref 8.4–10.5)
CHLORIDE SERPL-SCNC: 106 MMOL/L — SIGNIFICANT CHANGE UP (ref 96–108)
CO2 SERPL-SCNC: 27 MMOL/L — SIGNIFICANT CHANGE UP (ref 22–31)
CREAT SERPL-MCNC: 0.71 MG/DL — SIGNIFICANT CHANGE UP (ref 0.5–1.3)
EGFR: 91 ML/MIN/1.73M2 — SIGNIFICANT CHANGE UP
GLUCOSE SERPL-MCNC: 108 MG/DL — HIGH (ref 70–99)
HCT VFR BLD CALC: 26.6 % — LOW (ref 34.5–45)
HGB BLD-MCNC: 8.4 G/DL — LOW (ref 11.5–15.5)
MAGNESIUM SERPL-MCNC: 1.8 MG/DL — SIGNIFICANT CHANGE UP (ref 1.6–2.6)
MCHC RBC-ENTMCNC: 29.4 PG — SIGNIFICANT CHANGE UP (ref 27–34)
MCHC RBC-ENTMCNC: 31.6 GM/DL — LOW (ref 32–36)
MCV RBC AUTO: 93 FL — SIGNIFICANT CHANGE UP (ref 80–100)
NRBC # BLD: 0 /100 WBCS — SIGNIFICANT CHANGE UP (ref 0–0)
PHOSPHATE SERPL-MCNC: 3.4 MG/DL — SIGNIFICANT CHANGE UP (ref 2.5–4.5)
PLATELET # BLD AUTO: 209 K/UL — SIGNIFICANT CHANGE UP (ref 150–400)
POTASSIUM SERPL-MCNC: 3.1 MMOL/L — LOW (ref 3.5–5.3)
POTASSIUM SERPL-SCNC: 3.1 MMOL/L — LOW (ref 3.5–5.3)
RBC # BLD: 2.86 M/UL — LOW (ref 3.8–5.2)
RBC # FLD: 15 % — HIGH (ref 10.3–14.5)
SODIUM SERPL-SCNC: 141 MMOL/L — SIGNIFICANT CHANGE UP (ref 135–145)
WBC # BLD: 5.97 K/UL — SIGNIFICANT CHANGE UP (ref 3.8–10.5)
WBC # FLD AUTO: 5.97 K/UL — SIGNIFICANT CHANGE UP (ref 3.8–10.5)

## 2023-03-04 PROCEDURE — 99233 SBSQ HOSP IP/OBS HIGH 50: CPT

## 2023-03-04 RX ORDER — POTASSIUM CHLORIDE 20 MEQ
40 PACKET (EA) ORAL
Refills: 0 | Status: COMPLETED | OUTPATIENT
Start: 2023-03-04 | End: 2023-03-04

## 2023-03-04 RX ORDER — MAGNESIUM SULFATE 500 MG/ML
2 VIAL (ML) INJECTION ONCE
Refills: 0 | Status: COMPLETED | OUTPATIENT
Start: 2023-03-04 | End: 2023-03-04

## 2023-03-04 RX ORDER — POTASSIUM CHLORIDE 20 MEQ
10 PACKET (EA) ORAL
Refills: 0 | Status: COMPLETED | OUTPATIENT
Start: 2023-03-04 | End: 2023-03-04

## 2023-03-04 RX ADMIN — Medication 100 MILLIGRAM(S): at 00:28

## 2023-03-04 RX ADMIN — Medication 1 SPRAY(S): at 17:11

## 2023-03-04 RX ADMIN — Medication 1 SPRAY(S): at 06:48

## 2023-03-04 RX ADMIN — LEVETIRACETAM 1000 MILLIGRAM(S): 250 TABLET, FILM COATED ORAL at 17:10

## 2023-03-04 RX ADMIN — Medication 40 MILLIEQUIVALENT(S): at 13:41

## 2023-03-04 RX ADMIN — LATANOPROST 1 DROP(S): 0.05 SOLUTION/ DROPS OPHTHALMIC; TOPICAL at 23:08

## 2023-03-04 RX ADMIN — POLYETHYLENE GLYCOL 3350 17 GRAM(S): 17 POWDER, FOR SOLUTION ORAL at 06:49

## 2023-03-04 RX ADMIN — Medication 0.25 MILLIGRAM(S): at 17:10

## 2023-03-04 RX ADMIN — Medication 0.25 MILLIGRAM(S): at 06:39

## 2023-03-04 RX ADMIN — LEVETIRACETAM 1000 MILLIGRAM(S): 250 TABLET, FILM COATED ORAL at 06:39

## 2023-03-04 RX ADMIN — Medication 40 MILLIEQUIVALENT(S): at 11:15

## 2023-03-04 RX ADMIN — Medication 100 MILLIGRAM(S): at 07:16

## 2023-03-04 RX ADMIN — Medication 100 MILLIEQUIVALENT(S): at 13:39

## 2023-03-04 RX ADMIN — Medication 25 GRAM(S): at 11:15

## 2023-03-04 RX ADMIN — ENOXAPARIN SODIUM 50 MILLIGRAM(S): 100 INJECTION SUBCUTANEOUS at 23:08

## 2023-03-04 RX ADMIN — LACTULOSE 15 GRAM(S): 10 SOLUTION ORAL at 11:15

## 2023-03-04 RX ADMIN — Medication 100 MILLIEQUIVALENT(S): at 15:13

## 2023-03-04 NOTE — PROGRESS NOTE ADULT - ASSESSMENT
73 y/o F w/ h/o L hemicraniectomy and washout of L subdural empyema 12/30/22 (at time presented to [facility] c/o slurred speech and right sided weakness), s/p sinus surgery with judith purulence to L maxillary sinus and posterior bony erosion to L sphenoid sinus 1/5 w/ENT, PEs. Pt was discharged to [facility] rehab on 1/12/23 and plan was to return for cranioplasty in 3 months. Per daughter, patient spent 4 weeks at the rehab. She was sent home 1 week ago with 4 hrs home aid and home  PT. Her right side weakness was progressively improving and her expressive aphagia was minimally improved. Pending OR for cranioplasty      71 y/o F w/ h/o L hemicraniectomy and washout of L subdural empyema 12/30/22 (at time presented to [facility] c/o slurred speech and right sided weakness), s/p sinus surgery with judith purulence to L maxillary sinus and posterior bony erosion to L sphenoid sinus 1/5 w/ENT, PEs. Pt was discharged to [facility] rehab on 1/12/23 and plan was to return for cranioplasty in 3 months. Per daughter, patient spent 4 weeks at the rehab. She was sent home 1 week ago with 4 hrs home aid and home  PT. Her right side weakness was progressively improving and her expressive aphagia was minimally improved. S/p cranioplasty

## 2023-03-04 NOTE — PROGRESS NOTE ADULT - SUBJECTIVE AND OBJECTIVE BOX
INTERVAL EVENTS: No o/n events. Denies CP, dyspnea, palpitations, presyncope, syncope, f/c/n/v.     REVIEW OF SYSTEMS:  Constitutional:     [X] negative [ ] fevers [ ] chills [ ] weight loss [ ] weight gain  HEENT:                  [X] negative [ ] dry eyes [ ] eye irritation [ ] postnasal drip [ ] nasal congestion  CV:                         [X] negative  [ ] chest pain [ ] orthopnea [ ] palpitations [ ] murmur  Resp:                     [X] negative [ ] cough [ ] shortness of breath [ ] wheezing [ ] sputum [ ] hemoptysis  GI:                          [X] negative [ ] nausea [ ] vomiting [ ] diarrhea [ ] constipation [ ] abd pain [ ] dysphagia   :                        [X] negative [ ] dysuria [ ] nocturia [ ] hematuria [ ] increased urinary frequency  MSK:                      [X] negative [ ] back pain [ ] myalgias [ ] arthralgias [ ] fracture  Skin:                       [X] negative [ ] rash [ ] itch  Neuro:                   [X] negative [ ] headache [ ] dizziness [ ] syncope [ ] weakness [ ] numbness  Psych:                    [X] negative [ ] anxiety [ ] depression  Endo:                     [X] negative [ ] diabetes [ ] thyroid problem  Heme/Lymph:      [X] negative [ ] anemia [ ] bleeding problem  Allergic/Immune: [X] negative [ ] itchy eyes [ ] nasal discharge [ ] hives [ ] angioedema    [X] All other systems negative or otherwise described above.  [ ] Unable to assess ROS due to ________.    PAST MEDICAL & SURGICAL HISTORY:  HTN (hypertension)    S/P craniotomy      MEDICATIONS  (STANDING):  amLODIPine   Tablet 10 milliGRAM(s) Oral daily  bisacodyl 5 milliGRAM(s) Oral every 12 hours  buDESOnide    Inhalation Suspension 0.25 milliGRAM(s) Inhalation two times a day  enoxaparin Injectable 50 milliGRAM(s) SubCutaneous every 24 hours  fluticasone propionate 50 MICROgram(s)/spray Nasal Spray 1 Spray(s) Both Nostrils two times a day  lactulose Syrup 15 Gram(s) Oral daily  latanoprost 0.005% Ophthalmic Solution 1 Drop(s) Both EYES at bedtime  levETIRAcetam 1000 milliGRAM(s) Oral two times a day  polyethylene glycol 3350 17 Gram(s) Oral two times a day  potassium chloride    Tablet ER 40 milliEquivalent(s) Oral every 2 hours  potassium chloride  10 mEq/100 mL IVPB 10 milliEquivalent(s) IV Intermittent every 2 hours  senna 2 Tablet(s) Oral at bedtime    MEDICATIONS  (PRN):  acetaminophen     Tablet .. 650 milliGRAM(s) Oral every 6 hours PRN Temp greater or equal to 38C (100.4F), Mild Pain (1 - 3)  bisacodyl Suppository 10 milliGRAM(s) Rectal daily PRN Constipation  ondansetron Injectable 4 milliGRAM(s) IV Push every 6 hours PRN Nausea and/or Vomiting  oxyCODONE    IR 5 milliGRAM(s) Oral every 4 hours PRN Moderate Pain (4 - 6)    ICU Vital Signs Last 24 Hrs  T(C): 36.4 (04 Mar 2023 09:59), Max: 36.8 (03 Mar 2023 16:27)  T(F): 97.6 (04 Mar 2023 09:59), Max: 98.3 (03 Mar 2023 16:27)  HR: 86 (04 Mar 2023 09:59) (86 - 102)  BP: 124/72 (04 Mar 2023 09:59) (112/62 - 144/79)  BP(mean): --  ABP: --  ABP(mean): --  RR: 18 (04 Mar 2023 09:59) (16 - 18)  SpO2: 96% (04 Mar 2023 09:59) (96% - 97%)    O2 Parameters below as of 04 Mar 2023 09:59  Patient On (Oxygen Delivery Method): room air          Orthostatic VS    Daily     Daily   I&O's Summary    03 Mar 2023 07:01  -  04 Mar 2023 07:00  --------------------------------------------------------  IN: 0 mL / OUT: 1707.5 mL / NET: -1707.5 mL        PHYSICAL EXAM:  GENERAL: No acute distress, well-developed  HEAD:  Atraumatic, Normocephalic  ENT: EOMI, conjunctiva and sclera clear, Neck supple, No JVD, moist mucosa  CHEST/LUNG: Clear to auscultation bilaterally; No wheeze, equal breath sounds bilaterally   BACK: No spinal tenderness  HEART: Regular rate and rhythm; No murmurs, rubs, or gallops, radial and DP 2+ b/l, euvolemic  ABDOMEN: Soft, Nontender, Nondistended  EXTREMITIES:  No clubbing, cyanosis, or edema  PSYCH: Nl behavior, nl affect  NEUROLOGY: AAOx3, non-focal  SKIN: Normal color, No rashes or lesions  LINES: no central lines present     LABS:                        8.4    5.97  )-----------( 209      ( 04 Mar 2023 06:54 )             26.6       03-04    141  |  106  |  10  ----------------------------<  108<H>  3.1<L>   |  27  |  0.71    Ca    8.6      04 Mar 2023 06:54  Phos  3.4     03-04  Mg     1.8     03-04        RADIOLOGY & ADDITIONAL STUDIES:    Other misc imaging:   < from: US Duplex Venous Lower Ext Complete, Bilateral (03.03.23 @ 20:07) >  IMPRESSION:  Persistent DVT involving one of the. Right posterior tibial veins.   Persistent DVT in a right intramuscular calf vein.    --- End of Report ---      < end of copied text >    < from: CT Head No Cont (03.01.23 @ 14:07) >  Impression: Status post post left frontal parietal temporal   hemicraniectomy and flap with interval cranioplasty. Tiny left   extra-axial fluid collection. Reexpansion of the left cerebral hemisphere   when compared to the prior study. Resolution of midline shift to the   right. Chronic infarctions.      < end of copied text >       INTERVAL EVENTS: No o/n events. Denies CP, dyspnea, palpitations, presyncope, syncope, f/c/n/v.     REVIEW OF SYSTEMS:  Constitutional:     [X] negative [ ] fevers [ ] chills [ ] weight loss [ ] weight gain  HEENT:                  [X] negative [ ] dry eyes [ ] eye irritation [ ] postnasal drip [ ] nasal congestion  CV:                         [X] negative  [ ] chest pain [ ] orthopnea [ ] palpitations [ ] murmur  Resp:                     [X] negative [ ] cough [ ] shortness of breath [ ] wheezing [ ] sputum [ ] hemoptysis  GI:                          [X] negative [ ] nausea [ ] vomiting [ ] diarrhea [ ] constipation [ ] abd pain [ ] dysphagia   :                        [X] negative [ ] dysuria [ ] nocturia [ ] hematuria [ ] increased urinary frequency  MSK:                      [X] negative [ ] back pain [ ] myalgias [ ] arthralgias [ ] fracture  Skin:                       [X] negative [ ] rash [ ] itch  Neuro:                   [X] negative [ ] headache [ ] dizziness [ ] syncope [ ] weakness [ ] numbness  Psych:                    [X] negative [ ] anxiety [ ] depression  Endo:                     [X] negative [ ] diabetes [ ] thyroid problem  Heme/Lymph:      [X] negative [ ] anemia [ ] bleeding problem  Allergic/Immune: [X] negative [ ] itchy eyes [ ] nasal discharge [ ] hives [ ] angioedema    [X] All other systems negative or otherwise described above.  [ ] Unable to assess ROS due to ________.    PAST MEDICAL & SURGICAL HISTORY:  HTN (hypertension)    S/P craniotomy      MEDICATIONS  (STANDING):  amLODIPine   Tablet 10 milliGRAM(s) Oral daily  bisacodyl 5 milliGRAM(s) Oral every 12 hours  buDESOnide    Inhalation Suspension 0.25 milliGRAM(s) Inhalation two times a day  enoxaparin Injectable 50 milliGRAM(s) SubCutaneous every 24 hours  fluticasone propionate 50 MICROgram(s)/spray Nasal Spray 1 Spray(s) Both Nostrils two times a day  lactulose Syrup 15 Gram(s) Oral daily  latanoprost 0.005% Ophthalmic Solution 1 Drop(s) Both EYES at bedtime  levETIRAcetam 1000 milliGRAM(s) Oral two times a day  polyethylene glycol 3350 17 Gram(s) Oral two times a day  potassium chloride    Tablet ER 40 milliEquivalent(s) Oral every 2 hours  potassium chloride  10 mEq/100 mL IVPB 10 milliEquivalent(s) IV Intermittent every 2 hours  senna 2 Tablet(s) Oral at bedtime    MEDICATIONS  (PRN):  acetaminophen     Tablet .. 650 milliGRAM(s) Oral every 6 hours PRN Temp greater or equal to 38C (100.4F), Mild Pain (1 - 3)  bisacodyl Suppository 10 milliGRAM(s) Rectal daily PRN Constipation  ondansetron Injectable 4 milliGRAM(s) IV Push every 6 hours PRN Nausea and/or Vomiting  oxyCODONE    IR 5 milliGRAM(s) Oral every 4 hours PRN Moderate Pain (4 - 6)    ICU Vital Signs Last 24 Hrs  T(C): 36.4 (04 Mar 2023 09:59), Max: 36.8 (03 Mar 2023 16:27)  T(F): 97.6 (04 Mar 2023 09:59), Max: 98.3 (03 Mar 2023 16:27)  HR: 86 (04 Mar 2023 09:59) (86 - 102)  BP: 124/72 (04 Mar 2023 09:59) (112/62 - 144/79)  BP(mean): --  ABP: --  ABP(mean): --  RR: 18 (04 Mar 2023 09:59) (16 - 18)  SpO2: 96% (04 Mar 2023 09:59) (96% - 97%)    O2 Parameters below as of 04 Mar 2023 09:59  Patient On (Oxygen Delivery Method): room air          Orthostatic VS    Daily     Daily   I&O's Summary    03 Mar 2023 07:01  -  04 Mar 2023 07:00  --------------------------------------------------------  IN: 0 mL / OUT: 1707.5 mL / NET: -1707.5 mL        PHYSICAL EXAM:  GENERAL: No acute distress, well-developed  HEAD:  Head wrapped in bandaging  ENT: EOMI, conjunctiva and sclera clear, Neck supple, No JVD, moist mucosa  CHEST/LUNG: Clear to auscultation bilaterally; No wheeze, equal breath sounds bilaterally   BACK: No spinal tenderness  HEART: Regular rate and rhythm; No murmurs, rubs, or gallops, radial and DP 2+ b/l, euvolemic  ABDOMEN: Soft, Nontender, Nondistended  EXTREMITIES:  No clubbing, cyanosis, or edema  PSYCH: Nl behavior, nl affect  NEUROLOGY: AAOx3, non-focal  SKIN: Normal color, No rashes or lesions  LINES: no central lines present     LABS:                        8.4    5.97  )-----------( 209      ( 04 Mar 2023 06:54 )             26.6       03-04    141  |  106  |  10  ----------------------------<  108<H>  3.1<L>   |  27  |  0.71    Ca    8.6      04 Mar 2023 06:54  Phos  3.4     03-04  Mg     1.8     03-04        RADIOLOGY & ADDITIONAL STUDIES:    Other misc imaging:   < from: US Duplex Venous Lower Ext Complete, Bilateral (03.03.23 @ 20:07) >  IMPRESSION:  Persistent DVT involving one of the. Right posterior tibial veins.   Persistent DVT in a right intramuscular calf vein.    --- End of Report ---      < end of copied text >    < from: CT Head No Cont (03.01.23 @ 14:07) >  Impression: Status post post left frontal parietal temporal   hemicraniectomy and flap with interval cranioplasty. Tiny left   extra-axial fluid collection. Reexpansion of the left cerebral hemisphere   when compared to the prior study. Resolution of midline shift to the   right. Chronic infarctions.      < end of copied text >

## 2023-03-04 NOTE — PROGRESS NOTE ADULT - SUBJECTIVE AND OBJECTIVE BOX
HPI:  73 yo female brought to ER by her daughter after she had a fall secondary to her baseline right side weakness.  Below  is a synopsis of patients last admission with us.:  73 y/o F w/ no known PMHx transferred from [place] c/o slurred speech and right sided weakness. CTH initially negative, repeat on 12/23 showed L sided SDH, pts mental status worsened and CTH on 12/28 significant for L frontoparietal collection, MRI completed showed concern for possible empyema. S/p L hemicraniectomy and washout of L subdural empyema 12/30/22. S/p sinus surgery with judith purulence to L maxillary sinus and posterior bony erosiun to L sphenoid sinus 1/5.   On she was  discharged to [facility] rehab which plan to return for cranioplasty in 3 months post hemicraniectomy.  Per daughter, patient spent 4 weeks at the rehab. She was sent home 1 week ago with 4 hrs home aid and home  PT.  Her right side weakness was progressively improving and her expressive aphagia was minimally improved. Daughter fells that patient had more right side weakness the last hrs.  Today when patient  togetup from her wheelchair her right knee bucked and patient fell on her buttocks. She was wearing her helmet; did not hit her head. NO LOC. Patient remained alert and awake throughout.  She was sent here for a head CT by Dr. D'Amico.    HOSPITAL COURSE:  2/22: admitted w/worsening right UE/LE weakness  2/23: DEEPALI overnight. MRI brain performed. Neuro exam stable.improved expressive aphasia. MRI no infarct or focal findings, pending neurplastic CT, pending ID consult, ENT scope today  2/24: DEEPALI overnight, neuro stable. Pending OR plans for cranioplasty (also to include ENT sinus debridement). Pending anti xa level today at 2pm  2/25: DEEPALI overnight, neuro stable. OR plan tentatively scheduled for Wednesday, ID cleared.  2/26: DEEPALI overnight, pending OR Wednesday. New anisicoria, R pupil 2mm and fixed, left 4mm brisk, RUE trace withdrawal, RLE brisk withdrawal, CTH showing MLS. Started on IVF. Upgraded to tele for closer monitoring. Intermittent trendelenberg, exam improving.  2/27: DEEPALI overnight. Neuro exam stable. Continue to lay flat/intermittent trendelenberg. OR plan for Wednesday.    2/28: DEEPALI overnight. Neuro stable. Bedside debridement with ENT. Start BID Budesonide nasal washes. Pending OR tomorrow with ENT. Holding SQL for OR. ENT did naslphrayngealoscopy, debrieded and recommending budesonide nasal rinse however unavailable, normal saline wash 4x daily ordered instead per ENT. Preop for cranioplasty tomorrow.  3/1: OR for left cranioplasty.   3/2: POD 1, DEEPALI overnight. Neuro stable. Stepdown status today. Kassandra removed this am f/u TOV. Cont JPs at this time, cont Ancef until drains out. Pend BM.   3/3: POD 2. 2 JPs in place.   3/4: POD 3, DEEPALI overnight, pending AR    Vital Signs Last 24 Hrs  T(C): 36.5 (03 Mar 2023 21:33), Max: 36.8 (03 Mar 2023 16:27)  T(F): 97.7 (03 Mar 2023 21:33), Max: 98.3 (03 Mar 2023 16:27)  HR: 98 (03 Mar 2023 21:33) (82 - 102)  BP: 112/62 (03 Mar 2023 21:33) (112/62 - 144/79)  BP(mean): 87 (03 Mar 2023 09:07) (87 - 87)  RR: 16 (03 Mar 2023 21:33) (16 - 18)  SpO2: 96% (03 Mar 2023 21:33) (95% - 97%)    Parameters below as of 03 Mar 2023 21:33  Patient On (Oxygen Delivery Method): room air        I&O's Detail    02 Mar 2023 07:01  -  03 Mar 2023 07:00  --------------------------------------------------------  IN:    Oral Fluid: 120 mL    sodium chloride 0.9%: 330 mL  Total IN: 450 mL    OUT:    Bulb (mL): 32.5 mL    Bulb (mL): 13 mL    Indwelling Catheter - Urethral (mL): 575 mL    Intermittent Catheterization - Urethral (mL): 750 mL  Total OUT: 1370.5 mL    Total NET: -920.5 mL      03 Mar 2023 07:01  -  04 Mar 2023 00:50  --------------------------------------------------------  IN:  Total IN: 0 mL    OUT:    Bulb (mL): 7.5 mL    Voided (mL): 1050 mL  Total OUT: 1057.5 mL    Total NET: -1057.5 mL        I&O's Summary    02 Mar 2023 07:01  -  03 Mar 2023 07:00  --------------------------------------------------------  IN: 450 mL / OUT: 1370.5 mL / NET: -920.5 mL    03 Mar 2023 07:01  -  04 Mar 2023 00:50  --------------------------------------------------------  IN: 0 mL / OUT: 1057.5 mL / NET: -1057.5 mL        PHYSICAL EXAM:  Neuro: A/O x 3, expressive aphasia, following commands, LUE/LLE strength 5/5 throughout, RUE strength delt/bicep/tricep 3/5, hand  2/5, +RUE drift, RLE 4/5  Eyes: PERRL, EOMI  Cardio: regular rate and rhythm  Pulm: nonlabored breathing, normal chest rise  Abdomen: nondistended  Extremities: well perfused  Skin: warm, dry  Wound: head wrap in place, clean, dry, and intact; +SG CHENCHO x 1         TUBES/LINES:  [] CVC  [] A-line  [] Lumbar Drain  [] Ventriculostomy  [] Other    DIET:  [] NPO  [] Mechanical  [] Tube feeds    LABS:                        9.0    7.80  )-----------( 238      ( 03 Mar 2023 07:39 )             27.9     03-03    139  |  103  |  15  ----------------------------<  113<H>  3.1<L>   |  27  |  0.82    Ca    8.6      03 Mar 2023 07:39  Phos  3.6     03-03  Mg     2.2     03-03              CAPILLARY BLOOD GLUCOSE          Drug Levels: [] N/A    CSF Analysis: [] N/A      Allergies    No Known Allergies    Intolerances      MEDICATIONS:  Antibiotics:  ceFAZolin   IVPB 2000 milliGRAM(s) IV Intermittent every 8 hours    Neuro:  acetaminophen     Tablet .. 650 milliGRAM(s) Oral every 6 hours PRN  levETIRAcetam 1000 milliGRAM(s) Oral two times a day  ondansetron Injectable 4 milliGRAM(s) IV Push every 6 hours PRN  oxyCODONE    IR 5 milliGRAM(s) Oral every 4 hours PRN    Anticoagulation:  enoxaparin Injectable 50 milliGRAM(s) SubCutaneous every 24 hours    OTHER:  amLODIPine   Tablet 10 milliGRAM(s) Oral daily  bisacodyl 5 milliGRAM(s) Oral every 12 hours  bisacodyl Suppository 10 milliGRAM(s) Rectal daily PRN  buDESOnide    Inhalation Suspension 0.25 milliGRAM(s) Inhalation two times a day  fluticasone propionate 50 MICROgram(s)/spray Nasal Spray 1 Spray(s) Both Nostrils two times a day  lactulose Syrup 15 Gram(s) Oral daily  latanoprost 0.005% Ophthalmic Solution 1 Drop(s) Both EYES at bedtime  polyethylene glycol 3350 17 Gram(s) Oral two times a day  senna 2 Tablet(s) Oral at bedtime    IVF:    CULTURES:    RADIOLOGY & ADDITIONAL TESTS:      ASSESSMENT:  73 y/o F with h/o L hemicraniectomy and washout of L subdural empyema 12/30/22 (at time presented to [facility] c/o slurred speech and right sided weakness), s/p sinus surgery with judith purulence to L maxillary sinus and posterior bony erosion to L sphenoid sinus w/ENT 1/5/23, PEs. B/L DVT, B/L cataract surgery. Admitted to neurosurgery with plan for cranioplasty. Now s/p Left cranioplasty with Frankewing mesh, local tissue rearrangement, complex neuroplastics closure (3/1).      H/O CRANITOMY    Handoff    MEWS Score    HTN (hypertension)    History of trephination of cranium    History of trephination of cranium    Cranioplasty with replacement bone flap    H/O craniotomy    Skull defect    Status post craniectomy    HTN (hypertension)    Skull defect    Essential hypertension    Pre-op exam    Hypokalemia    Brain compression    Deep vein thrombosis (DVT) of femoral vein of right lower extremity, unspecified chronicity    Chronic deep vein thrombosis (DVT) of tibial vein of right lower extremity    Cranioplasty with replacement bone flap    S/P craniotomy    FALL    41    Room Service Assist    Status post craniectomy    [email or web address]        PLAN:  Neuro:  - Neuro/vitals q4h  - Keppra 1000 mg BID    - Pain control tylenol, oxycodone   - Helmet at bedside, no longer needed s/p cranioplasty   - Post-op CTH neuroplastics protocol completed   - Normal saline nasal rinse 4x daily per ENT  - Subgaleal CHENCHO drain x1 in-place with headwrap    Cardio:  - Continue home Norvasc  - SBP goal <160     Pulm:  - room air  - O2 sat goal > 92%     Renal:  - IVL   - straight cath PRN    GI:  - Advance diet as tolerated, soft and bite sized   - Bowel regimen   - Last BM 3/3    Endo:  - a1c 4.7   - ISS    Heme:  - SQL 3/3  - NO SCDs (b/l LE DVTs), SQL 110mg BID (previously on 120mg bid) on hold post-op   - prior LE doppler 1/8/23 b/l calf and L peroneal DVTs, repeat LE doppler 2/22 R posterior tibial vein DVT, persistent R IM calf vein DVT, resolution of L IM calf DVT  - h/o b/l mild segmental/lobar PE on CT PE protocol 1/10/23    ID  - Afebrile   - Consulted ID, finished course of ceftriaxone/metronidazole/vancomycin for subdural/epidural empyema (rare peptostreptococcus 1/6 + staph epi) end date 2/16     Dispo:   - Regional status, full code   - PT/OT     D/w Dr. D'Amico         Assessment:  Present when checked    []  GCS  E   V  M     Heart Failure: []Acute, [] acute on chronic , []chronic  Heart Failure:  [] Diastolic (HFpEF), [] Systolic (HFrEF), []Combined (HFpEF and HFrEF), [] RHF, [] Pulm HTN, [] Other    [] DIONTE, [] ATN, [] AIN, [] other  [] CKD1, [] CKD2, [] CKD 3, [] CKD 4, [] CKD 5, []ESRD    Encephalopathy: [] Metabolic, [] Hepatic, [] toxic, [] Neurological, [] Other    Abnormal Nurtitional Status: [] malnurtition (see nutrition note), [ ]underweight: BMI < 19, [] morbid obesity: BMI >40, [] Cachexia    [] Sepsis  [] hypovolemic shock,[] cardiogenic shock, [] hemorrhagic shock, [] neuogenic shock  [] Acute Respiratory Failure  []Cerebral edema, [] Brain compression/ herniation,   [] Functional quadriplegia  [] Acute blood loss anemia   lovenox BID  As per transplant note: restart tonight will administer at 2230 and change timing to be administered at [id number]

## 2023-03-05 LAB
ANION GAP SERPL CALC-SCNC: 9 MMOL/L — SIGNIFICANT CHANGE UP (ref 5–17)
BUN SERPL-MCNC: 8 MG/DL — SIGNIFICANT CHANGE UP (ref 7–23)
CALCIUM SERPL-MCNC: 8.9 MG/DL — SIGNIFICANT CHANGE UP (ref 8.4–10.5)
CHLORIDE SERPL-SCNC: 106 MMOL/L — SIGNIFICANT CHANGE UP (ref 96–108)
CO2 SERPL-SCNC: 26 MMOL/L — SIGNIFICANT CHANGE UP (ref 22–31)
CREAT SERPL-MCNC: 0.66 MG/DL — SIGNIFICANT CHANGE UP (ref 0.5–1.3)
EGFR: 94 ML/MIN/1.73M2 — SIGNIFICANT CHANGE UP
GLUCOSE SERPL-MCNC: 109 MG/DL — HIGH (ref 70–99)
HCT VFR BLD CALC: 27 % — LOW (ref 34.5–45)
HGB BLD-MCNC: 8.6 G/DL — LOW (ref 11.5–15.5)
MAGNESIUM SERPL-MCNC: 2 MG/DL — SIGNIFICANT CHANGE UP (ref 1.6–2.6)
MCHC RBC-ENTMCNC: 29.7 PG — SIGNIFICANT CHANGE UP (ref 27–34)
MCHC RBC-ENTMCNC: 31.9 GM/DL — LOW (ref 32–36)
MCV RBC AUTO: 93.1 FL — SIGNIFICANT CHANGE UP (ref 80–100)
NRBC # BLD: 0 /100 WBCS — SIGNIFICANT CHANGE UP (ref 0–0)
PHOSPHATE SERPL-MCNC: 3.2 MG/DL — SIGNIFICANT CHANGE UP (ref 2.5–4.5)
PLATELET # BLD AUTO: 237 K/UL — SIGNIFICANT CHANGE UP (ref 150–400)
POTASSIUM SERPL-MCNC: 4 MMOL/L — SIGNIFICANT CHANGE UP (ref 3.5–5.3)
POTASSIUM SERPL-SCNC: 4 MMOL/L — SIGNIFICANT CHANGE UP (ref 3.5–5.3)
RBC # BLD: 2.9 M/UL — LOW (ref 3.8–5.2)
RBC # FLD: 14.8 % — HIGH (ref 10.3–14.5)
SARS-COV-2 RNA SPEC QL NAA+PROBE: SIGNIFICANT CHANGE UP
SODIUM SERPL-SCNC: 141 MMOL/L — SIGNIFICANT CHANGE UP (ref 135–145)
WBC # BLD: 6.52 K/UL — SIGNIFICANT CHANGE UP (ref 3.8–10.5)
WBC # FLD AUTO: 6.52 K/UL — SIGNIFICANT CHANGE UP (ref 3.8–10.5)

## 2023-03-05 PROCEDURE — 99233 SBSQ HOSP IP/OBS HIGH 50: CPT

## 2023-03-05 RX ORDER — SODIUM CHLORIDE 0.65 %
1 AEROSOL, SPRAY (ML) NASAL
Refills: 0 | Status: DISCONTINUED | OUTPATIENT
Start: 2023-03-05 | End: 2023-03-06

## 2023-03-05 RX ADMIN — LEVETIRACETAM 1000 MILLIGRAM(S): 250 TABLET, FILM COATED ORAL at 05:45

## 2023-03-05 RX ADMIN — Medication 1 SPRAY(S): at 18:22

## 2023-03-05 RX ADMIN — Medication 1 SPRAY(S): at 05:46

## 2023-03-05 RX ADMIN — LATANOPROST 1 DROP(S): 0.05 SOLUTION/ DROPS OPHTHALMIC; TOPICAL at 21:53

## 2023-03-05 RX ADMIN — LEVETIRACETAM 1000 MILLIGRAM(S): 250 TABLET, FILM COATED ORAL at 18:22

## 2023-03-05 RX ADMIN — ENOXAPARIN SODIUM 50 MILLIGRAM(S): 100 INJECTION SUBCUTANEOUS at 21:53

## 2023-03-05 RX ADMIN — Medication 0.25 MILLIGRAM(S): at 18:22

## 2023-03-05 NOTE — PROGRESS NOTE ADULT - PROBLEM SELECTOR PROBLEM 3
Essential hypertension
Essential hypertension
HTN (hypertension)
Essential hypertension
HTN (hypertension)
Essential hypertension

## 2023-03-05 NOTE — PROGRESS NOTE ADULT - PROBLEM SELECTOR PLAN 1
pending OR for closure  ENT consulted for clearance of possible infection will preform sinus debridement together  pre-planning CT completed
pending OR for closure  ENT consulted for clearance of possible infection  pre-planning CT completed
s/p closure   Continue PT/OT   Pain control
pending OR for closure  ENT consulted for clearance of possible infection will preform sinus debridement together  pre-planning CT completed
s/p closure   Continue PT/OT   Pain control
s/p closure   Continue PT/OT   Pain control

## 2023-03-05 NOTE — PROGRESS NOTE ADULT - PROBLEM SELECTOR PROBLEM 4
Hypokalemia
Pre-op exam
Hypokalemia
Essential hypertension
Essential hypertension
Pre-op exam
Pre-op exam
Hypokalemia

## 2023-03-05 NOTE — PROGRESS NOTE ADULT - PROBLEM SELECTOR PLAN 5
Due to skull deformity improved now
RCRI 1, Class II, 6.0% CV Risk   METS>4 prior to neurologic event  Optimized without additional testing    Hold Full AC 24h prior to planned procedure
Replete K>4.0
RCRI 1, Class II, 6.0% CV Risk   METS>4 prior to neurologic event  Optimized without additional testing    Hold Full AC 24h prior to planned procedure
Due to skull deformity improved now
Due to skull deformity improved now

## 2023-03-05 NOTE — PROGRESS NOTE ADULT - ASSESSMENT
71 y/o F w/ h/o L hemicraniectomy and washout of L subdural empyema 12/30/22 (at time presented to [facility] c/o slurred speech and right sided weakness), s/p sinus surgery with judith purulence to L maxillary sinus and posterior bony erosion to L sphenoid sinus 1/5 w/ENT, PEs. Pt was discharged to [facility] rehab on 1/12/23 and plan was to return for cranioplasty in 3 months. Per daughter, patient spent 4 weeks at the rehab. She was sent home 1 week ago with 4 hrs home aid and home  PT. Her right side weakness was progressively improving and her expressive aphagia was minimally improved. S/p cranioplasty

## 2023-03-05 NOTE — PROGRESS NOTE ADULT - PROBLEM SELECTOR PROBLEM 1
Skull defect

## 2023-03-05 NOTE — PROGRESS NOTE ADULT - PROBLEM SELECTOR PLAN 4
RCRI 1, Class II, 6.0% CV Risk   METS>4 prior to neurologic event  Optimized without additional testing    Hold Full AC 24h prior to planned procedure
RCRI 1, Class II, 6.0% CV Risk   METS>4 prior to neurologic event  Optimized without additional testing    Hold Full AC 24h prior to planned procedure
Replete K>4.0
RCRI 1, Class II, 6.0% CV Risk   METS>4 prior to neurologic event  Optimized without additional testing    Hold Full AC 24h prior to planned procedure
Replete K>4.0
Replete K>4.0

## 2023-03-05 NOTE — PROCEDURE NOTE - ADDITIONAL PROCEDURE DETAILS
Area prepped with chlorhexidine. One staple removed from right temporal scalp area. No bleeding or discharge from site. Head wrap put in place. Patient tolerated well with no complications.
Doc drain removed from suction, suture removed, drain pulled with little to no resistance, site covered w/ bacitracin and incision covered w/ xeroform.

## 2023-03-05 NOTE — PROGRESS NOTE ADULT - SUBJECTIVE AND OBJECTIVE BOX
INTERVAL EVENTS: No o/n events. Denies CP, dyspnea, palpitations, presyncope, syncope, f/c/n/v.     REVIEW OF SYSTEMS:  Constitutional:     [X] negative [ ] fevers [ ] chills [ ] weight loss [ ] weight gain  HEENT:                  [X] negative [ ] dry eyes [ ] eye irritation [ ] postnasal drip [ ] nasal congestion  CV:                         [X] negative  [ ] chest pain [ ] orthopnea [ ] palpitations [ ] murmur  Resp:                     [X] negative [ ] cough [ ] shortness of breath [ ] wheezing [ ] sputum [ ] hemoptysis  GI:                          [X] negative [ ] nausea [ ] vomiting [ ] diarrhea [ ] constipation [ ] abd pain [ ] dysphagia   :                        [X] negative [ ] dysuria [ ] nocturia [ ] hematuria [ ] increased urinary frequency  MSK:                      [X] negative [ ] back pain [ ] myalgias [ ] arthralgias [ ] fracture  Skin:                       [X] negative [ ] rash [ ] itch  Neuro:                   [X] negative [ ] headache [ ] dizziness [ ] syncope [ ] weakness [ ] numbness  Psych:                    [X] negative [ ] anxiety [ ] depression  Endo:                     [X] negative [ ] diabetes [ ] thyroid problem  Heme/Lymph:      [X] negative [ ] anemia [ ] bleeding problem  Allergic/Immune: [X] negative [ ] itchy eyes [ ] nasal discharge [ ] hives [ ] angioedema    [X] All other systems negative or otherwise described above.  [ ] Unable to assess ROS due to ________.    PAST MEDICAL & SURGICAL HISTORY:  HTN (hypertension)    S/P craniotomy      MEDICATIONS  (STANDING):  amLODIPine   Tablet 10 milliGRAM(s) Oral daily  bisacodyl 5 milliGRAM(s) Oral every 12 hours  buDESOnide    Inhalation Suspension 0.25 milliGRAM(s) Inhalation two times a day  enoxaparin Injectable 50 milliGRAM(s) SubCutaneous every 24 hours  fluticasone propionate 50 MICROgram(s)/spray Nasal Spray 1 Spray(s) Both Nostrils two times a day  lactulose Syrup 15 Gram(s) Oral daily  latanoprost 0.005% Ophthalmic Solution 1 Drop(s) Both EYES at bedtime  levETIRAcetam 1000 milliGRAM(s) Oral two times a day  polyethylene glycol 3350 17 Gram(s) Oral two times a day  senna 2 Tablet(s) Oral at bedtime    MEDICATIONS  (PRN):  acetaminophen     Tablet .. 650 milliGRAM(s) Oral every 6 hours PRN Temp greater or equal to 38C (100.4F), Mild Pain (1 - 3)  bisacodyl Suppository 10 milliGRAM(s) Rectal daily PRN Constipation  ondansetron Injectable 4 milliGRAM(s) IV Push every 6 hours PRN Nausea and/or Vomiting  oxyCODONE    IR 5 milliGRAM(s) Oral every 4 hours PRN Moderate Pain (4 - 6)    ICU Vital Signs Last 24 Hrs  T(C): 36.9 (05 Mar 2023 08:45), Max: 36.9 (05 Mar 2023 08:45)  T(F): 98.4 (05 Mar 2023 08:45), Max: 98.4 (05 Mar 2023 08:45)  HR: 94 (05 Mar 2023 08:45) (82 - 94)  BP: 128/84 (05 Mar 2023 08:45) (103/65 - 129/73)  BP(mean): 99 (05 Mar 2023 08:45) (92 - 99)  ABP: --  ABP(mean): --  RR: 18 (05 Mar 2023 08:45) (16 - 18)  SpO2: 95% (05 Mar 2023 08:45) (95% - 99%)    O2 Parameters below as of 05 Mar 2023 08:45  Patient On (Oxygen Delivery Method): room air          Orthostatic VS    Daily     Daily   I&O's Summary    04 Mar 2023 07:01  -  05 Mar 2023 07:00  --------------------------------------------------------  IN: 610 mL / OUT: 1000 mL / NET: -390 mL    05 Mar 2023 07:01  -  05 Mar 2023 12:17  --------------------------------------------------------  IN: 240 mL / OUT: 0 mL / NET: 240 mL        PHYSICAL EXAM:  GENERAL: No acute distress, well-developed  HEAD:  Head wrapped in bandaging  ENT: EOMI, conjunctiva and sclera clear, Neck supple, No JVD, moist mucosa  CHEST/LUNG: Clear to auscultation bilaterally; No wheeze, equal breath sounds bilaterally   BACK: No spinal tenderness  HEART: Regular rate and rhythm; No murmurs, rubs, or gallops, radial and DP 2+ b/l, euvolemic  ABDOMEN: Soft, Nontender, Nondistended  EXTREMITIES:  No clubbing, cyanosis, or edema  NEUROLOGY: AAOx3  SKIN: Normal color, No rashes or lesions    LABS:                        8.6    6.52  )-----------( 237      ( 05 Mar 2023 07:09 )             27.0       03-05    141  |  106  |  8   ----------------------------<  109<H>  4.0   |  26  |  0.66    Ca    8.9      05 Mar 2023 07:09  Phos  3.2     03-05  Mg     2.0     03-05              RADIOLOGY & ADDITIONAL STUDIES:    Other misc imaging:   < from: US Duplex Venous Lower Ext Complete, Bilateral (03.03.23 @ 20:07) >  IMPRESSION:  Persistent DVT involving one of the. Right posterior tibial veins.   Persistent DVT in a right intramuscular calf vein.    --- End of Report ---      < end of copied text >    < from: CT Head No Cont (03.01.23 @ 14:07) >  Impression: Status post post left frontal parietal temporal   hemicraniectomy and flap with interval cranioplasty. Tiny left   extra-axial fluid collection. Reexpansion of the left cerebral hemisphere   when compared to the prior study. Resolution of midline shift to the   right. Chronic infarctions.      < end of copied text >

## 2023-03-05 NOTE — PROGRESS NOTE ADULT - PROBLEM SELECTOR PROBLEM 2
Status post craniectomy

## 2023-03-05 NOTE — PROGRESS NOTE ADULT - PROBLEM SELECTOR PLAN 3
amlodipine 10
c/w amlodipine 10
amlodipine 10
c/w amlodipine 10

## 2023-03-05 NOTE — PROGRESS NOTE ADULT - SUBJECTIVE AND OBJECTIVE BOX
HPI:  71 yo female brought to ER by her daughter after she had a fall secondary to her baseline right side weakness.  Below  is a synopsis of patients last admission with us.:  71 y/o F w/ no known PMHx transferred from [place] c/o slurred speech and right sided weakness. CTH initially negative, repeat on 12/23 showed L sided SDH, pts mental status worsened and CTH on 12/28 significant for L frontoparietal collection, MRI completed showed concern for possible empyema. S/p L hemicraniectomy and washout of L subdural empyema 12/30/22. S/p sinus surgery with judith purulence to L maxillary sinus and posterior bony erosiun to L sphenoid sinus 1/5.   On she was  discharged to [facility]hab which plan to return for cranioplasty in 3 months post hemicraniectomy.  Per daughter, patient spent 4 weeks at the rehab. She was sent home 1 week ago with 4 hrs home aid and home  PT.  Her right side weakness was progressively improving and her expressive aphagia was minimally improved. Daughter fells that patient had more right side weakness the last hrs.  Today when patient  togetup from her wheelchair her right knee bucked and patient fell on her buttocks. She was wearing her helmet; did not hit her head. NO LOC. Patient remained alert and awake throughout.  She was sent here for a head CT by Dr. D'Amico.    HOSPITAL COURSE:  2/22: admitted w/worsening right UE/LE weakness  2/23: DEEPALI overnight. MRI brain performed. Neuro exam stable.improved expressive aphasia. MRI no infarct or focal findings, pending neurplastic CT, pending ID consult, ENT scope today  2/24: DEEPALI overnight, neuro stable. Pending OR plans for cranioplasty (also to include ENT sinus debridement). Pending anti xa level today at 2pm  2/25: DEEPALI overnight, neuro stable. OR plan tentatively scheduled for Wednesday, ID cleared.  2/26: DEEPALI overnight, pending OR Wednesday. New anisicoria, R pupil 2mm and fixed, left 4mm brisk, RUE trace withdrawal, RLE brisk withdrawal, CTH showing MLS. Started on IVF. Upgraded to tele for closer monitoring. Intermittent trendelenberg, exam improving.  2/27: DEEPALI overnight. Neuro exam stable. Continue to lay flat/intermittent trendelenberg. OR plan for Wednesday.    2/28: DEEPALI overnight. Neuro stable. Bedside debridement with ENT. Start BID Budesonide nasal washes. Pending OR tomorrow with ENT. Holding SQL for OR. ENT did naslphrayngealoscopy, debrieded and recommending budesonide nasal rinse however unavailable, normal saline wash 4x daily ordered instead per ENT. Preop for cranioplasty tomorrow.  3/1: OR for left cranioplasty.   3/2: POD 1, DEEPALI overnight. Neuro stable. Stepdown status today. Kassandra removed this am f/u TOV. Cont JPs at this time, cont Ancef until drains out. Pend BM.   3/3: POD 2. 2 JPs in place.   3/4: POD 3, DEEPALI overnight, pending AR  3/5: POD 4, DEEPALI overnight, headwrap in place, pending rehab     Vital Signs Last 24 Hrs  T(C): 36.7 (04 Mar 2023 20:42), Max: 36.8 (04 Mar 2023 16:05)  T(F): 98 (04 Mar 2023 20:42), Max: 98.2 (04 Mar 2023 16:05)  HR: 91 (04 Mar 2023 20:42) (86 - 91)  BP: 109/68 (04 Mar 2023 20:42) (109/68 - 129/73)  BP(mean): 92 (04 Mar 2023 16:05) (92 - 92)  RR: 17 (04 Mar 2023 20:42) (17 - 18)  SpO2: 96% (04 Mar 2023 20:42) (96% - 99%)    Parameters below as of 04 Mar 2023 20:42  Patient On (Oxygen Delivery Method): room air        I&O's Detail    03 Mar 2023 07:01  -  04 Mar 2023 07:00  --------------------------------------------------------  IN:  Total IN: 0 mL    OUT:    Bulb (mL): 7.5 mL    Voided (mL): 1700 mL  Total OUT: 1707.5 mL    Total NET: -1707.5 mL      04 Mar 2023 07:01  -  05 Mar 2023 01:12  --------------------------------------------------------  IN:    IV PiggyBack: 250 mL    Oral Fluid: 360 mL  Total IN: 610 mL    OUT:    Voided (mL): 1000 mL  Total OUT: 1000 mL    Total NET: -390 mL        I&O's Summary    03 Mar 2023 07:01  -  04 Mar 2023 07:00  --------------------------------------------------------  IN: 0 mL / OUT: 1707.5 mL / NET: -1707.5 mL    04 Mar 2023 07:01  -  05 Mar 2023 01:12  --------------------------------------------------------  IN: 610 mL / OUT: 1000 mL / NET: -390 mL        PHYSICAL EXAM:  Neuro: A/O x 3, expressive aphasia, following commands, LUE/LLE strength 5/5 throughout, RUE strength delt/bicep/tricep 3/5, hand  2/5, +RUE drift, RLE 4/5  Eyes: PERRL, EOMI  Cardio: regular rate and rhythm  Pulm: nonlabored breathing, normal chest rise  Abdomen: nondistended  Extremities: well perfused  Skin: warm, dry  Wound: head wrap in place, clean, dry, and intact    TUBES/LINES:  [] CVC  [] A-line  [] Lumbar Drain  [] Ventriculostomy  [] Other    DIET:  [] NPO  [] Mechanical  [] Tube feeds    LABS:                        8.4    5.97  )-----------( 209      ( 04 Mar 2023 06:54 )             26.6     03-04    141  |  106  |  10  ----------------------------<  108<H>  3.1<L>   |  27  |  0.71    Ca    8.6      04 Mar 2023 06:54  Phos  3.4     03-04  Mg     1.8     03-04              CAPILLARY BLOOD GLUCOSE          Drug Levels: [] N/A    CSF Analysis: [] N/A      Allergies    No Known Allergies    Intolerances      MEDICATIONS:  Antibiotics:    Neuro:  acetaminophen     Tablet .. 650 milliGRAM(s) Oral every 6 hours PRN  levETIRAcetam 1000 milliGRAM(s) Oral two times a day  ondansetron Injectable 4 milliGRAM(s) IV Push every 6 hours PRN  oxyCODONE    IR 5 milliGRAM(s) Oral every 4 hours PRN    Anticoagulation:  enoxaparin Injectable 50 milliGRAM(s) SubCutaneous every 24 hours    OTHER:  amLODIPine   Tablet 10 milliGRAM(s) Oral daily  bisacodyl 5 milliGRAM(s) Oral every 12 hours  bisacodyl Suppository 10 milliGRAM(s) Rectal daily PRN  buDESOnide    Inhalation Suspension 0.25 milliGRAM(s) Inhalation two times a day  fluticasone propionate 50 MICROgram(s)/spray Nasal Spray 1 Spray(s) Both Nostrils two times a day  lactulose Syrup 15 Gram(s) Oral daily  latanoprost 0.005% Ophthalmic Solution 1 Drop(s) Both EYES at bedtime  polyethylene glycol 3350 17 Gram(s) Oral two times a day  senna 2 Tablet(s) Oral at bedtime    IVF:    CULTURES:    RADIOLOGY & ADDITIONAL TESTS:      ASSESSMENT:  71 y/o F with h/o L hemicraniectomy and washout of L subdural empyema 12/30/22 (at time presented to [facility] c/o slurred speech and right sided weakness), s/p sinus surgery with judith purulence to L maxillary sinus and posterior bony erosion to L sphenoid sinus w/ENT 1/5/23, PEs. B/L DVT, B/L cataract surgery. Admitted to neurosurgery with plan for cranioplasty. Now s/p Left cranioplasty with Neena mesh, local tissue rearrangement, complex neuroplastics closure (3/1).      H/O CRANITOMY    Handoff    MEWS Score    HTN (hypertension)    History of trephination of cranium    History of trephination of cranium    Cranioplasty with replacement bone flap    H/O craniotomy    Skull defect    Status post craniectomy    HTN (hypertension)    Skull defect    Essential hypertension    Pre-op exam    Hypokalemia    Brain compression    Deep vein thrombosis (DVT) of femoral vein of right lower extremity, unspecified chronicity    Chronic deep vein thrombosis (DVT) of tibial vein of right lower extremity    Cranioplasty with replacement bone flap    S/P craniotomy    FALL    41    Room Service Assist    Status post craniectomy    [email or web address]        PLAN:  Neuro:  - Neuro/vitals q4h  - Keppra 1000 mg BID    - Pain control tylenol, oxycodone   - Post-op CTH neuroplastics protocol completed   - Normal saline nasal rinse 4x daily per ENT  - Subgaleal CHENCHO drain dc'd     Cardio:  - Continue home Norvasc  - SBP goal <160     Pulm:  - room air  - O2 sat goal > 92%     Renal:  - IVL   - straight cath PRN    GI:  - Advance diet as tolerated, soft and bite sized   - Bowel regimen   - Last BM 3/3    Endo:  - a1c 4.7   - ISS    Heme:  - SQL 3/3  - NO SCDs (b/l LE DVTs), SQL 110mg BID (previously on 120mg bid) on hold post-op   - prior LE doppler 1/8/23 b/l calf and L peroneal DVTs, repeat LE doppler 2/22 R posterior tibial vein DVT, persistent R IM calf vein DVT, resolution of L IM calf DVT, 3/3 w/ persistent R IM calf DVT and R posterior tibial vein DVT  - h/o b/l mild segmental/lobar PE on CT PE protocol 1/10/23    ID  - Afebrile   - Consulted ID, finished course of ceftriaxone/metronidazole/vancomycin for subdural/epidural empyema (rare peptostreptococcus 1/6 + staph epi) end date 2/16     Dispo:   - Regional status, full code   - PT/OT     D/w Dr. D'Amico         Assessment:  Present when checked    []  GCS  E   V  M     Heart Failure: []Acute, [] acute on chronic , []chronic  Heart Failure:  [] Diastolic (HFpEF), [] Systolic (HFrEF), []Combined (HFpEF and HFrEF), [] RHF, [] Pulm HTN, [] Other    [] DIONTE, [] ATN, [] AIN, [] other  [] CKD1, [] CKD2, [] CKD 3, [] CKD 4, [] CKD 5, []ESRD    Encephalopathy: [] Metabolic, [] Hepatic, [] toxic, [] Neurological, [] Other    Abnormal Nurtitional Status: [] malnurtition (see nutrition note), [ ]underweight: BMI < 19, [] morbid obesity: BMI >40, [] Cachexia    [] Sepsis  [] hypovolemic shock,[] cardiogenic shock, [] hemorrhagic shock, [] neuogenic shock  [] Acute Respiratory Failure  []Cerebral edema, [] Brain compression/ herniation,   [] Functional quadriplegia  [] Acute blood loss anemia

## 2023-03-05 NOTE — PROGRESS NOTE ADULT - PROBLEM SELECTOR PLAN 6
-Cont Full AC when cleared by primary team
Cerebral Edema with midline shift  -continue positioning  -Plan for OR
-Cont Full AC when cleared by primary team
-Cont Full AC when cleared by primary team

## 2023-03-05 NOTE — PROGRESS NOTE ADULT - PROBLEM SELECTOR PROBLEM 6
Chronic deep vein thrombosis (DVT) of tibial vein of right lower extremity
Brain compression

## 2023-03-06 ENCOUNTER — TRANSCRIPTION ENCOUNTER (OUTPATIENT)
Age: 73
End: 2023-03-06

## 2023-03-06 VITALS
DIASTOLIC BLOOD PRESSURE: 69 MMHG | TEMPERATURE: 98 F | RESPIRATION RATE: 16 BRPM | HEART RATE: 91 BPM | SYSTOLIC BLOOD PRESSURE: 128 MMHG | OXYGEN SATURATION: 97 %

## 2023-03-06 LAB
ANION GAP SERPL CALC-SCNC: 12 MMOL/L — SIGNIFICANT CHANGE UP (ref 5–17)
BUN SERPL-MCNC: 8 MG/DL — SIGNIFICANT CHANGE UP (ref 7–23)
CALCIUM SERPL-MCNC: 9.6 MG/DL — SIGNIFICANT CHANGE UP (ref 8.4–10.5)
CHLORIDE SERPL-SCNC: 102 MMOL/L — SIGNIFICANT CHANGE UP (ref 96–108)
CO2 SERPL-SCNC: 25 MMOL/L — SIGNIFICANT CHANGE UP (ref 22–31)
CREAT SERPL-MCNC: 0.72 MG/DL — SIGNIFICANT CHANGE UP (ref 0.5–1.3)
EGFR: 89 ML/MIN/1.73M2 — SIGNIFICANT CHANGE UP
GLUCOSE SERPL-MCNC: 116 MG/DL — HIGH (ref 70–99)
HCT VFR BLD CALC: 33.3 % — LOW (ref 34.5–45)
HGB BLD-MCNC: 10.5 G/DL — LOW (ref 11.5–15.5)
MAGNESIUM SERPL-MCNC: 1.8 MG/DL — SIGNIFICANT CHANGE UP (ref 1.6–2.6)
MCHC RBC-ENTMCNC: 30.1 PG — SIGNIFICANT CHANGE UP (ref 27–34)
MCHC RBC-ENTMCNC: 31.5 GM/DL — LOW (ref 32–36)
MCV RBC AUTO: 95.4 FL — SIGNIFICANT CHANGE UP (ref 80–100)
NRBC # BLD: 0 /100 WBCS — SIGNIFICANT CHANGE UP (ref 0–0)
PHOSPHATE SERPL-MCNC: 4.1 MG/DL — SIGNIFICANT CHANGE UP (ref 2.5–4.5)
PLATELET # BLD AUTO: 258 K/UL — SIGNIFICANT CHANGE UP (ref 150–400)
POTASSIUM SERPL-MCNC: 4 MMOL/L — SIGNIFICANT CHANGE UP (ref 3.5–5.3)
POTASSIUM SERPL-SCNC: 4 MMOL/L — SIGNIFICANT CHANGE UP (ref 3.5–5.3)
RBC # BLD: 3.49 M/UL — LOW (ref 3.8–5.2)
RBC # FLD: 14.7 % — HIGH (ref 10.3–14.5)
SODIUM SERPL-SCNC: 139 MMOL/L — SIGNIFICANT CHANGE UP (ref 135–145)
WBC # BLD: 7.96 K/UL — SIGNIFICANT CHANGE UP (ref 3.8–10.5)
WBC # FLD AUTO: 7.96 K/UL — SIGNIFICANT CHANGE UP (ref 3.8–10.5)

## 2023-03-06 PROCEDURE — 85730 THROMBOPLASTIN TIME PARTIAL: CPT

## 2023-03-06 PROCEDURE — 85025 COMPLETE CBC W/AUTO DIFF WBC: CPT

## 2023-03-06 PROCEDURE — 86900 BLOOD TYPING SEROLOGIC ABO: CPT

## 2023-03-06 PROCEDURE — 80048 BASIC METABOLIC PNL TOTAL CA: CPT

## 2023-03-06 PROCEDURE — 74018 RADEX ABDOMEN 1 VIEW: CPT

## 2023-03-06 PROCEDURE — 97164 PT RE-EVAL EST PLAN CARE: CPT

## 2023-03-06 PROCEDURE — C1889: CPT

## 2023-03-06 PROCEDURE — 97161 PT EVAL LOW COMPLEX 20 MIN: CPT

## 2023-03-06 PROCEDURE — 94640 AIRWAY INHALATION TREATMENT: CPT

## 2023-03-06 PROCEDURE — 83036 HEMOGLOBIN GLYCOSYLATED A1C: CPT

## 2023-03-06 PROCEDURE — 92523 SPEECH SOUND LANG COMPREHEN: CPT

## 2023-03-06 PROCEDURE — 97165 OT EVAL LOW COMPLEX 30 MIN: CPT

## 2023-03-06 PROCEDURE — 70553 MRI BRAIN STEM W/O & W/DYE: CPT

## 2023-03-06 PROCEDURE — 99285 EMERGENCY DEPT VISIT HI MDM: CPT

## 2023-03-06 PROCEDURE — 86850 RBC ANTIBODY SCREEN: CPT

## 2023-03-06 PROCEDURE — 84100 ASSAY OF PHOSPHORUS: CPT

## 2023-03-06 PROCEDURE — 85610 PROTHROMBIN TIME: CPT

## 2023-03-06 PROCEDURE — 97116 GAIT TRAINING THERAPY: CPT

## 2023-03-06 PROCEDURE — U0005: CPT

## 2023-03-06 PROCEDURE — 70450 CT HEAD/BRAIN W/O DYE: CPT

## 2023-03-06 PROCEDURE — 87635 SARS-COV-2 COVID-19 AMP PRB: CPT

## 2023-03-06 PROCEDURE — 97168 OT RE-EVAL EST PLAN CARE: CPT

## 2023-03-06 PROCEDURE — 93970 EXTREMITY STUDY: CPT

## 2023-03-06 PROCEDURE — 82962 GLUCOSE BLOOD TEST: CPT

## 2023-03-06 PROCEDURE — C1713: CPT

## 2023-03-06 PROCEDURE — 80053 COMPREHEN METABOLIC PANEL: CPT

## 2023-03-06 PROCEDURE — 36415 COLL VENOUS BLD VENIPUNCTURE: CPT

## 2023-03-06 PROCEDURE — 72125 CT NECK SPINE W/O DYE: CPT | Mod: MC

## 2023-03-06 PROCEDURE — 74018 RADEX ABDOMEN 1 VIEW: CPT | Mod: 26

## 2023-03-06 PROCEDURE — 85520 HEPARIN ASSAY: CPT

## 2023-03-06 PROCEDURE — U0003: CPT

## 2023-03-06 PROCEDURE — 71045 X-RAY EXAM CHEST 1 VIEW: CPT

## 2023-03-06 PROCEDURE — 83735 ASSAY OF MAGNESIUM: CPT

## 2023-03-06 PROCEDURE — A9585: CPT

## 2023-03-06 PROCEDURE — 85027 COMPLETE CBC AUTOMATED: CPT

## 2023-03-06 PROCEDURE — 86901 BLOOD TYPING SEROLOGIC RH(D): CPT

## 2023-03-06 RX ORDER — ACETAMINOPHEN 500 MG
2 TABLET ORAL
Qty: 0 | Refills: 0 | DISCHARGE
Start: 2023-03-06

## 2023-03-06 RX ORDER — OXYCODONE HYDROCHLORIDE 5 MG/1
1 TABLET ORAL
Qty: 0 | Refills: 0 | DISCHARGE
Start: 2023-03-06

## 2023-03-06 RX ORDER — METOCLOPRAMIDE HCL 10 MG
10 TABLET ORAL ONCE
Refills: 0 | Status: COMPLETED | OUTPATIENT
Start: 2023-03-06 | End: 2023-03-06

## 2023-03-06 RX ORDER — POLYETHYLENE GLYCOL 3350 17 G/17G
17 POWDER, FOR SOLUTION ORAL DAILY
Refills: 0 | Status: DISCONTINUED | OUTPATIENT
Start: 2023-03-06 | End: 2023-03-06

## 2023-03-06 RX ORDER — POLYETHYLENE GLYCOL 3350 17 G/17G
17 POWDER, FOR SOLUTION ORAL
Qty: 0 | Refills: 0 | DISCHARGE
Start: 2023-03-06

## 2023-03-06 RX ORDER — SIMETHICONE 80 MG/1
80 TABLET, CHEWABLE ORAL EVERY 6 HOURS
Refills: 0 | Status: COMPLETED | OUTPATIENT
Start: 2023-03-06 | End: 2023-03-06

## 2023-03-06 RX ORDER — SODIUM CHLORIDE 0.65 %
1 AEROSOL, SPRAY (ML) NASAL
Qty: 0 | Refills: 0 | DISCHARGE
Start: 2023-03-06

## 2023-03-06 RX ORDER — ENOXAPARIN SODIUM 100 MG/ML
50 INJECTION SUBCUTANEOUS
Qty: 0 | Refills: 0 | DISCHARGE
Start: 2023-03-06

## 2023-03-06 RX ORDER — SIMETHICONE 80 MG/1
1 TABLET, CHEWABLE ORAL
Qty: 0 | Refills: 0 | DISCHARGE
Start: 2023-03-06

## 2023-03-06 RX ORDER — MAGNESIUM SULFATE 500 MG/ML
2 VIAL (ML) INJECTION ONCE
Refills: 0 | Status: DISCONTINUED | OUTPATIENT
Start: 2023-03-06 | End: 2023-03-06

## 2023-03-06 RX ADMIN — ONDANSETRON 4 MILLIGRAM(S): 8 TABLET, FILM COATED ORAL at 00:05

## 2023-03-06 RX ADMIN — Medication 10 MILLIGRAM(S): at 04:49

## 2023-03-06 RX ADMIN — Medication 1 SPRAY(S): at 05:40

## 2023-03-06 RX ADMIN — Medication 1 SPRAY(S): at 11:12

## 2023-03-06 RX ADMIN — SIMETHICONE 80 MILLIGRAM(S): 80 TABLET, CHEWABLE ORAL at 11:13

## 2023-03-06 RX ADMIN — LEVETIRACETAM 1000 MILLIGRAM(S): 250 TABLET, FILM COATED ORAL at 05:40

## 2023-03-06 RX ADMIN — SIMETHICONE 80 MILLIGRAM(S): 80 TABLET, CHEWABLE ORAL at 05:40

## 2023-03-06 RX ADMIN — POLYETHYLENE GLYCOL 3350 17 GRAM(S): 17 POWDER, FOR SOLUTION ORAL at 11:13

## 2023-03-06 RX ADMIN — SIMETHICONE 80 MILLIGRAM(S): 80 TABLET, CHEWABLE ORAL at 00:55

## 2023-03-06 NOTE — PROGRESS NOTE ADULT - REASON FOR ADMISSION
R.side weakness
R side weakness
R.side weakness
R side weakness
R.side weakness

## 2023-03-06 NOTE — DISCHARGE NOTE PROVIDER - HOSPITAL COURSE
HPI:  71 yo female brought to ER by her daughter after she had a fall secondary to her baseline right side weakness.  Below  is a synopsis of patients last admission with us.:  73 y/o F w/ no known PMHx transferred from [place] c/o slurred speech and right sided weakness. CTH initially negative, repeat on 12/23 showed L sided SDH, pts mental status worsened and CTH on 12/28 significant for L frontoparietal collection, MRI completed showed concern for possible empyema. S/p L hemicraniectomy and washout of L subdural empyema 12/30/22. S/p sinus surgery with judith purulence to L maxillary sinus and posterior bony erosiun to L sphenoid sinus 1/5.   On she was  discharged to [facility] rehab which plan to return for cranioplasty in 3 months post hemicraniectomy.  Per daughter, patient spent 4 weeks at the rehab. She was sent home 1 week ago with 4 hrs home aid and home  PT.  Her right side weakness was progressively improving and her expressive aphagia was minimally improved. Daughter fells that patient had more right side weakness the last hrs.  Today when patient  togetup from her wheelchair her right knee bucked and patient fell on her buttocks. She was wearing her helmet; did not hit her head. NO LOC. Patient remained alert and awake throughout.  She was sent here for a head CT by Dr. D'Amico.    Hospital Course:  2/22: admitted w/worsening right UE/LE weakness  2/23: DEEPALI overnight. MRI brain performed. Neuro exam stable.improved expressive aphasia. MRI no infarct or focal findings, pending neurplastic CT, pending ID consult, ENT scope today  2/24: DEEPALI overnight, neuro stable. Pending OR plans for cranioplasty (also to include ENT sinus debridement). Pending anti xa level today at 2pm  2/25: DEEPALI overnight, neuro stable. OR plan tentatively scheduled for Wednesday, ID cleared.  2/26: DEEPALI overnight, pending OR Wednesday. New anisicoria, R pupil 2mm and fixed, left 4mm brisk, RUE trace withdrawal, RLE brisk withdrawal, CTH showing MLS. Started on IVF. Upgraded to tele for closer monitoring. Intermittent trendelenberg, exam improving.  2/27: DEEPALI overnight. Neuro exam stable. Continue to lay flat/intermittent trendelenberg. OR plan for Wednesday.    2/28: DEEPALI overnight. Neuro stable. Bedside debridement with ENT. Start BID Budesonide nasal washes. Pending OR tomorrow with ENT. Holding SQL for OR. ENT did naslphrayngealoscopy, debrieded and recommending budesonide nasal rinse however unavailable, normal saline wash 4x daily ordered instead per ENT. Preop for cranioplasty tomorrow.  3/1: OR for left cranioplasty.   3/2: POD 1, DEEPALI overnight. Neuro stable. Stepdown status today. Cannon removed this am f/u TOV. Cont JPs at this time, cont Ancef until drains out. Pend BM.   3/3: POD 2. 2 JPs in place.   3/4: POD 3, DEEPALI overnight, pending AR. CHENCHO removed and head re-wrapped. Persistent R calf DVT's, holding full AC until POD10.   3/5: POD 4, EDEPALI overnight, headwrap in place, pending rehab. Covid negative today.   3/6: POD 5. DEEPALI. neuro exam improving.     Patient evaluated by PT/OT who recommended: Acute Rehab  Patient is going to [facility] course uncomplicated     Exam on day of discharge:  Neuro: A/O x 3, expressive aphasia, following commands, LUE/LLE strength 5/5 throughout, RUE strength delt 3/5, /bicep/tricep 4/5, hand  3/5, +RUE drift, RLE hip flex 4+/5, distally 4/5  Eyes: PERRL, EOMI  Cardio: regular rate and rhythm  Pulm: nonlabored breathing, normal chest rise  Abdomen: nondistended  Extremities: well perfused  Skin: warm, dry    Patient is neuro stable, vitals stable, afebrile, medically ready for discharge     Checklist:   - Reviewed final recommendations of inpatient consults         HPI:  71 yo female brought to ER by her daughter after she had a fall secondary to her baseline right side weakness.  Below  is a synopsis of patients last admission with us.:  71 y/o F w/ no known PMHx transferred from [place] c/o slurred speech and right sided weakness. CTH initially negative, repeat on 12/23 showed L sided SDH, pts mental status worsened and CTH on 12/28 significant for L frontoparietal collection, MRI completed showed concern for possible empyema. S/p L hemicraniectomy and washout of L subdural empyema 12/30/22. S/p sinus surgery with judith purulence to L maxillary sinus and posterior bony erosiun to L sphenoid sinus 1/5.   On she was  discharged to [facility] rehab which plan to return for cranioplasty in 3 months post hemicraniectomy.  Per daughter, patient spent 4 weeks at the rehab. She was sent home 1 week ago with 4 hrs home aid and home  PT.  Her right side weakness was progressively improving and her expressive aphagia was minimally improved. Daughter fells that patient had more right side weakness the last hrs.  Today when patient  togetup from her wheelchair her right knee bucked and patient fell on her buttocks. She was wearing her helmet; did not hit her head. NO LOC. Patient remained alert and awake throughout.  She was sent here for a head CT by Dr. D'Amico.    Hospital Course:  2/22: admitted w/worsening right UE/LE weakness  2/23: DEEPALI overnight. MRI brain performed. Neuro exam stable.improved expressive aphasia. MRI no infarct or focal findings, pending neurplastic CT, pending ID consult, ENT scope today  2/24: DEEPALI overnight, neuro stable. Pending OR plans for cranioplasty (also to include ENT sinus debridement). Pending anti xa level today at 2pm  2/25: DEEPALI overnight, neuro stable. OR plan tentatively scheduled for Wednesday, ID cleared.  2/26: DEEPALI overnight, pending OR Wednesday. New anisicoria, R pupil 2mm and fixed, left 4mm brisk, RUE trace withdrawal, RLE brisk withdrawal, CTH showing MLS. Started on IVF. Upgraded to tele for closer monitoring. Intermittent trendelenberg, exam improving.  2/27: DEEPALI overnight. Neuro exam stable. Continue to lay flat/intermittent trendelenberg. OR plan for Wednesday.    2/28: DEEPALI overnight. Neuro stable. Bedside debridement with ENT. Start BID Budesonide nasal washes. Pending OR tomorrow with ENT. Holding SQL for OR. ENT did naslphrayngealoscopy, debrieded and recommending budesonide nasal rinse however unavailable, normal saline wash 4x daily ordered instead per ENT. Preop for cranioplasty tomorrow.  3/1: OR for left cranioplasty.   3/2: POD 1, DEEPALI overnight. Neuro stable. Stepdown status today. Cannon removed this am f/u TOV. Cont JPs at this time, cont Ancef until drains out. Pend BM.   3/3: POD 2. 2 JPs in place.   3/4: POD 3, DEEPALI overnight, pending AR. CHENCHO removed and head re-wrapped. Persistent R calf DVT's, holding full AC until POD10.   3/5: POD 4, DEEPALI overnight, headwrap in place, pending rehab. Covid negative today.   3/6: POD 5. DEEPALI. neuro exam improving.     Patient evaluated by PT/OT who recommended: Acute Rehab  Patient is going to [facility] course uncomplicated     Exam on day of discharge:  Neuro: A/O x 3, expressive aphasia, following commands, LUE/LLE strength 5/5 throughout, RUE strength delt 3/5, /bicep/tricep 4/5, hand  3/5, +RUE drift, RLE hip flex 4+/5, distally 4/5  Eyes: PERRL, EOMI  Cardio: regular rate and rhythm  Pulm: nonlabored breathing, normal chest rise  Abdomen: nondistended  Extremities: well perfused  Skin: warm, dry    Patient is neuro stable, vitals stable, afebrile, medically ready for discharge              HPI:  71 yo female brought to ER by her daughter after she had a fall secondary to her baseline right side weakness.  Below  is a synopsis of patients last admission with us.:  71 y/o F w/ no known PMHx transferred from [place] c/o slurred speech and right sided weakness. CTH initially negative, repeat on 12/23 showed L sided SDH, pts mental status worsened and CTH on 12/28 significant for L frontoparietal collection, MRI completed showed concern for possible empyema. S/p L hemicraniectomy and washout of L subdural empyema 12/30/22. S/p sinus surgery with judith purulence to L maxillary sinus and posterior bony erosiun to L sphenoid sinus 1/5.   On she was  discharged to [facility] rehab which plan to return for cranioplasty in 3 months post hemicraniectomy.  Per daughter, patient spent 4 weeks at the rehab. She was sent home 1 week ago with 4 hrs home aid and home  PT.  Her right side weakness was progressively improving and her expressive aphagia was minimally improved. Daughter fells that patient had more right side weakness the last hrs.  Today when patient  togetup from her wheelchair her right knee bucked and patient fell on her buttocks. She was wearing her helmet; did not hit her head. NO LOC. Patient remained alert and awake throughout.  She was sent here for a head CT by Dr. D'Amico.  IVC filter placed at [facility] because she did not tolerated Lovenox BID dosing full AC (pt with persistent epistaxis).     Hospital Course:  2/22: admitted w/worsening right UE/LE weakness  2/23: DEEPALI overnight. MRI brain performed. Neuro exam stable.improved expressive aphasia. MRI no infarct or focal findings, pending neurplastic CT, pending ID consult, ENT scope today  2/24: DEEPALI overnight, neuro stable. Pending OR plans for cranioplasty (also to include ENT sinus debridement). Pending anti xa level today at 2pm  2/25: DEEPALI overnight, neuro stable. OR plan tentatively scheduled for Wednesday, ID cleared.  2/26: DEEPALI overnight, pending OR Wednesday. New anisicoria, R pupil 2mm and fixed, left 4mm brisk, RUE trace withdrawal, RLE brisk withdrawal, CTH showing MLS. Started on IVF. Upgraded to tele for closer monitoring. Intermittent trendelenberg, exam improving.  2/27: DEEPALI overnight. Neuro exam stable. Continue to lay flat/intermittent trendelenberg. OR plan for Wednesday.    2/28: DEEPALI overnight. Neuro stable. Bedside debridement with ENT. Start BID Budesonide nasal washes. Pending OR tomorrow with ENT. Holding SQL for OR. ENT did naslphrayngealoscopy, debrieded and recommending budesonide nasal rinse however unavailable, normal saline wash 4x daily ordered instead per ENT. Preop for cranioplasty tomorrow.  3/1: OR for left cranioplasty.   3/2: POD 1, DEEPALI overnight. Neuro stable. Stepdown status today. Cannon removed this am f/u TOV. Cont JPs at this time, cont Ancef until drains out. Pend BM.   3/3: POD 2. 2 JPs in place.   3/4: POD 3, DEEPALI overnight, pending AR. CHENCHO removed and head re-wrapped. Persistent R calf DVT's, holding full AC until POD10.   3/5: POD 4, DEEPALI overnight, headwrap in place, pending rehab. Covid negative today.   3/6: POD 5. DEEPALI. neuro exam improving.     Patient evaluated by PT/OT who recommended: Acute Rehab  Patient is going to [facility] course uncomplicated     Exam on day of discharge:  Neuro: A/O x 3, expressive aphasia, following commands, LUE/LLE strength 5/5 throughout, RUE strength delt 3/5, /bicep/tricep 4/5, hand  3/5, +RUE drift, RLE hip flex 4+/5, distally 4/5  Eyes: PERRL, EOMI  Cardio: regular rate and rhythm  Pulm: nonlabored breathing, normal chest rise  Abdomen: nondistended  Extremities: well perfused  Skin: warm, dry    Patient is neuro stable, vitals stable, afebrile, medically ready for discharge

## 2023-03-06 NOTE — DISCHARGE NOTE PROVIDER - NSDCMRMEDTOKEN_GEN_ALL_CORE_FT
acetaminophen 325 mg oral tablet: 2 tab(s) orally every 6 hours, As needed, Temp greater or equal to 38C (100.4F), Mild Pain (1 - 3)  amLODIPine 10 mg oral tablet: 1 tab(s) orally once a day  enoxaparin: 50 milligram(s) subcutaneous once a day  fluticasone 50 mcg/inh nasal spray: 1 spray(s) nasal every 12 hours  levETIRAcetam 1000 mg oral tablet: 1 tab(s) orally 2 times a day  oxyCODONE 5 mg oral tablet: 1 tab(s) orally every 4 hours, As needed, Moderate Pain (4 - 6)  polyethylene glycol 3350 oral powder for reconstitution: 17 gram(s) orally once a day  senna leaf extract oral tablet: 2 tab(s) orally once a day (at bedtime)  simethicone 80 mg oral tablet, chewable: 1 tab(s) orally every 6 hours, As Needed  sodium chloride 0.65% nasal spray: 1 spray(s) nasal 4 times a day  travoprost 0.004% ophthalmic solution: 1 drop(s) to both eyes once a day (in the evening)

## 2023-03-06 NOTE — DISCHARGE NOTE PROVIDER - NSDCFUADDINST_GEN_ALL_CORE_FT
Neurosurgery follow up appointment date/time: 3/21/23 at 11AM  - Follow up in the office for a wound check and suture removal   - please call the office to confirm appointment: [phone]    Wound Care:  - shower and wash your hair daily  - pat dry incision after showering  - leave incision uncovered, open to air   Activity:  - fatigue is common after surgery, rest if you feel tired   - no bending, lifting, twisting or heavy lifting   - walking is recommended, ambulate as tolerated  - you may shower when you get home, keep your incision dry  - no bathing   - no driving within 24 hours of anesthesia or while taking prescription pain medications   - keep hydrated, drink plenty of water       Inpatient consults:  - ENT: continue nasal rinses, follow up with Dr. Abarca     Please also follow up with your primary care doctor.     Pain Expectations:  - pain after surgery is expected  - please take pain meds as prescribed     Medications:  - Lovenox 50mg QD - switch to full AC dosing (115mg BID on POD 10 = 3/11/23)    - Continue Amlodipine 10mg daily for HTN, Keppra 1000mg twice daily for seizure prevention, nasal spray   - pain meds: Tylenol as needed for mild to moderate pain, Oxycodone as needed for severe pain   - pain medications can cause constipation, you should eat a high fiber diet and may take a stool softener while on pain meds   - Avoid taking Advil (ibuprofen), Motrin (naproxen), or Aspirin for pain as they can cause bleeding     Call the office or come to ED if:  - wound has drainage or bleeding, increased redness or pain at incision site, neurological change, fever (>101), chills, night sweats, syncope, nausea/vomiting      Playback:  - See [employer] health for a copy of your discharge paperwork     WITHIN 24 HOURS OF DISCHARGE, PLEASE CONTACT NEURO PA  WITH ANY QUESTIONS OR CONCERNS: [phone]   OTHERWISE, PLEASE CALL THE OFFICE WITH ANY QUESTIONS OR CONCERNS: [phone] Neurosurgery follow up appointment date/time: 3/21/23 at 11AM  - Follow up in the office for a wound check and suture removal   - please call the office to confirm appointment: [phone]    Wound Care:  - head wrap can be removed at rehab tomorrow   - shower and wash your hair daily  - pat dry incision after showering  - leave incision uncovered, open to air     Activity:  - fatigue is common after surgery, rest if you feel tired   - no bending, lifting, twisting or heavy lifting   - walking is recommended, ambulate as tolerated  - you may shower when you get home, keep your incision dry  - no bathing   - no driving within 24 hours of anesthesia or while taking prescription pain medications   - keep hydrated, drink plenty of water       Inpatient consults:  - ENT: continue nasal rinses, follow up with Dr. Abarca     Please also follow up with your primary care doctor.     Pain Expectations:  - pain after surgery is expected  - please take pain meds as prescribed     Medications:  - Lovenox 50mg QD - switch to full AC dosing (115mg BID on POD 10 = 3/11/23)    - Continue Amlodipine 10mg daily for HTN, Keppra 1000mg twice daily for seizure prevention, nasal spray   - pain meds: Tylenol as needed for mild to moderate pain, Oxycodone as needed for severe pain   - pain medications can cause constipation, you should eat a high fiber diet and may take a stool softener while on pain meds   - Avoid taking Advil (ibuprofen), Motrin (naproxen), or Aspirin for pain as they can cause bleeding     Call the office or come to ED if:  - wound has drainage or bleeding, increased redness or pain at incision site, neurological change, fever (>101), chills, night sweats, syncope, nausea/vomiting      Playback:  - See [employer] health for a copy of your discharge paperwork     WITHIN 24 HOURS OF DISCHARGE, PLEASE CONTACT NEURO PA  WITH ANY QUESTIONS OR CONCERNS: [phone]   OTHERWISE, PLEASE CALL THE OFFICE WITH ANY QUESTIONS OR CONCERNS: [phone] Neurosurgery follow up appointment date/time: 3/21/23 at 11AM  - Follow up in the office for a wound check and suture removal   - please call the office to confirm appointment: [phone]    Wound Care:  - head wrap can be removed at rehab tomorrow   - shower and wash your hair daily  - pat dry incision after showering  - leave incision uncovered, open to air     Activity:  - fatigue is common after surgery, rest if you feel tired   - no bending, lifting, twisting or heavy lifting   - walking is recommended, ambulate as tolerated  - you may shower when you get home, keep your incision dry  - no bathing   - no driving within 24 hours of anesthesia or while taking prescription pain medications   - keep hydrated, drink plenty of water       Inpatient consults:  - ENT: continue nasal rinses, follow up with Dr. Abarca     Please also follow up with your primary care doctor.     Pain Expectations:  - pain after surgery is expected  - please take pain meds as prescribed     Medications:  - Lovenox 50mg QD - switch to full AC dosing (110mg BID on POD 10 = 3/11/23)    - Continue Amlodipine 10mg daily for HTN, Keppra 1000mg twice daily for seizure prevention, nasal spray   - pain meds: Tylenol as needed for mild to moderate pain, Oxycodone as needed for severe pain   - pain medications can cause constipation, you should eat a high fiber diet and may take a stool softener while on pain meds   - Avoid taking Advil (ibuprofen), Motrin (naproxen), or Aspirin for pain as they can cause bleeding     Call the office or come to ED if:  - wound has drainage or bleeding, increased redness or pain at incision site, neurological change, fever (>101), chills, night sweats, syncope, nausea/vomiting      Playback:  - See [facility] health for a copy of your discharge paperwork     WITHIN 24 HOURS OF DISCHARGE, PLEASE CONTACT NEURO PA  WITH ANY QUESTIONS OR CONCERNS: [phone]   OTHERWISE, PLEASE CALL THE OFFICE WITH ANY QUESTIONS OR CONCERNS: [phone] Neurosurgery follow up appointment date/time: 3/21/23 at 11AM  - Follow up in the office for a wound check and suture removal   - please call the office to confirm appointment: [phone]    Wound Care:  - head wrap can be removed at rehab tomorrow   - shower and wash your hair daily  - pat dry incision after showering  - leave incision uncovered, open to air     Activity:  - fatigue is common after surgery, rest if you feel tired   - no bending, lifting, twisting or heavy lifting   - walking is recommended, ambulate as tolerated  - you may shower when you get home, keep your incision dry  - no bathing   - no driving within 24 hours of anesthesia or while taking prescription pain medications   - keep hydrated, drink plenty of water       Inpatient consults:  - ENT: continue nasal rinses, follow up with Dr. Abarca     Please also follow up with your primary care doctor.     Pain Expectations:  - pain after surgery is expected  - please take pain meds as prescribed     Medications:  - Lovenox 50mg QD    - Continue Amlodipine 10mg daily for HTN, Keppra 1000mg twice daily for seizure prevention, nasal spray   - pain meds: Tylenol as needed for mild to moderate pain, Oxycodone as needed for severe pain   - pain medications can cause constipation, you should eat a high fiber diet and may take a stool softener while on pain meds   - Avoid taking Advil (ibuprofen), Motrin (naproxen), or Aspirin for pain as they can cause bleeding     Call the office or come to ED if:  - wound has drainage or bleeding, increased redness or pain at incision site, neurological change, fever (>101), chills, night sweats, syncope, nausea/vomiting      Playback:  - See [employer] [facility] for a copy of your discharge paperwork     WITHIN 24 HOURS OF DISCHARGE, PLEASE CONTACT NEURO PA  WITH ANY QUESTIONS OR CONCERNS: [phone]   OTHERWISE, PLEASE CALL THE OFFICE WITH ANY QUESTIONS OR CONCERNS: [phone]

## 2023-03-06 NOTE — DISCHARGE NOTE PROVIDER - NSDCCPTREATMENT_GEN_ALL_CORE_FT
PRINCIPAL PROCEDURE  Procedure: Cranioplasty with replacement bone flap  Findings and Treatment:

## 2023-03-06 NOTE — DISCHARGE NOTE PROVIDER - NSDCFUADDAPPT_GEN_ALL_CORE_FT
Please follow up with Dr. D'amico and Dr. Ben Chen on 3/21/23 at 11AM, call the office to confirm appointment at [phone]    Please follow up with Dr. Abarca from ENT outpatient    Please follow up with your primary care doctor after rehab

## 2023-03-06 NOTE — DISCHARGE NOTE PROVIDER - NSDCCPCAREPLAN_GEN_ALL_CORE_FT
PRINCIPAL DISCHARGE DIAGNOSIS  Diagnosis: Skull defect  Assessment and Plan of Treatment: s/p cranioplasty      SECONDARY DISCHARGE DIAGNOSES  Diagnosis: Essential hypertension  Assessment and Plan of Treatment: continue Amlodipine    Diagnosis: Status post craniectomy  Assessment and Plan of Treatment:     Diagnosis: Deep vein thrombosis (DVT)  Assessment and Plan of Treatment: Resume full AC SQL BID 3/11    Diagnosis: Pulmonary embolism  Assessment and Plan of Treatment: Resume full AC SQL BID 3/11     PRINCIPAL DISCHARGE DIAGNOSIS  Diagnosis: Skull defect  Assessment and Plan of Treatment: s/p cranioplasty      SECONDARY DISCHARGE DIAGNOSES  Diagnosis: Essential hypertension  Assessment and Plan of Treatment: continue Amlodipine    Diagnosis: Status post craniectomy  Assessment and Plan of Treatment:     Diagnosis: Deep vein thrombosis (DVT)  Assessment and Plan of Treatment: s/p IVC filter, resume full AC SQL BID 3/11    Diagnosis: Pulmonary embolism  Assessment and Plan of Treatment: Resume full AC SQL BID 3/11     PRINCIPAL DISCHARGE DIAGNOSIS  Diagnosis: Skull defect  Assessment and Plan of Treatment: s/p cranioplasty      SECONDARY DISCHARGE DIAGNOSES  Diagnosis: Essential hypertension  Assessment and Plan of Treatment: continue Amlodipine    Diagnosis: Status post craniectomy  Assessment and Plan of Treatment:     Diagnosis: Deep vein thrombosis (DVT)  Assessment and Plan of Treatment: s/p IVC filter    Diagnosis: Pulmonary embolism  Assessment and Plan of Treatment: Resume full AC SQL BID 3/11

## 2023-03-06 NOTE — DISCHARGE NOTE PROVIDER - NSDCFUSCHEDAPPT_GEN_ALL_CORE_FT
D'Amico, Randy  [facility]ician [facility]  NEUROSURG [address] [facility] [address]  Scheduled Appointment: 04/05/2023

## 2023-03-06 NOTE — DISCHARGE NOTE NURSING/CASE MANAGEMENT/SOCIAL WORK - PATIENT PORTAL LINK FT
You can access the FollowMyHealth Patient Portal offered by [facility] by registering at the following website: [email or web address]. By joining [facility]’s FollowMyHealth portal, you will also be able to view your health information using other applications (apps) compatible with our system.

## 2023-03-06 NOTE — PROGRESS NOTE ADULT - SUBJECTIVE AND OBJECTIVE BOX
HPI:  71 yo female brought to ER by her daughter after she had a fall secondary to her baseline right side weakness.  Below  is a synopsis of patients last admission with us.:  71 y/o F w/ no known PMHx transferred from [place] c/o slurred speech and right sided weakness. CTH initially negative, repeat on 12/23 showed L sided SDH, pts mental status worsened and CTH on 12/28 significant for L frontoparietal collection, MRI completed showed concern for possible empyema. S/p L hemicraniectomy and washout of L subdural empyema 12/30/22. S/p sinus surgery with judith purulence to L maxillary sinus and posterior bony erosiun to L sphenoid sinus 1/5.   On she was  discharged to [facility] [place] rehab which plan to return for cranioplasty in 3 months post hemicraniectomy.  Per daughter, patient spent 4 weeks at the rehab. She was sent home 1 week ago with 4 hrs home aid and home  PT.  Her right side weakness was progressively improving and her expressive aphagia was minimally improved. Daughter fells that patient had more right side weakness the last hrs.  Today when patient  togetup from her wheelchair her right knee bucked and patient fell on her buttocks. She was wearing her helmet; did not hit her head. NO LOC. Patient remained alert and awake throughout.  She was sent here for a head CT by Dr. D'Amico.   (22 Feb 2023 17:26)    INTERVAL EVENTS:  C/o gas - [facility]    HOSPITAL COURSE:  2/22: admitted w/worsening right UE/LE weakness  2/23: DEEPALI overnight. MRI brain performed. Neuro exam stable.improved expressive aphasia. MRI no infarct or focal findings, pending neurplastic CT, pending ID consult, ENT scope today  2/24: DEEPALI overnight, neuro stable. Pending OR plans for cranioplasty (also to include ENT sinus debridement). Pending anti xa level today at 2pm  2/25: DEEPALI overnight, neuro stable. OR plan tentatively scheduled for Wednesday, ID cleared.  2/26: DEEPALI overnight, pending OR Wednesday. New anisicoria, R pupil 2mm and fixed, left 4mm brisk, RUE trace withdrawal, RLE brisk withdrawal, CTH showing MLS. Started on IVF. Upgraded to tele for closer monitoring. Intermittent trendelenberg, exam improving.  2/27: DEEPALI overnight. Neuro exam stable. Continue to lay flat/intermittent trendelenberg. OR plan for Wednesday.    2/28: DEEPALI overnight. Neuro stable. Bedside debridement with ENT. Start BID Budesonide nasal washes. Pending OR tomorrow with ENT. Holding SQL for OR. ENT did naslphrayngealoscopy, debrieded and recommending budesonide nasal rinse however unavailable, normal saline wash 4x daily ordered instead per ENT. Preop for cranioplasty tomorrow.  3/1: OR for left cranioplasty.   3/2: POD 1, DEEPALI overnight. Neuro stable. Stepdown status today. Kassandra removed this am f/u TOV. Cont JPs at this time, cont Ancef until drains out. Pend BM.   3/3: POD 2. 2 JPs in place.   3/4: POD 3, DEEPALI overnight, pending AR. CHENCHO removed and head re-wrapped. Persistent R calf DVT's, holding full AC until POD10.   3/5: POD 4, DEEPALI overnight, headwrap in place, pending rehab. Covid negative today.   3/6: POD 5. DEEPALI. neuro exam improving.     Vital Signs Last 24 Hrs  T(C): 36.3 (06 Mar 2023 00:11), Max: 36.9 (05 Mar 2023 08:45)  T(F): 97.4 (06 Mar 2023 00:11), Max: 98.4 (05 Mar 2023 08:45)  HR: 109 (06 Mar 2023 00:11) (82 - 109)  BP: 146/83 (06 Mar 2023 00:11) (103/65 - 147/77)  BP(mean): 99 (05 Mar 2023 08:45) (99 - 99)  RR: 18 (06 Mar 2023 00:11) (16 - 18)  SpO2: 96% (06 Mar 2023 00:11) (95% - 97%)    Parameters below as of 06 Mar 2023 00:11  Patient On (Oxygen Delivery Method): room air        I&O's Summary    04 Mar 2023 07:01  -  05 Mar 2023 07:00  --------------------------------------------------------  IN: 610 mL / OUT: 1000 mL / NET: -390 mL    05 Mar 2023 07:01  -  06 Mar 2023 00:59  --------------------------------------------------------  IN: 240 mL / OUT: 0 mL / NET: 240 mL          PHYSICAL EXAM:  Neuro: A/O x 3, expressive aphasia, following commands, LUE/LLE strength 5/5 throughout, RUE strength delt 3/5, /bicep/tricep 4/5, hand  3/5, +RUE drift, RLE hip flex 4+/5, distally 4/5  Eyes: PERRL, EOMI  Cardio: regular rate and rhythm  Pulm: nonlabored breathing, normal chest rise  Abdomen: nondistended  Extremities: well perfused  Skin: warm, dry  Wound: head wrap in place, clean, dry, and intact      LABS:                        8.6    6.52  )-----------( 237      ( 05 Mar 2023 07:09 )             27.0     03-05    141  |  106  |  8   ----------------------------<  109<H>  4.0   |  26  |  0.66    Ca    8.9      05 Mar 2023 07:09  Phos  3.2     03-05  Mg     2.0     03-05              CAPILLARY BLOOD GLUCOSE          Drug Levels: [] N/A    CSF Analysis: [] N/A      Allergies    No Known Allergies    Intolerances      MEDICATIONS:  Antibiotics:    Neuro:  acetaminophen     Tablet .. 650 milliGRAM(s) Oral every 6 hours PRN  levETIRAcetam 1000 milliGRAM(s) Oral two times a day  ondansetron Injectable 4 milliGRAM(s) IV Push every 6 hours PRN  oxyCODONE    IR 5 milliGRAM(s) Oral every 4 hours PRN    Anticoagulation:  enoxaparin Injectable 50 milliGRAM(s) SubCutaneous every 24 hours    OTHER:  amLODIPine   Tablet 10 milliGRAM(s) Oral daily  bisacodyl 5 milliGRAM(s) Oral every 12 hours  bisacodyl Suppository 10 milliGRAM(s) Rectal daily PRN  buDESOnide    Inhalation Suspension 0.25 milliGRAM(s) Inhalation two times a day  fluticasone propionate 50 MICROgram(s)/spray Nasal Spray 1 Spray(s) Both Nostrils two times a day  lactulose Syrup 15 Gram(s) Oral daily  latanoprost 0.005% Ophthalmic Solution 1 Drop(s) Both EYES at bedtime  polyethylene glycol 3350 17 Gram(s) Oral two times a day  senna 2 Tablet(s) Oral at bedtime  simethicone 80 milliGRAM(s) Chew every 6 hours  sodium chloride 0.65% Nasal 1 Spray(s) Both Nostrils four times a day    IVF:    CULTURES:    RADIOLOGY & ADDITIONAL TESTS:      ASSESSMENT:  71 y/o F with h/o L hemicraniectomy and washout of L subdural empyema 12/30/22 (at time presented to [facility] c/o slurred speech and right sided weakness), s/p sinus surgery with judith purulence to L maxillary sinus and posterior bony erosion to L sphenoid sinus w/ENT 1/5/23, PEs. B/L DVT, B/L cataract surgery. Admitted to neurosurgery with plan for cranioplasty. Now s/p Left cranioplasty with Neena mesh, local tissue rearrangement, complex neuroplastics closure (3/1).      PLAN:  Neuro:  - Neuro/vitals q4h  - Keppra 1000 mg BID    - Pain control tylenol, oxycodone   - Post-op [facility] neuroplastics protocol completed   - Normal saline nasal rinse 4x daily per ENT    Cardio:  - Continue home Norvasc  - SBP goal <160     Pulm:  - room air  - O2 sat goal > 92%     Renal:  - IVL   - Voiding   - straight cath prn    GI:  - soft and bite sized diet  - Bowel regimen   - Last BM 3/3    Endo:  - a1c 4.7     Heme:  - SQL 3/3  - NO SCDs (b/l LE DVTs), SQL 110mg BID (previously on 120mg bid) on hold post-op   - prior LE doppler 1/8/23 b/l calf and L peroneal DVTs, repeat LE doppler 2/22 R posterior tibial vein DVT, persistent R IM calf vein DVT, resolution of L IM calf DVT, 3/3 w/ persistent R IM calf DVT and R posterior tibial vein DVT  - h/o b/l mild segmental/lobar PE on CT PE protocol 1/10/23    ID  - Afebrile   - Consulted ID, finished course of ceftriaxone/metronidazole/vancomycin for subdural/epidural empyema (rare peptostreptococcus 1/6 + staph epi) end date 2/16     Dispo:   - Regional status, full code   - PT/OT     D/w Dr. D'Amico

## 2023-03-06 NOTE — DISCHARGE NOTE NURSING/CASE MANAGEMENT/SOCIAL WORK - NSDCFUADDAPPT_GEN_ALL_CORE_FT
Please follow up with Dr. D'amico and Dr. Ben Chen on 3/21/23 at 11AM, call the office to confirm appointment at [phone]    Please follow up with Dr. Abarca from ENT outpatient    Please follow up with your primary care doctor after rehab

## 2023-03-06 NOTE — DISCHARGE NOTE PROVIDER - NSDCDCMDCOMP_GEN_ALL_CORE
"Chief Complaint   Patient presents with     Infusion     IV cefepime     Infusion Nursing Note:  Sophie Acharya presents today for IV cefepime.    Patient seen by provider today: No   present during visit today: Not Applicable.    Note: Infusion given IVP over 3 minutes.    Note- confirmed with patient SW was in contact with her yesterday and rides will be established for next three days; drove herself this morning.     No acute changes or new concerns from yesterday.     Intravenous Access:  Implanted Port accessed yesterday. Line +blood return. Cap changed, heparin locked and left accessed for infusion this afternoon.    Treatment Conditions:  Not Applicable.      Post Infusion Assessment:  Patient tolerated infusion without incident.  Blood return noted pre and post infusion.  Site patent and intact, free from redness, edema or discomfort.  No evidence of extravasations.       Discharge Plan:   Confirmed 4 PM appt this afternoon with patient.   Patient discharged in stable condition accompanied by: self.  Departure Mode: Ambulatory.    Administrations This Visit     ceFEPIme (MAXIPIME) 2 g in 20 mL SWFI for IVP     Admin Date  02/25/2022 Action  Given Dose  2 g Route  Intravenous Administered By  Shaheed Bach RN          heparin lock flush 10 UNIT/ML injection 5 mL     Admin Date  02/25/2022 Action  Given Dose  5 mL Route  Intracatheter Administered By  Shaheed Bach RN                Vital signs:  Temp: 97.9  F (36.6  C) Temp src: Oral BP: (!) 149/84 Pulse: 85   Resp: 18 SpO2: 99 % O2 Device: None (Room air)        Estimated body mass index is 27.73 kg/m  as calculated from the following:    Height as of 2/18/22: 1.6 m (5' 3\").    Weight as of 2/21/22: 71 kg (156 lb 8.4 oz).                    "
This document is complete and the patient is ready for discharge.

## 2023-03-06 NOTE — PROGRESS NOTE ADULT - PROVIDER SPECIALTY LIST ADULT
Neurosurgery
NSICU
Neurosurgery
ENT
ENT
Hospitalist
Neurosurgery
Hospitalist
NSICU
Hospitalist
NSICU
Hospitalist

## 2023-03-06 NOTE — DISCHARGE NOTE PROVIDER - CARE PROVIDER_API CALL
Diego Perry)  Neurosurgery  [address] [address]  Phone: [phone]  Fax: [phone]  Follow Up Time:     Samuel Abarca)  Otolaryngology  [address] [place] [address]  Phone: [phone]  Fax: [phone]  Follow Up Time:

## 2023-03-10 DIAGNOSIS — Z53.9 PROCEDURE AND TREATMENT NOT CARRIED OUT, UNSPECIFIED REASON: ICD-10-CM

## 2023-03-10 DIAGNOSIS — G06.0 INTRACRANIAL ABSCESS AND GRANULOMA: ICD-10-CM

## 2023-03-10 DIAGNOSIS — I82.561 CHRONIC EMBOLISM AND THROMBOSIS OF RIGHT CALF MUSCULAR VEIN: ICD-10-CM

## 2023-03-10 DIAGNOSIS — I69.220 APHASIA FOLLOWING OTHER NONTRAUMATIC INTRACRANIAL HEMORRHAGE: ICD-10-CM

## 2023-03-10 DIAGNOSIS — G93.6 CEREBRAL EDEMA: ICD-10-CM

## 2023-03-10 DIAGNOSIS — Z20.822 CONTACT WITH AND (SUSPECTED) EXPOSURE TO COVID-19: ICD-10-CM

## 2023-03-10 DIAGNOSIS — E66.9 OBESITY, UNSPECIFIED: ICD-10-CM

## 2023-03-10 DIAGNOSIS — D64.9 ANEMIA, UNSPECIFIED: ICD-10-CM

## 2023-03-10 DIAGNOSIS — R73.03 PREDIABETES: ICD-10-CM

## 2023-03-10 DIAGNOSIS — I69.851 HEMIPLEGIA AND HEMIPARESIS FOLLOWING OTHER CEREBROVASCULAR DISEASE AFFECTING RIGHT DOMINANT SIDE: ICD-10-CM

## 2023-03-10 DIAGNOSIS — Z86.711 PERSONAL HISTORY OF PULMONARY EMBOLISM: ICD-10-CM

## 2023-03-10 DIAGNOSIS — G93.5 COMPRESSION OF BRAIN: ICD-10-CM

## 2023-03-10 DIAGNOSIS — G97.41 ACCIDENTAL PUNCTURE OR LACERATION OF DURA DURING A PROCEDURE: ICD-10-CM

## 2023-03-10 DIAGNOSIS — J34.89 OTHER SPECIFIED DISORDERS OF NOSE AND NASAL SINUSES: ICD-10-CM

## 2023-03-10 DIAGNOSIS — Z95.828 PRESENCE OF OTHER VASCULAR IMPLANTS AND GRAFTS: ICD-10-CM

## 2023-03-10 DIAGNOSIS — E87.6 HYPOKALEMIA: ICD-10-CM

## 2023-03-10 DIAGNOSIS — G97.82 OTHER POSTPROCEDURAL COMPLICATIONS AND DISORDERS OF NERVOUS SYSTEM: ICD-10-CM
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
## 2023-03-10 DIAGNOSIS — I10 ESSENTIAL (PRIMARY) HYPERTENSION: ICD-10-CM

## 2023-03-10 DIAGNOSIS — I82.541 CHRONIC EMBOLISM AND THROMBOSIS OF RIGHT TIBIAL VEIN: ICD-10-CM

## 2023-03-10 DIAGNOSIS — M95.2 OTHER ACQUIRED DEFORMITY OF HEAD: ICD-10-CM

## 2023-03-16 NOTE — HISTORY OF PRESENT ILLNESS
[FreeTextEntry1] : 73 y/o Female with no known PMHx who presented to [facility] 12/22/22 with slurred speech and R sided weakness, stroke code called, imaging initially negative for stroke or hemorrhage, pt's neuro exam worsened and subsequent CTH the following day showed L sided SDH, admitted to ICU for further monitoring. \par Pt neuro declined on 12/25/22 with AMS requiring intubation, noted to have episodes of twitching concerning for seizures, started on keppra and EEG placed. No epileptiform activity noted. CTH on 12/28/22 significant for L \par frontoparietal collection, MRI completed showed concern for possible empyema. \par Daughter reported pt likely had a sinus infection recently, unsure of abx. At [place] pt started on ceftriaxone for UTI that was switched to vancomycin. Pt transferred to [facility] 12/31/22 for further management. \par \par 12/30/22 s/p left hemicraniectomy and washout of left subdural empyema \par  OR culture grew Peptostreptococcus\par \par 1/5/23 s/p b/l sinus surgery with judith purulence to L maxillary sinus and posterior bony erosion to L sphenoid sinus with ENT Dr. Abarca\par Cultures with Staph epi. Follows with ID Dr. Ayers- currently on vancomycin, ceftriaxone, and metronidazole\par \par 2/8/23 pt presented for post op follow- up while still in rehab via telehealth. Reported progress in strength and speech. Plan made for her to RTC after dc from rehab, MRI 3 months post op. \par \par She returns today for routine postop visit and suture removal. \par

## 2023-03-16 NOTE — ASSESSMENT
[FreeTextEntry1] : Incision healing well. All staples removed and incision cleansed with chlorhexidine solution. Patient her  family counseled to wash wound with soap and water daily, pat dry and keep open to air. They will call for any wound drainage, new headaches or fever over 100F.\par

## 2023-03-16 NOTE — REASON FOR VISIT
[de-identified] : left cranioplasty  [de-identified] : 3/1/23 [de-identified] : 1.  Creation, elevation and advancement of brand new leftsided fasciocutaneous pedicled skull flap 20 x 20 sq cm based on the left superficial temporal artery for a planned coverage of customized craniotomy implant.\par 2.  Debridement and excision of fullthickness left scalp scar 16 sq cm x 1 cm wide.\par 3.  Leftsided cranioplasty reconstruction with large frontotemporal scar defect using titanium mesh implants measuring 16 x 12 sq cm status post craniectomy.\par 4.  Reconstruction of cerebrospinal fluid leak with pedicled pericranial flap.

## 2023-03-21 ENCOUNTER — APPOINTMENT (OUTPATIENT)
Dept: NEUROSURGERY | Facility: CLINIC | Age: 73
End: 2023-03-21

## 2023-03-25 PROBLEM — Z98.890 STATUS POST CRANIECTOMY: Status: ACTIVE | Noted: 2023-02-07

## 2023-03-25 NOTE — REASON FOR VISIT
[de-identified] : left hemicraniectomy and washout of left subdural empyema 12/30/22; s/p left cranioplasty 3/1/23 (Dr. Rosenberg)

## 2023-03-25 NOTE — HISTORY OF PRESENT ILLNESS
[FreeTextEntry1] : 71 y/o Female with no known PMHx who presented to [facility] 12/22/22 with slurred speech and R sided weakness, stroke code called, imaging initially negative for stroke or hemorrhage, pt's neuro exam worsened and subsequent CTH the following day showed L sided SDH, admitted to ICU for further monitoring. \par Pt neuro declined on 12/25/22 with AMS requiring intubation, noted to have episodes of twitching concerning for seizures, started on keppra and EEG placed. No epileptiform activity noted. CTH on 12/28/22 significant for L \par frontoparietal collection, MRI completed showed concern for possible empyema. \par Daughter reported pt likely had a sinus infection recently, unsure of abx. At [place] pt started on ceftriaxone for UTI that was switched to vancomycin. Pt transferred to [facility] 12/31/22 for further management. \par \par 12/30/22 s/p left hemicraniectomy and washout of left subdural empyema \par  OR culture grew Peptostreptococcus\par \par 1/5/23 s/p b/l sinus surgery with judith purulence to L maxillary sinus and posterior bony erosion to L sphenoid sinus with ENT Dr. Abarca\par Cultures with Staph epi. Follows with ID Dr. Ayers- currently on vancomycin, ceftriaxone, and metronidazole\par \par 2/8/23 pt presented for post op follow- up while still in rehab via telehealth. Reported progress in strength and speech. Plan made for her to RTC after dc from rehab, MRI 3 months post op. \par \par 2/22/23 pt brought to ER by daughter with increased right- sided weakness. MRI with subacute to chronic infarct sites in the left hemisphere, most recent at the parietal/temporal junction, Status post left hemicraniectomy with sunken flap appearance.\par \par 3/1/23 pt underwent left cranioplasty with Dr. Rosenberg\par \par 3/6/23 pt dc with [facility]. \par \par TODAY pt returns for follow- up. \par \par \par \par \par \par \par

## 2023-03-25 NOTE — DATA REVIEWED
[de-identified] : \par ACC: [id number] EXAM: CT BRAIN ORDERED BY: ALLEGRA MOONEY STEPHREGANROBINA\par \par PROCEDURE DATE: 03/01/2023\par \par \par \par INTERPRETATION: Status post cranioplasty [place] plastics 3-D.\par \par Technique: CT head was performed utilizing axial images from the base of the skull through the vertex without the administration of intravenous contrast. Images were reviewed in the bone, brain and subdural windows.\par \par Findings: Comparison is made to a prior CT of the brain performed on 2/26/2023\par \par The patient is status post left frontal parietal temporal hemicraniectomy and flap with interval cranioplasty. There is a metallic mesh seen overlying the left craniectomy site with a small amount of extracranial air with mild fluid and blood product present. There is a very tiny left frontal parietal temporal subdural collection of fluid measuring approximately 3 mm in largest diameter. There has been reexpansion of the left cerebral hemisphere when compared to the prior study.\par \par There has been interval increased conspicuity of low density seen in the left lateral inferior frontal lobe. There are chronic infarction seen within the left parietal and superior temporal lobes. There is effacement of the left frontal parietal and mildly temporal sulci. There has been reexpansion of the lateral ventricles and left sylvian fissure. There is ex vacuo dilation of the left lateral ventricle. There has been resolution of the midline shift.\par \par There is a tiny right superior cerebellar chronic infarction. There is a tiny left pontine chronic lacunar infarction\par \par There is mild bilateral maxillary mucosal thickening. There is complete ossification of the bilateral sphenoid air cells with areas of hyperdensity that may represent inspissation or fungal disease. The remaining paranasal sinuses and bilateral mastoid air cells are clear.\par \par Impression: Status post post left frontal parietal temporal hemicraniectomy and flap with interval cranioplasty. Tiny left extra-axial fluid collection. Reexpansion of the left cerebral hemisphere when compared to the prior study. Resolution of midline shift to the right. Chronic infarctions.\par \par \par --- End of Report ---\par  [de-identified] : \par \par ACC: [id number] EXAM: MR BRAIN WAW IC ORDERED BY: ALLEGRA ZAPATA\par \par PROCEDURE DATE: 02/22/2023\par \par \par \par INTERPRETATION: PROCEDURE: MRI Brain with and without contrast\par \par INDICATION: Left weakness post craniectomy\par \par TECHNIQUE: Sagittal T1 volumetric series with MPR, axial T2-FLAIR, T2-FFE and diffusion imaging of the brain is obtained. Following the intravenous administration of 10 cc Gadavist contrast material, axial T2 spin-echo imaging is obtained followed by sagittal T1 volumetric imaging with MPR provided.\par \par COMPARISON: No prior MR. CTA head exams 02/22/2023 and 2/7/2023.\par \par FINDINGS:\par \par Diffusion-weighted images demonstrate a late subacute focus of ischemia in the left parietal/temporal junction with some T2 shine through and with original cortical enhancement that appears thin and consistent with subacute infarct. Smaller focus of encephalomalacia in the left left temporal pole may be more chronic and without enhancement. Small chronic appearing signal also shown in the left paramedian parietal lobe adjacent to the forceps major tract. No focal signal abnormality or infarct in the right cerebral hemisphere to explain new left weakness. Brainstem and cerebellar hemispheres are grossly unremarkable. The susceptibility images are motion degraded without obvious site of parenchymal hemorrhage. No extraaxial fluid collection.\par \par There is sunken flap appearance status post decompressive left hemicraniectomy as detailed on the same day head CT. There is minor midline shift to the right. No hydrocephalus. There is mild expected dilatation of the left lateral ventricle. Large vessel flow voids are preserved.\par \par Inflammatory paranasal sinus disease is present as detailed on recent head CT. Mastoids are partially opacified, left more than right.\par \par \par IMPRESSION:\par \par No right brain infarct or focal finding to explain left weakness. Rather there is subacute to chronic infarct sites in the left hemisphere, most recent at the parietal/temporal junction.\par \par Status post left hemicraniectomy with sunken flap appearance.\par \par --- End of Report ---\par

## 2023-03-29 ENCOUNTER — APPOINTMENT (OUTPATIENT)
Dept: NEUROSURGERY | Facility: CLINIC | Age: 73
End: 2023-03-29

## 2023-03-29 DIAGNOSIS — Z98.890 OTHER SPECIFIED POSTPROCEDURAL STATES: ICD-10-CM

## 2023-04-11 ENCOUNTER — APPOINTMENT (OUTPATIENT)
Dept: NEUROSURGERY | Facility: CLINIC | Age: 73
End: 2023-04-11
Payer: MEDICARE

## 2023-04-11 ENCOUNTER — NON-APPOINTMENT (OUTPATIENT)
Age: 73
End: 2023-04-11

## 2023-04-11 VITALS
RESPIRATION RATE: 16 BRPM | BODY MASS INDEX: 36.73 KG/M2 | HEIGHT: 67 IN | HEART RATE: 101 BPM | TEMPERATURE: 97 F | OXYGEN SATURATION: 95 % | SYSTOLIC BLOOD PRESSURE: 117 MMHG | WEIGHT: 234 LBS | DIASTOLIC BLOOD PRESSURE: 79 MMHG

## 2023-04-11 PROCEDURE — 99214 OFFICE O/P EST MOD 30 MIN: CPT | Mod: 24

## 2023-04-11 NOTE — PHYSICAL EXAM
[Longitudinal] : longitudinal [Clean] : clean [Healing Well] : healing well [Well-Healed] : well-healed [Intact] : intact [No Drainage] : without drainage [Normal Skin] : normal [FreeTextEntry1] : left scalp

## 2023-04-11 NOTE — HISTORY OF PRESENT ILLNESS
[FreeTextEntry1] : 71 y/o Female with no known PMHx who presented to [facility] 12/22/22 with slurred speech and R sided weakness, stroke code called, imaging initially negative for stroke or hemorrhage, pt's neuro exam worsened and subsequent CTH the following day showed L sided SDH, admitted to ICU for further monitoring. \par Pt neuro declined on 12/25/22 with AMS requiring intubation, noted to have episodes of twitching concerning for seizures, started on keppra and EEG placed. No epileptiform activity noted. CTH on 12/28/22 significant for L \par frontoparietal collection, MRI completed showed concern for possible empyema. \par Daughter reported pt likely had a sinus infection recently, unsure of abx. At [place] pt started on ceftriaxone for UTI that was switched to vancomycin. Pt transferred to [facility] 12/31/22 for further management. \par \par 12/30/22 s/p left hemicraniectomy and washout of left subdural empyema \par  OR culture grew Peptostreptococcus\par \par 1/5/23 s/p b/l sinus surgery with judith purulence to L maxillary sinus and posterior bony erosion to L sphenoid sinus with ENT Dr. Abarca\par Cultures with Staph epi. Follows with ID Dr. Ayers- currently on vancomycin, ceftriaxone, and metronidazole\par \par 2/8/23 pt presented for post op follow- up while still in rehab via telehealth. Reported progress in strength and speech. Plan made for her to RTC after dc from rehab, MRI 3 months post op. \par \par She returns today for routine postop visit and suture removal. She denies wound drainage or fever. She was discharged from acute rehab and is currently at subacute rehab. Her right side strength is improving but she still requires max assist for walking. \par Scalp: Head shape appears symmetrical. Nylon sutures in place along incision. Scalp incision sites are healing appropriately without erythema, dehiscence, drainage, or signs of infection. Edges are well-approximated. No other erythema. No fluctuance or palpable fluid collections.  A&Ox3

## 2023-04-11 NOTE — REASON FOR VISIT
[de-identified] : left cranioplasty  [de-identified] : 3/1/23 [de-identified] : 1.  Creation, elevation and advancement of brand new leftsided fasciocutaneous pedicled skull flap 20 x 20 sq cm based on the left superficial temporal artery for a planned coverage of customized craniotomy implant.\par 2.  Debridement and excision of fullthickness left scalp scar 16 sq cm x 1 cm wide.\par 3.  Leftsided cranioplasty reconstruction with large frontotemporal scar defect using titanium mesh implants measuring 16 x 12 sq cm status post craniectomy.\par 4.  Reconstruction of cerebrospinal fluid leak with pedicled pericranial flap.

## 2023-04-11 NOTE — ASSESSMENT
[FreeTextEntry1] : She is stable and healing appropriately.\par She is an appropriate candidate for partial suture removal today/ Partial suture removed in clinic today.\par Instructed to:\par - Can leave incision open to air.\par - Gently wash surgical sites daily with baby shampoo and water. Pat dry.\par - No swimming or soaking in bathtub for 6 weeks post-operatively or until wounds have fully healed.\par - Avoid applying cream/ointment directly to surgical incisions for 6 weeks post-op.\par - Continue monitoring for signs of infection including increased pain, redness, swelling, fever (temperature > 100.4 F), or yellow/green/brown drainage.\par - Please call immediately with any of these symptoms. During office hours, call your our office at [phone]. Call 9-1-1 for any life-threatening signs or symptoms.\par - Follow up with rest of medical team as advised.\par - Follow-up in __ weeks with Dr. Rosenberg for post-op and continued suture removal.\par  \par Patient verbalized understanding and agreement with treatment plan.\par  \par I, Dr. Rosenberg, personally performed the evaluation and management (E/M) services for this new patient. That E/M includes conducting the initial examination, assessing all conditions, and establishing the plan of care. Today, my ACP, was here to observe my evaluation and management services for this patient to be followed going forward.\par  \par Today, I personally spent 30 minutes in direct face to face time with the patient, of which greater than 50% of the time was spent in patient education and counseling as described above.\par  \par \par

## 2023-05-10 ENCOUNTER — APPOINTMENT (OUTPATIENT)
Dept: NEUROLOGY | Facility: CLINIC | Age: 73
End: 2023-05-10
Payer: MEDICARE

## 2023-05-10 VITALS
BODY MASS INDEX: 39.24 KG/M2 | HEART RATE: 110 BPM | DIASTOLIC BLOOD PRESSURE: 82 MMHG | OXYGEN SATURATION: 95 % | TEMPERATURE: 97.6 F | WEIGHT: 250 LBS | SYSTOLIC BLOOD PRESSURE: 114 MMHG | HEIGHT: 67 IN

## 2023-05-10 PROCEDURE — 99205 OFFICE O/P NEW HI 60 MIN: CPT

## 2023-05-11 ENCOUNTER — NON-APPOINTMENT (OUTPATIENT)
Age: 73
End: 2023-05-11

## 2023-05-11 LAB
ALBUMIN SERPL ELPH-MCNC: 4 G/DL
ALP BLD-CCNC: 85 U/L
ALT SERPL-CCNC: 32 U/L
ANION GAP SERPL CALC-SCNC: 16 MMOL/L
AST SERPL-CCNC: 26 U/L
BASOPHILS # BLD AUTO: 0.07 K/UL
BASOPHILS NFR BLD AUTO: 1.2 %
BILIRUB SERPL-MCNC: 0.3 MG/DL
BUN SERPL-MCNC: 17 MG/DL
CALCIUM SERPL-MCNC: 10.1 MG/DL
CHLORIDE SERPL-SCNC: 105 MMOL/L
CO2 SERPL-SCNC: 21 MMOL/L
CREAT SERPL-MCNC: 0.88 MG/DL
EGFR: 70 ML/MIN/1.73M2
EOSINOPHIL # BLD AUTO: 0.13 K/UL
EOSINOPHIL NFR BLD AUTO: 2.3 %
GLUCOSE SERPL-MCNC: 128 MG/DL
HCT VFR BLD CALC: 36.3 %
HGB BLD-MCNC: 11.2 G/DL
IMM GRANULOCYTES NFR BLD AUTO: 0.2 %
LYMPHOCYTES # BLD AUTO: 1.43 K/UL
LYMPHOCYTES NFR BLD AUTO: 24.9 %
MAN DIFF?: NORMAL
MCHC RBC-ENTMCNC: 28.4 PG
MCHC RBC-ENTMCNC: 30.9 GM/DL
MCV RBC AUTO: 92.1 FL
MONOCYTES # BLD AUTO: 0.58 K/UL
MONOCYTES NFR BLD AUTO: 10.1 %
NEUTROPHILS # BLD AUTO: 3.52 K/UL
NEUTROPHILS NFR BLD AUTO: 61.3 %
PLATELET # BLD AUTO: 300 K/UL
POTASSIUM SERPL-SCNC: 4.5 MMOL/L
PROT SERPL-MCNC: 7.8 G/DL
RBC # BLD: 3.94 M/UL
RBC # FLD: 14.8 %
SODIUM SERPL-SCNC: 142 MMOL/L
WBC # FLD AUTO: 5.74 K/UL

## 2023-05-15 LAB — LEVETIRACETAM SERPL-MCNC: 52.8 UG/ML

## 2023-05-17 ENCOUNTER — APPOINTMENT (OUTPATIENT)
Dept: NEUROLOGY | Facility: CLINIC | Age: 73
End: 2023-05-17
Payer: MEDICARE

## 2023-05-17 PROCEDURE — 95819 EEG AWAKE AND ASLEEP: CPT

## 2023-05-18 PROCEDURE — 95708 EEG WO VID EA 12-26HR UNMNTR: CPT

## 2023-05-19 ENCOUNTER — APPOINTMENT (OUTPATIENT)
Dept: NEUROLOGY | Facility: CLINIC | Age: 73
End: 2023-05-19

## 2023-05-19 PROCEDURE — 95708 EEG WO VID EA 12-26HR UNMNTR: CPT

## 2023-05-19 PROCEDURE — 95721 EEG PHY/QHP>36<60 HR W/O VID: CPT

## 2023-05-19 PROCEDURE — 95700 EEG CONT REC W/VID EEG TECH: CPT

## 2023-06-15 ENCOUNTER — APPOINTMENT (OUTPATIENT)
Dept: NEUROLOGY | Facility: CLINIC | Age: 73
End: 2023-06-15
Payer: MEDICARE

## 2023-06-15 PROCEDURE — 99214 OFFICE O/P EST MOD 30 MIN: CPT | Mod: 95

## 2023-06-27 PROBLEM — G06.2 SUBDURAL EMPYEMA: Status: ACTIVE | Noted: 2023-01-30

## 2023-06-27 PROBLEM — Z51.89 VISIT FOR WOUND CHECK: Status: ACTIVE | Noted: 2023-02-07

## 2023-06-27 PROBLEM — Z98.890 HISTORY OF CRANIOPLASTY: Status: ACTIVE | Noted: 2023-03-25

## 2023-06-27 PROBLEM — Z98.890 S/P CRANIOTOMY: Status: ACTIVE | Noted: 2023-02-07

## 2023-06-28 ENCOUNTER — OUTPATIENT (OUTPATIENT)
Dept: OUTPATIENT SERVICES | Facility: HOSPITAL | Age: 73
LOS: 1 days | End: 2023-06-28
Payer: MEDICARE

## 2023-06-28 ENCOUNTER — APPOINTMENT (OUTPATIENT)
Dept: MRI IMAGING | Facility: HOSPITAL | Age: 73
End: 2023-06-28
Payer: MEDICARE

## 2023-06-28 ENCOUNTER — APPOINTMENT (OUTPATIENT)
Dept: NEUROSURGERY | Facility: CLINIC | Age: 73
End: 2023-06-28
Payer: MEDICARE

## 2023-06-28 VITALS
DIASTOLIC BLOOD PRESSURE: 79 MMHG | OXYGEN SATURATION: 96 % | SYSTOLIC BLOOD PRESSURE: 131 MMHG | TEMPERATURE: 98 F | HEART RATE: 94 BPM

## 2023-06-28 DIAGNOSIS — Z98.890 OTHER SPECIFIED POSTPROCEDURAL STATES: Chronic | ICD-10-CM

## 2023-06-28 DIAGNOSIS — Z98.890 OTHER SPECIFIED POSTPROCEDURAL STATES: ICD-10-CM

## 2023-06-28 DIAGNOSIS — R53.1 WEAKNESS: ICD-10-CM

## 2023-06-28 DIAGNOSIS — Z51.89 ENCOUNTER FOR OTHER SPECIFIED AFTERCARE: ICD-10-CM

## 2023-06-28 DIAGNOSIS — G06.2 EXTRADURAL AND SUBDURAL ABSCESS, UNSPECIFIED: ICD-10-CM

## 2023-06-28 PROCEDURE — A9585: CPT

## 2023-06-28 PROCEDURE — 70553 MRI BRAIN STEM W/O & W/DYE: CPT

## 2023-06-28 PROCEDURE — 99212 OFFICE O/P EST SF 10 MIN: CPT

## 2023-06-28 PROCEDURE — 70553 MRI BRAIN STEM W/O & W/DYE: CPT | Mod: 26,MH

## 2023-06-28 NOTE — REVIEW OF SYSTEMS
[As Noted in HPI] : as noted in HPI [Arm Weakness] : arm weakness [Fever] : no fever [Chills] : no chills [Chest Pain] : no chest pain [Palpitations] : no palpitations [Shortness Of Breath] : no shortness of breath [Cough] : no cough

## 2023-06-28 NOTE — PHYSICAL EXAM
[General Appearance - Alert] : alert [General Appearance - In No Acute Distress] : in no acute distress [Clean] : clean [Dry] : dry [Well-Healed] : well-healed [No Drainage] : without drainage [Sclera] : the sclera and conjunctiva were normal [PERRL With Normal Accommodation] : pupils were equal in size, round, reactive to light, with normal accommodation [Neck Appearance] : the appearance of the neck was normal [] : no respiratory distress [Respiration, Rhythm And Depth] : normal respiratory rhythm and effort [Skin Color & Pigmentation] : normal skin color and pigmentation [Erythema] : not erythematous [Warm] : not warm [FreeTextEntry6] : RUE 3/5; RLE 4/5; EVON/LE 5/5

## 2023-06-28 NOTE — HISTORY OF PRESENT ILLNESS
[FreeTextEntry1] : 73 y/o Female with no known PMHx who presented to [facility] 12/22/22 with slurred speech and R sided weakness, stroke code called, imaging initially negative for stroke or hemorrhage, pt's neuro exam worsened and subsequent CTH the following day showed L sided SDH, admitted to ICU for further monitoring. \par Pt neuro declined on 12/25/22 with AMS requiring intubation, noted to have episodes of twitching concerning for seizures, started on keppra and EEG placed. No epileptiform activity noted. CTH on 12/28/22 significant for L \par frontoparietal collection, MRI completed showed concern for possible empyema. \par Daughter reported pt likely had a sinus infection recently, unsure of abx. At [place] pt started on ceftriaxone for UTI that was switched to vancomycin. Pt transferred to [facility] 12/31/22 for further management. \par 12/30/22 s/p left hemicraniectomy and washout of left subdural empyema. OR culture grew Peptostreptococcus. \par \par 1/5/23 s/p b/l sinus surgery with judith purulence to L maxillary sinus and posterior bony erosion to L sphenoid sinus with ENT Dr. Abarca\par Cultures with Staph epi. IV abx started per ID Dr. Ayers. \par \par 2/22/23 pt brought to ER by daughter with increased right- sided weakness. MRI with subacute to chronic infarct sites in the left hemisphere, most recent at the parietal/temporal junction, Status post left hemicraniectomy with sunken flap appearance.\par 3/1/23 pt underwent left cranioplasty with Dr. Rosenberg\par 3/6/23 pt dc with [facility]. \par \par TODAY pt returns for follow- up with her daughter. \par She remains at [facility]. Continues to have expressive aphasia, some mixed. Also right sided weakness, upper worse than lower. Ambulates with walker and assistance \par She is getting MRI later today. \par \par Neurologist: Dr. Vicky Ribera

## 2023-06-28 NOTE — ASSESSMENT
[FreeTextEntry1] : 72-year-old female initially presented to [facility] with concern for a left-sided stroke that ultimately developed a left-sided subdural empyema over the course of 7 days.  Was transferred to [facility] where she underwent a left-sided hemicraniectomy and subsequent endonasal evacuation of infection now s/p cranioplasty with persistent aphasia and right upper extremity weakness. Awaiting MRI today. 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 Have recommended the patient continue f/u with stroke neurology. Will also continue to follow with Dr. Rosenberg regarding cranioplasty. .\par \par PIncision CDI, well healed. \par \par PLAN:\par - MRI today as planned\par - Continue physical therapy \par - Will follow- up with MRI results \par \par Patient and family member verbalize understanding of today’s discussion and next steps in treatment plan. \par  \par  \par A total of 15 minutes was spent reviewing the labs, imaging and physical examination of the patient. We discussed the diagnosis, and the plan. The patient's questions were answered. The patient demonstrated an excellent understanding of the plan.

## 2023-07-11 ENCOUNTER — APPOINTMENT (OUTPATIENT)
Dept: NEUROSURGERY | Facility: CLINIC | Age: 73
End: 2023-07-11
Payer: MEDICARE

## 2023-07-11 VITALS
OXYGEN SATURATION: 96 % | HEART RATE: 99 BPM | DIASTOLIC BLOOD PRESSURE: 68 MMHG | SYSTOLIC BLOOD PRESSURE: 99 MMHG | TEMPERATURE: 97.1 F

## 2023-07-11 PROCEDURE — 99213 OFFICE O/P EST LOW 20 MIN: CPT

## 2023-07-17 NOTE — ASSESSMENT
[FreeTextEntry1] : This is a complex case 72 year old female s/p 3/1/23 Leftsided hemicraniectomy for subdural empyema followed by 6 weeks of antibiotics, and developed severe syndrome of the trephined. she is now s/p cranioplasty reconstruction with  titanium mesh implants measuring 16 x 12 sq  with pedicled pericranial flap. Returns after weight loss and mesh impregnation on the fronto-temporal scalp with impending extrusion. \par Recommended augmentation of the scalp with acellular dermal matrix and pedicle scalp flap for coverage of implant. \par instructed the patient and her daughter to follow closely and increase protein and fat intake as well as remain hydrated. \par will continue with surgery booking. \par \par I, Dr. Rosenberg, personally performed the evaluation and management (E/M) services for this new patient. That E/M includes conducting the initial examination, assessing all conditions, and establishing the plan of care. Today, my ACP, was here to observe my evaluation and management services for this patient to be followed going forward.\par  \par Today, I personally spent 30 minutes in direct face to face time with the patient, of which greater than 50% of the time was spent in patient education and counseling as described above.\par  \par \par

## 2023-07-17 NOTE — HISTORY OF PRESENT ILLNESS
[FreeTextEntry1] : 73 y/o Female with no known PMHx who presented to [facility] 12/22/22 with slurred speech and R sided weakness, stroke code called, imaging initially negative for stroke or hemorrhage, pt's neuro exam worsened and subsequent CTH the following day showed L sided SDH, admitted to ICU for further monitoring. \par Pt neuro declined on 12/25/22 with AMS requiring intubation, noted to have episodes of twitching concerning for seizures, started on keppra and EEG placed. No epileptiform activity noted. CTH on 12/28/22 significant for L \par frontoparietal collection, MRI completed showed concern for possible empyema. \par Daughter reported pt likely had a sinus infection recently, unsure of abx. At [place] pt started on ceftriaxone for UTI that was switched to vancomycin. Pt transferred to [facility] 12/31/22 for further management. \par \par 12/30/22 s/p left hemicraniectomy and washout of left subdural empyema \par  OR culture grew Peptostreptococcus\par \par 1/5/23 s/p b/l sinus surgery with judith purulence to L maxillary sinus and posterior bony erosion to L sphenoid sinus with ENT Dr. Abarca\par Cultures with Staph epi. Follows with ID Dr. Ayers- currently on vancomycin, ceftriaxone, and metronidazole\par \par \par 3/1/23 Leftsided cranioplasty reconstruction with large frontotemporal scar defect using titanium mesh implants measuring 16 x 12 sq cm status post craniectomy.\par 4.  Reconstruction of cerebrospinal fluid leak with pedicled pericranial flap.\par She returns today for routine postop visit.. She denies wound drainage or fever. She was discharged from acute rehab and is currently at subacute rehab. Her right side strength is improving but she still requires max assist for walking. \par \par She comes in today with scalp thinning over titanium mesh with visible enmeshment at the fronto-temporal region after a significant weight loss which has contributed to loss of subcutaneous fat putting her at risk for hardware exposure. Incision well healed without erythema, dehiscence, drainage, or signs of infection. Edges are well-approximated. No other erythema. No fluctuance or palpable fluid collections. \par \par A&Ox3\par

## 2023-08-03 ENCOUNTER — APPOINTMENT (OUTPATIENT)
Dept: NEUROLOGY | Facility: CLINIC | Age: 73
End: 2023-08-03

## 2023-08-09 ENCOUNTER — TRANSCRIPTION ENCOUNTER (OUTPATIENT)
Age: 73
End: 2023-08-09

## 2023-08-09 VITALS
WEIGHT: 214.95 LBS | TEMPERATURE: 97 F | HEIGHT: 67 IN | DIASTOLIC BLOOD PRESSURE: 67 MMHG | HEART RATE: 82 BPM | OXYGEN SATURATION: 99 % | SYSTOLIC BLOOD PRESSURE: 112 MMHG | RESPIRATION RATE: 16 BRPM

## 2023-08-09 NOTE — PATIENT PROFILE ADULT - FALL HARM RISK - HARM RISK INTERVENTIONS

## 2023-08-10 ENCOUNTER — INPATIENT (INPATIENT)
Facility: HOSPITAL | Age: 73
LOS: 3 days | Discharge: NURSING, REHAB OR LONG-TERM CARE | DRG: 464 | End: 2023-08-14
Attending: STUDENT IN AN ORGANIZED HEALTH CARE EDUCATION/TRAINING PROGRAM | Admitting: STUDENT IN AN ORGANIZED HEALTH CARE EDUCATION/TRAINING PROGRAM
Payer: MEDICARE

## 2023-08-10 ENCOUNTER — TRANSCRIPTION ENCOUNTER (OUTPATIENT)
Age: 73
End: 2023-08-10

## 2023-08-10 ENCOUNTER — APPOINTMENT (OUTPATIENT)
Dept: NEUROSURGERY | Facility: HOSPITAL | Age: 73
End: 2023-08-10

## 2023-08-10 DIAGNOSIS — Z98.890 OTHER SPECIFIED POSTPROCEDURAL STATES: Chronic | ICD-10-CM

## 2023-08-10 DIAGNOSIS — Z90.49 ACQUIRED ABSENCE OF OTHER SPECIFIED PARTS OF DIGESTIVE TRACT: Chronic | ICD-10-CM

## 2023-08-10 LAB
ANION GAP SERPL CALC-SCNC: 10 MMOL/L — SIGNIFICANT CHANGE UP (ref 5–17)
BLD GP AB SCN SERPL QL: NEGATIVE — SIGNIFICANT CHANGE UP
BUN SERPL-MCNC: 22 MG/DL — SIGNIFICANT CHANGE UP (ref 7–23)
CALCIUM SERPL-MCNC: 9.1 MG/DL — SIGNIFICANT CHANGE UP (ref 8.4–10.5)
CHLORIDE SERPL-SCNC: 105 MMOL/L — SIGNIFICANT CHANGE UP (ref 96–108)
CO2 SERPL-SCNC: 23 MMOL/L — SIGNIFICANT CHANGE UP (ref 22–31)
CREAT SERPL-MCNC: 0.79 MG/DL — SIGNIFICANT CHANGE UP (ref 0.5–1.3)
EGFR: 80 ML/MIN/1.73M2 — SIGNIFICANT CHANGE UP
GLUCOSE SERPL-MCNC: 177 MG/DL — HIGH (ref 70–99)
HCT VFR BLD CALC: 32.1 % — LOW (ref 34.5–45)
HGB BLD-MCNC: 10.3 G/DL — LOW (ref 11.5–15.5)
MAGNESIUM SERPL-MCNC: 1.9 MG/DL — SIGNIFICANT CHANGE UP (ref 1.6–2.6)
MCHC RBC-ENTMCNC: 28.6 PG — SIGNIFICANT CHANGE UP (ref 27–34)
MCHC RBC-ENTMCNC: 32.1 GM/DL — SIGNIFICANT CHANGE UP (ref 32–36)
MCV RBC AUTO: 89.2 FL — SIGNIFICANT CHANGE UP (ref 80–100)
NRBC # BLD: 0 /100 WBCS — SIGNIFICANT CHANGE UP (ref 0–0)
PHOSPHATE SERPL-MCNC: 4.4 MG/DL — SIGNIFICANT CHANGE UP (ref 2.5–4.5)
PLATELET # BLD AUTO: 225 K/UL — SIGNIFICANT CHANGE UP (ref 150–400)
POTASSIUM SERPL-MCNC: 4 MMOL/L — SIGNIFICANT CHANGE UP (ref 3.5–5.3)
POTASSIUM SERPL-SCNC: 4 MMOL/L — SIGNIFICANT CHANGE UP (ref 3.5–5.3)
RBC # BLD: 3.6 M/UL — LOW (ref 3.8–5.2)
RBC # FLD: 14 % — SIGNIFICANT CHANGE UP (ref 10.3–14.5)
RH IG SCN BLD-IMP: POSITIVE — SIGNIFICANT CHANGE UP
SODIUM SERPL-SCNC: 138 MMOL/L — SIGNIFICANT CHANGE UP (ref 135–145)
WBC # BLD: 7.36 K/UL — SIGNIFICANT CHANGE UP (ref 3.8–10.5)
WBC # FLD AUTO: 7.36 K/UL — SIGNIFICANT CHANGE UP (ref 3.8–10.5)

## 2023-08-10 PROCEDURE — 14301 TIS TRNFR ANY 30.1-60 SQ CM: CPT

## 2023-08-10 PROCEDURE — 14301 TIS TRNFR ANY 30.1-60 SQ CM: CPT | Mod: AS

## 2023-08-10 PROCEDURE — 15750 NEUROVASCULAR PEDICLE FLAP: CPT | Mod: AS

## 2023-08-10 PROCEDURE — 15750 NEUROVASCULAR PEDICLE FLAP: CPT

## 2023-08-10 PROCEDURE — 64999 UNLISTED PX NERVOUS SYSTEM: CPT

## 2023-08-10 PROCEDURE — 64999 UNLISTED PX NERVOUS SYSTEM: CPT | Mod: AS

## 2023-08-10 DEVICE — SURGICEL 4 X 8": Type: IMPLANTABLE DEVICE | Site: LEFT | Status: FUNCTIONAL

## 2023-08-10 DEVICE — SCREW UN3 AXS SELF DRILL 1.5X4MM: Type: IMPLANTABLE DEVICE | Site: LEFT | Status: FUNCTIONAL

## 2023-08-10 DEVICE — SURGIFLO HEMOSTATIC MATRIX KIT: Type: IMPLANTABLE DEVICE | Site: LEFT | Status: FUNCTIONAL

## 2023-08-10 DEVICE — SURGIFOAM PAD 8CM X 12.5CM X 10MM (100): Type: IMPLANTABLE DEVICE | Site: LEFT | Status: FUNCTIONAL

## 2023-08-10 RX ORDER — TRAVOPROST 0.04 MG/ML
1 SOLUTION/ DROPS OPHTHALMIC
Qty: 0 | Refills: 0 | DISCHARGE

## 2023-08-10 RX ORDER — SENNA PLUS 8.6 MG/1
2 TABLET ORAL AT BEDTIME
Refills: 0 | Status: DISCONTINUED | OUTPATIENT
Start: 2023-08-10 | End: 2023-08-14

## 2023-08-10 RX ORDER — ONDANSETRON 8 MG/1
4 TABLET, FILM COATED ORAL EVERY 6 HOURS
Refills: 0 | Status: COMPLETED | OUTPATIENT
Start: 2023-08-10 | End: 2023-08-12

## 2023-08-10 RX ORDER — LATANOPROST 0.05 MG/ML
1 SOLUTION/ DROPS OPHTHALMIC; TOPICAL AT BEDTIME
Refills: 0 | Status: DISCONTINUED | OUTPATIENT
Start: 2023-08-10 | End: 2023-08-14

## 2023-08-10 RX ORDER — ACETAMINOPHEN 500 MG
1000 TABLET ORAL ONCE
Refills: 0 | Status: COMPLETED | OUTPATIENT
Start: 2023-08-10 | End: 2023-08-10

## 2023-08-10 RX ORDER — SODIUM CHLORIDE 9 MG/ML
1000 INJECTION, SOLUTION INTRAVENOUS
Refills: 0 | Status: DISCONTINUED | OUTPATIENT
Start: 2023-08-10 | End: 2023-08-10

## 2023-08-10 RX ORDER — ACETAMINOPHEN 500 MG
1000 TABLET ORAL EVERY 8 HOURS
Refills: 0 | Status: COMPLETED | OUTPATIENT
Start: 2023-08-10 | End: 2023-08-12

## 2023-08-10 RX ORDER — SODIUM CHLORIDE 0.65 %
1 AEROSOL, SPRAY (ML) NASAL
Refills: 0 | Status: DISCONTINUED | OUTPATIENT
Start: 2023-08-10 | End: 2023-08-14

## 2023-08-10 RX ORDER — LEVETIRACETAM 250 MG/1
2 TABLET, FILM COATED ORAL
Refills: 0 | DISCHARGE

## 2023-08-10 RX ORDER — TRAMADOL HYDROCHLORIDE 50 MG/1
25 TABLET ORAL EVERY 6 HOURS
Refills: 0 | Status: DISCONTINUED | OUTPATIENT
Start: 2023-08-10 | End: 2023-08-14

## 2023-08-10 RX ORDER — CHLORHEXIDINE GLUCONATE 213 G/1000ML
1 SOLUTION TOPICAL ONCE
Refills: 0 | Status: COMPLETED | OUTPATIENT
Start: 2023-08-10 | End: 2023-08-10

## 2023-08-10 RX ORDER — APREPITANT 80 MG/1
40 CAPSULE ORAL ONCE
Refills: 0 | Status: COMPLETED | OUTPATIENT
Start: 2023-08-10 | End: 2023-08-10

## 2023-08-10 RX ORDER — HYDROMORPHONE HYDROCHLORIDE 2 MG/ML
0.25 INJECTION INTRAMUSCULAR; INTRAVENOUS; SUBCUTANEOUS
Refills: 0 | Status: DISCONTINUED | OUTPATIENT
Start: 2023-08-10 | End: 2023-08-14

## 2023-08-10 RX ORDER — SODIUM CHLORIDE 9 MG/ML
1000 INJECTION INTRAMUSCULAR; INTRAVENOUS; SUBCUTANEOUS
Refills: 0 | Status: DISCONTINUED | OUTPATIENT
Start: 2023-08-10 | End: 2023-08-14

## 2023-08-10 RX ORDER — POVIDONE-IODINE 5 %
1 AEROSOL (ML) TOPICAL ONCE
Refills: 0 | Status: COMPLETED | OUTPATIENT
Start: 2023-08-10 | End: 2023-08-10

## 2023-08-10 RX ORDER — CEFAZOLIN SODIUM 1 G
2000 VIAL (EA) INJECTION EVERY 8 HOURS
Refills: 0 | Status: DISCONTINUED | OUTPATIENT
Start: 2023-08-10 | End: 2023-08-14

## 2023-08-10 RX ORDER — AMLODIPINE BESYLATE 2.5 MG/1
10 TABLET ORAL DAILY
Refills: 0 | Status: DISCONTINUED | OUTPATIENT
Start: 2023-08-11 | End: 2023-08-14

## 2023-08-10 RX ORDER — HYDRALAZINE HCL 50 MG
10 TABLET ORAL
Refills: 0 | Status: DISCONTINUED | OUTPATIENT
Start: 2023-08-10 | End: 2023-08-14

## 2023-08-10 RX ORDER — LEVETIRACETAM 250 MG/1
1000 TABLET, FILM COATED ORAL
Refills: 0 | Status: DISCONTINUED | OUTPATIENT
Start: 2023-08-10 | End: 2023-08-10

## 2023-08-10 RX ORDER — FLUTICASONE PROPIONATE 50 MCG
1 SPRAY, SUSPENSION NASAL EVERY 12 HOURS
Refills: 0 | Status: DISCONTINUED | OUTPATIENT
Start: 2023-08-10 | End: 2023-08-14

## 2023-08-10 RX ORDER — LEVETIRACETAM 250 MG/1
2000 TABLET, FILM COATED ORAL AT BEDTIME
Refills: 0 | Status: DISCONTINUED | OUTPATIENT
Start: 2023-08-10 | End: 2023-08-14

## 2023-08-10 RX ORDER — POLYETHYLENE GLYCOL 3350 17 G/17G
17 POWDER, FOR SOLUTION ORAL DAILY
Refills: 0 | Status: DISCONTINUED | OUTPATIENT
Start: 2023-08-10 | End: 2023-08-14

## 2023-08-10 RX ADMIN — Medication 1000 MILLIGRAM(S): at 07:10

## 2023-08-10 RX ADMIN — Medication 1 APPLICATION(S): at 07:11

## 2023-08-10 RX ADMIN — Medication 1000 MILLIGRAM(S): at 16:07

## 2023-08-10 RX ADMIN — LATANOPROST 1 DROP(S): 0.05 SOLUTION/ DROPS OPHTHALMIC; TOPICAL at 21:51

## 2023-08-10 RX ADMIN — ONDANSETRON 4 MILLIGRAM(S): 8 TABLET, FILM COATED ORAL at 20:37

## 2023-08-10 RX ADMIN — SODIUM CHLORIDE 100 MILLILITER(S): 9 INJECTION INTRAMUSCULAR; INTRAVENOUS; SUBCUTANEOUS at 23:52

## 2023-08-10 RX ADMIN — APREPITANT 40 MILLIGRAM(S): 80 CAPSULE ORAL at 07:10

## 2023-08-10 RX ADMIN — SENNA PLUS 2 TABLET(S): 8.6 TABLET ORAL at 21:52

## 2023-08-10 RX ADMIN — LEVETIRACETAM 2000 MILLIGRAM(S): 250 TABLET, FILM COATED ORAL at 21:51

## 2023-08-10 RX ADMIN — Medication 1000 MILLIGRAM(S): at 15:48

## 2023-08-10 RX ADMIN — Medication 100 MILLIGRAM(S): at 16:14

## 2023-08-10 RX ADMIN — Medication 1000 MILLIGRAM(S): at 22:35

## 2023-08-10 RX ADMIN — Medication 1000 MILLIGRAM(S): at 21:51

## 2023-08-10 RX ADMIN — Medication 100 MILLIGRAM(S): at 23:51

## 2023-08-10 RX ADMIN — SODIUM CHLORIDE 100 MILLILITER(S): 9 INJECTION INTRAMUSCULAR; INTRAVENOUS; SUBCUTANEOUS at 14:22

## 2023-08-10 RX ADMIN — CHLORHEXIDINE GLUCONATE 1 APPLICATION(S): 213 SOLUTION TOPICAL at 07:10

## 2023-08-10 NOTE — BRIEF OPERATIVE NOTE - NSICDXBRIEFPOSTOP_GEN_ALL_CORE_FT
(0) no particular expression or smile/(0) lying quietly, normal position, moves easily/(0) content, relaxed/(0) normal position or relaxed/(0) no cry (awake or asleep) POST-OP DIAGNOSIS:  Acquired skull defect 10-Aug-2023 08:51:28  Maggie Camacho

## 2023-08-10 NOTE — H&P ADULT - NSHPPHYSICALEXAM_GEN_ALL_CORE
Constitutional:   73 y/o female awake, alert in no acute distress.  Eyes:  Sclera anicteric, conjunctiva noninjected.  ENMT: Oropharyngeal mucosa moist, pink. Tongue midline.    Respiratory: Clear to auscultation bilaterally.  No rales, rhonchi, wheezes.  Cardiovascular: Regular rate and rhythm.  S1, S2 heard.  Gastrointestinal:  Soft, nontender, nondistended.  +BS.  Genitourinary:  Deferred.  Rectal: Deferred.  Vascular: Extremities warm, no ulcers, no discoloration of skin.   Neurological: Gen: AA&O x 3, conversant, appropriate.  SCALP >>>    CN II-XII grossly intact.    Motor: BENDER x 4, 5/5 throughout UE/LE.    Sens: Sensation intact to light touch throughout.    Plantar downgoing bilaterally.  No clonus.      No pronator drift, no dysmetria.  Skin: Warm, dry, no erythema. Constitutional:   71 y/o female awake, alert in no acute distress.  Eyes:  Sclera anicteric, conjunctiva noninjected.  ENMT: Oropharyngeal mucosa moist, pink. Tongue midline.    Respiratory: Clear to auscultation bilaterally.  No rales, rhonchi, wheezes.  Cardiovascular: Regular rate and rhythm.  S1, S2 heard.  Gastrointestinal:  Soft, nontender, nondistended.  +BS.  Genitourinary:  Deferred.  Rectal: Deferred.  Vascular: Extremities warm, no ulcers, no discoloration of skin.   Neurological: Gen: A&O x self.  A&O x place with choices.  Not oriented to date/time.  Word finding difficulties with dysarthria, slow to respond, some appropriate word choices.  SCALP left thinned over cranioplasty site, incision well healed.     CN II-XII grossly intact except right lower facial droop.    Motor: BENDER x 4, 5/5 throughout UE/LE on left.      Right RUE: deltoid 2/5, biceps 2/5 triceps 1/5, hand grip0/5    RLE: hip flex 4/5, knee 4/5 planter flex 3/5 dorsiflex 3/5    Sens: Sensation intact to light touch throughout.    Plantar downgoing bilaterally.  No clonus.      No pronator drift, no dysmetria.  Skin: Warm, dry, no erythema.

## 2023-08-10 NOTE — H&P ADULT - ASSESSMENT
72 year old female s/p 3/1/23 Left sided hemicraniectomy for subdural empyema followed by 6 weeks of antibiotics, and developed severe syndrome of the trephined. she is now s/p cranioplasty reconstruction with titanium mesh implants measuring 16 x 12 sq with pedicled pericranial flap. Returned to office after weight loss and mesh impregnation on the fronto-temporal scalp with impending extrusion.  Plan is for augmentation of the scalp with acellular dermal matrix and pedicle scalp flap for coverage of implant.     - NPO  - 3M  - Perioperative antibiotics  - Cranial ERAS  - SCDs  - Admit to [facility] ICU postop for neuromonitoring    All above d/w Dr. Rosenberg who formed above plan.    72 year old female PMHx HTN, GERD, Glaucoma, CVA, s/p 3/1/23 Left sided hemicraniectomy for subdural empyema followed by 6 weeks of antibiotics, and developed severe syndrome of the trephined. she is now s/p cranioplasty reconstruction with titanium mesh implants measuring 16 x 12 sq with pedicled pericranial flap (3/2023). Returned to office after weight loss and mesh impregnation on the fronto-temporal scalp with impending extrusion.  Plan is for augmentation of the scalp with acellular dermal matrix and pedicle scalp flap for coverage of implant.     - NPO  - 3M  - Perioperative antibiotics  - Cranial ERAS  - SCDs  - Admit to [facility] ICU postop for neuromonitoring    All above d/w Dr. Rosenberg who formed above plan.

## 2023-08-10 NOTE — BRIEF OPERATIVE NOTE - COMMENTS
Maggie TELLO first assisted for the entirety of the procedure as there was no qualified resident or fellow available to assist.

## 2023-08-10 NOTE — PROGRESS NOTE ADULT - SUBJECTIVE AND OBJECTIVE BOX
NEUROSURGERY POST OP NOTE:    POD# 0 S/P left cranioplasty revision with aumentation of left anterior scalp with pericranial autograft and augmentation of scalp overlying mesh using acellular dermal matrix allograft, complex closure.    S: Pt examined in PACU on NC, in NAD.       T(C): 36.2 (08-10-23 @ 11:55), Max: 36.2 (08-10-23 @ 07:07)  HR: 64 (08-10-23 @ 14:00) (53 - 82)  BP: 117/58 (08-10-23 @ 14:00) (112/61 - 130/61)  RR: 10 (08-10-23 @ 14:00) (10 - 19)  SpO2: 98% (08-10-23 @ 14:00) (93% - 99%)      08-10-23 @ 07:01  -  08-10-23 @ 14:49  --------------------------------------------------------  IN: 275 mL / OUT: 55 mL / NET: 220 mL        acetaminophen     Tablet .. 1000 milliGRAM(s) Oral every 8 hours  ceFAZolin   IVPB 2000 milliGRAM(s) IV Intermittent every 8 hours  fluticasone propionate 50 MICROgram(s)/spray Nasal Spray 1 Spray(s) Both Nostrils every 12 hours  hydrALAZINE Injectable 10 milliGRAM(s) IV Push every 1 hour PRN  HYDROmorphone  Injectable 0.25 milliGRAM(s) IV Push every 2 hours PRN  latanoprost 0.005% Ophthalmic Solution 1 Drop(s) Both EYES at bedtime  levETIRAcetam ER 2000 milliGRAM(s) Oral at bedtime  ondansetron Injectable 4 milliGRAM(s) IV Push every 6 hours  polyethylene glycol 3350 17 Gram(s) Oral daily  senna 2 Tablet(s) Oral at bedtime  sodium chloride 0.65% Nasal 1 Spray(s) Both Nostrils four times a day PRN  sodium chloride 0.9%. 1000 milliLiter(s) IV Continuous <Continuous>  traMADol 25 milliGRAM(s) Oral every 6 hours PRN        Exam:  General: patient seen laying supine in bed in NAD  Neuro: AAOx2 (self, place), FC, OE spontaneously, aphasic w/ improving speech, LUE/LLE 5/5 and RLE 4-/5, RUE 2/5 w/ hand contracted   Neck: supple  Cardiac: RRR, S1S2  Pulmonary: chest rise symmetric  Abdomen: soft, nontender, nondistended  Ext: perfusing well  Skin: warm, dry  Wound: +Headwrap in place    Assessment: 71 y/o female with PMHx HTN, GERD, Glaucoma, Subdural Empyema presents for revision of left cranioplasty.  Daughter reports that she hasn't been making much progress at [facility].  She still has word finding difficulties and right upper than lower extremity weakness.  S/p left cranioplasty revision with aumentation of left anterior scalp with pericranial autograft and augmentation of scalp overlying mesh using acellular dermal matrix allograft, complex closure.      Plan:  Neuro   - Vital/neuro q4  - 2 SG man drains - monitor output   - cranial ERAS  - Keppra 2000 mg at bedtime     Cardio   - - 140     Pulm  - RA, IS     GI  - regular diet  - bowel regimen     Renal   - IVF while npo     Endo   - no issues     Heme   - SCDs   - hold sql periop    ID   - ancef while drains in     D/w Dr. Giles   Dispo pending PT/OT

## 2023-08-10 NOTE — BRIEF OPERATIVE NOTE - OPERATION/FINDINGS
s/p left cranioplasty revision with aumentation of left anterior scalp with pericranial autograft and augmentation of scalp overlying mesh using acellular dermal matrix allograft, complex closure. No

## 2023-08-10 NOTE — H&P ADULT - HISTORY OF PRESENT ILLNESS
Taken from outpatient neurosurgery note on 7/17/23 " 71 y/o Female with no known PMHx who presented to [facility] 12/22/22 with slurred speech and R sided weakness, stroke code called, imaging initially negative for stroke or hemorrhage, pt's neuro exam worsened and subsequent CTH the following day showed L sided SDH, admitted to ICU for further monitoring.   Pt neuro declined on 12/25/22 with AMS requiring intubation, noted to have episodes of twitching concerning for seizures, started on keppra and EEG placed. No epileptiform activity noted. CTH on 12/28/22 significant for L   frontoparietal collection, MRI completed showed concern for possible empyema.   Daughter reported pt likely had a sinus infection recently, unsure of abx. At [place] pt started on ceftriaxone for UTI that was switched to vancomycin. Pt transferred to [facility] 12/31/22 for further management.     12/30/22 s/p left hemicraniectomy and washout of left subdural empyema   OR culture grew Peptostreptococcus    1/5/23 s/p b/l sinus surgery with judith purulence to L maxillary sinus and posterior bony erosion to L sphenoid sinus with ENT Dr. Abarca  Cultures with Staph epi. Follows with ID Dr. Ayers- currently on vancomycin, ceftriaxone, and metronidazole      3/1/23 Left sided cranioplasty reconstruction with large frontotemporal scar defect using titanium mesh implants measuring 16 x 12 sq cm status post craniectomy.  4. Reconstruction of cerebrospinal fluid leak with pedicled pericranial flap.  She returns today for routine postop visit.. She denies wound drainage or fever. She was discharged from acute rehab and is currently at subacute rehab. Her right side strength is improving but she still requires max assist for walking.     She comes in today with scalp thinning over titanium mesh with visible enmeshment at the fronto-temporal region after a significant weight loss which has contributed to loss of subcutaneous fat putting her at risk for hardware exposure. Incision well healed without erythema, dehiscence, drainage, or signs of infection. Edges are well-approximated. No other erythema. No fluctuance or palpable fluid collections.  "    71 y/o female >>>>>>>>>>>>>>>> Taken from outpatient neurosurgery note on 7/17/23 " 73 y/o Female with no known PMHx who presented to [facility] 12/22/22 with slurred speech and R sided weakness, stroke code called, imaging initially negative for stroke or hemorrhage, pt's neuro exam worsened and subsequent CTH the following day showed L sided SDH, admitted to ICU for further monitoring.   Pt neuro declined on 12/25/22 with AMS requiring intubation, noted to have episodes of twitching concerning for seizures, started on keppra and EEG placed. No epileptiform activity noted. CTH on 12/28/22 significant for L   frontoparietal collection, MRI completed showed concern for possible empyema.   Daughter reported pt likely had a sinus infection recently, unsure of abx. At [place] pt started on ceftriaxone for UTI that was switched to vancomycin. Pt transferred to [facility] 12/31/22 for further management.     12/30/22 s/p left hemicraniectomy and washout of left subdural empyema   OR culture grew Peptostreptococcus    1/5/23 s/p b/l sinus surgery with judith purulence to L maxillary sinus and posterior bony erosion to L sphenoid sinus with ENT Dr. Abarca  Cultures with Staph epi. Follows with ID Dr. Ayers- currently on vancomycin, ceftriaxone, and metronidazole      3/1/23 Left sided cranioplasty reconstruction with large frontotemporal scar defect using titanium mesh implants measuring 16 x 12 sq cm status post craniectomy.  4. Reconstruction of cerebrospinal fluid leak with pedicled pericranial flap.  She returns today for routine postop visit.. She denies wound drainage or fever. She was discharged from acute rehab and is currently at subacute rehab. Her right side strength is improving but she still requires max assist for walking.     She comes in today with scalp thinning over titanium mesh with visible enmeshment at the fronto-temporal region after a significant weight loss which has contributed to loss of subcutaneous fat putting her at risk for hardware exposure. Incision well healed without erythema, dehiscence, drainage, or signs of infection. Edges are well-approximated. No other erythema. No fluctuance or palpable fluid collections.  "    73 y/o female with PMHx HTN, GERD, Glaucoma, Subdural Empyema presents for revision of left cranioplasty today.  Daughter reports that she hasn't been making much progress at [facility].  She still has word finding difficulties and right upper than lower extremity weakness.

## 2023-08-10 NOTE — H&P ADULT - NSHPLABSRESULTS_GEN_ALL_CORE
< from: CT Head No Cont (03.01.23 @ 14:07) >    Status post post left frontal parietal temporal   hemicraniectomy and flap with interval cranioplasty. Tiny left   extra-axial fluid collection. Reexpansion of the left cerebral hemisphere   when compared to the prior study. Resolution of midline shift to the   right. Chronic infarctions.    < end of copied text >

## 2023-08-10 NOTE — PRE-ANESTHESIA EVALUATION ADULT - NSANTHPMHFT_GEN_ALL_CORE
72Fwith PMHx HTN, GERD, Glaucoma, Subdural Empyema s/p hemicraniectomy and subsequent cranioplasty presents for revision of left cranioplasty today. Still with persistent neuro deficit: aphasia (primarily yes/no answers) and right sided weakness (upper>lower) 72Fwith PMHx HTN, GERD, Glaucoma, obesity and Subdural Empyema in 2022 s/p hemicraniectomy and subsequent cranioplasty presents for revision of left cranioplasty today. Still with persistent neuro deficit: aphasia (primarily yes/no answers) and right sided weakness (upper>lower)

## 2023-08-10 NOTE — PRE-ANESTHESIA EVALUATION ADULT - BMI (KG/M2)
Medication:   Requested Prescriptions     Pending Prescriptions Disp Refills    vitamin D (ERGOCALCIFEROL) 1.25 MG (15074 UT) CAPS capsule [Pharmacy Med Name: ergocalciferol (vitamin D2) 1,250 mcg (50,000 unit) capsule] 12 capsule 0     Sig: Take 1 capsule by mouth once a week    atorvastatin (LIPITOR) 40 MG tablet [Pharmacy Med Name: atorvastatin 40 mg tablet] 90 tablet 0     Sig: Take 1 tablet by mouth daily    aspirin 325 MG tablet [Pharmacy Med Name: aspirin 325 mg tablet] 90 tablet 0     Sig: Take 1 tablet by mouth daily    diclofenac sodium (VOLTAREN) 1 % GEL [Pharmacy Med Name: diclofenac 1 % topical gel] 100 g 0     Sig: apply 2 gram to the affected areas FOUR TIMES DAILY AS NEEDED FOR PAIN    metoprolol succinate (TOPROL XL) 50 MG extended release tablet [Pharmacy Med Name: metoprolol succinate ER 50 mg tablet,extended release 24 hr] 90 tablet 0     Sig: Take 1 tablet by mouth daily        Last Filled:  2/23/23    Patient Phone Number: [phone] (home)     Last appt: 12/16/2022   Next appt: Visit date not found    Last OARRS: No flowsheet data found.
33.7

## 2023-08-10 NOTE — PRE-ANESTHESIA EVALUATION ADULT - NSANTHPEFT_GEN_ALL_CORE
GEN: A&Ox3, NAD  HEENT: no abnormal facies, neck unremarkable  CV: RRR, no M/R/G  PULM: CTABL, breathing comfortably on RA  NEURO: moves all 4 extremities, no gross deficit GEN: A&Ox2, NAD  HEENT: no abnormal facies, neck unremarkable  CV: RRR, no M/R/G  PULM: CTABL, breathing comfortably on RA  NEURO: right sided weakness (upper>lower), aphasia (primarily yes/no answers) GEN: A&Ox2, NAD, overweight  HEENT: no abnormal facies, neck unremarkable  CV: RRR, no M/R/G  PULM: CTABL, breathing comfortably on RA  NEURO: right sided weakness (upper>lower), aphasia (primarily yes/no answers)

## 2023-08-11 LAB
ANION GAP SERPL CALC-SCNC: 11 MMOL/L — SIGNIFICANT CHANGE UP (ref 5–17)
BUN SERPL-MCNC: 20 MG/DL — SIGNIFICANT CHANGE UP (ref 7–23)
CALCIUM SERPL-MCNC: 9.9 MG/DL — SIGNIFICANT CHANGE UP (ref 8.4–10.5)
CHLORIDE SERPL-SCNC: 105 MMOL/L — SIGNIFICANT CHANGE UP (ref 96–108)
CO2 SERPL-SCNC: 25 MMOL/L — SIGNIFICANT CHANGE UP (ref 22–31)
CREAT SERPL-MCNC: 0.78 MG/DL — SIGNIFICANT CHANGE UP (ref 0.5–1.3)
EGFR: 81 ML/MIN/1.73M2 — SIGNIFICANT CHANGE UP
GLUCOSE SERPL-MCNC: 132 MG/DL — HIGH (ref 70–99)
HCT VFR BLD CALC: 31.8 % — LOW (ref 34.5–45)
HGB BLD-MCNC: 10.2 G/DL — LOW (ref 11.5–15.5)
MAGNESIUM SERPL-MCNC: 2 MG/DL — SIGNIFICANT CHANGE UP (ref 1.6–2.6)
MCHC RBC-ENTMCNC: 28.6 PG — SIGNIFICANT CHANGE UP (ref 27–34)
MCHC RBC-ENTMCNC: 32.1 GM/DL — SIGNIFICANT CHANGE UP (ref 32–36)
MCV RBC AUTO: 89.1 FL — SIGNIFICANT CHANGE UP (ref 80–100)
NRBC # BLD: 0 /100 WBCS — SIGNIFICANT CHANGE UP (ref 0–0)
PHOSPHATE SERPL-MCNC: 4.1 MG/DL — SIGNIFICANT CHANGE UP (ref 2.5–4.5)
PLATELET # BLD AUTO: 260 K/UL — SIGNIFICANT CHANGE UP (ref 150–400)
POTASSIUM SERPL-MCNC: 4.4 MMOL/L — SIGNIFICANT CHANGE UP (ref 3.5–5.3)
POTASSIUM SERPL-SCNC: 4.4 MMOL/L — SIGNIFICANT CHANGE UP (ref 3.5–5.3)
RBC # BLD: 3.57 M/UL — LOW (ref 3.8–5.2)
RBC # FLD: 13.6 % — SIGNIFICANT CHANGE UP (ref 10.3–14.5)
SODIUM SERPL-SCNC: 141 MMOL/L — SIGNIFICANT CHANGE UP (ref 135–145)
WBC # BLD: 9.17 K/UL — SIGNIFICANT CHANGE UP (ref 3.8–10.5)
WBC # FLD AUTO: 9.17 K/UL — SIGNIFICANT CHANGE UP (ref 3.8–10.5)

## 2023-08-11 PROCEDURE — 99024 POSTOP FOLLOW-UP VISIT: CPT

## 2023-08-11 PROCEDURE — 99222 1ST HOSP IP/OBS MODERATE 55: CPT

## 2023-08-11 RX ADMIN — Medication 100 MILLIGRAM(S): at 07:13

## 2023-08-11 RX ADMIN — Medication 1 SPRAY(S): at 17:30

## 2023-08-11 RX ADMIN — LEVETIRACETAM 2000 MILLIGRAM(S): 250 TABLET, FILM COATED ORAL at 22:01

## 2023-08-11 RX ADMIN — ONDANSETRON 4 MILLIGRAM(S): 8 TABLET, FILM COATED ORAL at 02:08

## 2023-08-11 RX ADMIN — POLYETHYLENE GLYCOL 3350 17 GRAM(S): 17 POWDER, FOR SOLUTION ORAL at 11:49

## 2023-08-11 RX ADMIN — LATANOPROST 1 DROP(S): 0.05 SOLUTION/ DROPS OPHTHALMIC; TOPICAL at 22:01

## 2023-08-11 RX ADMIN — ONDANSETRON 4 MILLIGRAM(S): 8 TABLET, FILM COATED ORAL at 22:02

## 2023-08-11 RX ADMIN — SENNA PLUS 2 TABLET(S): 8.6 TABLET ORAL at 22:02

## 2023-08-11 RX ADMIN — Medication 1000 MILLIGRAM(S): at 22:02

## 2023-08-11 RX ADMIN — Medication 100 MILLIGRAM(S): at 17:30

## 2023-08-11 RX ADMIN — AMLODIPINE BESYLATE 10 MILLIGRAM(S): 2.5 TABLET ORAL at 05:41

## 2023-08-11 RX ADMIN — Medication 1000 MILLIGRAM(S): at 06:35

## 2023-08-11 RX ADMIN — Medication 1000 MILLIGRAM(S): at 05:41

## 2023-08-11 RX ADMIN — Medication 1000 MILLIGRAM(S): at 15:52

## 2023-08-11 RX ADMIN — Medication 1000 MILLIGRAM(S): at 14:10

## 2023-08-11 RX ADMIN — ONDANSETRON 4 MILLIGRAM(S): 8 TABLET, FILM COATED ORAL at 15:51

## 2023-08-11 RX ADMIN — ONDANSETRON 4 MILLIGRAM(S): 8 TABLET, FILM COATED ORAL at 07:12

## 2023-08-11 RX ADMIN — Medication 100 MILLIGRAM(S): at 23:25

## 2023-08-11 RX ADMIN — Medication 1 SPRAY(S): at 05:41

## 2023-08-11 NOTE — OCCUPATIONAL THERAPY INITIAL EVALUATION ADULT - MANUAL MUSCLE TESTING RESULTS, REHAB EVAL
LUE grossly 4+/5 throughout. RUE shoulder flexion 1/5, RUE shoulder abduction 2-/5, RUE elbow 0/5, RUE wrist 0/5, RUE  strength 0/5. LLE 5/5 throughout. RLE hip  flexion 4/5, RLE knee flexion/extension 4+/5, RLE ankle DF/PF 4+/5.

## 2023-08-11 NOTE — OCCUPATIONAL THERAPY INITIAL EVALUATION ADULT - MD ORDER
Pt is POD #1 s/p s/p left cranioplasty revision with aumentation of left anterior scalp with pericranial autograft and augmentation of scalp overlying mesh using acellular dermal matrix allograft, complex closure, performed on 8/10/23.

## 2023-08-11 NOTE — OCCUPATIONAL THERAPY INITIAL EVALUATION ADULT - PERTINENT HX OF CURRENT PROBLEM, REHAB EVAL
72 year old female PMHx HTN, GERD, Glaucoma, CVA, s/p 3/1/23 Left sided hemicraniectomy for subdural empyema followed by 6 weeks of antibiotics, and developed severe syndrome of the trephined. she is now s/p cranioplasty reconstruction with titanium mesh implants measuring 16 x 12 sq with pedicled pericranial flap (3/2023). Returned to office after weight loss and mesh impregnation on the fronto-temporal scalp with impending extrusion

## 2023-08-11 NOTE — PHYSICAL THERAPY INITIAL EVALUATION ADULT - PERTINENT HX OF CURRENT PROBLEM, REHAB EVAL
73 y/o female with PMHx HTN, GERD, Glaucoma, Subdural Empyema presents for revision of left cranioplasty.  Has word finding difficulties and R sided weakness.  Now s/p left cranioplasty revision with aumentation of left anterior scalp with pericranial autograft and augmentation of scalp overlying mesh using acellular dermal matrix allograft, complex closure (8/10).

## 2023-08-11 NOTE — PHYSICAL THERAPY INITIAL EVALUATION ADULT - MANUAL MUSCLE TESTING RESULTS, REHAB EVAL
LLE 5/5 throughout// R hip flex 4/5, R knee ext and flex 4+/5, R ankle DF and PF 4+/5 LLE 5/5 throughout// R hip flex 4/5, R knee ext and flex 4+/5, R ankle DF and PF 4+/5/// LUE 4+/5 throughout, R shoulder flexion 2-/5, shoulder abduction 2-/5

## 2023-08-11 NOTE — OCCUPATIONAL THERAPY INITIAL EVALUATION ADULT - GENERAL OBSERVATIONS, REHAB EVAL
OT IE Completed. Pt's RN Barbi cleared pt for OT. Pt received semisupine in bed, +tele, +cranial CHENCHO drains x 2, +cranial headwrap, +b/l SCDs, +primafit, NAD, agreeable to OT. PT Kitty present. Pt tolerated session well, briefly tachycardic up to 127 bpm during functional mobility, RN made aware. Pt left as found, all lines in tact, +bed alarm, needs in reach. RN aware.

## 2023-08-11 NOTE — PHYSICAL THERAPY INITIAL EVALUATION ADULT - DID THE PATIENT HAVE SURGERY?
left cranioplasty revision with aumentation of left anterior scalp with pericranial autograft and augmentation of scalp overlying mesh using acellular dermal matrix allograft, complex closure/yes

## 2023-08-11 NOTE — OCCUPATIONAL THERAPY INITIAL EVALUATION ADULT - ADDITIONAL COMMENTS
Pt admitted from subacute rehab. Limited PLOF obtained due to expressive aphasia, of note, documenting OT able to discuss PLOF with pt's daughter Loren later in the day. Pt admitted from subacute rehab where she was receiving PT/OT however she ran out of "100 days". Pt was not standing at rehab. Pt is now L hand dominant since 12/2022. Requires assist for mostly all ADLs however is able to perform self-feeding with independence.

## 2023-08-11 NOTE — OCCUPATIONAL THERAPY INITIAL EVALUATION ADULT - NS ASR FOLLOW COMMAND OT EVAL
at times requires visual demonstration for full understanding of commands/75% of the time/able to follow single-step instructions

## 2023-08-11 NOTE — OCCUPATIONAL THERAPY INITIAL EVALUATION ADULT - SENSORY TESTS
CN Testing: V1-V3 sensation in tact; b/l frontalis difficulty performing due to swelling and cranial headwrap; b/l buccinator intact; smile with R facial droop; tongue protrusion at midline w/ b/l lateralization in tact; b/l eyes open/close intact; b/l shoulder elevation unable to perform due to impaired command following.

## 2023-08-11 NOTE — OCCUPATIONAL THERAPY INITIAL EVALUATION ADULT - DIAGNOSIS, OT EVAL
Pt presents with expressive aphasia (chronic), RUE/RLE Weakness (from hx of L SDH), impaired functional endurance, postural control, weight-shifting, motor planning, and potential receptive aphasia impacting their ability to independently complete ADLs, functional transfers, and functional mobility.

## 2023-08-11 NOTE — PHYSICAL THERAPY INITIAL EVALUATION ADULT - COORDINATION ASSESSED, REHAB EVAL
RUE NT due to dec strength//LUE with increased time required for processing as well as verbal and manual cueing provided-slight dysmetria/finger to nose

## 2023-08-11 NOTE — PHYSICAL THERAPY INITIAL EVALUATION ADULT - GENERAL OBSERVATIONS, REHAB EVAL
Pt received semi supine, +cranial bandage C/D/I, +CHENCHO x2, +primafit, +IV, +telemetry, +pulse ox, NAD, agreeable to PT.

## 2023-08-11 NOTE — CONSULT NOTE ADULT - ASSESSMENT
73 y/o female with PMHx HTN, GERD, Glaucoma, L SDH in 12/22, Subdural Empyema 1/23 who presented for revision of left cranioplasty now s/p left cranioplasty revision with aumentation of left anterior scalp with pericranial autograft and augmentation of scalp overlying mesh using acellular dermal matrix allograft, complex closure (8/10)    #Cranial defect s/p craniotomy due to L SDH and empyema  - s/p left cranioplasty revision (8/10)  - drain management per primary team  - keppra  - abx per primary team    #HTN  - c/w amlodipine 10mg    #Anemia  - hgb stable ~10  - prior iron panels with AOCD    #Glaucoma  - c/w home eye drops    #Obesity  - BMI 33 down from 42 in the past  - affects all aspects of care

## 2023-08-11 NOTE — PHYSICAL THERAPY INITIAL EVALUATION ADULT - ACTIVE RANGE OF MOTION EXAMINATION, REHAB EVAL
RUE AROM limited due to dec strength/Left UE Active ROM was WFL (within functional limits)/bilateral  lower extremity Active ROM was WFL (within functional limits)

## 2023-08-11 NOTE — OCCUPATIONAL THERAPY INITIAL EVALUATION ADULT - PLANNED THERAPY INTERVENTIONS, OT EVAL
ADL retraining/IADL retraining/balance training/bed mobility training/cognitive, visual perceptual/fine motor coordination training/neuromuscular re-education/ROM/strengthening/transfer training

## 2023-08-11 NOTE — PHYSICAL THERAPY INITIAL EVALUATION ADULT - PHYSICAL ASSIST/NONPHYSICAL ASSIST: GAIT, REHAB EVAL
verbal cues/2 person assist
I have re-evaluated the patient's fluid status and reviewed vital signs. Clinical perfusion assessment was performed.

## 2023-08-11 NOTE — OCCUPATIONAL THERAPY INITIAL EVALUATION ADULT - IMPAIRED TRANSFERS: SIT/STAND, REHAB EVAL
impaired balance/cognition/impaired coordination/impaired motor control/abnormal muscle tone/decreased strength

## 2023-08-11 NOTE — PHYSICAL THERAPY INITIAL EVALUATION ADULT - IMPAIRMENTS CONTRIBUTING TO GAIT DEVIATIONS, PT EVAL
decreased aerobic capacity/endurance/impaired balance/cognition/impaired postural control/decreased ROM/decreased strength

## 2023-08-11 NOTE — PHYSICAL THERAPY INITIAL EVALUATION ADULT - ADDITIONAL COMMENTS
Pt presents to [facility] from rehab. Per pt's daughter's report, pt. was not receiving frequent therapy in the rehab and performed limited standing.

## 2023-08-11 NOTE — PHYSICAL THERAPY INITIAL EVALUATION ADULT - MODALITIES TREATMENT COMMENTS
--- hand dominant; (L) hand  /5, (R) hand  /5. CN Testing: B/L Frontalis intact; B/L buccinator intact; smile with R sided droop; tongue protrusion-unable to perform (dec ability to follow commands) ; B/L eyes open/close intact; Shoulder elevation: unable to perform (dec ability to follow commands) ; Vision H-Test: pt. with difficulty following commands to perform test

## 2023-08-11 NOTE — PROGRESS NOTE ADULT - SUBJECTIVE AND OBJECTIVE BOX
HPI:  Taken from outpatient neurosurgery note on 7/17/23 " 73 y/o Female with no known PMHx who presented to [facility] 12/22/22 with slurred speech and R sided weakness, stroke code called, imaging initially negative for stroke or hemorrhage, pt's neuro exam worsened and subsequent CTH the following day showed L sided SDH, admitted to ICU for further monitoring.   Pt neuro declined on 12/25/22 with AMS requiring intubation, noted to have episodes of twitching concerning for seizures, started on keppra and EEG placed. No epileptiform activity noted. CTH on 12/28/22 significant for L   frontoparietal collection, MRI completed showed concern for possible empyema.   Daughter reported pt likely had a sinus infection recently, unsure of abx. At [place] pt started on ceftriaxone for UTI that was switched to vancomycin. Pt transferred to [facility] 12/31/22 for further management.     12/30/22 s/p left hemicraniectomy and washout of left subdural empyema   OR culture grew Peptostreptococcus    1/5/23 s/p b/l sinus surgery with judith purulence to L maxillary sinus and posterior bony erosion to L sphenoid sinus with ENT Dr. Abarca  Cultures with Staph epi. Follows with ID Dr. Ayers- currently on vancomycin, ceftriaxone, and metronidazole      3/1/23 Left sided cranioplasty reconstruction with large frontotemporal scar defect using titanium mesh implants measuring 16 x 12 sq cm status post craniectomy.  4. Reconstruction of cerebrospinal fluid leak with pedicled pericranial flap.  She returns today for routine postop visit.. She denies wound drainage or fever. She was discharged from acute rehab and is currently at subacute rehab. Her right side strength is improving but she still requires max assist for walking.     She comes in today with scalp thinning over titanium mesh with visible enmeshment at the fronto-temporal region after a significant weight loss which has contributed to loss of subcutaneous fat putting her at risk for hardware exposure. Incision well healed without erythema, dehiscence, drainage, or signs of infection. Edges are well-approximated. No other erythema. No fluctuance or palpable fluid collections.  "    73 y/o female with PMHx HTN, GERD, Glaucoma, Subdural Empyema presents for revision of left cranioplasty today.  Daughter reports that she hasn't been making much progress at [facility].  She still has word finding difficulties and right upper than lower extremity weakness.   (10 Aug 2023 06:58)    INTERVAL EVENTS:  No complaints, poor appetite post op    HOSPITAL COURSE:  8/10: POD 0 left cranioplasty revision with aumentation of left anterior scalp with pericranial autograft and augmentation of scalp overlying mesh using acellular dermal matrix allograft, complex closure.  8/11: POD 1. Drains x 2.     Vital Signs Last 24 Hrs  T(C): 36.4 (10 Aug 2023 21:59), Max: 37.2 (10 Aug 2023 17:26)  T(F): 97.5 (10 Aug 2023 21:59), Max: 98.9 (10 Aug 2023 17:26)  HR: 66 (10 Aug 2023 23:55) (53 - 82)  BP: 103/55 (10 Aug 2023 23:55) (103/55 - 130/61)  BP(mean): 77 (10 Aug 2023 23:55) (74 - 88)  RR: 17 (10 Aug 2023 23:55) (10 - 19)  SpO2: 97% (10 Aug 2023 23:55) (93% - 100%)    Parameters below as of 10 Aug 2023 23:55  Patient On (Oxygen Delivery Method): nasal cannula  O2 Flow (L/min): 2      I&O's Summary    10 Aug 2023 07:01  -  11 Aug 2023 00:18  --------------------------------------------------------  IN: 1175 mL / OUT: 81 mL / NET: 1094 mL        PHYSICAL EXAM:  General: patient seen laying supine in bed in NAD  Neuro: AAOx2 (self, place), FC, OE spontaneously, aphasic w/ improving speech, LUE/LLE 5/5 and RLE 4-/5, RUE 2/5 w/ hand contracted   Neck: supple  Cardiac: RRR, S1S2  Pulmonary: chest rise symmetric  Abdomen: soft, nontender, nondistended  Ext: perfusing well  Skin: warm, dry  Wound: +Headwrap in place, CHENCHO x 2      LABS:                        10.3   7.36  )-----------( 225      ( 10 Aug 2023 12:14 )             32.1     08-10    138  |  105  |  22  ----------------------------<  177<H>  4.0   |  23  |  0.79    Ca    9.1      10 Aug 2023 12:14  Phos  4.4     08-10  Mg     1.9     08-10        Urinalysis Basic - ( 10 Aug 2023 12:14 )    Color: x / Appearance: x / SG: x / pH: x  Gluc: 177 mg/dL / Ketone: x  / Bili: x / Urobili: x   Blood: x / Protein: x / Nitrite: x   Leuk Esterase: x / RBC: x / WBC x   Sq Epi: x / Non Sq Epi: x / Bacteria: x          CAPILLARY BLOOD GLUCOSE          Drug Levels: [] N/A    CSF Analysis: [] N/A      Allergies    No Known Allergies    Intolerances      MEDICATIONS:  Antibiotics:  ceFAZolin   IVPB 2000 milliGRAM(s) IV Intermittent every 8 hours    Neuro:  acetaminophen     Tablet .. 1000 milliGRAM(s) Oral every 8 hours  HYDROmorphone  Injectable 0.25 milliGRAM(s) IV Push every 2 hours PRN  levETIRAcetam ER 2000 milliGRAM(s) Oral at bedtime  ondansetron Injectable 4 milliGRAM(s) IV Push every 6 hours  traMADol 25 milliGRAM(s) Oral every 6 hours PRN    Anticoagulation:    OTHER:  amLODIPine   Tablet 10 milliGRAM(s) Oral daily  fluticasone propionate 50 MICROgram(s)/spray Nasal Spray 1 Spray(s) Both Nostrils every 12 hours  hydrALAZINE Injectable 10 milliGRAM(s) IV Push every 1 hour PRN  latanoprost 0.005% Ophthalmic Solution 1 Drop(s) Both EYES at bedtime  polyethylene glycol 3350 17 Gram(s) Oral daily  senna 2 Tablet(s) Oral at bedtime  sodium chloride 0.65% Nasal 1 Spray(s) Both Nostrils four times a day PRN    IVF:  sodium chloride 0.9%. 1000 milliLiter(s) IV Continuous <Continuous>    CULTURES:    RADIOLOGY & ADDITIONAL TESTS:      ASSESSMENT:  73 y/o female with PMHx HTN, GERD, Glaucoma, Subdural Empyema presents for revision of left cranioplasty.  Has word finding difficulties and R sided weakness.  Now s/p left cranioplasty revision with aumentation of left anterior scalp with pericranial autograft and augmentation of scalp overlying mesh using acellular dermal matrix allograft, complex closure (8/10).    Neuro   - Vitals/neuro checks q4h  - 2 SG man drains - monitor output   - cranial ERAS  - Keppra 2000mg XR qhs    Cardio   - - 140   - hx HTN: continue amlodipine 10mg qd    Pulm  - RA, IS     GI  - regular diet   - bowel regimen     Renal   - IVF while npo     Endo   - no issues     Heme   - SCDs   - hold SQL periop    ID   - Ancef while drains in; Keflex x 2 weeks when drains removed    D/w Dr. Giles   Dispo pending PT/OT  HPI:  Taken from outpatient neurosurgery note on 7/17/23 " 73 y/o Female with no known PMHx who presented to [facility] 12/22/22 with slurred speech and R sided weakness, stroke code called, imaging initially negative for stroke or hemorrhage, pt's neuro exam worsened and subsequent CTH the following day showed L sided SDH, admitted to ICU for further monitoring.   Pt neuro declined on 12/25/22 with AMS requiring intubation, noted to have episodes of twitching concerning for seizures, started on keppra and EEG placed. No epileptiform activity noted. CTH on 12/28/22 significant for L   frontoparietal collection, MRI completed showed concern for possible empyema.   Daughter reported pt likely had a sinus infection recently, unsure of abx. At [place] pt started on ceftriaxone for UTI that was switched to vancomycin. Pt transferred to [facility] 12/31/22 for further management.     12/30/22 s/p left hemicraniectomy and washout of left subdural empyema   OR culture grew Peptostreptococcus    1/5/23 s/p b/l sinus surgery with judith purulence to L maxillary sinus and posterior bony erosion to L sphenoid sinus with ENT Dr. Abarca  Cultures with Staph epi. Follows with ID Dr. Ayers- currently on vancomycin, ceftriaxone, and metronidazole      3/1/23 Left sided cranioplasty reconstruction with large frontotemporal scar defect using titanium mesh implants measuring 16 x 12 sq cm status post craniectomy.  4. Reconstruction of cerebrospinal fluid leak with pedicled pericranial flap.  She returns today for routine postop visit.. She denies wound drainage or fever. She was discharged from acute rehab and is currently at subacute rehab. Her right side strength is improving but she still requires max assist for walking.     She comes in today with scalp thinning over titanium mesh with visible enmeshment at the fronto-temporal region after a significant weight loss which has contributed to loss of subcutaneous fat putting her at risk for hardware exposure. Incision well healed without erythema, dehiscence, drainage, or signs of infection. Edges are well-approximated. No other erythema. No fluctuance or palpable fluid collections.  "    73 y/o female with PMHx HTN, GERD, Glaucoma, Subdural Empyema presents for revision of left cranioplasty today.  Daughter reports that she hasn't been making much progress at [facility].  She still has word finding difficulties and right upper than lower extremity weakness.   (10 Aug 2023 06:58)    INTERVAL EVENTS:  No complaints, poor appetite post op    HOSPITAL COURSE:  8/10: POD 0 left cranioplasty revision with aumentation of left anterior scalp with pericranial autograft and augmentation of scalp overlying mesh using acellular dermal matrix allograft, complex closure.  8/11: POD 1. Drains x 2.     Vital Signs Last 24 Hrs  T(C): 36.4 (10 Aug 2023 21:59), Max: 37.2 (10 Aug 2023 17:26)  T(F): 97.5 (10 Aug 2023 21:59), Max: 98.9 (10 Aug 2023 17:26)  HR: 66 (10 Aug 2023 23:55) (53 - 82)  BP: 103/55 (10 Aug 2023 23:55) (103/55 - 130/61)  BP(mean): 77 (10 Aug 2023 23:55) (74 - 88)  RR: 17 (10 Aug 2023 23:55) (10 - 19)  SpO2: 97% (10 Aug 2023 23:55) (93% - 100%)    Parameters below as of 10 Aug 2023 23:55  Patient On (Oxygen Delivery Method): nasal cannula  O2 Flow (L/min): 2      I&O's Summary    10 Aug 2023 07:01  -  11 Aug 2023 00:18  --------------------------------------------------------  IN: 1175 mL / OUT: 81 mL / NET: 1094 mL        PHYSICAL EXAM:  General: patient seen laying supine in bed in NAD  Neuro: AAOx2 (self, place), FC, OE spontaneously, mixed aphasia, LUE/LLE 5/5 and RLE 4-/5, RUE 2/5 w/ hand contracted   Neck: supple  Cardiac: RRR, S1S2  Pulmonary: chest rise symmetric  Abdomen: soft, nontender, nondistended  Ext: perfusing well  Skin: warm, dry  Wound: +Headwrap in place, CHENCHO x 2      LABS:                        10.3   7.36  )-----------( 225      ( 10 Aug 2023 12:14 )             32.1     08-10    138  |  105  |  22  ----------------------------<  177<H>  4.0   |  23  |  0.79    Ca    9.1      10 Aug 2023 12:14  Phos  4.4     08-10  Mg     1.9     08-10        Urinalysis Basic - ( 10 Aug 2023 12:14 )    Color: x / Appearance: x / SG: x / pH: x  Gluc: 177 mg/dL / Ketone: x  / Bili: x / Urobili: x   Blood: x / Protein: x / Nitrite: x   Leuk Esterase: x / RBC: x / WBC x   Sq Epi: x / Non Sq Epi: x / Bacteria: x          CAPILLARY BLOOD GLUCOSE          Drug Levels: [] N/A    CSF Analysis: [] N/A      Allergies    No Known Allergies    Intolerances      MEDICATIONS:  Antibiotics:  ceFAZolin   IVPB 2000 milliGRAM(s) IV Intermittent every 8 hours    Neuro:  acetaminophen     Tablet .. 1000 milliGRAM(s) Oral every 8 hours  HYDROmorphone  Injectable 0.25 milliGRAM(s) IV Push every 2 hours PRN  levETIRAcetam ER 2000 milliGRAM(s) Oral at bedtime  ondansetron Injectable 4 milliGRAM(s) IV Push every 6 hours  traMADol 25 milliGRAM(s) Oral every 6 hours PRN    Anticoagulation:    OTHER:  amLODIPine   Tablet 10 milliGRAM(s) Oral daily  fluticasone propionate 50 MICROgram(s)/spray Nasal Spray 1 Spray(s) Both Nostrils every 12 hours  hydrALAZINE Injectable 10 milliGRAM(s) IV Push every 1 hour PRN  latanoprost 0.005% Ophthalmic Solution 1 Drop(s) Both EYES at bedtime  polyethylene glycol 3350 17 Gram(s) Oral daily  senna 2 Tablet(s) Oral at bedtime  sodium chloride 0.65% Nasal 1 Spray(s) Both Nostrils four times a day PRN    IVF:  sodium chloride 0.9%. 1000 milliLiter(s) IV Continuous <Continuous>    CULTURES:    RADIOLOGY & ADDITIONAL TESTS:      ASSESSMENT:  73 y/o female with PMHx HTN, GERD, Glaucoma, Subdural Empyema presents for revision of left cranioplasty.  Has word finding difficulties and R sided weakness.  Now s/p left cranioplasty revision with aumentation of left anterior scalp with pericranial autograft and augmentation of scalp overlying mesh using acellular dermal matrix allograft, complex closure (8/10).    Neuro   - Vitals/neuro checks q4h  - 2 SG man drains - monitor output   - cranial ERAS  - Keppra 2000mg XR qhs    Cardio   - - 140   - hx HTN: continue amlodipine 10mg qd    Pulm  - RA, IS     GI  - regular diet   - bowel regimen     Renal   - IVF while npo     Endo   - no issues     Heme   - SCDs   - hold SQL periop    ID   - Ancef while drains in; Keflex x 2 weeks when drains removed    D/w Dr. Giles   Dispo pending PT/OT

## 2023-08-11 NOTE — CONSULT NOTE ADULT - SUBJECTIVE AND OBJECTIVE BOX
Patient is a 72y old  Female who presents with a chief complaint of cranioplasty revision (11 Aug 2023 00:17)      HPI:  71 y/o female with PMHx HTN, GERD, Glaucoma, L SDH in 12/22, Subdural Empyema 1/23 who presented for revision of left cranioplasty.  Daughter reports that she hasn't been making much progress at [facility].  She still has word finding difficulties and right upper than lower extremity weakness.   (10 Aug 2023 06:58)    Patient seen and examined at bedside.  Significant expressive aphasia, responds "Yes" to all questions but is able to catch herself and say "Yes, no" when she means no.  Denies fever, chills, CP, SOB, N/V, abdominal pain, headache.       REVIEW OF SYSTEMS: see HPI    PAST MEDICAL & SURGICAL HISTORY:  HTN (hypertension)      CVA (cerebrovascular accident)  R sided weakness      S/P craniotomy      S/P appendectomy      S/P wrist surgery      FAMILY HISTORY:  No known family hx    SOCIAL HISTORY:  No known tobacco, EtOH or recreational drug use hx    MEDICATIONS:  MEDICATIONS  (STANDING):  acetaminophen     Tablet .. 1000 milliGRAM(s) Oral every 8 hours  amLODIPine   Tablet 10 milliGRAM(s) Oral daily  ceFAZolin   IVPB 2000 milliGRAM(s) IV Intermittent every 8 hours  fluticasone propionate 50 MICROgram(s)/spray Nasal Spray 1 Spray(s) Both Nostrils every 12 hours  latanoprost 0.005% Ophthalmic Solution 1 Drop(s) Both EYES at bedtime  levETIRAcetam ER 2000 milliGRAM(s) Oral at bedtime  ondansetron Injectable 4 milliGRAM(s) IV Push every 6 hours  polyethylene glycol 3350 17 Gram(s) Oral daily  senna 2 Tablet(s) Oral at bedtime  sodium chloride 0.9%. 1000 milliLiter(s) (100 mL/Hr) IV Continuous <Continuous>    MEDICATIONS  (PRN):  hydrALAZINE Injectable 10 milliGRAM(s) IV Push every 1 hour PRN SBP > 140 mm Hg  HYDROmorphone  Injectable 0.25 milliGRAM(s) IV Push every 2 hours PRN breakthrough pain in pacu only  sodium chloride 0.65% Nasal 1 Spray(s) Both Nostrils four times a day PRN Nasal Congestion  traMADol 25 milliGRAM(s) Oral every 6 hours PRN Severe Pain (7 - 10)      ALLERGIES:  Allergies    No Known Allergies    Intolerances        VITAL SIGNS:  Vital Signs Last 24 Hrs  T(C): 36.6 (11 Aug 2023 09:50), Max: 37.2 (10 Aug 2023 17:26)  T(F): 97.9 (11 Aug 2023 09:50), Max: 98.9 (10 Aug 2023 17:26)  HR: 94 (11 Aug 2023 11:15) (53 - 94)  BP: 123/61 (11 Aug 2023 11:15) (92/51 - 130/61)  BP(mean): 87 (11 Aug 2023 11:15) (66 - 88)  RR: 18 (11 Aug 2023 11:15) (10 - 19)  SpO2: 95% (11 Aug 2023 11:15) (94% - 100%)    Parameters below as of 11 Aug 2023 11:15  Patient On (Oxygen Delivery Method): room air        08-10-23 @ 07:01  -  08-11-23 @ 07:00  --------------------------------------------------------  IN:    Lactated Ringers: 75 mL    sodium chloride 0.9%: 1700 mL  Total IN: 1775 mL    OUT:    Bulb (mL): 42 mL    Bulb (mL): 47 mL    Intermittent Catheterization -  Stomal (mL): 550 mL    Voided (mL): 0 mL  Total OUT: 639 mL    Total NET: 1136 mL      08-11-23 @ 07:01  -  08-11-23 @ 12:05  --------------------------------------------------------  IN:    Oral Fluid: 240 mL    sodium chloride 0.9%: 500 mL  Total IN: 740 mL    OUT:    Bulb (mL): 5 mL    Bulb (mL): 5 mL    Voided (mL): 410 mL  Total OUT: 420 mL    Total NET: 320 mL          PHYSICAL EXAM:  Constitutional: resting comfortably in bed; NAD  Head: Head wrapped, CHENCHO drains x2 with serosanguinous drainage  Eyes: PERRL, EOMI  ENT: no nasal discharge; MMM  Neck: supple; no JVD   Respiratory: CTA B/L; no W/R/R, no retractions  Cardiac: +S1/S2; RRR; no M/R/G  Gastrointestinal: abdomen soft, NT/ND; no rebound or guarding; +BSx4  Extremities: WWP, no clubbing or cyanosis; trace edema of LUE  Vascular: pulses equal and strong throughout  Dermatologic: skin warm, dry and intact;  Neurologic: Awake, alert, expressive aphasia, RUE/RLE 5/5, LUE 2/5, LLE 5/5    LABS:                        10.2   9.17  )-----------( 260      ( 11 Aug 2023 07:17 )             31.8     08-11    141  |  105  |  20  ----------------------------<  132<H>  4.4   |  25  |  0.78    Ca    9.9      11 Aug 2023 07:16  Phos  4.1     08-11  Mg     2.0     08-11        Urinalysis Basic - ( 11 Aug 2023 07:16 )    Color: x / Appearance: x / SG: x / pH: x  Gluc: 132 mg/dL / Ketone: x  / Bili: x / Urobili: x   Blood: x / Protein: x / Nitrite: x   Leuk Esterase: x / RBC: x / WBC x   Sq Epi: x / Non Sq Epi: x / Bacteria: x          CAPILLARY BLOOD GLUCOSE              RADIOLOGY & ADDITIONAL TESTS: Reviewed.

## 2023-08-11 NOTE — OCCUPATIONAL THERAPY INITIAL EVALUATION ADULT - RANGE OF MOTION EXAMINATION, UPPER EXTREMITY
RUE shoulder and elbow PROM WFL, however impaired R wrist PROM due to weakness and stiffness. R digit 3 contracted into flexion at MCP, PIP, and DIP joints. R digits 1,2,4,5, PROM noted to have slight flexion contracture. LUE AROM WFL

## 2023-08-11 NOTE — OCCUPATIONAL THERAPY INITIAL EVALUATION ADULT - ORIENTATION, REHAB EVAL
disoriented to month, oriented to year, difficulty obtaining due to expressive aphasia./person/place

## 2023-08-11 NOTE — OCCUPATIONAL THERAPY INITIAL EVALUATION ADULT - MODIFIED CLINICAL TEST OF SENSORY INTEGRATION IN BALANCE TEST
Pt sat EOB ~10 mins with CGA/min A at times to maintain upright posture. Pt then able to perform ~5 sidesteps towards HOB with b/l axillary and trunk support and mod Ax 2.

## 2023-08-12 LAB
ANION GAP SERPL CALC-SCNC: 6 MMOL/L — SIGNIFICANT CHANGE UP (ref 5–17)
BUN SERPL-MCNC: 19 MG/DL — SIGNIFICANT CHANGE UP (ref 7–23)
CALCIUM SERPL-MCNC: 8.8 MG/DL — SIGNIFICANT CHANGE UP (ref 8.4–10.5)
CHLORIDE SERPL-SCNC: 108 MMOL/L — SIGNIFICANT CHANGE UP (ref 96–108)
CO2 SERPL-SCNC: 25 MMOL/L — SIGNIFICANT CHANGE UP (ref 22–31)
CREAT SERPL-MCNC: 0.94 MG/DL — SIGNIFICANT CHANGE UP (ref 0.5–1.3)
EGFR: 65 ML/MIN/1.73M2 — SIGNIFICANT CHANGE UP
GLUCOSE SERPL-MCNC: 88 MG/DL — SIGNIFICANT CHANGE UP (ref 70–99)
HCT VFR BLD CALC: 28.9 % — LOW (ref 34.5–45)
HGB BLD-MCNC: 9.1 G/DL — LOW (ref 11.5–15.5)
MAGNESIUM SERPL-MCNC: 1.9 MG/DL — SIGNIFICANT CHANGE UP (ref 1.6–2.6)
MCHC RBC-ENTMCNC: 28.3 PG — SIGNIFICANT CHANGE UP (ref 27–34)
MCHC RBC-ENTMCNC: 31.5 GM/DL — LOW (ref 32–36)
MCV RBC AUTO: 89.8 FL — SIGNIFICANT CHANGE UP (ref 80–100)
NRBC # BLD: 0 /100 WBCS — SIGNIFICANT CHANGE UP (ref 0–0)
PHOSPHATE SERPL-MCNC: 2.9 MG/DL — SIGNIFICANT CHANGE UP (ref 2.5–4.5)
PLATELET # BLD AUTO: 209 K/UL — SIGNIFICANT CHANGE UP (ref 150–400)
POTASSIUM SERPL-MCNC: 3.8 MMOL/L — SIGNIFICANT CHANGE UP (ref 3.5–5.3)
POTASSIUM SERPL-SCNC: 3.8 MMOL/L — SIGNIFICANT CHANGE UP (ref 3.5–5.3)
RBC # BLD: 3.22 M/UL — LOW (ref 3.8–5.2)
RBC # FLD: 14.2 % — SIGNIFICANT CHANGE UP (ref 10.3–14.5)
SODIUM SERPL-SCNC: 139 MMOL/L — SIGNIFICANT CHANGE UP (ref 135–145)
WBC # BLD: 7.83 K/UL — SIGNIFICANT CHANGE UP (ref 3.8–10.5)
WBC # FLD AUTO: 7.83 K/UL — SIGNIFICANT CHANGE UP (ref 3.8–10.5)

## 2023-08-12 PROCEDURE — 99232 SBSQ HOSP IP/OBS MODERATE 35: CPT

## 2023-08-12 RX ORDER — MAGNESIUM SULFATE 500 MG/ML
1 VIAL (ML) INJECTION ONCE
Refills: 0 | Status: COMPLETED | OUTPATIENT
Start: 2023-08-12 | End: 2023-08-12

## 2023-08-12 RX ORDER — ENOXAPARIN SODIUM 100 MG/ML
40 INJECTION SUBCUTANEOUS EVERY 24 HOURS
Refills: 0 | Status: DISCONTINUED | OUTPATIENT
Start: 2023-08-12 | End: 2023-08-14

## 2023-08-12 RX ORDER — POTASSIUM CHLORIDE 20 MEQ
20 PACKET (EA) ORAL ONCE
Refills: 0 | Status: COMPLETED | OUTPATIENT
Start: 2023-08-12 | End: 2023-08-12

## 2023-08-12 RX ORDER — SODIUM,POTASSIUM PHOSPHATES 278-250MG
1 POWDER IN PACKET (EA) ORAL ONCE
Refills: 0 | Status: COMPLETED | OUTPATIENT
Start: 2023-08-12 | End: 2023-08-12

## 2023-08-12 RX ADMIN — Medication 1 TABLET(S): at 08:18

## 2023-08-12 RX ADMIN — Medication 1 SPRAY(S): at 07:06

## 2023-08-12 RX ADMIN — Medication 20 MILLIEQUIVALENT(S): at 08:18

## 2023-08-12 RX ADMIN — Medication 1000 MILLIGRAM(S): at 07:05

## 2023-08-12 RX ADMIN — Medication 100 GRAM(S): at 08:18

## 2023-08-12 RX ADMIN — ONDANSETRON 4 MILLIGRAM(S): 8 TABLET, FILM COATED ORAL at 11:15

## 2023-08-12 RX ADMIN — AMLODIPINE BESYLATE 10 MILLIGRAM(S): 2.5 TABLET ORAL at 07:05

## 2023-08-12 RX ADMIN — ONDANSETRON 4 MILLIGRAM(S): 8 TABLET, FILM COATED ORAL at 03:30

## 2023-08-12 RX ADMIN — ENOXAPARIN SODIUM 40 MILLIGRAM(S): 100 INJECTION SUBCUTANEOUS at 22:23

## 2023-08-12 RX ADMIN — POLYETHYLENE GLYCOL 3350 17 GRAM(S): 17 POWDER, FOR SOLUTION ORAL at 11:15

## 2023-08-12 RX ADMIN — LEVETIRACETAM 2000 MILLIGRAM(S): 250 TABLET, FILM COATED ORAL at 22:23

## 2023-08-12 RX ADMIN — SENNA PLUS 2 TABLET(S): 8.6 TABLET ORAL at 22:23

## 2023-08-12 RX ADMIN — Medication 100 MILLIGRAM(S): at 22:22

## 2023-08-12 RX ADMIN — Medication 100 MILLIGRAM(S): at 15:03

## 2023-08-12 RX ADMIN — Medication 1 SPRAY(S): at 18:22

## 2023-08-12 RX ADMIN — LATANOPROST 1 DROP(S): 0.05 SOLUTION/ DROPS OPHTHALMIC; TOPICAL at 22:23

## 2023-08-12 RX ADMIN — Medication 1000 MILLIGRAM(S): at 07:27

## 2023-08-12 RX ADMIN — Medication 100 MILLIGRAM(S): at 07:05

## 2023-08-12 NOTE — PROGRESS NOTE ADULT - SUBJECTIVE AND OBJECTIVE BOX
HPI:  Taken from outpatient neurosurgery note on 7/17/23 " 71 y/o Female with no known PMHx who presented to [facility] 12/22/22 with slurred speech and R sided weakness, stroke code called, imaging initially negative for stroke or hemorrhage, pt's neuro exam worsened and subsequent CTH the following day showed L sided SDH, admitted to ICU for further monitoring.   Pt neuro declined on 12/25/22 with AMS requiring intubation, noted to have episodes of twitching concerning for seizures, started on keppra and EEG placed. No epileptiform activity noted. CTH on 12/28/22 significant for L   frontoparietal collection, MRI completed showed concern for possible empyema.   Daughter reported pt likely had a sinus infection recently, unsure of abx. At [place] pt started on ceftriaxone for UTI that was switched to vancomycin. Pt transferred to [facility] 12/31/22 for further management.     12/30/22 s/p left hemicraniectomy and washout of left subdural empyema   OR culture grew Peptostreptococcus    1/5/23 s/p b/l sinus surgery with judith purulence to L maxillary sinus and posterior bony erosion to L sphenoid sinus with ENT Dr. Abarca  Cultures with Staph epi. Follows with ID Dr. Ayers- currently on vancomycin, ceftriaxone, and metronidazole      3/1/23 Left sided cranioplasty reconstruction with large frontotemporal scar defect using titanium mesh implants measuring 16 x 12 sq cm status post craniectomy.  4. Reconstruction of cerebrospinal fluid leak with pedicled pericranial flap.  She returns today for routine postop visit.. She denies wound drainage or fever. She was discharged from acute rehab and is currently at subacute rehab. Her right side strength is improving but she still requires max assist for walking.     She comes in today with scalp thinning over titanium mesh with visible enmeshment at the fronto-temporal region after a significant weight loss which has contributed to loss of subcutaneous fat putting her at risk for hardware exposure. Incision well healed without erythema, dehiscence, drainage, or signs of infection. Edges are well-approximated. No other erythema. No fluctuance or palpable fluid collections.  "    71 y/o female with PMHx HTN, GERD, Glaucoma, Subdural Empyema presents for revision of left cranioplasty today.  Daughter reports that she hasn't been making much progress at [facility].  She still has word finding difficulties and right upper than lower extremity weakness.   (10 Aug 2023 06:58)  HOSPITAL COURSE:  8/10: POD 0 left cranioplasty revision with aumentation of left anterior scalp with pericranial autograft and augmentation of scalp overlying mesh using acellular dermal matrix allograft, complex closure.  8/11: POD 1. Drains x 2.   8/12: POD# 2 Revision of cranioplasty, complex neuro plastic closure. Neuro stable. Subgaleal CHENCHO with minimal output.     OVERNIGHT EVENTS: none.    Vital Signs Last 24 Hrs  T(C): 36.7 (11 Aug 2023 21:47), Max: 37 (11 Aug 2023 14:29)  T(F): 98.1 (11 Aug 2023 21:47), Max: 98.6 (11 Aug 2023 14:29)  HR: 72 (11 Aug 2023 20:13) (54 - 94)  BP: 112/55 (11 Aug 2023 20:13) (96/55 - 125/60)  BP(mean): 79 (11 Aug 2023 20:13) (71 - 87)  RR: 19 (11 Aug 2023 20:13) (17 - 19)  SpO2: 92% (11 Aug 2023 20:13) (92% - 98%)    Parameters below as of 11 Aug 2023 20:13  Patient On (Oxygen Delivery Method): room air        I&O's Summary    10 Aug 2023 07:01  -  11 Aug 2023 07:00  --------------------------------------------------------  IN: 1775 mL / OUT: 639 mL / NET: 1136 mL    11 Aug 2023 07:01  -  12 Aug 2023 03:01  --------------------------------------------------------  IN: 1880 mL / OUT: 1035 mL / NET: 845 mL        PHYSICAL EXAM:  General: patient seen laying supine in bed in NAD  Neuro: AAOx2 (self, place), FC, OE spontaneously, mixed aphasia, LUE/LLE 5/5 and RLE 4-/5, RUE 2/5 w/ hand contracted   Neck: supple  Cardiac: RRR, S1S2  Pulmonary: chest rise symmetric  Abdomen: soft, nontender, nondistended  Ext: perfusing well  Skin: warm, dry  Wound: +Headwrap in place, CHENCHO x 2    TUBES/LINES:  - Subgaleal CHENCHO x 2.    DIET:  - regular    LABS:                        10.2   9.17  )-----------( 260      ( 11 Aug 2023 07:17 )             31.8     08-11    141  |  105  |  20  ----------------------------<  132<H>  4.4   |  25  |  0.78    Ca    9.9      11 Aug 2023 07:16  Phos  4.1     08-11  Mg     2.0     08-11        Urinalysis Basic - ( 11 Aug 2023 07:16 )    Color: x / Appearance: x / SG: x / pH: x  Gluc: 132 mg/dL / Ketone: x  / Bili: x / Urobili: x   Blood: x / Protein: x / Nitrite: x   Leuk Esterase: x / RBC: x / WBC x   Sq Epi: x / Non Sq Epi: x / Bacteria: x          CAPILLARY BLOOD GLUCOSE          Drug Levels: [] N/A    CSF Analysis: [] N/A      Allergies    No Known Allergies    Intolerances      MEDICATIONS:  Antibiotics:  ceFAZolin   IVPB 2000 milliGRAM(s) IV Intermittent every 8 hours    Neuro:  acetaminophen     Tablet .. 1000 milliGRAM(s) Oral every 8 hours  HYDROmorphone  Injectable 0.25 milliGRAM(s) IV Push every 2 hours PRN  levETIRAcetam ER 2000 milliGRAM(s) Oral at bedtime  ondansetron Injectable 4 milliGRAM(s) IV Push every 6 hours  traMADol 25 milliGRAM(s) Oral every 6 hours PRN    Anticoagulation:    OTHER:  amLODIPine   Tablet 10 milliGRAM(s) Oral daily  fluticasone propionate 50 MICROgram(s)/spray Nasal Spray 1 Spray(s) Both Nostrils every 12 hours  hydrALAZINE Injectable 10 milliGRAM(s) IV Push every 1 hour PRN  latanoprost 0.005% Ophthalmic Solution 1 Drop(s) Both EYES at bedtime  polyethylene glycol 3350 17 Gram(s) Oral daily  senna 2 Tablet(s) Oral at bedtime  sodium chloride 0.65% Nasal 1 Spray(s) Both Nostrils four times a day PRN    IVF:  sodium chloride 0.9%. 1000 milliLiter(s) IV Continuous <Continuous>    CULTURES:    RADIOLOGY & ADDITIONAL TESTS:      ASSESSMENT:  71 y/o female with PMHx HTN, GERD, Glaucoma, Subdural Empyema presents for revision of left cranioplasty.  Has word finding difficulties and R sided weakness.  Now s/p left cranioplasty revision with aumentation of left anterior scalp with pericranial autograft and augmentation of scalp overlying mesh using acellular dermal matrix allograft, complex closure (8/10).      Z98.890    No pertinent family history in first degree relatives    Handoff    MEWS Score    HTN (hypertension)    CVA (cerebrovascular accident)    Acquired skull defect    Acquired skull defect    S/P craniotomy    S/P appendectomy    S/P wrist surgery    [id number]        PLAN:    Neuro   - Vitals/neuro checks q4h  - 2 SG man drains - monitor output   - cranial ERAS  - Keppra 2000mg XR qhs    Cardio   - - 140   - hx HTN: continue amlodipine 10mg qd    Pulm  - RA, IS     GI  - regular diet   - bowel regimen     Renal   - IVF while npo     Endo   - no issues     Heme   - SCDs   - hold SQL periop    ID   - Ancef while drains in.    DVT PROPHYLAXIS:  [] Venodynes                                [] Heparin/Lovenox    DISPOSITION:  - Full code  - RIRI when clinically cleared    Assessment:  Present when checked    []  GCS  E   V  M     Heart Failure: []Acute, [] acute on chronic , []chronic  Heart Failure:  [] Diastolic (HFpEF), [] Systolic (HFrEF), []Combined (HFpEF and HFrEF), [] RHF, [] Pulm HTN, [] Other    [] DIONTE, [] ATN, [] AIN, [] other  [] CKD1, [] CKD2, [] CKD 3, [] CKD 4, [] CKD 5, []ESRD    Encephalopathy: [] Metabolic, [] Hepatic, [] toxic, [] Neurological, [] Other    Abnormal Nurtitional Status: [] malnurtition (see nutrition note), [ ]underweight: BMI < 19, [] morbid obesity: BMI >40, [] Cachexia    [] Sepsis  [] hypovolemic shock,[] cardiogenic shock, [] hemorrhagic shock, [] neuogenic shock  [] Acute Respiratory Failure  []Cerebral edema, [] Brain compression/ herniation,   [] Functional quadriplegia  [] Acute blood loss anemia

## 2023-08-12 NOTE — PROGRESS NOTE ADULT - SUBJECTIVE AND OBJECTIVE BOX
INTERVAL EVENTS:  DEEPALI.    PAST MEDICAL & SURGICAL HISTORY:  HTN (hypertension)    CVA (cerebrovascular accident)  R sided weakness    S/P craniotomy    S/P appendectomy    S/P wrist surgery        MEDICATIONS  (STANDING):  amLODIPine   Tablet 10 milliGRAM(s) Oral daily  ceFAZolin   IVPB 2000 milliGRAM(s) IV Intermittent every 8 hours  enoxaparin Injectable 40 milliGRAM(s) SubCutaneous every 24 hours  fluticasone propionate 50 MICROgram(s)/spray Nasal Spray 1 Spray(s) Both Nostrils every 12 hours  latanoprost 0.005% Ophthalmic Solution 1 Drop(s) Both EYES at bedtime  levETIRAcetam ER 2000 milliGRAM(s) Oral at bedtime  polyethylene glycol 3350 17 Gram(s) Oral daily  senna 2 Tablet(s) Oral at bedtime  sodium chloride 0.9%. 1000 milliLiter(s) (100 mL/Hr) IV Continuous <Continuous>    MEDICATIONS  (PRN):  hydrALAZINE Injectable 10 milliGRAM(s) IV Push every 1 hour PRN SBP > 140 mm Hg  HYDROmorphone  Injectable 0.25 milliGRAM(s) IV Push every 2 hours PRN breakthrough pain in pacu only  sodium chloride 0.65% Nasal 1 Spray(s) Both Nostrils four times a day PRN Nasal Congestion  traMADol 25 milliGRAM(s) Oral every 6 hours PRN Severe Pain (7 - 10)      Vital Signs Last 24 Hrs  T(C): 36.5 (12 Aug 2023 08:31), Max: 37 (11 Aug 2023 14:29)  T(F): 97.7 (12 Aug 2023 08:31), Max: 98.6 (11 Aug 2023 14:29)  HR: 73 (12 Aug 2023 08:31) (72 - 80)  BP: 112/65 (12 Aug 2023 08:31) (112/55 - 131/69)  BP(mean): 79 (11 Aug 2023 20:13) (79 - 79)  RR: 17 (12 Aug 2023 08:31) (17 - 19)  SpO2: 94% (12 Aug 2023 08:31) (92% - 96%)    Parameters below as of 12 Aug 2023 08:31  Patient On (Oxygen Delivery Method): room air         PHYSICAL EXAM:  Constitutional: resting comfortably in bed; NAD  Head: Head wrapped, CHENCHO drains x2 with serosanguinous drainage  Eyes: PERRL, EOMI  ENT: no nasal discharge; MMM  Neck: supple; no JVD   Respiratory: CTA B/L; no W/R/R, no retractions  Cardiac: +S1/S2; RRR; no M/R/G  Gastrointestinal: abdomen soft, NT/ND; no rebound or guarding; +BSx4  Extremities: WWP, no clubbing or cyanosis; trace edema of LUE  Vascular: pulses equal and strong throughout  Dermatologic: skin warm, dry and intact;  Neurologic: Awake, alert, expressive aphasia, RUE/RLE 5/5, LUE 2/5, LLE 5/5    LABS:                        9.1    7.83  )-----------( 209      ( 12 Aug 2023 05:30 )             28.9     08-12    139  |  108  |  19  ----------------------------<  88  3.8   |  25  |  0.94    Ca    8.8      12 Aug 2023 05:30  Phos  2.9     08-12  Mg     1.9     08-12      Urinalysis Basic - ( 12 Aug 2023 05:30 )    Color: x / Appearance: x / SG: x / pH: x  Gluc: 88 mg/dL / Ketone: x  / Bili: x / Urobili: x   Blood: x / Protein: x / Nitrite: x   Leuk Esterase: x / RBC: x / WBC x   Sq Epi: x / Non Sq Epi: x / Bacteria: x      I&O's Summary    11 Aug 2023 07:01  -  12 Aug 2023 07:00  --------------------------------------------------------  IN: 1880 mL / OUT: 1340 mL / NET: 540 mL

## 2023-08-13 LAB
ANION GAP SERPL CALC-SCNC: 8 MMOL/L — SIGNIFICANT CHANGE UP (ref 5–17)
BUN SERPL-MCNC: 22 MG/DL — SIGNIFICANT CHANGE UP (ref 7–23)
CALCIUM SERPL-MCNC: 8.8 MG/DL — SIGNIFICANT CHANGE UP (ref 8.4–10.5)
CHLORIDE SERPL-SCNC: 107 MMOL/L — SIGNIFICANT CHANGE UP (ref 96–108)
CO2 SERPL-SCNC: 25 MMOL/L — SIGNIFICANT CHANGE UP (ref 22–31)
CREAT SERPL-MCNC: 0.75 MG/DL — SIGNIFICANT CHANGE UP (ref 0.5–1.3)
EGFR: 85 ML/MIN/1.73M2 — SIGNIFICANT CHANGE UP
GLUCOSE SERPL-MCNC: 97 MG/DL — SIGNIFICANT CHANGE UP (ref 70–99)
HCT VFR BLD CALC: 31 % — LOW (ref 34.5–45)
HGB BLD-MCNC: 9.5 G/DL — LOW (ref 11.5–15.5)
MAGNESIUM SERPL-MCNC: 1.8 MG/DL — SIGNIFICANT CHANGE UP (ref 1.6–2.6)
MCHC RBC-ENTMCNC: 28.2 PG — SIGNIFICANT CHANGE UP (ref 27–34)
MCHC RBC-ENTMCNC: 30.6 GM/DL — LOW (ref 32–36)
MCV RBC AUTO: 92 FL — SIGNIFICANT CHANGE UP (ref 80–100)
NRBC # BLD: 0 /100 WBCS — SIGNIFICANT CHANGE UP (ref 0–0)
PHOSPHATE SERPL-MCNC: 3.2 MG/DL — SIGNIFICANT CHANGE UP (ref 2.5–4.5)
PLATELET # BLD AUTO: 226 K/UL — SIGNIFICANT CHANGE UP (ref 150–400)
POTASSIUM SERPL-MCNC: 4 MMOL/L — SIGNIFICANT CHANGE UP (ref 3.5–5.3)
POTASSIUM SERPL-SCNC: 4 MMOL/L — SIGNIFICANT CHANGE UP (ref 3.5–5.3)
RBC # BLD: 3.37 M/UL — LOW (ref 3.8–5.2)
RBC # FLD: 14.5 % — SIGNIFICANT CHANGE UP (ref 10.3–14.5)
SODIUM SERPL-SCNC: 140 MMOL/L — SIGNIFICANT CHANGE UP (ref 135–145)
WBC # BLD: 7.71 K/UL — SIGNIFICANT CHANGE UP (ref 3.8–10.5)
WBC # FLD AUTO: 7.71 K/UL — SIGNIFICANT CHANGE UP (ref 3.8–10.5)

## 2023-08-13 PROCEDURE — 99232 SBSQ HOSP IP/OBS MODERATE 35: CPT

## 2023-08-13 PROCEDURE — 99024 POSTOP FOLLOW-UP VISIT: CPT

## 2023-08-13 RX ORDER — MAGNESIUM SULFATE 500 MG/ML
2 VIAL (ML) INJECTION ONCE
Refills: 0 | Status: COMPLETED | OUTPATIENT
Start: 2023-08-13 | End: 2023-08-13

## 2023-08-13 RX ADMIN — LATANOPROST 1 DROP(S): 0.05 SOLUTION/ DROPS OPHTHALMIC; TOPICAL at 21:11

## 2023-08-13 RX ADMIN — Medication 1 SPRAY(S): at 05:53

## 2023-08-13 RX ADMIN — LEVETIRACETAM 2000 MILLIGRAM(S): 250 TABLET, FILM COATED ORAL at 21:11

## 2023-08-13 RX ADMIN — Medication 100 MILLIGRAM(S): at 15:03

## 2023-08-13 RX ADMIN — POLYETHYLENE GLYCOL 3350 17 GRAM(S): 17 POWDER, FOR SOLUTION ORAL at 15:03

## 2023-08-13 RX ADMIN — Medication 25 GRAM(S): at 16:08

## 2023-08-13 RX ADMIN — Medication 100 MILLIGRAM(S): at 05:53

## 2023-08-13 RX ADMIN — Medication 1 SPRAY(S): at 17:50

## 2023-08-13 RX ADMIN — ENOXAPARIN SODIUM 40 MILLIGRAM(S): 100 INJECTION SUBCUTANEOUS at 21:11

## 2023-08-13 RX ADMIN — AMLODIPINE BESYLATE 10 MILLIGRAM(S): 2.5 TABLET ORAL at 05:52

## 2023-08-13 RX ADMIN — SODIUM CHLORIDE 100 MILLILITER(S): 9 INJECTION INTRAMUSCULAR; INTRAVENOUS; SUBCUTANEOUS at 16:08

## 2023-08-13 RX ADMIN — Medication 100 MILLIGRAM(S): at 21:12

## 2023-08-13 RX ADMIN — SENNA PLUS 2 TABLET(S): 8.6 TABLET ORAL at 21:11

## 2023-08-13 NOTE — PROGRESS NOTE ADULT - SUBJECTIVE AND OBJECTIVE BOX
INTERVAL EVENTS:  CHENCHO drain removed this morning. Resting comfortably.     PAST MEDICAL & SURGICAL HISTORY:  HTN (hypertension)    CVA (cerebrovascular accident)  R sided weakness    S/P craniotomy    S/P appendectomy    S/P wrist surgery        MEDICATIONS  (STANDING):  amLODIPine   Tablet 10 milliGRAM(s) Oral daily  ceFAZolin   IVPB 2000 milliGRAM(s) IV Intermittent every 8 hours  enoxaparin Injectable 40 milliGRAM(s) SubCutaneous every 24 hours  fluticasone propionate 50 MICROgram(s)/spray Nasal Spray 1 Spray(s) Both Nostrils every 12 hours  latanoprost 0.005% Ophthalmic Solution 1 Drop(s) Both EYES at bedtime  levETIRAcetam ER 2000 milliGRAM(s) Oral at bedtime  polyethylene glycol 3350 17 Gram(s) Oral daily  senna 2 Tablet(s) Oral at bedtime  sodium chloride 0.9%. 1000 milliLiter(s) (100 mL/Hr) IV Continuous <Continuous>    MEDICATIONS  (PRN):  hydrALAZINE Injectable 10 milliGRAM(s) IV Push every 1 hour PRN SBP > 140 mm Hg  HYDROmorphone  Injectable 0.25 milliGRAM(s) IV Push every 2 hours PRN breakthrough pain in pacu only  sodium chloride 0.65% Nasal 1 Spray(s) Both Nostrils four times a day PRN Nasal Congestion  traMADol 25 milliGRAM(s) Oral every 6 hours PRN Severe Pain (7 - 10)      Vital Signs Last 24 Hrs  T(C): 36.4 (13 Aug 2023 08:53), Max: 36.9 (12 Aug 2023 21:13)  T(F): 97.6 (13 Aug 2023 08:53), Max: 98.5 (12 Aug 2023 21:13)  HR: 68 (13 Aug 2023 08:53) (68 - 77)  BP: 108/67 (13 Aug 2023 08:53) (106/65 - 113/58)  BP(mean): 81 (13 Aug 2023 08:53) (81 - 81)  RR: 17 (13 Aug 2023 08:53) (17 - 18)  SpO2: 96% (13 Aug 2023 08:53) (94% - 96%)    Parameters below as of 13 Aug 2023 08:53  Patient On (Oxygen Delivery Method): room air         PHYSICAL EXAM:  Constitutional: resting comfortably in bed; NAD  Head: Head wrapped  ENT: no nasal discharge; MMM  Neck: supple; no JVD   Respiratory: CTA B/L; no W/R/R, no retractions  Cardiac: +S1/S2; RRR; no M/R/G  Gastrointestinal: abdomen soft, NT/ND; no rebound or guarding; +BSx4  Extremities: WWP, no clubbing or cyanosis; trace edema of LUE  Vascular: pulses equal and strong throughout  Dermatologic: skin warm, dry and intact;  Neurologic: Awake, alert, expressive aphasia, RUE/RLE 5/5, LUE 2/5, LLE 5/5    LABS:                        9.5    7.71  )-----------( 226      ( 13 Aug 2023 05:30 )             31.0     08-13    140  |  107  |  22  ----------------------------<  97  4.0   |  25  |  0.75    Ca    8.8      13 Aug 2023 05:30  Phos  3.2     08-13  Mg     1.8     08-13      Urinalysis Basic - ( 13 Aug 2023 05:30 )    Color: x / Appearance: x / SG: x / pH: x  Gluc: 97 mg/dL / Ketone: x  / Bili: x / Urobili: x   Blood: x / Protein: x / Nitrite: x   Leuk Esterase: x / RBC: x / WBC x   Sq Epi: x / Non Sq Epi: x / Bacteria: x      I&O's Summary    12 Aug 2023 07:01  -  13 Aug 2023 07:00  --------------------------------------------------------  IN: 0 mL / OUT: 2721 mL / NET: -2721 mL

## 2023-08-13 NOTE — PROGRESS NOTE ADULT - SUBJECTIVE AND OBJECTIVE BOX
HPI:  71 y/o female with PMHx HTN, GERD, Glaucoma, Subdural Empyema presents for revision of left cranioplasty today.  Daughter reports that she hasn't been making much progress at [facility].  She still has word finding difficulties and right upper than lower extremity weakness.   (10 Aug 2023 06:58)    OVERNIGHT EVENTS: [facility] Course:   8/10: POD 0 left cranioplasty revision with aumentation of left anterior scalp with pericranial autograft and augmentation of scalp overlying mesh using acellular dermal matrix allograft, complex closure.  8/11: POD 1. Drains x 2.   8/12: POD 2.   8/13: POD 3. DEEPALI overnight, CHENCHO x 2 in place, pending RIRI     Vital Signs Last 24 Hrs  T(C): 36.9 (12 Aug 2023 21:13), Max: 36.9 (12 Aug 2023 21:13)  T(F): 98.5 (12 Aug 2023 21:13), Max: 98.5 (12 Aug 2023 21:13)  HR: 77 (12 Aug 2023 21:13) (73 - 80)  BP: 113/58 (12 Aug 2023 21:13) (112/65 - 131/69)  BP(mean): --  RR: 18 (12 Aug 2023 21:13) (17 - 18)  SpO2: 94% (12 Aug 2023 21:13) (94% - 96%)    Parameters below as of 12 Aug 2023 21:13  Patient On (Oxygen Delivery Method): room air        I&O's Summary    11 Aug 2023 07:01  -  12 Aug 2023 07:00  --------------------------------------------------------  IN: 1880 mL / OUT: 1340 mL / NET: 540 mL    12 Aug 2023 07:01  -  13 Aug 2023 00:38  --------------------------------------------------------  IN: 0 mL / OUT: 2117 mL / NET: -2117 mL        PHYSICAL EXAM:      TUBES/LINES:  [] Cannon  [] Lumbar Drain  [] Wound Drains  [] Others      DIET:  [] NPO  [] Mechanical  [] Tube feeds    LABS:                        9.1    7.83  )-----------( 209      ( 12 Aug 2023 05:30 )             28.9     08-12    139  |  108  |  19  ----------------------------<  88  3.8   |  25  |  0.94    Ca    8.8      12 Aug 2023 05:30  Phos  2.9     08-12  Mg     1.9     08-12        Urinalysis Basic - ( 12 Aug 2023 05:30 )    Color: x / Appearance: x / SG: x / pH: x  Gluc: 88 mg/dL / Ketone: x  / Bili: x / Urobili: x   Blood: x / Protein: x / Nitrite: x   Leuk Esterase: x / RBC: x / WBC x   Sq Epi: x / Non Sq Epi: x / Bacteria: x          CAPILLARY BLOOD GLUCOSE          Drug Levels: [] N/A    CSF Analysis: [] N/A      Allergies    No Known Allergies    Intolerances      MEDICATIONS:  Antibiotics:  ceFAZolin   IVPB 2000 milliGRAM(s) IV Intermittent every 8 hours    Neuro:  HYDROmorphone  Injectable 0.25 milliGRAM(s) IV Push every 2 hours PRN  levETIRAcetam ER 2000 milliGRAM(s) Oral at bedtime  traMADol 25 milliGRAM(s) Oral every 6 hours PRN    Anticoagulation:  enoxaparin Injectable 40 milliGRAM(s) SubCutaneous every 24 hours    OTHER:  amLODIPine   Tablet 10 milliGRAM(s) Oral daily  fluticasone propionate 50 MICROgram(s)/spray Nasal Spray 1 Spray(s) Both Nostrils every 12 hours  hydrALAZINE Injectable 10 milliGRAM(s) IV Push every 1 hour PRN  latanoprost 0.005% Ophthalmic Solution 1 Drop(s) Both EYES at bedtime  polyethylene glycol 3350 17 Gram(s) Oral daily  senna 2 Tablet(s) Oral at bedtime  sodium chloride 0.65% Nasal 1 Spray(s) Both Nostrils four times a day PRN    IVF:  sodium chloride 0.9%. 1000 milliLiter(s) IV Continuous <Continuous>      ASSESSMENT:  71 y/o female with PMHx HTN, GERD, Glaucoma, Subdural Empyema presents for revision of left cranioplasty.  Has word finding difficulties and R sided weakness.  Now s/p left cranioplasty revision with aumentation of left anterior scalp with pericranial autograft and augmentation of scalp overlying mesh using acellular dermal matrix allograft, complex closure (8/10).    PLAN:   Neuro   - Vitals/neuro checks q4h  - 2 SG man drains - monitor output   - cranial ERAS  - Keppra 2000mg XR qhs    Cardio   - - 140   - hx HTN: continue amlodipine 10mg qd    Pulm  - RA, IS     GI  - regular diet   - bowel regimen     Renal   - IVF while npo     Endo   - no issues     Heme   - SCDs SQL    ID   - Ancef while drains in; Keflex x 2 weeks when drains removed    D/w Dr. Giles   Dispo: RIRI      Assessment:  Present when checked    []  GCS  E   V  M     Heart Failure: []Acute, [] acute on chronic , []chronic  Heart Failure:  [] Diastolic (HFpEF), [] Systolic (HFrEF), []Combined (HFpEF and HFrEF), [] RHF, [] Pulm HTN, [] Other    [] DIONTE, [] ATN, [] AIN, [] other  [] CKD1, [] CKD2, [] CKD 3, [] CKD 4, [] CKD 5, []ESRD    Encephalopathy: [] Metabolic, [] Hepatic, [] toxic, [] Neurological, [] Other    Abnormal Nurtitional Status: [] malnurtition (see nutrition note), [ ]underweight: BMI < 19, [] morbid obesity: BMI >40, [] Cachexia    [] Sepsis  [] hypovolemic shock,[] cardiogenic shock, [] hemorrhagic shock, [] neuogenic shock  [] Acute Respiratory Failure  []Cerebral edema, [] Brain compression/ herniation,   [] Functional quadriplegia  [] Acute blood loss anemia   HPI:  71 y/o female with PMHx HTN, GERD, Glaucoma, Subdural Empyema presents for revision of left cranioplasty today.  Daughter reports that she hasn't been making much progress at [facility].  She still has word finding difficulties and right upper than lower extremity weakness.   (10 Aug 2023 06:58)    OVERNIGHT EVENTS: [facility] Course:   8/10: POD 0 left cranioplasty revision with aumentation of left anterior scalp with pericranial autograft and augmentation of scalp overlying mesh using acellular dermal matrix allograft, complex closure.  8/11: POD 1. Drains x 2.   8/12: POD 2.   8/13: POD 3. DEEPALI overnight, CHENCHO x 2 in place, pending RIRI     Vital Signs Last 24 Hrs  T(C): 36.9 (12 Aug 2023 21:13), Max: 36.9 (12 Aug 2023 21:13)  T(F): 98.5 (12 Aug 2023 21:13), Max: 98.5 (12 Aug 2023 21:13)  HR: 77 (12 Aug 2023 21:13) (73 - 80)  BP: 113/58 (12 Aug 2023 21:13) (112/65 - 131/69)  BP(mean): --  RR: 18 (12 Aug 2023 21:13) (17 - 18)  SpO2: 94% (12 Aug 2023 21:13) (94% - 96%)    Parameters below as of 12 Aug 2023 21:13  Patient On (Oxygen Delivery Method): room air        I&O's Summary    11 Aug 2023 07:01  -  12 Aug 2023 07:00  --------------------------------------------------------  IN: 1880 mL / OUT: 1340 mL / NET: 540 mL    12 Aug 2023 07:01  -  13 Aug 2023 00:38  --------------------------------------------------------  IN: 0 mL / OUT: 2117 mL / NET: -2117 mL        PHYSICAL EXAM:  General: patient seen laying supine in bed in NAD  Neuro: AAOx3, FC, OE spontaneously, aphasic w/ improving speech, LUE/LLE 5/5 and RLE 4+/5, RUE 2/5 w/ hand contracted   Neck: supple  Cardiac: RRR, S1S2  Pulmonary: chest rise symmetric  Abdomen: soft, nontender, nondistended  Ext: perfusing well  Skin: warm, dry  Wound: +Headwrap in place    TUBES/LINES:  [] Cannon  [] Lumbar Drain  [x] Wound Drains - CHENCHO x2  [] Others      DIET:  [] NPO  [x] Mechanical  [] Tube feeds    LABS:                        9.1    7.83  )-----------( 209      ( 12 Aug 2023 05:30 )             28.9     08-12    139  |  108  |  19  ----------------------------<  88  3.8   |  25  |  0.94    Ca    8.8      12 Aug 2023 05:30  Phos  2.9     08-12  Mg     1.9     08-12        Urinalysis Basic - ( 12 Aug 2023 05:30 )    Color: x / Appearance: x / SG: x / pH: x  Gluc: 88 mg/dL / Ketone: x  / Bili: x / Urobili: x   Blood: x / Protein: x / Nitrite: x   Leuk Esterase: x / RBC: x / WBC x   Sq Epi: x / Non Sq Epi: x / Bacteria: x          CAPILLARY BLOOD GLUCOSE          Drug Levels: [] N/A    CSF Analysis: [] N/A      Allergies    No Known Allergies    Intolerances      MEDICATIONS:  Antibiotics:  ceFAZolin   IVPB 2000 milliGRAM(s) IV Intermittent every 8 hours    Neuro:  HYDROmorphone  Injectable 0.25 milliGRAM(s) IV Push every 2 hours PRN  levETIRAcetam ER 2000 milliGRAM(s) Oral at bedtime  traMADol 25 milliGRAM(s) Oral every 6 hours PRN    Anticoagulation:  enoxaparin Injectable 40 milliGRAM(s) SubCutaneous every 24 hours    OTHER:  amLODIPine   Tablet 10 milliGRAM(s) Oral daily  fluticasone propionate 50 MICROgram(s)/spray Nasal Spray 1 Spray(s) Both Nostrils every 12 hours  hydrALAZINE Injectable 10 milliGRAM(s) IV Push every 1 hour PRN  latanoprost 0.005% Ophthalmic Solution 1 Drop(s) Both EYES at bedtime  polyethylene glycol 3350 17 Gram(s) Oral daily  senna 2 Tablet(s) Oral at bedtime  sodium chloride 0.65% Nasal 1 Spray(s) Both Nostrils four times a day PRN    IVF:  sodium chloride 0.9%. 1000 milliLiter(s) IV Continuous <Continuous>      ASSESSMENT:  71 y/o female with PMHx HTN, GERD, Glaucoma, Subdural Empyema presents for revision of left cranioplasty.  Has word finding difficulties and R sided weakness.  Now s/p left cranioplasty revision with aumentation of left anterior scalp with pericranial autograft and augmentation of scalp overlying mesh using acellular dermal matrix allograft, complex closure (8/10).    PLAN:   Neuro   - Vitals/neuro checks q4h  - 2 SG man drains - monitor output   - cranial ERAS  - Keppra 2000mg XR qhs    Cardio   - - 140   - hx HTN: continue amlodipine 10mg qd    Pulm  - RA, IS     GI  - regular diet   - bowel regimen     Renal   - IVF while npo     Endo   - no issues     Heme   - SCDs SQL    ID   - Ancef while drains in; Keflex x 2 weeks when drains removed    D/w Dr. Giles   Dispo: RIRI      Assessment:  Present when checked    []  GCS  E   V  M     Heart Failure: []Acute, [] acute on chronic , []chronic  Heart Failure:  [] Diastolic (HFpEF), [] Systolic (HFrEF), []Combined (HFpEF and HFrEF), [] RHF, [] Pulm HTN, [] Other    [] DIONTE, [] ATN, [] AIN, [] other  [] CKD1, [] CKD2, [] CKD 3, [] CKD 4, [] CKD 5, []ESRD    Encephalopathy: [] Metabolic, [] Hepatic, [] toxic, [] Neurological, [] Other    Abnormal Nurtitional Status: [] malnurtition (see nutrition note), [ ]underweight: BMI < 19, [] morbid obesity: BMI >40, [] Cachexia    [] Sepsis  [] hypovolemic shock,[] cardiogenic shock, [] hemorrhagic shock, [] neuogenic shock  [] Acute Respiratory Failure  []Cerebral edema, [] Brain compression/ herniation,   [] Functional quadriplegia  [] Acute blood loss anemia

## 2023-08-14 ENCOUNTER — TRANSCRIPTION ENCOUNTER (OUTPATIENT)
Age: 73
End: 2023-08-14

## 2023-08-14 ENCOUNTER — NON-APPOINTMENT (OUTPATIENT)
Age: 73
End: 2023-08-14

## 2023-08-14 VITALS
SYSTOLIC BLOOD PRESSURE: 113 MMHG | TEMPERATURE: 98 F | RESPIRATION RATE: 18 BRPM | OXYGEN SATURATION: 100 % | HEART RATE: 76 BPM | DIASTOLIC BLOOD PRESSURE: 66 MMHG

## 2023-08-14 PROBLEM — I63.9 CEREBRAL INFARCTION, UNSPECIFIED: Chronic | Status: ACTIVE | Noted: 2023-08-09

## 2023-08-14 LAB
ANION GAP SERPL CALC-SCNC: 6 MMOL/L — SIGNIFICANT CHANGE UP (ref 5–17)
BUN SERPL-MCNC: 20 MG/DL — SIGNIFICANT CHANGE UP (ref 7–23)
CALCIUM SERPL-MCNC: 8.8 MG/DL — SIGNIFICANT CHANGE UP (ref 8.4–10.5)
CHLORIDE SERPL-SCNC: 106 MMOL/L — SIGNIFICANT CHANGE UP (ref 96–108)
CO2 SERPL-SCNC: 27 MMOL/L — SIGNIFICANT CHANGE UP (ref 22–31)
CREAT SERPL-MCNC: 0.65 MG/DL — SIGNIFICANT CHANGE UP (ref 0.5–1.3)
EGFR: 94 ML/MIN/1.73M2 — SIGNIFICANT CHANGE UP
GLUCOSE SERPL-MCNC: 108 MG/DL — HIGH (ref 70–99)
HCT VFR BLD CALC: 30.3 % — LOW (ref 34.5–45)
HGB BLD-MCNC: 9.7 G/DL — LOW (ref 11.5–15.5)
MAGNESIUM SERPL-MCNC: 2 MG/DL — SIGNIFICANT CHANGE UP (ref 1.6–2.6)
MCHC RBC-ENTMCNC: 28.3 PG — SIGNIFICANT CHANGE UP (ref 27–34)
MCHC RBC-ENTMCNC: 32 GM/DL — SIGNIFICANT CHANGE UP (ref 32–36)
MCV RBC AUTO: 88.3 FL — SIGNIFICANT CHANGE UP (ref 80–100)
NRBC # BLD: 0 /100 WBCS — SIGNIFICANT CHANGE UP (ref 0–0)
PHOSPHATE SERPL-MCNC: 3.4 MG/DL — SIGNIFICANT CHANGE UP (ref 2.5–4.5)
PLATELET # BLD AUTO: 218 K/UL — SIGNIFICANT CHANGE UP (ref 150–400)
POTASSIUM SERPL-MCNC: 3.7 MMOL/L — SIGNIFICANT CHANGE UP (ref 3.5–5.3)
POTASSIUM SERPL-SCNC: 3.7 MMOL/L — SIGNIFICANT CHANGE UP (ref 3.5–5.3)
RBC # BLD: 3.43 M/UL — LOW (ref 3.8–5.2)
RBC # FLD: 14.1 % — SIGNIFICANT CHANGE UP (ref 10.3–14.5)
SARS-COV-2 RNA SPEC QL NAA+PROBE: NEGATIVE — SIGNIFICANT CHANGE UP
SODIUM SERPL-SCNC: 139 MMOL/L — SIGNIFICANT CHANGE UP (ref 135–145)
WBC # BLD: 8 K/UL — SIGNIFICANT CHANGE UP (ref 3.8–10.5)
WBC # FLD AUTO: 8 K/UL — SIGNIFICANT CHANGE UP (ref 3.8–10.5)

## 2023-08-14 PROCEDURE — 84100 ASSAY OF PHOSPHORUS: CPT

## 2023-08-14 PROCEDURE — 99232 SBSQ HOSP IP/OBS MODERATE 35: CPT

## 2023-08-14 PROCEDURE — 97166 OT EVAL MOD COMPLEX 45 MIN: CPT

## 2023-08-14 PROCEDURE — 86850 RBC ANTIBODY SCREEN: CPT

## 2023-08-14 PROCEDURE — 87635 SARS-COV-2 COVID-19 AMP PRB: CPT

## 2023-08-14 PROCEDURE — 85027 COMPLETE CBC AUTOMATED: CPT

## 2023-08-14 PROCEDURE — 97161 PT EVAL LOW COMPLEX 20 MIN: CPT

## 2023-08-14 PROCEDURE — 83735 ASSAY OF MAGNESIUM: CPT

## 2023-08-14 PROCEDURE — C1713: CPT

## 2023-08-14 PROCEDURE — 94640 AIRWAY INHALATION TREATMENT: CPT

## 2023-08-14 PROCEDURE — 36415 COLL VENOUS BLD VENIPUNCTURE: CPT

## 2023-08-14 PROCEDURE — 99223 1ST HOSP IP/OBS HIGH 75: CPT

## 2023-08-14 PROCEDURE — 80048 BASIC METABOLIC PNL TOTAL CA: CPT

## 2023-08-14 PROCEDURE — 99024 POSTOP FOLLOW-UP VISIT: CPT

## 2023-08-14 PROCEDURE — 86901 BLOOD TYPING SEROLOGIC RH(D): CPT

## 2023-08-14 PROCEDURE — 86900 BLOOD TYPING SEROLOGIC ABO: CPT

## 2023-08-14 PROCEDURE — C1889: CPT

## 2023-08-14 RX ORDER — CEPHALEXIN 500 MG
500 CAPSULE ORAL EVERY 12 HOURS
Refills: 0 | Status: DISCONTINUED | OUTPATIENT
Start: 2023-08-14 | End: 2023-08-14

## 2023-08-14 RX ORDER — POTASSIUM CHLORIDE 20 MEQ
40 PACKET (EA) ORAL ONCE
Refills: 0 | Status: COMPLETED | OUTPATIENT
Start: 2023-08-14 | End: 2023-08-14

## 2023-08-14 RX ORDER — ENOXAPARIN SODIUM 100 MG/ML
40 INJECTION SUBCUTANEOUS
Qty: 0 | Refills: 0 | DISCHARGE
Start: 2023-08-14

## 2023-08-14 RX ORDER — TRAMADOL HYDROCHLORIDE 50 MG/1
0.5 TABLET ORAL
Qty: 0 | Refills: 0 | DISCHARGE
Start: 2023-08-14

## 2023-08-14 RX ORDER — CEPHALEXIN 500 MG
1 CAPSULE ORAL
Qty: 0 | Refills: 0 | DISCHARGE
Start: 2023-08-14

## 2023-08-14 RX ADMIN — Medication 1 SPRAY(S): at 05:45

## 2023-08-14 RX ADMIN — Medication 40 MILLIEQUIVALENT(S): at 11:02

## 2023-08-14 RX ADMIN — Medication 100 MILLIGRAM(S): at 05:45

## 2023-08-14 RX ADMIN — AMLODIPINE BESYLATE 10 MILLIGRAM(S): 2.5 TABLET ORAL at 05:45

## 2023-08-14 NOTE — DISCHARGE NOTE PROVIDER - HOSPITAL COURSE
HPI:  73 y/o female with PMHx HTN, GERD, Glaucoma, Subdural Empyema presents for revision of left cranioplasty today.  Daughter reports that she hasn't been making much progress at [facility].  She still has word finding difficulties and right upper than lower extremity weakness.       Hospital Course:  8/10: POD 0 left cranioplasty revision with aumentation of left anterior scalp with pericranial autograft and augmentation of scalp overlying mesh using acellular dermal matrix allograft, complex closure.  8/11: POD 1. Drains x 2.   8/12: POD 2.   8/13: POD 3. DEEPALI overnight, CHENCHO x 2 in place, pending RIRI   8/14: POD 4. DEEPALI overnight, pending RIRI    Patient evaluated by PT/OT who recommended:  Patient is going home? rehab? hospice? Facility Name:     Hospital course c/b:     Exam on day of discharge:    Checklist:   - Obtained follow up appointment from NP  - Reviewed final recommendations of inpatient consults  - review discharge planning on provider handoff  - post op imaging completed  - Neurologically stable for discharge  - Vitals stable for discharge   - Afebrile for discharge  - WBC is stable  - Sodium level is normal  - Pain is adequately controlled  - Pt has PICC/walker/brace/collar   - LACE score (10 or > needs PCP apt)   - stroke patient? Discharge NIHSS score   HPI:  73 y/o female with PMHx HTN, GERD, Glaucoma, Subdural Empyema presents for revision of left cranioplasty today.  Daughter reports that she hasn't been making much progress at [facility][1].  She still has word finding difficulties and right upper than lower extremity weakness.       Hospital Course:  8/10: POD 0 left cranioplasty revision with aumentation of left anterior scalp with pericranial autograft and augmentation of scalp overlying mesh using acellular dermal matrix allograft, complex closure.  8/11: POD 1. Drains x 2.   8/12: POD 2.   8/13: POD 3. DEEPALI overnight, CHENCHO x 2 in place, pending RIRI   8/14: POD 4. DEEPALI overnight, pending RIRI    Patient evaluated by PT/OT who recommended: [facility]  Patient is going back to [facility]    Hospital course uncomplicated    Exam on day of discharge:  General: patient seen laying supine in bed in NAD  Neuro: AAOx3, FC, OE spontaneously, aphasic w/ improving speech, LUE/LLE 5/5 and RLE 4+/5, RUE 2/5 w/ hand contracted   Neck: supple  Cardiac: RRR, S1S2  Pulmonary: chest rise symmetric  Abdomen: soft, nontender, nondistended  Ext: perfusing well  Skin: warm, dry  Wound: C/D/I with sutures in place    Patient is neuro stable, vitals stable, afebrile, medically ready for discharge

## 2023-08-14 NOTE — PROGRESS NOTE ADULT - SUBJECTIVE AND OBJECTIVE BOX
Patient is a 72y old  Female who presents with a chief complaint of cranioplasty revision (14 Aug 2023 12:28)      SUBJECTIVE:  Patient seen and examined at bedside.  Comfortable, no new complaints.  Continues to have expressive aphasia stating "yes" to all questions but able to correct herself when she means "no"    ROS:  Denies fevers, chills, headache, cough, SOB, chest pain, Abdominal pain, N/V.  All other ROS negative except as above    Vital Signs Last 12 Hrs  T(F): 98 (08-14-23 @ 14:47), Max: 98.4 (08-14-23 @ 05:10)  HR: 76 (08-14-23 @ 14:47) (72 - 76)  BP: 113/66 (08-14-23 @ 14:47) (105/59 - 113/69)  BP(mean): --  RR: 18 (08-14-23 @ 14:47) (18 - 18)  SpO2: 100% (08-14-23 @ 14:47) (97% - 100%)  I&O's Summary    13 Aug 2023 07:01  -  14 Aug 2023 07:00  --------------------------------------------------------  IN: 1100 mL / OUT: 1904 mL / NET: -804 mL    14 Aug 2023 07:01  -  14 Aug 2023 15:48  --------------------------------------------------------  IN: 720 mL / OUT: 205 mL / NET: 515 mL        PHYSICAL EXAM:  Constitutional: lying comfortably in bed; NAD  Head: Head wrapped  ENT: no nasal discharge; MMM  Neck: supple  Respiratory: CTA B/L; no W/R/R, no retractions  Cardiac: +S1/S2; RRR; no M/R/G  Gastrointestinal: abdomen soft, NT/ND; no rebound or guarding; +BSx4  Extremities: WWP, no clubbing or cyanosis; trace edema of LUE  Vascular: pulses equal and strong throughout  Dermatologic: skin warm, dry and intact;  Neurologic: Awake, alert, expressive aphasia, RUE/RLE 5/5, LUE 2/5, LLE 5/5          LABS:                        9.7    8.00  )-----------( 218      ( 14 Aug 2023 08:36 )             30.3     08-14    139  |  106  |  20  ----------------------------<  108<H>  3.7   |  27  |  0.65    Ca    8.8      14 Aug 2023 08:36  Phos  3.4     08-14  Mg     2.0     08-14        Urinalysis Basic - ( 14 Aug 2023 08:36 )    Color: x / Appearance: x / SG: x / pH: x  Gluc: 108 mg/dL / Ketone: x  / Bili: x / Urobili: x   Blood: x / Protein: x / Nitrite: x   Leuk Esterase: x / RBC: x / WBC x   Sq Epi: x / Non Sq Epi: x / Bacteria: x          RADIOLOGY & ADDITIONAL TESTS:  No new imaging    MEDICATIONS  (STANDING):  amLODIPine   Tablet 10 milliGRAM(s) Oral daily  cephalexin 500 milliGRAM(s) Oral every 12 hours  enoxaparin Injectable 40 milliGRAM(s) SubCutaneous every 24 hours  fluticasone propionate 50 MICROgram(s)/spray Nasal Spray 1 Spray(s) Both Nostrils every 12 hours  latanoprost 0.005% Ophthalmic Solution 1 Drop(s) Both EYES at bedtime  levETIRAcetam ER 2000 milliGRAM(s) Oral at bedtime  polyethylene glycol 3350 17 Gram(s) Oral daily  senna 2 Tablet(s) Oral at bedtime    MEDICATIONS  (PRN):  hydrALAZINE Injectable 10 milliGRAM(s) IV Push every 1 hour PRN SBP > 140 mm Hg  HYDROmorphone  Injectable 0.25 milliGRAM(s) IV Push every 2 hours PRN breakthrough pain in pacu only  sodium chloride 0.65% Nasal 1 Spray(s) Both Nostrils four times a day PRN Nasal Congestion  traMADol 25 milliGRAM(s) Oral every 6 hours PRN Severe Pain (7 - 10)

## 2023-08-14 NOTE — CONSULT NOTE ADULT - SUBJECTIVE AND OBJECTIVE BOX
The patient is a very pleasant 72 year old female subdural empyema in s/o pansinusitis and L temporal stroke. She is seen in consultation for history of stroke.    Briefly, the apteint was transferred from [facility] after presenting with R-sided weakness and aphasia in 12/2022 after she was found to have a left-sided subdural empyema s/p hemicraniectomy and washout with Dr. D'Amico and sinus washout with ENT 01/2023. Course complicated by focal seizures, on Keppra, following with epilepsy outpatient with no evidence of seizure on recent EEG. Of note, scans around that time of her initial admission to [facility] showed edema vs ischemia in the left temporal lobe. She was discharged home after several weeks of AR at the end of February but unfortunately declined with worsening aphasia and worsening R-sided weakness, ultimately resulting in a fall, shortly thereafter. She was readmitted to [facility]. 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 She was subsequently discharged to [facility] but failed to improve back how she was prior to late February. She returned for surgical follow-up and was noted to have thinning scalp with visible mesh due to significant weight loss because of which she was readmitted 08/10 for cranioplasty revision.     Exam:  Alert. Significant expression>receptive aphasia (global aphasia). Able to follow simple, midline commands consistently, less consistent with appendicular commands; unable to perform crossed/multi-step. Able to state name. and choose location appropriately when given options. Unable to state month/year despite choices. Unable to repeat. Mild/moderate anomia. L pupil >right. R UMN facial droop. UE: deltoid 2/5, bicep 2/5, tricep 2/5, WE 0/5, WF 1/5, FE/FF 0/5. L UE, bilateral LE 5/5 with exception of R Iliopsoas of 4+/5. Sensation intact.  The patient is a very pleasant 72 year old female subdural empyema in s/o pansinusitis and L temporal stroke. She is seen in consultation for history of stroke.    Briefly, the apteint was transferred from [facility] after presenting with R-sided weakness and aphasia in 12/2022 after she was found to have a left-sided subdural empyema s/p hemicraniectomy and washout with Dr. D'Amico and sinus washout with ENT 01/2023. Course complicated by focal seizures, on Keppra, following with epilepsy outpatient with no evidence of seizure on recent EEG X48 hours though there were sharp waves. Of note, scans around that time of her initial admission to [facility] showed edema vs ischemia in the left temporal lobe. She was discharged home after several weeks of AR at the end of February but unfortunately declined with worsening aphasia and worsening R-sided weakness, ultimately resulting in a fall, shortly thereafter. She was readmitted to [facility]. 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 She was subsequently discharged to [facility] but failed to improve back how she was prior to late February. She returned for surgical follow-up and was noted to have thinning scalp with visible mesh due to significant weight loss because of which she was readmitted 08/10 for cranioplasty revision.     Exam:  Alert. Significant expression>receptive aphasia (global aphasia). Able to follow simple, midline commands consistently, less consistent with appendicular commands; unable to perform crossed/multi-step. Able to state name. and choose location appropriately when given options. Unable to state month/year despite choices. Unable to repeat. Mild/moderate anomia. L pupil >right. R UMN facial droop. UE: deltoid 2/5, bicep 2/5, tricep 2/5, WE 0/5, WF 1/5, FE/FF 0/5. L UE, bilateral LE 5/5 with exception of R Iliopsoas of 4+/5. Sensation intact.

## 2023-08-14 NOTE — PROGRESS NOTE ADULT - REASON FOR ADMISSION
cranioplasty revision

## 2023-08-14 NOTE — DISCHARGE NOTE NURSING/CASE MANAGEMENT/SOCIAL WORK - NSDCPEFALRISK_GEN_ALL_CORE
For information on Fall & Injury Prevention, visit: [email or web address] OR  [email or web address] OR  https://www.cdc.gov/steadi/patient.html

## 2023-08-14 NOTE — DISCHARGE NOTE PROVIDER - NSDCFUADDINST_GEN_ALL_CORE_FT
Neurosurgery follow up appointment date/time:  - follow up in the office for a wound check and suture removal   - please call the office to confirm appointment: [phone]    Wound Care:  - shower and have hair washed with shampoo daily  - pat dry incision with a clean towel after showering  - leave incision uncovered, open to air   - no picking at incision    Devices/Drains/Lines:        Activity:  - fatigue is common after surgery, rest if you feel tired   - no bending, lifting, twisting or heavy lifting   - walking is recommended, ambulate as tolerated  - you may shower when you get home, keep your incision dry  - no soaking in a tub/pool/hot tub   - no driving within 24 hours of anesthesia or while taking prescription pain medications   - keep hydrated, drink plenty of water   - skull base precautions: no nose blowing, sneeze with mouth open, no drinking out of a straw, no straining      Inpatient consults:      Please also follow up with your primary care doctor.     Pain Expectations:  - pain after surgery is expected  - please take pain meds as prescribed     Medications:  - changes to home meds (ex. AED's)?  - new meds?  - pain meds?  - when can antiplatelets or anticoagulants be restarted?  - were adverse affects of meds discussed with patients?   - pain medications can cause constipation, you should eat a high fiber diet and may take a stool softener while on pain meds   - Avoid taking Advil (ibuprofen), Motrin (naproxen), or Aspirin for pain as they can cause bleeding     Call the office or come to ED if:  - wound has drainage or bleeding, increased redness or pain at incision site, neurological change, fever (>101), chills, night sweats, syncope, nausea/vomiting, chest pain, shortness of breath      Playback:  - See playback health for a copy of your discharge paperwork     WITHIN 24 HOURS OF DISCHARGE, PLEASE CONTACT NEURO PA  WITH ANY QUESTIONS OR CONCERNS: [phone]   OTHERWISE, PLEASE CALL THE OFFICE WITH ANY QUESTIONS OR CONCERNS: [phone] Neurosurgery follow up appointment date/time: 8/29 ay 11:30AM  - follow up in the office for a wound check and suture removal   - please call the office to confirm appointment: [phone]    Wound Care:  - shower and have hair washed with shampoo daily  - pat dry incision with a clean towel after showering  - leave incision uncovered, open to air   - no picking at incision    Activity:  - fatigue is common after surgery, rest if you feel tired   - no bending, lifting, twisting or heavy lifting   - walking is recommended, ambulate as tolerated  - you may shower when you get home, keep your incision dry  - no soaking in a tub/pool/hot tub   - no driving within 24 hours of anesthesia or while taking prescription pain medications   - keep hydrated, drink plenty of water     Please also follow up with your primary care doctor.     Pain Expectations:  - pain after surgery is expected  - please take pain meds as prescribed     Medications:  - Continue home medications as prescribed   - new meds: Keflex 500mg every 12 hours for 2 weeks (end date = 8/28) for wound infection prevention (can cause GI upset)  - pain meds: Tylenol as needed for mild to moderate pain, Tramadol 25mg every 6 hours as needed for severe pain (can cause sleepiness)  - adverse affects of meds discussed with patient and family  - pain medications can cause constipation, you should eat a high fiber diet and may take a stool softener while on pain meds   - Avoid taking Advil (ibuprofen), Motrin (naproxen), or Aspirin for pain as they can cause bleeding     Call the office or come to ED if:  - wound has drainage or bleeding, increased redness or pain at incision site, neurological change, fever (>101), chills, night sweats, syncope, nausea/vomiting, chest pain, shortness of breath      Playback:  - See playback health for a copy of your discharge paperwork     WITHIN 24 HOURS OF DISCHARGE, PLEASE CONTACT NEURO PA  WITH ANY QUESTIONS OR CONCERNS: [phone]   OTHERWISE, PLEASE CALL THE OFFICE WITH ANY QUESTIONS OR CONCERNS: [phone] Neurosurgery follow up appointment date/time: 8/29 ay 11:30AM  - follow up in the office for a wound check and suture removal   - please call the office to confirm appointment: [phone]    Wound Care:  - shower and have hair washed with shampoo daily  - pat dry incision with a clean towel after showering  - leave incision uncovered, open to air   - no picking at incision    Activity:  - fatigue is common after surgery, rest if you feel tired   - no bending, lifting, twisting or heavy lifting   - walking is recommended, ambulate as tolerated  - you may shower when you get home, keep your incision dry  - no soaking in a tub/pool/hot tub   - no driving within 24 hours of anesthesia or while taking prescription pain medications   - keep hydrated, drink plenty of water     Please also follow up with your primary care doctor.     Pain Expectations:  - pain after surgery is expected  - please take pain meds as prescribed     Medications:  - Continue home medications as prescribed: Amlodipine, Fluticasone, Keppra, Simethicone, bowel regimen, Travoprost   - new meds: Keflex 500mg every 12 hours for 2 weeks (end date = 8/28) for wound infection prevention (can cause GI upset)  - pain meds: Tylenol as needed for mild to moderate pain, Tramadol 25mg every 6 hours as needed for severe pain (can cause sleepiness)  - adverse affects of meds discussed with patient and family  - pain medications can cause constipation, you should eat a high fiber diet and may take a stool softener while on pain meds   - Avoid taking Advil (ibuprofen), Motrin (naproxen), or Aspirin for pain as they can cause bleeding     Call the office or come to ED if:  - wound has drainage or bleeding, increased redness or pain at incision site, neurological change, fever (>101), chills, night sweats, syncope, nausea/vomiting, chest pain, shortness of breath      Playback:  - See playback health for a copy of your discharge paperwork     WITHIN 24 HOURS OF DISCHARGE, PLEASE CONTACT NEURO PA  WITH ANY QUESTIONS OR CONCERNS: [phone]   OTHERWISE, PLEASE CALL THE OFFICE WITH ANY QUESTIONS OR CONCERNS: [phone]

## 2023-08-14 NOTE — DISCHARGE NOTE NURSING/CASE MANAGEMENT/SOCIAL WORK - NSDCFUADDAPPT_GEN_ALL_CORE_FT
Please follow up with Dr. Ben Chen on 8/29 at 11:30AM, call the office to confirm appointment at [phone]    Please follow up with Dr. Whitman    Please follow up with your primary care doctor

## 2023-08-14 NOTE — PROCEDURE NOTE - GENERAL PROCEDURE DETAILS
CHENCHO drain removed from self suction. Anchoring suture removed. Catheter removed without resistance. Bacitracin and xeroform placed to exit site and incision. Headwrap placed.
CHENCHO drain taken off suction, site cleaned with chlorhexidine, anchoring suture identified and cut, drain pulled and distal tip of the catheter was visualized, bacitracin and xeroform applied to drain site, headwrap appiled

## 2023-08-14 NOTE — DISCHARGE NOTE NURSING/CASE MANAGEMENT/SOCIAL WORK - PATIENT PORTAL LINK FT
You can access the FollowMyHealth Patient Portal offered by [facility] by registering at the following website: [email or web address]. By joining [facility]’s FollowMyHealth portal, you will also be able to view your health information using other applications (apps) compatible with our system.

## 2023-08-14 NOTE — DISCHARGE NOTE PROVIDER - NSDCCPTREATMENT_GEN_ALL_CORE_FT
PRINCIPAL PROCEDURE  Procedure: Cranioplasty, for skull defect greater than 5 cm in diameter  Findings and Treatment:

## 2023-08-14 NOTE — DISCHARGE NOTE PROVIDER - NSDCMRMEDTOKEN_GEN_ALL_CORE_FT
acetaminophen 325 mg oral tablet: 2 tab(s) orally every 6 hours, As needed, Temp greater or equal to 38C (100.4F), Mild Pain (1 - 3)  amLODIPine 10 mg oral tablet: 1 tab(s) orally once a day  fluticasone 50 mcg/inh nasal spray: 1 spray(s) nasal every 12 hours  levETIRAcetam 1000 mg oral tablet, extended release: 2 tab(s) orally once a day (at bedtime)  polyethylene glycol 3350 oral powder for reconstitution: 17 gram(s) orally once a day  senna leaf extract oral tablet: 2 tab(s) orally once a day (at bedtime)  simethicone 80 mg oral tablet, chewable: 1 tab(s) orally every 6 hours, As Needed  sodium chloride 0.65% nasal spray: 1 spray(s) nasal 4 times a day  travoprost 0.004% ophthalmic solution: 1 drop(s) to both eyes once a day (in the evening)   acetaminophen 325 mg oral tablet: 2 tab(s) orally every 6 hours, As needed, Temp greater or equal to 38C (100.4F), Mild Pain (1 - 3)  amLODIPine 10 mg oral tablet: 1 tab(s) orally once a day  cephalexin 500 mg oral capsule: 1 cap(s) orally every 12 hours for 2 weeks, end date = 8/28  enoxaparin: 40 milligram(s) subcutaneous once a day for DVT ppx while at rehab  fluticasone 50 mcg/inh nasal spray: 1 spray(s) nasal every 12 hours  levETIRAcetam 1000 mg oral tablet, extended release: 2 tab(s) orally once a day (at bedtime)  polyethylene glycol 3350 oral powder for reconstitution: 17 gram(s) orally once a day  senna leaf extract oral tablet: 2 tab(s) orally once a day (at bedtime)  simethicone 80 mg oral tablet, chewable: 1 tab(s) orally every 6 hours, As Needed  sodium chloride 0.65% nasal spray: 1 spray(s) nasal 4 times a day  traMADol 50 mg oral tablet: 0.5 tab(s) orally every 6 hours As needed Severe Pain (7 - 10)  travoprost 0.004% ophthalmic solution: 1 drop(s) to both eyes once a day (in the evening)

## 2023-08-14 NOTE — DISCHARGE NOTE PROVIDER - NSDCCPCAREPLAN_GEN_ALL_CORE_FT
PRINCIPAL DISCHARGE DIAGNOSIS  Diagnosis: Acquired skull defect  Assessment and Plan of Treatment: s/p left cranioplasty revision with aumentation of left anterior scalp with pericranial autograft and augmentation of scalp overlying mesh using acellular dermal matrix allograft, complex closure (8/10)     PRINCIPAL DISCHARGE DIAGNOSIS  Diagnosis: Acquired skull defect  Assessment and Plan of Treatment: s/p left cranioplasty revision with aumentation of left anterior scalp with pericranial autograft and augmentation of scalp overlying mesh using acellular dermal matrix allograft, complex closure (8/10)  Follow up with Dr. Ben Chen in the office for a wound check and suture removal, Keflex x2 weeks      SECONDARY DISCHARGE DIAGNOSES  Diagnosis: Seizure  Assessment and Plan of Treatment: continue home Keppra    Diagnosis: HTN (hypertension)  Assessment and Plan of Treatment: continue home Amlodipine    Diagnosis: Glaucoma  Assessment and Plan of Treatment: continue home eye drops    Diagnosis: Severe obesity  Assessment and Plan of Treatment:

## 2023-08-14 NOTE — PROGRESS NOTE ADULT - PROVIDER SPECIALTY LIST ADULT
Neurosurgery
Hospitalist
Hospitalist
Neurosurgery
Neurosurgery
Hospitalist

## 2023-08-14 NOTE — CONSULT NOTE ADULT - TIME BILLING
Review of hospital course, labs, vitals, medical records.   Bedside exam and interview    Discussed plan of care with primary team   Documenting the encounter
review of objective data, interview of patient, and discussion of assessment plan with patient/family/multidisciplinary team. I agree with ACP, Fellow documentation except where indicated above.

## 2023-08-14 NOTE — PROGRESS NOTE ADULT - ASSESSMENT
71 y/o female with PMHx HTN, GERD, Glaucoma, L SDH in 12/22, Subdural Empyema 1/23 who presented for revision of left cranioplasty now s/p left cranioplasty revision with aumentation of left anterior scalp with pericranial autograft and augmentation of scalp overlying mesh using acellular dermal matrix allograft, complex closure (8/10)    #Cranial defect s/p craniotomy due to L SDH and empyema  - s/p left cranioplasty revision (8/10)  - drain management per primary team  - keppra  - abx per primary team    #HTN  - c/w amlodipine 10mg    #Anemia  - hgb stable ~10  - prior iron panels with AOCD    #Glaucoma  - c/w home eye drops    #Obesity  - BMI 33 down from 42 in the past    
73 y/o female with PMHx HTN, GERD, Glaucoma, L SDH in 12/22, Subdural Empyema 1/23 who presented for revision of left cranioplasty now s/p left cranioplasty revision with aumentation of left anterior scalp with pericranial autograft and augmentation of scalp overlying mesh using acellular dermal matrix allograft, complex closure (8/10)    #Cranial defect s/p craniotomy due to L SDH and empyema  - s/p left cranioplasty revision (8/10)  - drain management per primary team  - keppra  - abx per primary team    #HTN  - c/w amlodipine 10mg    #Anemia  - hgb stable ~10  - prior iron panels with AOCD    #Glaucoma  - c/w home eye drops    #Obesity  - BMI 33 down from 42 in the past
73 y/o female with PMHx HTN, GERD, Glaucoma, L SDH in 12/22, Subdural Empyema 1/23 who presented for revision of left cranioplasty now s/p left cranioplasty revision with aumentation of left anterior scalp with pericranial autograft and augmentation of scalp overlying mesh using acellular dermal matrix allograft, complex closure (8/10)    #Cranial defect s/p craniotomy due to L SDH and empyema  - s/p left cranioplasty revision (8/10)  - drain management per primary team  - keppra  - abx per primary team    #HTN  - c/w amlodipine 10mg    #Anemia  - hgb stable ~10  - prior iron panels with AOCD    #Glaucoma  - c/w home eye drops    #Obesity  - BMI 33 down from 42 in the past    Dispo: for return to rehab today

## 2023-08-14 NOTE — PROGRESS NOTE ADULT - SUBJECTIVE AND OBJECTIVE BOX
HPI:  71 y/o female with PMHx HTN, GERD, Glaucoma, Subdural Empyema presents for revision of left cranioplasty today.  Daughter reports that she hasn't been making much progress at [facility].  She still has word finding difficulties and right upper than lower extremity weakness.   (10 Aug 2023 06:58)    Hospital Course:   8/10: POD 0 left cranioplasty revision with aumentation of left anterior scalp with pericranial autograft and augmentation of scalp overlying mesh using acellular dermal matrix allograft, complex closure.  8/11: POD 1. Drains x 2.   8/12: POD 2.   8/13: POD 3. DEEPALI overnight, CHENCHO x 2 in place, pending RIRI   8/14: POD 4. DEEPALI overnight, pending RIRI      Vital Signs Last 24 Hrs  T(C): 36.6 (13 Aug 2023 21:15), Max: 36.7 (13 Aug 2023 17:08)  T(F): 97.9 (13 Aug 2023 21:15), Max: 98.1 (13 Aug 2023 17:08)  HR: 77 (13 Aug 2023 21:15) (68 - 77)  BP: 123/76 (13 Aug 2023 21:15) (106/65 - 123/76)  BP(mean): 81 (13 Aug 2023 08:53) (81 - 81)  RR: 18 (13 Aug 2023 21:15) (17 - 18)  SpO2: 95% (13 Aug 2023 21:15) (94% - 96%)    Parameters below as of 13 Aug 2023 21:15  Patient On (Oxygen Delivery Method): room air        I&O's Detail    12 Aug 2023 07:01  -  13 Aug 2023 07:00  --------------------------------------------------------  IN:  Total IN: 0 mL    OUT:    Bulb (mL): 12 mL    Bulb (mL): 9 mL    Voided (mL): 2700 mL  Total OUT: 2721 mL    Total NET: -2721 mL      13 Aug 2023 07:01  -  14 Aug 2023 01:13  --------------------------------------------------------  IN:    IV PiggyBack: 100 mL    sodium chloride 0.9%: 1000 mL  Total IN: 1100 mL    OUT:    Bulb (mL): 4 mL    Voided (mL): 1900 mL  Total OUT: 1904 mL    Total NET: -804 mL        I&O's Summary    12 Aug 2023 07:01  -  13 Aug 2023 07:00  --------------------------------------------------------  IN: 0 mL / OUT: 2721 mL / NET: -2721 mL    13 Aug 2023 07:01  -  14 Aug 2023 01:13  --------------------------------------------------------  IN: 1100 mL / OUT: 1904 mL / NET: -804 mL        PHYSICAL EXAM:  General: patient seen laying supine in bed in NAD  Neuro: AAOx3, FC, OE spontaneously, aphasic w/ improving speech, LUE/LLE 5/5 and RLE 4+/5, RUE 2/5 w/ hand contracted   Neck: supple  Cardiac: RRR, S1S2  Pulmonary: chest rise symmetric  Abdomen: soft, nontender, nondistended  Ext: perfusing well  Skin: warm, dry  Wound: +Headwrap in place    TUBES/LINES:  [] CVC  [] A-line  [] Lumbar Drain  [] Ventriculostomy  [] Other    DIET:  [] NPO  [x] Mechanical  [] Tube feeds    LABS:                        9.5    7.71  )-----------( 226      ( 13 Aug 2023 05:30 )             31.0     08-13    140  |  107  |  22  ----------------------------<  97  4.0   |  25  |  0.75    Ca    8.8      13 Aug 2023 05:30  Phos  3.2     08-13  Mg     1.8     08-13        Urinalysis Basic - ( 13 Aug 2023 05:30 )    Color: x / Appearance: x / SG: x / pH: x  Gluc: 97 mg/dL / Ketone: x  / Bili: x / Urobili: x   Blood: x / Protein: x / Nitrite: x   Leuk Esterase: x / RBC: x / WBC x   Sq Epi: x / Non Sq Epi: x / Bacteria: x          CAPILLARY BLOOD GLUCOSE          Drug Levels: [] N/A    CSF Analysis: [] N/A      Allergies    No Known Allergies    Intolerances      MEDICATIONS:  Antibiotics:  ceFAZolin   IVPB 2000 milliGRAM(s) IV Intermittent every 8 hours    Neuro:  HYDROmorphone  Injectable 0.25 milliGRAM(s) IV Push every 2 hours PRN  levETIRAcetam ER 2000 milliGRAM(s) Oral at bedtime  traMADol 25 milliGRAM(s) Oral every 6 hours PRN    Anticoagulation:  enoxaparin Injectable 40 milliGRAM(s) SubCutaneous every 24 hours    OTHER:  amLODIPine   Tablet 10 milliGRAM(s) Oral daily  fluticasone propionate 50 MICROgram(s)/spray Nasal Spray 1 Spray(s) Both Nostrils every 12 hours  hydrALAZINE Injectable 10 milliGRAM(s) IV Push every 1 hour PRN  latanoprost 0.005% Ophthalmic Solution 1 Drop(s) Both EYES at bedtime  polyethylene glycol 3350 17 Gram(s) Oral daily  senna 2 Tablet(s) Oral at bedtime  sodium chloride 0.65% Nasal 1 Spray(s) Both Nostrils four times a day PRN    IVF:  sodium chloride 0.9%. 1000 milliLiter(s) IV Continuous <Continuous>    CULTURES:    RADIOLOGY & ADDITIONAL TESTS:      ASSESSMENT:  71 y/o female with PMHx HTN, GERD, Glaucoma, Subdural Empyema presents for revision of left cranioplasty.  Has word finding difficulties and R sided weakness.  Now s/p left cranioplasty revision with aumentation of left anterior scalp with pericranial autograft and augmentation of scalp overlying mesh using acellular dermal matrix allograft, complex closure (8/10).    Z98.890    No pertinent family history in first degree relatives    Handoff    MEWS Score    HTN (hypertension)    CVA (cerebrovascular accident)    Acquired skull defect    Acquired skull defect    S/P craniotomy    S/P appendectomy    S/P wrist surgery    Room Service Assist    [email or web address]        PLAN:  Neuro   - Vitals/neuro checks q4h  - 1 SG tram drain - monitor output   - cranial ERAS  - Keppra 2000mg XR qhs    Cardio   - - 140   - hx HTN: continue amlodipine 10mg qd    Pulm  - RA, IS     GI  - regular diet   - bowel regimen     Renal   - IVL    Endo   - no issues     Heme   - SCDs SQL    ID   - Ancef while drains in; Keflex x 2 weeks when drains removed    D/w Dr. Giles   Dispo: RIRI          Assessment:  Present when checked    []  GCS  E   V  M     Heart Failure: []Acute, [] acute on chronic , []chronic  Heart Failure:  [] Diastolic (HFpEF), [] Systolic (HFrEF), []Combined (HFpEF and HFrEF), [] RHF, [] Pulm HTN, [] Other    [] DIONTE, [] ATN, [] AIN, [] other  [] CKD1, [] CKD2, [] CKD 3, [] CKD 4, [] CKD 5, []ESRD    Encephalopathy: [] Metabolic, [] Hepatic, [] toxic, [] Neurological, [] Other    Abnormal Nurtitional Status: [] malnurtition (see nutrition note), [ ]underweight: BMI < 19, [] morbid obesity: BMI >40, [] Cachexia    [] Sepsis  [] hypovolemic shock,[] cardiogenic shock, [] hemorrhagic shock, [] neuogenic shock  [] Acute Respiratory Failure  []Cerebral edema, [] Brain compression/ herniation,   [] Functional quadriplegia  [] Acute blood loss anemia

## 2023-08-14 NOTE — DISCHARGE NOTE PROVIDER - NSDCFUADDAPPT_GEN_ALL_CORE_FT
Please follow up with Dr. Ben Chen on 8/29 at 11:30AM, call the office to confirm appointment at [phone]    Please follow up with Dr. Whitman    Please follow up with your primary care doctor

## 2023-08-14 NOTE — CONSULT NOTE ADULT - ASSESSMENT
The patient is a very pleasant 72 year old female subdural empyema in s/o pansinusitis and L temporal stroke. She is seen in consultation for history of stroke. The patient is a very pleasant 72 year old female subdural empyema in s/o pansinusitis and L temporal stroke. She is seen in consultation for history of stroke.    CTH’s dating back to pre and immediately post-op of her initial surgery in 12/2022 do seem to show an area of hypodensity of variable size on individual scans involving the left temporal lobe which does seem to correlate with the subacute/chronic-appearing stroke seen on follow-up MRI on 02/2023 and more chronic-appearing stroke on 06/2023 (of note, there was some DWI signal with isointense ADC, favored to be chronic infarct).     It is unclear to me what caused the initial decline in function in 02/2023. Based on imaging, the stroke may have pre-dated the decline.  Per recent 48 hr EEG, seizure seems less likely a cause of the decline. Regardless of cause, she needs appropriate stroke eval (despite mechanism possibly related to infection, etc) with CTA, TTE, 30 day monitor (minimum) and stroke labs which we can arrange in the outpatient setting. Ideally, she would be on aspirin, statin therapy as well but defer timing of antiplatelet to neurosurgery.

## 2023-08-14 NOTE — DISCHARGE NOTE PROVIDER - NSDCFUSCHEDAPPT_GEN_ALL_CORE_FT
Vicky Ribera Physician [facility]  NEUROLOGY [address]  Scheduled Appointment: 09/08/2023     Diego Rosenberg  [facility]  NEUROSURG [address]  Scheduled Appointment: 08/29/2023    Rafia Whitman  [facility]  NEUROLOGY [address]  Scheduled Appointment: 08/29/2023    Vicky Ribera  [facility]  NEUROLOGY [address]  Scheduled Appointment: 09/08/2023

## 2023-08-14 NOTE — DISCHARGE NOTE PROVIDER - CARE PROVIDER_API CALL
Diego Rosenberg  Neurosurgery  [address] [place] [address]  Phone: [phone]  Fax: [phone]  Follow Up Time:     Rafia Whitman  Neurology  [address] [address]  Phone: [phone]  Fax: [phone]  Follow Up Time:

## 2023-08-18 DIAGNOSIS — G81.91 HEMIPLEGIA, UNSPECIFIED AFFECTING RIGHT DOMINANT SIDE: ICD-10-CM

## 2023-08-18 DIAGNOSIS — M95.2 OTHER ACQUIRED DEFORMITY OF HEAD: ICD-10-CM

## 2023-08-18 DIAGNOSIS — D64.9 ANEMIA, UNSPECIFIED: ICD-10-CM

## 2023-08-18 DIAGNOSIS — H40.9 UNSPECIFIED GLAUCOMA: ICD-10-CM

## 2023-08-18 DIAGNOSIS — E66.9 OBESITY, UNSPECIFIED: ICD-10-CM

## 2023-08-18 DIAGNOSIS — G40.909 EPILEPSY, UNSPECIFIED, NOT INTRACTABLE, WITHOUT STATUS EPILEPTICUS: ICD-10-CM

## 2023-08-18 DIAGNOSIS — I10 ESSENTIAL (PRIMARY) HYPERTENSION: ICD-10-CM

## 2023-08-18 DIAGNOSIS — K21.9 GASTRO-ESOPHAGEAL REFLUX DISEASE WITHOUT ESOPHAGITIS: ICD-10-CM

## 2023-08-18 DIAGNOSIS — I69.320 APHASIA FOLLOWING CEREBRAL INFARCTION: ICD-10-CM

## 2023-08-29 ENCOUNTER — APPOINTMENT (OUTPATIENT)
Dept: NEUROLOGY | Facility: CLINIC | Age: 73
End: 2023-08-29
Payer: MEDICARE

## 2023-08-29 ENCOUNTER — NON-APPOINTMENT (OUTPATIENT)
Age: 73
End: 2023-08-29

## 2023-08-29 ENCOUNTER — LABORATORY RESULT (OUTPATIENT)
Age: 73
End: 2023-08-29

## 2023-08-29 ENCOUNTER — APPOINTMENT (OUTPATIENT)
Dept: NEUROSURGERY | Facility: CLINIC | Age: 73
End: 2023-08-29
Payer: MEDICARE

## 2023-08-29 VITALS
TEMPERATURE: 98.4 F | HEIGHT: 67 IN | BODY MASS INDEX: 39.24 KG/M2 | RESPIRATION RATE: 16 BRPM | DIASTOLIC BLOOD PRESSURE: 73 MMHG | OXYGEN SATURATION: 95 % | WEIGHT: 250 LBS | HEART RATE: 96 BPM | SYSTOLIC BLOOD PRESSURE: 110 MMHG

## 2023-08-29 DIAGNOSIS — Z86.73 PERSONAL HISTORY OF TRANSIENT ISCHEMIC ATTACK (TIA), AND CEREBRAL INFARCTION W/OUT RESIDUAL DEFICITS: ICD-10-CM

## 2023-08-29 PROCEDURE — 99213 OFFICE O/P EST LOW 20 MIN: CPT

## 2023-08-29 PROCEDURE — 99024 POSTOP FOLLOW-UP VISIT: CPT

## 2023-08-29 RX ORDER — ENOXAPARIN SODIUM 100 MG/ML
40 INJECTION SUBCUTANEOUS
Refills: 0 | Status: ACTIVE | COMMUNITY

## 2023-08-29 RX ORDER — FLUTICASONE PROPIONATE 50 UG/1
50 SPRAY, METERED NASAL DAILY
Refills: 0 | Status: ACTIVE | COMMUNITY

## 2023-08-29 RX ORDER — SIMETHICONE 80 MG/1
80 TABLET, CHEWABLE ORAL
Refills: 0 | Status: ACTIVE | COMMUNITY

## 2023-08-29 RX ORDER — ACETAMINOPHEN 325 MG/1
325 TABLET ORAL
Refills: 0 | Status: ACTIVE | COMMUNITY

## 2023-08-29 RX ORDER — POLYETHYLENE GLYCOL 3350 17 G/17G
17 POWDER, FOR SOLUTION ORAL DAILY
Refills: 0 | Status: ACTIVE | COMMUNITY

## 2023-08-29 RX ORDER — AMLODIPINE BESYLATE 10 MG/1
10 TABLET ORAL DAILY
Refills: 0 | Status: ACTIVE | COMMUNITY

## 2023-08-29 RX ORDER — SENNOSIDES 8.6 MG TABLETS 8.6 MG/1
8.6 TABLET ORAL
Refills: 0 | Status: ACTIVE | COMMUNITY

## 2023-08-29 RX ORDER — LEVETIRACETAM 1000 MG/1
1000 TABLET, FILM COATED ORAL
Refills: 0 | Status: ACTIVE | COMMUNITY

## 2023-08-29 RX ORDER — TRAVOPROST 0.04 MG/ML
0 SOLUTION/ DROPS OPHTHALMIC
Refills: 0 | Status: ACTIVE | COMMUNITY

## 2023-08-29 RX ORDER — SODIUM CHLORIDE 0.65 %
0.65 AEROSOL, SPRAY (ML) NASAL
Refills: 0 | Status: ACTIVE | COMMUNITY

## 2023-08-29 NOTE — ASSESSMENT
[FreeTextEntry1] : Patient is a 72 year old female with PMH subdural empyema in s/o pansinusitis and left temporal stroke presenting as a new patient s/p recent discharge from the hospital. Based on previous imaging, the stroke seems to predate her initial decline in function in 02/2023 however she hasn't received a stroke eval.  Plan for stroke eval - CTA Head - TTE w/ bubble - 30 day monitor - Stroke labs, hypercoags - Speech therapy referral - Begin Aspirin and Statin, pending neurosx recommendations - F/U in 1 month

## 2023-08-29 NOTE — HISTORY OF PRESENT ILLNESS
[FreeTextEntry1] : Patient is a 72 year old female with PMH subdural empyema in s/o pansinusitis and left temporal stroke presenting as a new patient s/p recent discharge from the hospital. Patient was previously transferred to [facility] from [facility] after presenting with Right sided weakness and aphasia in 12/2022, after which she was found to have a left sided subdural empyema s/p hemicraniectomy and washout with Dr. D/Amico and sinus washout with ENT 01/2023. Course complicated by focal seziures, on Keppra, following with epilepsy outpatient with no evidence of seizure on recent EEG x48 hours though there were sharp waves. Of note, scans around that time of her initial admission to [facility] showed edema vs ischemia in the left temporal lobe. She was discharged home after several weeks of acute rehab at the end of February 2023 but unfortunately declined with worsening aphasia and worsening right sided weakness, ultimately resulting in a fall shortly thereafter.  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 She was subsequently discharged to [facility] but failed to improve back how she was prior to late February. She returned for surgical follow up and was noted to have thinning scalp with visible mesh due to significant weight loss because of which she was readmitted 8/10/23 for cranioplasty revision.  Currently, she is still in the rehab facility, denying any new stroke like symptoms. She presents with expressive aphasia that she's been experiencing since 02/2023. They just restarted her physical therapy. She just got her sutures removed during her follow up with neurosx today.  Recent labs done at [facility] (8/15/23): LDL 83.4

## 2023-08-29 NOTE — PHYSICAL EXAM
[FreeTextEntry1] : The patient is alert and oriented x3, follows commands, and is able to participate fully in the history taking. Speech has no evidence of dysarthria but patient has expressive aphasia. Memory is intact: Immediate recall 3 out of 3, short-term 3 out of 3, remote memory intact. Cranial nerves II through XII intact. Right sided facial flattening. Motor exam: Right upper extremity 1/5, Right lower extremity 4/5, Left upper and lower extremities 5/5 power, normal tone. No abnormal movements noted. Sensory exam: Intact to light touch and pinprick bilaterally with left upper extremity having more sensation than right upper extremity. Coordination and vestibular exam: Finger to nose intact on left upper extremity, no evidence of nystagmus. Gait: Deferred as patient uses a wheelchair.

## 2023-08-30 NOTE — ASSESSMENT
[FreeTextEntry1] : This is a complex case 72 year old female s/p 3/1/23 Leftsided hemicraniectomy for subdural empyema followed by 6 weeks of antibiotics, and developed severe syndrome of the trephined. she is now s/p cranioplasty reconstruction with  titanium mesh implants measuring 16 x 12 sq  with pedicled pericranial flap. Most recently s/p cranioplasty revision and scalp augmentation using large piece of ADM  She is stable and healing appropriately. She is an appropriate candidate for complete suture removal today/ Instructed to: - Can leave incision open to air. - Gently wash surgical sites daily with baby shampoo and water. Pat dry. - No swimming or soaking in bathtub for 6 weeks post-operatively or until wounds have fully healed. - Avoid applying cream/ointment directly to surgical incisions for 6 weeks post-op. - Continue monitoring for signs of infection including increased pain, redness, swelling, fever (temperature > 100.4 F), or yellow/green/brown drainage. - Please call immediately with any of these symptoms. During office hours, call your our office at [phone]. Call 9-1-1 for any life-threatening signs or symptoms. - Follow up with rest of medical team as advised. - Follow-up PRN with Dr. Rosenberg for post-op and continued suture removal.  Patient verbalized understanding and agreement with treatment plan.  I, Dr. Rosenberg, personally performed the evaluation and management (E/M) services for this new patient. That E/M includes conducting the initial examination, assessing all conditions, and establishing the plan of care. Today, my ACP, Annetta Alegria, was here to observe my evaluation and management services for this patient to be followed going forward.  Today, I personally spent 30 minutes in direct face to face time with the patient, of which greater than 50% of the time was spent in patient education and counseling as described above.   I, Dr. Rosenberg, personally performed the evaluation and management (E/M) services for this new patient. That E/M includes conducting the initial examination, assessing all conditions, and establishing the plan of care. Today, my ACP, was here to observe my evaluation and management services for this patient to be followed going forward.   Today, I personally spent 30 minutes in direct face to face time with the patient, of which greater than 50% of the time was spent in patient education and counseling as described above.

## 2023-09-07 ENCOUNTER — NON-APPOINTMENT (OUTPATIENT)
Age: 73
End: 2023-09-07

## 2023-09-07 LAB
APO LP(A) SERPL-MCNC: 192.6 NMOL/L
B2 GLYCOPROT1 IGA SERPL IA-ACNC: 10.9 SAU
B2 GLYCOPROT1 IGG SER-ACNC: <5 SGU
B2 GLYCOPROT1 IGM SER-ACNC: <5 SMU
CARDIOLIPIN AB SER IA-ACNC: POSITIVE
CARDIOLIPIN IGM SER-MCNC: 5.7 MPL
CARDIOLIPIN IGM SER-MCNC: 6.8 GPL
CHOLEST SERPL-MCNC: 197 MG/DL
DEPRECATED CARDIOLIPIN IGA SER: 43.4 APL
DNA PLOIDY SPEC FC-IMP: NORMAL
FACT VIII ACT/NOR PPP: 295 %
FOLATE SERPL-MCNC: 16.2 NG/ML
HDLC SERPL-MCNC: 45 MG/DL
HOMOCYSTEINE LEVEL: 15.4 UMOL/L
INR PPP: 1.04 RATIO
LDLC SERPL CALC-MCNC: 133 MG/DL
METHYLMALONIC ACID LEVEL: 145 NMOL/L
NONHDLC SERPL-MCNC: 152 MG/DL
PROT C PPP CHRO-ACNC: 142 %
PROT S AG ACT/NOR PPP IA: 81 %
PROT S FREE PPP-ACNC: 104 %
PT BLD: 11.7 SEC
TRIGL SERPL-MCNC: 107 MG/DL
VIT B12 SERPL-MCNC: 1061 PG/ML

## 2023-09-08 LAB
ALPHA LIPOPROTEINS: NORMAL
BETA LIPOPROTEINS: NORMAL
CHOLESTEROL, TOTAL: 194 MG/DL
LIPOPROTEIN CHYLOMICRONS: ABNORMAL
LIPOPROTEIN INTERPRETATION: NORMAL
PRE-BETA LIPOPROTEINS: NORMAL
SERUM APPEARANCE: CLEAR
TRIGLYCERIDES: 113 MG/DL

## 2023-09-12 ENCOUNTER — APPOINTMENT (OUTPATIENT)
Dept: NEUROLOGY | Facility: CLINIC | Age: 73
End: 2023-09-12
Payer: MEDICARE

## 2023-09-12 DIAGNOSIS — R47.01 APHASIA: ICD-10-CM

## 2023-09-12 PROCEDURE — 99213 OFFICE O/P EST LOW 20 MIN: CPT | Mod: 95

## 2023-11-16 ENCOUNTER — APPOINTMENT (OUTPATIENT)
Dept: CT IMAGING | Facility: HOSPITAL | Age: 73
End: 2023-11-16
Payer: MEDICARE

## 2023-11-16 ENCOUNTER — OUTPATIENT (OUTPATIENT)
Dept: OUTPATIENT SERVICES | Facility: HOSPITAL | Age: 73
LOS: 1 days | End: 2023-11-16
Payer: MEDICARE

## 2023-11-16 DIAGNOSIS — Z90.49 ACQUIRED ABSENCE OF OTHER SPECIFIED PARTS OF DIGESTIVE TRACT: Chronic | ICD-10-CM

## 2023-11-16 DIAGNOSIS — Z98.890 OTHER SPECIFIED POSTPROCEDURAL STATES: Chronic | ICD-10-CM

## 2023-11-16 LAB
POCT ISTAT CREATININE: 0.7 MG/DL — SIGNIFICANT CHANGE UP (ref 0.5–1.3)
POCT ISTAT CREATININE: 0.7 MG/DL — SIGNIFICANT CHANGE UP (ref 0.5–1.3)

## 2023-11-16 PROCEDURE — 82565 ASSAY OF CREATININE: CPT

## 2023-11-16 PROCEDURE — 76376 3D RENDER W/INTRP POSTPROCES: CPT | Mod: 26

## 2023-11-16 PROCEDURE — 93306 TTE W/DOPPLER COMPLETE: CPT | Mod: 26

## 2023-11-16 PROCEDURE — 70496 CT ANGIOGRAPHY HEAD: CPT | Mod: 26,MH

## 2023-11-16 PROCEDURE — 70496 CT ANGIOGRAPHY HEAD: CPT

## 2023-11-16 PROCEDURE — 93306 TTE W/DOPPLER COMPLETE: CPT

## 2023-12-04 ENCOUNTER — NON-APPOINTMENT (OUTPATIENT)
Age: 73
End: 2023-12-04

## 2024-01-01 NOTE — OCCUPATIONAL THERAPY INITIAL EVALUATION ADULT - BATHING, PREVIOUS LEVEL OF FUNCTION, OT EVAL
[facility]                  [address] [place] [address]                                  CONSULTATION    PATIENT NAME: BRANDEN PRICE                    :        1982  MED REC NO:   [id number]                            ROOM:       8208  ACCOUNT NO:   [id number]                           ADMIT DATE: 2023  PROVIDER:     Agnieszka Roberts MD    CONSULT DATE:  2024    REASON FOR CONSULT:  T12 burst fracture.    HISTORY OF PRESENT ILLNESS:  The patient is a 41-year-old lady who was  up the chair hanging balloons and she fell off the chair and landed on  her back, immediately had excruciating back pain.  She was subsequently  brought to [facility] where CT scan showed a T12 burst  fracture.  As a result of that Neurosurgery Service was consulted.  At  this time, she continues complaining of excruciating back pain that is  made worse with movement, it is better with rest.  She denies any new  numbness, tingling or weakness or loss of control of bowel or bladder  function.  She does have some paresthesias in her legs.    PAST MEDICAL HISTORY:  Positive for hypothyroidism, diabetes,  hyperlipidemia.    HOME MEDICATIONS:  Include metformin and Synthroid.    PAST SURGICAL HISTORY:  Positive for tonsillectomy, adenoidectomy and  LEEP.    FAMILY HISTORY:  Positive for rheumatoid arthritis in her mother.   Diabetes in her father.    ALLERGIES:  Include _____.    SOCIAL HISTORY:  Positive for use of nicotine vape.    REVIEW OF SYSTEMS:  HEENT:  Negative for headache, double vision, blurry  vision.  CARDIOVASCULAR:  Negative for chest pain, arrhythmia or  palpitations.  RESPIRATORY:  Negative for shortness of breath, asthma,  bronchitis or pneumonia..  GASTROINTESTINAL:  Positive for  gastroesophageal reflux.  GENITOURINARY:  Negative for hematuria,  dysuria.  HEMATOLOGIC:  Positive for easy bruising.  INFECTIOUS:   Negative for any recent  independent

## 2024-01-03 ENCOUNTER — APPOINTMENT (OUTPATIENT)
Dept: NEUROLOGY | Facility: CLINIC | Age: 74
End: 2024-01-03
Payer: MEDICARE

## 2024-01-03 PROCEDURE — 95816 EEG AWAKE AND DROWSY: CPT

## 2024-01-04 PROCEDURE — 95708 EEG WO VID EA 12-26HR UNMNTR: CPT

## 2024-01-04 PROCEDURE — 95717 EEG PHYS/QHP 2-12 HR W/O VID: CPT

## 2024-01-04 PROCEDURE — 95700 EEG CONT REC W/VID EEG TECH: CPT

## 2024-01-04 PROCEDURE — 95719 EEG PHYS/QHP EA INCR W/O VID: CPT

## 2024-01-05 ENCOUNTER — APPOINTMENT (OUTPATIENT)
Dept: NEUROLOGY | Facility: CLINIC | Age: 74
End: 2024-01-05

## 2024-01-10 ENCOUNTER — APPOINTMENT (OUTPATIENT)
Dept: NEUROLOGY | Facility: CLINIC | Age: 74
End: 2024-01-10
Payer: MEDICARE

## 2024-01-10 DIAGNOSIS — Z91.89 OTHER SPECIFIED PERSONAL RISK FACTORS, NOT ELSEWHERE CLASSIFIED: ICD-10-CM

## 2024-01-10 PROCEDURE — 99213 OFFICE O/P EST LOW 20 MIN: CPT

## 2024-01-10 RX ORDER — LEVETIRACETAM 500 MG/1
500 TABLET, FILM COATED, EXTENDED RELEASE ORAL
Qty: 90 | Refills: 5 | Status: ACTIVE | COMMUNITY
Start: 2024-01-10 | End: 1900-01-01

## 2024-01-10 NOTE — DISCUSSION/SUMMARY
[FreeTextEntry1] : 72 yo F with recent left subdural empyema s/p craniotomy/cranioplasty  referred by neurosurgery for Keppra management. Possible seizures close to onset of symptoms related to empyema however no definite seizures since. She continues to carry potential risk given history. .AEEG completed 1/3-1/4/2024 1)	There was focal slowing over the left hemisphere with breech artifact.   2)	Sharp wave discharges in the left central region.   Discussed we  can try and lower Keppra minimally given evidence of ongoing seizure risk on AEEG. Fatigue likely multifactorial Decrease Keppra XR 1500mg/nightly F/u in 6 months

## 2024-01-10 NOTE — HISTORY OF PRESENT ILLNESS
[FreeTextEntry1] : Interim Hx: 1/10/2024 (televisit)  Daughter reports overall patient has been stable. Very minimal improvement in symptoms.  No seizures reported. Continues Keppra XR 2g/nightly. Continues to have fatigue  AEEG completed 1/3-1/4/2024 1)	There was focal slowing over the left hemisphere with breech artifact.   2)	Sharp wave discharges in the left central region.   Following with stroke. CTA and echo completed 11/16.  ____________________________ Interim Hx: 9/12/2023 (televisit) Patient still in rehab. Daughter reports very slight improvement   S/p left cranioplasty revision and scalp augmentation on 8/10/2023  No seizures reported. On Keppra XR 2 g/daily Does have daytime fatigue   Seen by Stroke team w/ plan for stroke work up  ___________________________ Interim Hx: 6/15/2023 (televisit)  Patient still in rehab. Daughter reports there improvement has been slow but speech is slightly better.  Ambulatory EEG 5/17-5/19/2023 prelim review showed sharply contoured waveforms over left suggestive of hyperexcitability.   Patient reports no change in fatigue.  Upon speaking to charge nurse Keppra currently ordered as 1000mg in AM (regular) and XR 2000mg/night.  Unclear why it is ordered as such ____________________ Initial Hx: 5/10/2023 73 yo F with recent left subdural empyema s/p craniotomy/cranioplasty referred by neurosurgery for Keppra management.   Hospital records reviewed Initially presented to [facility] 12/22/2022 for slurred speech hand right sided weakness. CTH initially negative, repeat on 12/23 showed L sided SDH. 12/25 mental status worsened and required intubation. She was also noted to have episodes of twitching which was concerning for seizure so was started on Keppra 1 g BID and EEG was placed. No epileptiform activity noted.  CTH on 12/28 significant for L frontoparietal collection, MRI completed showed concern for possible empyema..  Transferred to [facility] 12/30. S/p L hemicraniectomy and washout of L subdural empyema 12/30/22. S/p sinus surgery with judith purulence to L maxillary sinus and posterior bony erosion to L sphenoid sinus 1/5. Discharged to [facility] [place], operated by [facility] rehab with plan to return for cranioplasty in 3 months post hemicraniectomy.  She spent 4 weeks at the rehab and then went home. Her right side weakness and aphasia were improving but then was re-admitted in Feb for worsening right sided weakness and aphasia. 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 S/p left cranioplasty reconstruction. Went to acute rehab and currently is in subacute rehab. Continues to participate in speech therapy/OT/PT, is improving very slowly. Now able to stand with one assist (as opposed to two).  Currently still on Keppra 1 g BID No seizures witnessed. Does have fatigue

## 2024-01-10 NOTE — REASON FOR VISIT
[Medical Office: ([facility])___] : at the medical office located in  [Follow-Up: _____] : a [unfilled] follow-up visit [This encounter was initiated by telehealth (audio with video) and converted to telephone (audio only) due to technical difficulties.] : This encounter was initiated by telehealth (audio with video) and converted to telephone (audio only) due to technical difficulties. [FreeTextEntry2] : Daughter

## 2024-01-12 ENCOUNTER — NON-APPOINTMENT (OUTPATIENT)
Age: 74
End: 2024-01-12

## 2024-02-06 NOTE — ED ADULT NURSE NOTE - CADM POA CENTRAL LINE
What Type Of Note Output Would You Prefer (Optional)?: Bullet Format Has Your Skin Lesion Been Treated?: not been treated Is This A New Presentation, Or A Follow-Up?: Skin Lesion Additional History: Pt is here for a total body skin exam. No spots bothering him today. No

## 2024-03-15 NOTE — SWALLOW FEES ASSESSMENT ADULT - ADDITIONAL RECOMMENDATIONS
This service will continue to follow to address speech-language needs as well as to monitor diet tolerance. [4 ___] : forward flexion 4[unfilled]/5 [4___] : internal rotation 4[unfilled]/5 [Calcific density] : Calcific density [Left] : left shoulder [] : no erythema [TWNoteComboBox7] : active forward flexion 170 degrees [de-identified] : active abduction 150 degrees [TWNoteComboBox6] : internal rotation L5 [de-identified] : external rotation 65 degrees

## 2024-05-10 ENCOUNTER — APPOINTMENT (OUTPATIENT)
Dept: NEUROLOGY | Facility: CLINIC | Age: 74
End: 2024-05-10

## 2024-05-10 ENCOUNTER — APPOINTMENT (OUTPATIENT)
Dept: NEUROLOGY | Facility: CLINIC | Age: 74
End: 2024-05-10
Payer: MEDICARE

## 2024-05-10 PROCEDURE — 99213 OFFICE O/P EST LOW 20 MIN: CPT

## 2024-05-10 NOTE — HISTORY OF PRESENT ILLNESS
[Home] : at home, [unfilled] , at the time of the visit. [Medical Office: ([facility])___] : at the medical office located in  [Verbal consent obtained from patient] : the patient, [unfilled] [FreeTextEntry1] : Interim Hx: 5/10/2024 (televisit)  Daughter states there have been some improvement in the past 1-2 months in patient's speech and energy level. No seizures reported. Keppra XR was lowered 1500mg in Jan  ____________________________ Interim Hx: 1/10/2024 (televisit)  Daughter reports overall patient has been stable. Very minimal improvement in symptoms.  No seizures reported. Continues Keppra XR 2g/nightly. Continues to have fatigue  AEEG completed 1/3-1/4/2024 1)	There was focal slowing over the left hemisphere with breech artifact.   2)	Sharp wave discharges in the left central region.   Following with stroke. CTA and echo completed 11/16.  ____________________________ Interim Hx: 9/12/2023 (televisit) Patient still in rehab. Daughter reports very slight improvement   S/p left cranioplasty revision and scalp augmentation on 8/10/2023  No seizures reported. On Keppra XR 2 g/daily Does have daytime fatigue   Seen by Stroke team w/ plan for stroke work up  ___________________________ Interim Hx: 6/15/2023 (televisit)  Patient still in rehab. Daughter reports there improvement has been slow but speech is slightly better.  Ambulatory EEG 5/17-5/19/2023 prelim review showed sharply contoured waveforms over left suggestive of hyperexcitability.   Patient reports no change in fatigue.  Upon speaking to charge nurse Keppra currently ordered as 1000mg in AM (regular) and XR 2000mg/night.  Unclear why it is ordered as such ____________________ Initial Hx: 5/10/2023 71 yo F with recent left subdural empyema s/p craniotomy/cranioplasty referred by neurosurgery for Keppra management.   Hospital records reviewed Initially presented to [facility] 12/22/2022 for slurred speech hand right sided weakness. CTH initially negative, repeat on 12/23 showed L sided SDH. 12/25 mental status worsened and required intubation. She was also noted to have episodes of twitching which was concerning for seizure so was started on Keppra 1 g BID and EEG was placed. No epileptiform activity noted.  CTH on 12/28 significant for L frontoparietal collection, MRI completed showed concern for possible empyema..  Transferred to [facility] 12/30. S/p L hemicraniectomy and washout of L subdural empyema 12/30/22. S/p sinus surgery with judith purulence to L maxillary sinus and posterior bony erosion to L sphenoid sinus 1/5. Discharged to [facility] rehab with plan to return for cranioplasty in 3 months post hemicraniectomy.  She spent 4 weeks at the rehab and then went home. Her right side weakness and aphasia were improving but then was re-admitted in Feb for worsening right sided weakness and aphasia. 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 S/p left cranioplasty reconstruction. Went to acute rehab and currently is in subacute rehab. Continues to participate in speech therapy/OT/PT, is improving very slowly. Now able to stand with one assist (as opposed to two).  Currently still on Keppra 1 g BID No seizures witnessed. Does have fatigue

## 2024-05-10 NOTE — DISCUSSION/SUMMARY
[FreeTextEntry1] : 72 yo F with recent left subdural empyema s/p craniotomy/cranioplasty  referred by neurosurgery for Keppra management. Possible seizures close to onset of symptoms related to empyema however no definite seizures since. She continues to carry potential risk given history. .AEEG completed 1/3-1/4/2024 1)	There was focal slowing over the left hemisphere with breech artifact.   2)	Sharp wave discharges in the left central region.   Continue Keppra XR 1500mg/nightly F/u in 6 months

## 2024-09-20 NOTE — PATIENT PROFILE ADULT - SURGICAL SITE INCISION
Patient verified name, , and procedure.    Type: 1a; abbreviated assessment per anesthesia guidelines    Labs per anesthesia: none    Instructed pt that they will be notified the day before their procedure by the GI Lab for time of arrival if their procedure is [place] and Pre-op for [place] cases. Arrival times should be called by 5 pm. If no phone is received the patient should contact their respective hospital. The GI lab telephone number is [phone] and ES Pre-op is [phone].     Follow diet and prep instructions per office including NPO status.  If patient has NOT received instructions from office patient is advised to call surgeon office, verbalizes understanding.    Bath or shower the night before and the am of surgery with non-mositurizing soap. No lotions, oils, powders, cologne on skin. No make up, eye make up or jewelry. Wear loose fitting comfortable, clean clothing.     Must have adult present in building the entire time .     Medications for the day of procedure Bupropion, Trintellix, Alprazolam if needed.    The following discharge instructions reviewed with patient: medication given during procedure may cause drowsiness for several hours, therefore, do not drive or operate machinery for remainder of the day. You may not drink alcohol on the day of your procedure, please resume regular diet and activity unless otherwise directed. You may experience abdominal distention for several hours that is relieved by the passage of gas. Contact your physician if you have any of the following: fever or chills, severe abdominal pain or excessive amount of bleeding or a large amount when having a bowel movement. Occasional specks of blood with bowel movement would not be unusual.      no

## 2024-12-11 NOTE — DISCHARGE NOTE PROVIDER - NSDCHC_MEDRECSTATUS_GEN_ALL_CORE
no lesions,  no deformities,  no traumatic injuries,  no significant scars are present,  chest wall non-tender,  no masses present, breathing is unlabored without accessory muscle use,normal breath sounds Admission Reconciliation is Completed  Discharge Reconciliation is Completed

## 2025-04-22 NOTE — PATIENT PROFILE ADULT - DO YOU LACK THE NECESSARY SUPPORT TO HELP YOU COPE WITH LIFE CHALLENGES?
As discussed we will go ahead and check for H. pylori for possible GERD causing your symptoms of food sticking to your throat.  Please drop off the H. pylori kit in the lab before you start the omeprazole.     Will check x-ray esophagogram for making sure there is no other concerns in your throat which could be causing this before further recommendations.  Please keep up the imaging appointment.  
no

## 2025-05-20 NOTE — PATIENT PROFILE ADULT - PACKS PER DAY
----- Message from Dr. Lisa Mukherjee MD sent at 5/19/2025  2:37 PM EDT -----  Please inform the patient that the test is possibly abnormal.   The test shows - possible blockage.    I recommend that the patient he consider an angiogram.       1

## 2025-06-26 NOTE — ED ADULT NURSE NOTE - OBJECTIVE STATEMENT
Brief Electrophysiology Procedure Note:  65 yo female with a Hx of asthma, pAF s/p ILR now s/p PVI via PFA (Affera).    Brief Procedural Details:  3 points of access obtained in the bilateral femoral veins under ultrasound and fluoroscopic guidance. ICE catheter confirmed no pericardial effusion prior to the case. Transseptal access obtained under ultrasound and fluoroscopic guidance. LA was then mapped with otherwise normal voltage. PVI was performed via PFA. Post ablation mapping confirmed electrically quiet pulmonary veins with exit block. ICE confirmed no pericardial effusion post ablation. Catheters were removed and hemostasis achieved via 3 Vascade closure devices. Pt received aaox3 s/p fall this morning. Pt recently discharged from rehab on 2/17. Pt has pmhx of hemorraghic stroke (december 2022) with L sided craniotomy with residual right sided deficits and garbled speech. Pt was transferring from wheelchair to  chair this morning when the right leg "gave out" and pt slid to the ground, sitting on her buttocks. Pt and sister deny any head-strike, LOC, or dizziness.

## (undated) DEVICE — PETRI DISH MED 3.5"

## (undated) DEVICE — GLV 7 PROTEXIS (WHITE)

## (undated) DEVICE — VENODYNE/SCD SLEEVE CALF MEDIUM

## (undated) DEVICE — GLV 8.5 PROTEXIS (WHITE)

## (undated) DEVICE — SUT ETHIBOND 3-0 36" RB-1

## (undated) DEVICE — Device

## (undated) DEVICE — SUT VICRYL 3-0 27" SH UNDYED

## (undated) DEVICE — MIDAS REX LEGEND BALL FLUTED LG BORE 5.0MM X 9CM

## (undated) DEVICE — SUT NUROLON 4-0 8-18" TF (POP-OFF)

## (undated) DEVICE — ELCTR COLORADO 3CM

## (undated) DEVICE — PREP BETADINE SPONGE STICKS

## (undated) DEVICE — STAPLER SKIN PROXIMATE

## (undated) DEVICE — BIPOLAR ISOCOOL TIP 1MM

## (undated) DEVICE — CODMAN PERFORATOR 14MM (BLUE)

## (undated) DEVICE — BLADE SURGICAL #15 CARBON

## (undated) DEVICE — LONE STAR RETRACTOR RING 12MM BLUNT DISP

## (undated) DEVICE — DRAPE SURGICAL #1010

## (undated) DEVICE — SUT ETHILON 2-0 18" PS

## (undated) DEVICE — MARKING PEN W RULER

## (undated) DEVICE — SUT VICRYL PLUS 2-0 18" CT-2 (POP-OFF)

## (undated) DEVICE — LAP PAD 18 X 18"

## (undated) DEVICE — DRSG TEGADERM 4X4.75"

## (undated) DEVICE — ELCTR BOVIE SUCTION 8FR 6"

## (undated) DEVICE — SYR LUER LOK 50CC

## (undated) DEVICE — DRAPE LIGHT HANDLE COVER (GREEN)

## (undated) DEVICE — DRAIN JACKSON PRATT 7MM FLAT FULL NO TROCAR

## (undated) DEVICE — PACK CRANIOTOMY LNX SURGICOUNT

## (undated) DEVICE — SUT CHROMIC 4-0 18" G-3

## (undated) DEVICE — GLV 8 PROTEXIS (WHITE)

## (undated) DEVICE — DRSG BIOPATCH DISK W CHG 1" W 4.0MM HOLE

## (undated) DEVICE — DRAPE MAYO STAND 30"

## (undated) DEVICE — DRSG GAUZE SPONGE 2X2" STERILE

## (undated) DEVICE — ELCTR STRYKER NEPTUNE SMOKE EVACUATION PENCIL (GREEN)

## (undated) DEVICE — BIPOLAR FORCEP SYMMETRY BAYONET STR 8.25" X 1.5MM (BLUE)

## (undated) DEVICE — SUT VICRYL 3-0 18" CP-2 UNDYED (POP-OFF)

## (undated) DEVICE — BIPOLAR FORCEP KIRWAN JEWELERS STR 4" X 0.4MM W 12FT CORD (GREEN)

## (undated) DEVICE — BIOMET BATTERY DISP

## (undated) DEVICE — PREP DURAPREP 26CC

## (undated) DEVICE — BLADE MEDTRONIC ENT INFERIOR TURBINATE ROTATABLE STRAIGHT 2.9MM X 11CM

## (undated) DEVICE — CLIPPER BLADE NEURO (BLUE)

## (undated) DEVICE — AESCULAP SCALPFIX 10 CLIPS

## (undated) DEVICE — FRAZIER CONNECTING TUBE 2FT 5MM

## (undated) DEVICE — DRAPE MAYO STAND 23"

## (undated) DEVICE — SYR ASEPTO

## (undated) DEVICE — DRAPE TOWEL BLUE 17" X 24"

## (undated) DEVICE — SUT SILK 2-0 30" PSL

## (undated) DEVICE — DRAIN RESERVOIR FOR JACKSON PRATT 100CC CARDINAL

## (undated) DEVICE — DRSG KERLIX ROLL 4.5"

## (undated) DEVICE — PACK RHINOPLASTY

## (undated) DEVICE — DRSG TELFA 3 X 8

## (undated) DEVICE — SUT ETHILON 3-0 18" PS-1

## (undated) DEVICE — BIPOLAR FORCEP KOGENT IRRIGATING STRAIGHT 0.5MM X 7" DISP

## (undated) DEVICE — DRSG MASTISOL

## (undated) DEVICE — DRAPE MAGNETIC INSTRUMENT MEDIUM

## (undated) DEVICE — WARMING BLANKET LOWER ADULT

## (undated) DEVICE — SOL ANTI FOG

## (undated) DEVICE — SUT PDS II 3-0 27" SH UNDYED

## (undated) DEVICE — DRSG GAUZE MOISTURIZER 0.5 OZ 4X8

## (undated) DEVICE — MIDAS REX MR8 BALL FLUTED LG BORE 5MM X 9CM

## (undated) DEVICE — STAPLER SKIN VISI-STAT 35 WIDE

## (undated) DEVICE — SLV COMPRESSION KNEE MED

## (undated) DEVICE — SPONGE SURGICAL STRIP 1/2 X 6"